# Patient Record
Sex: FEMALE | Race: WHITE | NOT HISPANIC OR LATINO | Employment: FULL TIME | URBAN - METROPOLITAN AREA
[De-identification: names, ages, dates, MRNs, and addresses within clinical notes are randomized per-mention and may not be internally consistent; named-entity substitution may affect disease eponyms.]

---

## 2017-01-02 ENCOUNTER — VIRTUAL VISIT (OUTPATIENT)
Dept: FAMILY MEDICINE | Facility: OTHER | Age: 42
End: 2017-01-02

## 2017-01-02 NOTE — PROGRESS NOTES
"Date:   Clinician: Tracey Guerra  Clinician NPI: [id number]  Patient: Lena Morrow  Patient : 1975  Patient Address: [address] [place] [address]  Patient Phone: [phone]  Visit Protocol: URI  Patient Summary:  Lena is a 41 year old ( : 1975 ) female who initiated a Zip for a presumed sinus infection. When asked the question \"Do you have a [place] primary care physician?\", Lena responded \"Yes\".     Her symptoms started gradually 1 week ago and consist of malaise, sore throat, hoarse voice, nasal congestion, and post-nasal drainage.   She denies dysphagia, nausea, vomiting, itchy eyes, fever, cough, myalgias, chest pain, petechial or purpuric rash, dyspnea, rhinitis, loss of appetite, chills, and ear pain. She denies a history of facial surgery.   Her minimal nasal secretions are green. Her mild facial pain or pressure feels worse when bending over or leaning forward and is located on both sides of her head. The facial pain or pressure started after the onset of other URI symptoms. Lena has a mild headache. The headache did not start before her other symptoms and is located on both sides of her head.   In the past year Lena has been diagnosed with two (2) episodes of sinusitis.   She has a mildly painful sore throat. The patient denies having white spots on the tonsils similar to a sample strep throat image provided. She has not been exposed to Strep. When asked to feel her neck she denied feeling enlarged lymph nodes. She denies axillary lymphadenopathy.    Lena denies having COPD or other chronic lung disease.   Pulse: Not measured beats in 10 seconds.    Weight (in lbs): 199.0   She states she is not pregnant and denies breastfeeding. She no longer menstruates.   Lena does not smoke or use smokeless tobacco.  MEDICATIONS:  Loratadine (Claritin, Tavist ND), acetaminophen (Tylenol), saline nose drops/spray (SeaMist, Ocean Nasal Spray), and guaifenesin (Robitussin, " Mucinex)   Patient free text response:  Lamotrigine   Buproprion   Kameron   Omeprazole     , ALLERGIES:   penicillin/amoxicillin/augmentin and Z-pack/azithromycin/clarithromycin/erythromycin    Clinician Response:  Dear Lena,  Based on the information you have provided, you likely have  acute sinusitis, otherwise known as a sinus infection.   I am prescribing cefdinir (Omnicef). Take one capsule by mouth two times a day for 10 days. There are no refills with this prescription.   Try the following to help with your throat pain and discomfort:     Use throat lozenges    Gargle with warm salt water (1/4 teaspoon of salt per 8 ounce glass of water).    Suck on frozen items such as Popsicles or ice cubes.     Call 911 or go to the emergency room if you feel that your throat is closing off, you suddenly develop a rash, you are unable to swallow fluids, you are drooling, or you are having difficulty breathing.  Follow up with your primary care clinician if your symptoms are not improving in 3-4 days.   Drink plenty of liquids, especially water and take time to rest your body. This may mean taking a nap or going to bed earlier. Your body is fighting an infection and liquids and rest will improve the pace of recovery. Remember to regularly wash your hands and avoid close contact with others to prevent spreading your infection.   Some people develop allergies to antibiotics. If you notice a new rash, significant swelling or difficulty breathing, stop the medication immediately and go into a clinic for physical evaluation.   Some women may also develop a yeast infection as a side effect of taking antibiotics. If you notice symptoms of a yeast infection, please use Zipnosis to get treatment.   If you become pregnant during this course of treatment with medication, stop taking the medication and contact your primary care clinician.   Diagnosis: Acute Sinusitis  Diagnosis ICD: J01.90  Prescription: cefdinir (Omnicef) 300 mg  oral capsule 20 capsules, 10 days supply. Take one capsule by mouth two times a day for 10 days. Refills: 0, Refill as needed: no, Allow substitutions: yes  Prescription Sent At: January 02 14:44:09, 2017  Pharmacy: [facility] [id number] - [phone] - [address] [place] [address]

## 2017-01-18 ENCOUNTER — VIRTUAL VISIT (OUTPATIENT)
Dept: FAMILY MEDICINE | Facility: OTHER | Age: 42
End: 2017-01-18

## 2017-01-18 NOTE — PROGRESS NOTES
"Date:   Clinician: Jaqueline Singh  Clinician NPI: [id number]  Patient: Lena Morrow  Patient : 1975  Patient Address: [address] [place] [id number]  Patient Phone: [phone]  Visit Protocol: URI  Patient Summary:  Lena is a 41 year old ( : 1975 ) female who initiated a Zip for a presumed sinus infection. When asked the question \"Do you have a [place] primary care physician?\", Lena responded \"Yes\".     Her symptoms started gradually 3-6 days ago and consist of post-nasal drainage, cough, malaise, rhinitis, and nasal congestion.   She denies fever, myalgias, chest pain, dysphagia, nausea, vomiting, itchy eyes, chills, sore throat, hoarse voice, ear pain, loss of appetite, and dyspnea. She denies a history of facial surgery.   Her moderate nasal secretions are green. Her mild facial pain or pressure feels worse when bending over or leaning forward and is located on both sides of her head. The facial pain or pressure started after the onset of other URI symptoms.  Lena has a mild headache. The headache did not start before her other symptoms and is located on both sides of her head.   In the past year Lena has been diagnosed with one (1) episodes of sinusitis. When asked to feel her neck she denied feeling enlarged lymph nodes. She cannot tell if axillary lymphadenopathy is present.   Her moderately severe (cough every 5-10 minutes) productive cough is more bothersome at night. She believes the cough is caused by post-nasal drainage. Her cough produces green sputum.    Lena denies having COPD or other chronic lung disease.   Pulse: Not measured beats in 10 seconds.    Weight (in lbs): 188   She states she is not pregnant and denies breastfeeding. She has menstruated in the past month.   Lena smokes or uses smokeless tobacco.  MEDICATIONS:  Antacid, guaifenesin + dextromethorphan (Robitussin DM, Mytussin DM, Mucinex DM), loratadine (Claritin), loratadine (Claritin, Tavist " ND), and naproxen (Aleve, Naprosyn)   Patient free text response:  Lamotrigine   Buproprion   Omeprazole   Joricarda  , ALLERGIES:   Z-pack/azithromycin/clarithromycin/erythromycin and penicillin/amoxicillin/augmentin    Clinician Response:  Dear Lena,  Based on the information you have provided, I am unable to diagnose and treat you without additional information. Please be seen in a clinic or urgent care to determine the treatment that is best for you. You will not be charged for this Zip. Thanks for using Zipnosis.   Diagnosis: Unable to diagnose  Diagnosis ICD: R69  Additional Clinician Notes: Lena, You were just treated on Zipnosis on 1/2/17 (16 days ago) for a sinus infection with the antibiotic: omnicef. Due to this history, you need to be seen in a clinical setting for evaluation.

## 2017-04-19 DIAGNOSIS — B00.9 HERPES SIMPLEX VIRUS (HSV) INFECTION: ICD-10-CM

## 2017-04-19 RX ORDER — VALACYCLOVIR HYDROCHLORIDE 500 MG/1
TABLET, FILM COATED ORAL
Qty: 6 TABLET | Refills: 0 | Status: CANCELLED | OUTPATIENT
Start: 2017-04-19

## 2017-04-20 DIAGNOSIS — B00.9 HSV (HERPES SIMPLEX VIRUS) INFECTION: Primary | ICD-10-CM

## 2017-04-20 NOTE — TELEPHONE ENCOUNTER
Okay, but unfortunately no pharmacy identified.  If ongoing problems, would need appointment or virtual visit.

## 2017-04-20 NOTE — TELEPHONE ENCOUNTER
Routing refill request to provider for review/approval because:  Labs out of range:  Cr  Patient needs to be seen because it has been more than 1 year since last office visit.    Patient reporting has current herpes simplex outbreak on R) arm.  Outbreak started yesterday, c/o discomfort.  Patient wanting Valtrex refilled.  Per records last seen for office visit 12/4/2015, however patient insists she has seen you since then.       Valtrex     Last Written Prescription Date: 9/8/2015  Last Fill Quantity: 6, # refills: 6  [place] [facility] or [facility] prescribing provider: 12/4/2015        Creatinine   Date Value Ref Range Status   11/18/2014 0.83 0.52 - 1.04 mg/dL Final

## 2017-04-21 RX ORDER — VALACYCLOVIR HYDROCHLORIDE 500 MG/1
TABLET, FILM COATED ORAL
Qty: 6 TABLET | Refills: 1 | Status: ON HOLD | OUTPATIENT
Start: 2017-04-21 | End: 2017-05-26

## 2017-05-01 ENCOUNTER — TRANSFERRED RECORDS (OUTPATIENT)
Dept: HEALTH INFORMATION MANAGEMENT | Facility: CLINIC | Age: 42
End: 2017-05-01

## 2017-05-01 LAB — PAP SMEAR - HIM PATIENT REPORTED: NEGATIVE

## 2017-05-08 ENCOUNTER — OFFICE VISIT (OUTPATIENT)
Dept: INTERNAL MEDICINE | Facility: CLINIC | Age: 42
End: 2017-05-08
Payer: COMMERCIAL

## 2017-05-08 VITALS
OXYGEN SATURATION: 96 % | BODY MASS INDEX: 31.4 KG/M2 | DIASTOLIC BLOOD PRESSURE: 82 MMHG | SYSTOLIC BLOOD PRESSURE: 112 MMHG | HEIGHT: 64 IN | TEMPERATURE: 98 F | WEIGHT: 183.9 LBS | HEART RATE: 90 BPM

## 2017-05-08 DIAGNOSIS — F31.9 BIPOLAR AFFECTIVE DISORDER, REMISSION STATUS UNSPECIFIED (H): ICD-10-CM

## 2017-05-08 DIAGNOSIS — Z01.818 PREOP GENERAL PHYSICAL EXAM: Primary | ICD-10-CM

## 2017-05-08 DIAGNOSIS — K21.9 GASTROESOPHAGEAL REFLUX DISEASE WITHOUT ESOPHAGITIS: ICD-10-CM

## 2017-05-08 DIAGNOSIS — N94.6 DYSMENORRHEA: ICD-10-CM

## 2017-05-08 DIAGNOSIS — N92.0 EXCESSIVE OR FREQUENT MENSTRUATION: ICD-10-CM

## 2017-05-08 DIAGNOSIS — F41.1 GENERALIZED ANXIETY DISORDER: ICD-10-CM

## 2017-05-08 DIAGNOSIS — G43.809 OTHER TYPE OF MIGRAINE: ICD-10-CM

## 2017-05-08 DIAGNOSIS — J45.21 MILD INTERMITTENT ASTHMA WITH ACUTE EXACERBATION: ICD-10-CM

## 2017-05-08 LAB
BASOPHILS # BLD AUTO: 0 10E9/L (ref 0–0.2)
BASOPHILS NFR BLD AUTO: 0.2 %
CREAT SERPL-MCNC: 0.99 MG/DL (ref 0.52–1.04)
DIFFERENTIAL METHOD BLD: NORMAL
EOSINOPHIL # BLD AUTO: 0.1 10E9/L (ref 0–0.7)
EOSINOPHIL NFR BLD AUTO: 1.1 %
ERYTHROCYTE [DISTWIDTH] IN BLOOD BY AUTOMATED COUNT: 12.4 % (ref 10–15)
GFR SERPL CREATININE-BSD FRML MDRD: 62 ML/MIN/1.7M2
HCT VFR BLD AUTO: 44.7 % (ref 35–47)
HGB BLD-MCNC: 14.4 G/DL (ref 11.7–15.7)
LYMPHOCYTES # BLD AUTO: 2.1 10E9/L (ref 0.8–5.3)
LYMPHOCYTES NFR BLD AUTO: 24.5 %
MCH RBC QN AUTO: 30.4 PG (ref 26.5–33)
MCHC RBC AUTO-ENTMCNC: 32.2 G/DL (ref 31.5–36.5)
MCV RBC AUTO: 94 FL (ref 78–100)
MONOCYTES # BLD AUTO: 0.6 10E9/L (ref 0–1.3)
MONOCYTES NFR BLD AUTO: 6.3 %
NEUTROPHILS # BLD AUTO: 5.9 10E9/L (ref 1.6–8.3)
NEUTROPHILS NFR BLD AUTO: 67.9 %
PLATELET # BLD AUTO: 243 10E9/L (ref 150–450)
RBC # BLD AUTO: 4.74 10E12/L (ref 3.8–5.2)
WBC # BLD AUTO: 8.7 10E9/L (ref 4–11)

## 2017-05-08 PROCEDURE — 85025 COMPLETE CBC W/AUTO DIFF WBC: CPT | Performed by: PHYSICIAN ASSISTANT

## 2017-05-08 PROCEDURE — 99214 OFFICE O/P EST MOD 30 MIN: CPT | Performed by: PHYSICIAN ASSISTANT

## 2017-05-08 PROCEDURE — 82565 ASSAY OF CREATININE: CPT | Performed by: PHYSICIAN ASSISTANT

## 2017-05-08 PROCEDURE — 36415 COLL VENOUS BLD VENIPUNCTURE: CPT | Performed by: PHYSICIAN ASSISTANT

## 2017-05-08 NOTE — TELEPHONE ENCOUNTER
albuterol (PROAIR HFA, PROVENTIL HFA, VENTOLIN HFA) 108 (90 BASE) MCG/ACT inhaler       Last Written Prescription Date: 12/04/2015  Last Fill Quantity: 1 inhaler, # refills: prn    Last Office Visit with [facility], [facility] or [facility] prescribing provider:  05/08/2017   Future Office Visit:       Date of Last Asthma Action Plan Letter:   Asthma Action Plan Q1 Year    Topic Date Due     Asthma Action Plan - yearly  01/06/2016      Asthma Control Test:   ACT Total Scores 9/1/2015   ACT TOTAL SCORE -   ASTHMA ER VISITS -   ASTHMA HOSPITALIZATIONS -   ACT TOTAL SCORE (Goal Greater than or Equal to 20) 25   In the past 12 months, how many times did you visit the emergency room for your asthma without being admitted to the hospital? 0   In the past 12 months, how many times were you hospitalized overnight because of your asthma? 0       Date of Last Spirometry Test:   No results found for this or any previous visit.

## 2017-05-08 NOTE — PROGRESS NOTES
[address] [address]  [phone]  Dept: [phone]    PRE-OP EVALUATION:  Today's date: 2017    Lena Morrow (: 1975) presents for pre-operative evaluation assessment as requested by Dr. Hastings.  She requires evaluation and anesthesia risk assessment prior to undergoing surgery/procedure for treatment of  Menorrhagia, metomenorrhagia .  Proposed procedure: ROBOTIC ASSISTED LAPAROSCOPIC TOTAL HYSTERECTOMY; BILATERAL SALPINGECTOMY; CYSTOSCOPY    Date of Surgery/ Procedure:   Time of Surgery/ Procedure: 7 am   Hospital/Surgical Facility: [facility]     Primary Physician: Trevon Crisostomo  Type of Anesthesia Anticipated: General    Patient has a Health Care Directive or Living Will:  YES     1. NO - Do you have a history of heart attack, stroke, stent, bypass or surgery on an artery in the head, neck, heart or legs?  2. NO - Do you ever have any pain or discomfort in your chest?  3. NO - Do you have a history of  Heart Failure?  4. NO - Are you troubled by shortness of breath when: walking on the level, up a slight hill or at night?  5. NO - Do you currently have a cold, bronchitis or other respiratory infection?  6. NO - Do you have a cough, shortness of breath or wheezing?  7. NO - Do you sometimes get pains in the calves of your legs when you walk?  8. NO - Do you or anyone in your family have previous history of blood clots?  9. NO - Do you or does anyone in your family have a serious bleeding problem such as prolonged bleeding following surgeries or cuts?  10. NO - Have you ever had problems with anemia or been told to take iron pills?  11. NO - Have you had any abnormal blood loss such as black, tarry or bloody stools, or abnormal vaginal bleeding?  12. NO - Have you ever had a blood transfusion?  13. NO - Have you or any of your relatives ever had problems with anesthesia?  14. NO - Do you have sleep apnea, excessive  snoring or daytime drowsiness?  15. NO - Do you have any prosthetic heart valves?  16. NO - Do you have prosthetic joints?  17. NO - Is there any chance that you may be pregnant?      HPI:                                                      Brief HPI related to upcoming procedure: dysmenorrhea/ menorrhagia/ failed with conservative treatments       See problem list for active medical problems.  Problems all longstanding and stable, except as noted/documented.  See ROS for pertinent symptoms related to these conditions.                                                                                                  .    MEDICAL HISTORY:                                                      Patient Active Problem List    Diagnosis Date Noted     Kidney stones 10/13/2014     Priority: Medium     LSIL (low grade squamous intraepithelial lesion) on Pap smear 10/10/2012     Priority: Medium     10/10/12 LSIL on pap smear done at [facility]  11/2012 Colposcopy done at [facility], pt. Is to have a repeat pap in four months.  4/2013 NL pap, pt. Is to have a repeat pap in one year, 4/2014  Endometrial biopsy benign 12/2015       Bipolar disorder (H) 04/27/2015     History of overspending       Recurrent sinusitis 09/25/2014     3/2014, 9/2014, 9/2015, 10/2015       Vitamin D deficiency 06/10/2014     Abnormal pap      Generalized anxiety disorder 03/20/2013     Atrial flutter, paroxysmal (H)      Occurred during pregnancy, none since.  On meds since.  Rare symptoms lasting up to 2 minutes.       Health Care Home 06/20/2011     X  DX V65.8 REPLACED WITH [facility] (04/08/2013)       CARDIOVASCULAR SCREENING; LDL GOAL LESS THAN 160 10/31/2010     Depression      Onset 2001 after death of multiple family members.  Has been on and off meds for recurrent episodes.  Poor control of symptoms for years       DDD (degenerative disc disease), cervical      Obesity 02/12/2009     Backache 08/30/2007      Problem list name updated by automated process. Provider to review       Other type of migraine      Diagnosis updated by automated process. Provider to review and confirm.       Herpes simplex virus (HSV) infection      Affects R hand area         ASTHMA - MILD INTERMITTENT 2005     Rare need for albuterol       Panic disorder without agoraphobia 2005     DYSPEPSIA 2005     Gastroesophageal reflux disease 2005     Allergic rhinitis 2005     Problem list name updated by automated process. Provider to review        Past Medical History:   Diagnosis Date     Allergic rhinitis, cause unspecified      Arrhythmia     atrial flutter, paroxysmal     Asthma      Atrial flutter, paroxysmal (H)      C. difficile colitis 10/14     Cholelithiasis      DDD (degenerative disc disease), cervical      DYSPEPSIA      Gastro-oesophageal reflux disease      Herpes simplex without mention of complication      LSIL (low grade squamous intraepithelial lesion) on Pap smear 10/10/12    Done at [facility]     Major depression      Mild intermittent asthma      Obesity      Other forms of migraine, without mention of intractable migraine without mention of status migrainosus      Panic disorder without agoraphobia      Past Surgical History:   Procedure Laterality Date     C NONSPECIFIC PROCEDURE      tonsillectomy     C NONSPECIFIC PROCEDURE      Open right carpal tunnel release.  Excision right dorsal carpal ganglion cyst. Excision, terminal branch, right posterior interosseous nerve.        COLONOSCOPY       DILATION AND CURETTAGE N/A 2016    Procedure: DILATION AND CURETTAGE;  Surgeon: Josue Jama MD;  Location: [facility]     GYN SURGERY           HC COLP CERVIX/UPPER VAGINA W BX CERVIX  10/30/12    [facility]     LAPAROSCOPIC CHOLECYSTECTOMY  4/3/2012    Procedure:LAPAROSCOPIC CHOLECYSTECTOMY; LAPAROSCOPIC CHOLECYSTECTOMY ; Surgeon:KARYNA CAMARA;  Location:[facility]     LAPAROSCOPY DIAGNOSTIC (GYN) N/A 1/5/2016    Procedure: LAPAROSCOPY DIAGNOSTIC (GYN);  Surgeon: Josue Jama MD;  Location: [facility]     ORTHOPEDIC SURGERY      carpal tunnel with ganglian cyst removal     Current Outpatient Prescriptions   Medication Sig Dispense Refill     valACYclovir (VALTREX) 500 MG tablet 1 tab twice a day for 3 days for recurrent herpes outbreak 6 tablet 1     methocarbamol (ROBAXIN) 750 MG tablet Take 1 tablet (750 mg) by mouth 4 times daily as needed 30 tablet 1     lamoTRIgine (LAMICTAL) 200 MG tablet Take 1 tablet twice per day (400 mg per day) 60 tablet 5     buPROPion (WELLBUTRIN XL) 150 MG 24 hr tablet Take 3 tablets in the morning 90 tablet 5     propranolol (INDERAL) 20 MG tablet Take 1-2 tablets as needed for anxiety (up to twice a day) 30 tablet 2     fluticasone (FLONASE) 50 MCG/ACT nasal spray Spray 2 sprays into both nostrils daily 1 Bottle prn     omeprazole 20 MG tablet Take 1 tablet (20 mg) by mouth daily Take 30-60 minutes before a meal. 90 tablet prn     saccharomyces boulardii (FLORASTOR) 250 MG capsule Take 250 mg by mouth 2 times daily       oxyCODONE (ROXICODONE) 5 MG immediate release tablet Take 1-2 tablets (5-10 mg) by mouth every 3 hours as needed for pain or other (Moderate to Severe) 30 tablet 0     LORazepam (ATIVAN) 0.5 MG tablet Take 1-2 tablets (0.5-1 mg) by mouth nightly as needed 15 tablet 0     albuterol (PROAIR HFA, PROVENTIL HFA, VENTOLIN HFA) 108 (90 BASE) MCG/ACT inhaler Inhale 2 puffs into the lungs every 6 hours as needed for shortness of breath / dyspnea or wheezing 1 Inhaler prn     ascorbic acid (VITAMIN C) 500 MG tablet Take 1,000 mg by mouth daily  30 tablet      Cholecalciferol (VITAMIN D) 2000 UNITS tablet Take 5,000 Units by mouth daily  100 tablet 3     loratadine (CLARITIN) 10 MG capsule Take 10 mg by mouth 2 times daily       Prenatal vitamin  s (DIS) TABS Take 1 tablet by mouth daily        OTC products: None,  "except as noted above    Allergies   Allergen Reactions     Amoxicillin      yeast vaginal inf     Ativan Fatigue     Augmentin [Amoxicillin-Pot Clavulanate]      itching     Azithromycin      GI cramps     Clonazepam      Sedation, even at 0.5 mg dose     Estrogens      # bcps cause cns sx     Lexapro      diarrhea       Mirtazapine      Sedation      Prochlorperazine      Compazine- agitation     Seasonal Allergies      Venlafaxine      sweats     Xanax [Alprazolam] Fatigue      Latex Allergy: NO    Social History   Substance Use Topics     Smoking status: Never Smoker     Smokeless tobacco: Never Used     Alcohol use Yes      Comment: Very rare     History   Drug Use No       REVIEW OF SYSTEMS:                                                    C: NEGATIVE for fever, chills, change in weight  INTEGUMENTARY/SKIN: NEGATIVE for worrisome rashes, moles or lesions  EYES: NEGATIVE for vision changes or irritation  E/M: NEGATIVE for ear, mouth and throat problems  R: NEGATIVE for significant cough or SOB  CV: NEGATIVE for chest pain, palpitations or peripheral edema  GI: NEGATIVE for nausea, abdominal pain, heartburn, or change in bowel habits  MUSCULOSKELETAL: NEGATIVE for significant arthralgias or myalgia  ENDOCRINE: NEGATIVE for temperature intolerance, skin/hair changes  HEME/ALLERGY/IMMUNE: NEGATIVE for bleeding problems- just heavy menses  PSYCHIATRIC: NEGATIVE for changes in mood or affect  ROS otherwise negative    EXAM:                                                    /82 (BP Location: Left arm, Patient Position: Chair, Cuff Size: Adult Regular)  Pulse 90  Temp 98  F (36.7  C) (Oral)  Ht 5' 4\" (1.626 m)  Wt 183 lb 14.4 oz (83.4 kg)  SpO2 96%  BMI 31.57 kg/m2  GENERAL APPEARANCE: healthy, alert and no distress  HENT: ear canals and TM's normal and nose and mouth without ulcers or lesions  RESP: lungs clear to auscultation - no rales, rhonchi or wheezes  CV: regular rate and rhythm, normal S1 S2, " no S3 or S4 and no murmur, click or rub   ABDOMEN: soft, nontender, no HSM or masses and bowel sounds normal  MS: extremities normal- no gross deformities noted  SKIN: no suspicious lesions or rashes  NEURO: Normal strength and tone, sensory exam grossly normal, mentation intact and speech normal  PSYCH: mentation appears normal and affect normal/bright    DIAGNOSTICS:                                                      EKG: Not indicated due to non-vascular surgery and low risk of event (age <65 and without cardiac risk factors)  Labs Drawn and in Process:   Unresulted Labs Ordered in the Past 30 Days of this Admission     No orders found from 3/9/2017 to 5/9/2017.       Creatinine and CBC ordered at time of visit - pending     Recent Labs   Lab Test  11/18/14   2249  10/09/14   1230   HGB  15.2  14.1   PLT  259  276   NA  144  139   POTASSIUM  3.5  3.9   CR  0.83  0.83        IMPRESSION:                                                    Reason for surgery/procedure: menorrhagia, dysmenorrhea   Diagnosis/reason for consult: bipolar, anxiety disorder migraine, gerd    The proposed surgical procedure is considered INTERMEDIATE risk.    REVISED CARDIAC RISK INDEX  The patient has the following serious cardiovascular risks for perioperative complications such as (MI, PE, VFib and 3  AV Block):  No serious cardiac risks  INTERPRETATION: 0 risks: Class I (very low risk - 0.4% complication rate)    The patient has the following additional risks for perioperative complications:  No identified additional risks      ICD-10-CM    1. Preop general physical exam Z01.818 CBC with platelets differential     Creatinine   2. Excessive or frequent menstruation N92.0 CBC with platelets differential     Creatinine   3. Dysmenorrhea N94.6 CBC with platelets differential     Creatinine   4. Bipolar affective disorder, remission status unspecified (H) F31.9    5. Generalized anxiety disorder F41.1    6. Gastroesophageal reflux disease  K21.9    7. Other type of migraine G43.809        RECOMMENDATIONS:                                                          --Patient is to take all scheduled medications on the day of surgery EXCEPT for modifications listed below.    Anticoagulant or Antiplatelet Medication Use  NSAIDS: stop 7 days prior to surgery - including any aspirin products       Hold vitamins supplements the am of surgery       APPROVAL GIVEN to proceed with proposed procedure, without further diagnostic evaluation       Signed Electronically by: Amelia Strauss PA-C    Copy of this evaluation report is provided to requesting physician.    [facility] Preop Guidelines

## 2017-05-08 NOTE — MR AVS SNAPSHOT
After Visit Summary   5/8/2017    Lena Morrow    MRN: [id number]           Patient Information     Date Of Birth          1975        Visit Information        Provider Department      5/8/2017 3:20 PM Amelia Strauss PA-C [facility]        Today's Diagnoses     Preop general physical exam    -  1    Excessive or frequent menstruation        Dysmenorrhea          Care Instructions    No ibuprofen/ aspirin products after the 19th of may  Ok to take your routine medications with a sip of water morning of surgery  NO vitamins the morning of surgery .        Before Your Surgery      Call your surgeon if there is any change in your health. This includes signs of a cold or flu (such as a sore throat, runny nose, cough, rash or fever).    Do not smoke, drink alcohol or take over the counter medicine (unless your surgeon or primary care doctor tells you to) for the 24 hours before and after surgery.    If you take prescribed drugs: Follow your doctor s orders about which medicines to take and which to stop until after surgery.    Eating and drinking prior to surgery: follow the instructions from your surgeon    Take a shower or bath the night before surgery. Use the soap your surgeon gave you to gently clean your skin. If you do not have soap from your surgeon, use your regular soap. Do not shave or scrub the surgery site.  Wear clean pajamas and have clean sheets on your bed.         Follow-ups after your visit        Your next 10 appointments already scheduled     May 26, 2017   Procedure with Toni Da Silva MD   [facility] (--)    [address]  [facility] [address] [phone]              Who to contact     If you have questions or need follow up information about today's clinic visit or your schedule please contact [facility] directly at [phone].  Normal or non-critical lab and imaging results  "will be communicated to you by MyChart, letter or phone within 4 business days after the clinic has received the results. If you do not hear from us within 7 days, please contact the clinic through [facility] or phone. If you have a critical or abnormal lab result, we will notify you by phone as soon as possible.  Submit refill requests through [facility] or call your pharmacy and they will forward the refill request to us. Please allow 3 business days for your refill to be completed.          Additional Information About Your Visit        [facility] Information     [facility] gives you secure access to your electronic health record. If you see a primary care provider, you can also send messages to your care team and make appointments. If you have questions, please call your primary care clinic.  If you do not have a primary care provider, please call [phone] and they will assist you.        Care EveryWhere ID     This is your Care EveryWhere ID. This could be used by other organizations to access your [place] medical records  [id number]        Your Vitals Were     Pulse Temperature Height Pulse Oximetry BMI (Body Mass Index)       90 98  F (36.7  C) (Oral) 5' 4\" (1.626 m) 96% 31.57 kg/m2        Blood Pressure from Last 3 Encounters:   05/08/17 112/82   08/26/16 114/84   01/05/16 114/77    Weight from Last 3 Encounters:   05/08/17 183 lb 14.4 oz (83.4 kg)   08/26/16 193 lb 6.4 oz (87.7 kg)   01/05/16 190 lb 6.4 oz (86.4 kg)              We Performed the Following     CBC with platelets differential     Creatinine        Primary Care Provider Office Phone # Fax #    Trevon Crisostomo [phone]       [facility] [address] [facility] [address]        Thank you!     Thank you for choosing [facility]  for your care. Our goal is always to provide you with excellent care. Hearing back from our patients is one way we can continue to improve our services. Please " take a few minutes to complete the written survey that you may receive in the mail after your visit with us. Thank you!             Your Updated Medication List - Protect others around you: Learn how to safely use, store and throw away your medicines at www.disposemymeds.org.          This list is accurate as of: 5/8/17  3:55 PM.  Always use your most recent med list.                   Brand Name Dispense Instructions for use    albuterol 108 (90 BASE) MCG/ACT Inhaler    PROAIR HFA/PROVENTIL HFA/VENTOLIN HFA    1 Inhaler    Inhale 2 puffs into the lungs every 6 hours as needed for shortness of breath / dyspnea or wheezing       ascorbic acid 500 MG tablet    VITAMIN C    30 tablet    Take 1,000 mg by mouth daily       buPROPion 150 MG 24 hr tablet    WELLBUTRIN XL    90 tablet    Take 3 tablets in the morning       CLARITIN 10 MG capsule   Generic drug:  loratadine      Take 10 mg by mouth 2 times daily       fluticasone 50 MCG/ACT spray    FLONASE    1 Bottle    Spray 2 sprays into both nostrils daily       lamoTRIgine 200 MG tablet    LaMICtal    60 tablet    Take 1 tablet twice per day (400 mg per day)       LORazepam 0.5 MG tablet    ATIVAN    15 tablet    Take 1-2 tablets (0.5-1 mg) by mouth nightly as needed       methocarbamol 750 MG tablet    ROBAXIN    30 tablet    Take 1 tablet (750 mg) by mouth 4 times daily as needed       omeprazole 20 MG tablet     90 tablet    Take 1 tablet (20 mg) by mouth daily Take 30-60 minutes before a meal.       oxyCODONE 5 MG IR tablet    ROXICODONE    30 tablet    Take 1-2 tablets (5-10 mg) by mouth every 3 hours as needed for pain or other (Moderate to Severe)       Prenatal vitamin  s (DIS) Tabs      Take 1 tablet by mouth daily       propranolol 20 MG tablet    INDERAL    30 tablet    Take 1-2 tablets as needed for anxiety (up to twice a day)       saccharomyces boulardii 250 MG capsule    FLORASTOR     Take 250 mg by mouth 2 times daily       valACYclovir 500 MG tablet     VALTREX    6 tablet    1 tab twice a day for 3 days for recurrent herpes outbreak       vitamin D 2000 UNITS tablet     100 tablet    Take 5,000 Units by mouth daily

## 2017-05-08 NOTE — PATIENT INSTRUCTIONS
No ibuprofen/ aspirin products after the 19th of may  Ok to take your routine medications with a sip of water morning of surgery  NO vitamins the morning of surgery .        Before Your Surgery      Call your surgeon if there is any change in your health. This includes signs of a cold or flu (such as a sore throat, runny nose, cough, rash or fever).    Do not smoke, drink alcohol or take over the counter medicine (unless your surgeon or primary care doctor tells you to) for the 24 hours before and after surgery.    If you take prescribed drugs: Follow your doctor s orders about which medicines to take and which to stop until after surgery.    Eating and drinking prior to surgery: follow the instructions from your surgeon    Take a shower or bath the night before surgery. Use the soap your surgeon gave you to gently clean your skin. If you do not have soap from your surgeon, use your regular soap. Do not shave or scrub the surgery site.  Wear clean pajamas and have clean sheets on your bed.

## 2017-05-08 NOTE — NURSING NOTE
"Chief Complaint   Patient presents with     Pre-Op Exam     hysterectomy 05/26       Initial /82 (BP Location: Left arm, Patient Position: Chair, Cuff Size: Adult Regular)  Pulse 90  Temp 98  F (36.7  C) (Oral)  Ht 5' 4\" (1.626 m)  Wt 183 lb 14.4 oz (83.4 kg)  SpO2 96%  BMI 31.57 kg/m2 Estimated body mass index is 31.57 kg/(m^2) as calculated from the following:    Height as of this encounter: 5' 4\" (1.626 m).    Weight as of this encounter: 183 lb 14.4 oz (83.4 kg).  Medication Reconciliation: complete    "

## 2017-05-09 NOTE — TELEPHONE ENCOUNTER
Routing refill request to provider for review/approval because:  Last ACT test 2015 per [facility] protocol to be done every 6 months

## 2017-05-10 RX ORDER — ALBUTEROL SULFATE 90 UG/1
2 AEROSOL, METERED RESPIRATORY (INHALATION) EVERY 6 HOURS PRN
Qty: 1 INHALER | Status: SHIPPED | OUTPATIENT
Start: 2017-05-10 | End: 2018-04-24

## 2017-05-23 NOTE — H&P (VIEW-ONLY)
[address] [address]  [phone]  Dept: [phone]    PRE-OP EVALUATION:  Today's date: 2017    Lena Morrow (: 1975) presents for pre-operative evaluation assessment as requested by Dr. Hastings.  She requires evaluation and anesthesia risk assessment prior to undergoing surgery/procedure for treatment of  Menorrhagia, metomenorrhagia .  Proposed procedure: ROBOTIC ASSISTED LAPAROSCOPIC TOTAL HYSTERECTOMY; BILATERAL SALPINGECTOMY; CYSTOSCOPY    Date of Surgery/ Procedure:   Time of Surgery/ Procedure: 7 am   Hospital/Surgical Facility: [facility]     Primary Physician: Trevon Crisostomo  Type of Anesthesia Anticipated: General    Patient has a Health Care Directive or Living Will:  YES     1. NO - Do you have a history of heart attack, stroke, stent, bypass or surgery on an artery in the head, neck, heart or legs?  2. NO - Do you ever have any pain or discomfort in your chest?  3. NO - Do you have a history of  Heart Failure?  4. NO - Are you troubled by shortness of breath when: walking on the level, up a slight hill or at night?  5. NO - Do you currently have a cold, bronchitis or other respiratory infection?  6. NO - Do you have a cough, shortness of breath or wheezing?  7. NO - Do you sometimes get pains in the calves of your legs when you walk?  8. NO - Do you or anyone in your family have previous history of blood clots?  9. NO - Do you or does anyone in your family have a serious bleeding problem such as prolonged bleeding following surgeries or cuts?  10. NO - Have you ever had problems with anemia or been told to take iron pills?  11. NO - Have you had any abnormal blood loss such as black, tarry or bloody stools, or abnormal vaginal bleeding?  12. NO - Have you ever had a blood transfusion?  13. NO - Have you or any of your relatives ever had problems with anesthesia?  14. NO - Do you have sleep apnea, excessive  snoring or daytime drowsiness?  15. NO - Do you have any prosthetic heart valves?  16. NO - Do you have prosthetic joints?  17. NO - Is there any chance that you may be pregnant?      HPI:                                                      Brief HPI related to upcoming procedure: dysmenorrhea/ menorrhagia/ failed with conservative treatments       See problem list for active medical problems.  Problems all longstanding and stable, except as noted/documented.  See ROS for pertinent symptoms related to these conditions.                                                                                                  .    MEDICAL HISTORY:                                                      Patient Active Problem List    Diagnosis Date Noted     Kidney stones 10/13/2014     Priority: Medium     LSIL (low grade squamous intraepithelial lesion) on Pap smear 10/10/2012     Priority: Medium     10/10/12 LSIL on pap smear done at [facility]  11/2012 Colposcopy done at [facility], pt. Is to have a repeat pap in four months.  4/2013 NL pap, pt. Is to have a repeat pap in one year, 4/2014  Endometrial biopsy benign 12/2015       Bipolar disorder (H) 04/27/2015     History of overspending       Recurrent sinusitis 09/25/2014     3/2014, 9/2014, 9/2015, 10/2015       Vitamin D deficiency 06/10/2014     Abnormal pap      Generalized anxiety disorder 03/20/2013     Atrial flutter, paroxysmal (H)      Occurred during pregnancy, none since.  On meds since.  Rare symptoms lasting up to 2 minutes.       Health Care Home 06/20/2011     X  DX V65.8 REPLACED WITH 7[facility]30 HEALTH CARE HOME (04/08/2013)       CARDIOVASCULAR SCREENING; LDL GOAL LESS THAN 160 10/31/2010     Depression      Onset 2001 after death of multiple family members.  Has been on and off meds for recurrent episodes.  Poor control of symptoms for years       DDD (degenerative disc disease), cervical      Obesity 02/12/2009     Backache 08/30/2007      Problem list name updated by automated process. Provider to review       Other type of migraine      Diagnosis updated by automated process. Provider to review and confirm.       Herpes simplex virus (HSV) infection      Affects R hand area         ASTHMA - MILD INTERMITTENT 2005     Rare need for albuterol       Panic disorder without agoraphobia 2005     DYSPEPSIA 2005     Gastroesophageal reflux disease 2005     Allergic rhinitis 2005     Problem list name updated by automated process. Provider to review        Past Medical History:   Diagnosis Date     Allergic rhinitis, cause unspecified      Arrhythmia     atrial flutter, paroxysmal     Asthma      Atrial flutter, paroxysmal (H)      C. difficile colitis 10/14     Cholelithiasis      DDD (degenerative disc disease), cervical      DYSPEPSIA      Gastro-oesophageal reflux disease      Herpes simplex without mention of complication      LSIL (low grade squamous intraepithelial lesion) on Pap smear 10/10/12    Done at [facility]     Major depression      Mild intermittent asthma      Obesity      Other forms of migraine, without mention of intractable migraine without mention of status migrainosus      Panic disorder without agoraphobia      Past Surgical History:   Procedure Laterality Date     C NONSPECIFIC PROCEDURE      tonsillectomy     C NONSPECIFIC PROCEDURE      Open right carpal tunnel release.  Excision right dorsal carpal ganglion cyst. Excision, terminal branch, right posterior interosseous nerve.        COLONOSCOPY       DILATION AND CURETTAGE N/A 2016    Procedure: DILATION AND CURETTAGE;  Surgeon: Josue Jama MD;  Location: [facility]     GYN SURGERY           HC COLP CERVIX/UPPER VAGINA W BX CERVIX  10/30/12    [facility]     LAPAROSCOPIC CHOLECYSTECTOMY  4/3/2012    Procedure:LAPAROSCOPIC CHOLECYSTECTOMY; LAPAROSCOPIC CHOLECYSTECTOMY ; Surgeon:KARYNA CAMARA;  Location:[facility]     LAPAROSCOPY DIAGNOSTIC (GYN) N/A 1/5/2016    Procedure: LAPAROSCOPY DIAGNOSTIC (GYN);  Surgeon: Josue Jama MD;  Location: [facility]     ORTHOPEDIC SURGERY      carpal tunnel with ganglian cyst removal     Current Outpatient Prescriptions   Medication Sig Dispense Refill     valACYclovir (VALTREX) 500 MG tablet 1 tab twice a day for 3 days for recurrent herpes outbreak 6 tablet 1     methocarbamol (ROBAXIN) 750 MG tablet Take 1 tablet (750 mg) by mouth 4 times daily as needed 30 tablet 1     lamoTRIgine (LAMICTAL) 200 MG tablet Take 1 tablet twice per day (400 mg per day) 60 tablet 5     buPROPion (WELLBUTRIN XL) 150 MG 24 hr tablet Take 3 tablets in the morning 90 tablet 5     propranolol (INDERAL) 20 MG tablet Take 1-2 tablets as needed for anxiety (up to twice a day) 30 tablet 2     fluticasone (FLONASE) 50 MCG/ACT nasal spray Spray 2 sprays into both nostrils daily 1 Bottle prn     omeprazole 20 MG tablet Take 1 tablet (20 mg) by mouth daily Take 30-60 minutes before a meal. 90 tablet prn     saccharomyces boulardii (FLORASTOR) 250 MG capsule Take 250 mg by mouth 2 times daily       oxyCODONE (ROXICODONE) 5 MG immediate release tablet Take 1-2 tablets (5-10 mg) by mouth every 3 hours as needed for pain or other (Moderate to Severe) 30 tablet 0     LORazepam (ATIVAN) 0.5 MG tablet Take 1-2 tablets (0.5-1 mg) by mouth nightly as needed 15 tablet 0     albuterol (PROAIR HFA, PROVENTIL HFA, VENTOLIN HFA) 108 (90 BASE) MCG/ACT inhaler Inhale 2 puffs into the lungs every 6 hours as needed for shortness of breath / dyspnea or wheezing 1 Inhaler prn     ascorbic acid (VITAMIN C) 500 MG tablet Take 1,000 mg by mouth daily  30 tablet      Cholecalciferol (VITAMIN D) 2000 UNITS tablet Take 5,000 Units by mouth daily  100 tablet 3     loratadine (CLARITIN) 10 MG capsule Take 10 mg by mouth 2 times daily       Prenatal vitamin  s (DIS) TABS Take 1 tablet by mouth daily        OTC products: None,  "except as noted above    Allergies   Allergen Reactions     Amoxicillin      yeast vaginal inf     Ativan Fatigue     Augmentin [Amoxicillin-Pot Clavulanate]      itching     Azithromycin      GI cramps     Clonazepam      Sedation, even at 0.5 mg dose     Estrogens      # bcps cause cns sx     Lexapro      diarrhea       Mirtazapine      Sedation      Prochlorperazine      Compazine- agitation     Seasonal Allergies      Venlafaxine      sweats     Xanax [Alprazolam] Fatigue      Latex Allergy: NO    Social History   Substance Use Topics     Smoking status: Never Smoker     Smokeless tobacco: Never Used     Alcohol use Yes      Comment: Very rare     History   Drug Use No       REVIEW OF SYSTEMS:                                                    C: NEGATIVE for fever, chills, change in weight  INTEGUMENTARY/SKIN: NEGATIVE for worrisome rashes, moles or lesions  EYES: NEGATIVE for vision changes or irritation  E/M: NEGATIVE for ear, mouth and throat problems  R: NEGATIVE for significant cough or SOB  CV: NEGATIVE for chest pain, palpitations or peripheral edema  GI: NEGATIVE for nausea, abdominal pain, heartburn, or change in bowel habits  MUSCULOSKELETAL: NEGATIVE for significant arthralgias or myalgia  ENDOCRINE: NEGATIVE for temperature intolerance, skin/hair changes  HEME/ALLERGY/IMMUNE: NEGATIVE for bleeding problems- just heavy menses  PSYCHIATRIC: NEGATIVE for changes in mood or affect  ROS otherwise negative    EXAM:                                                    /82 (BP Location: Left arm, Patient Position: Chair, Cuff Size: Adult Regular)  Pulse 90  Temp 98  F (36.7  C) (Oral)  Ht 5' 4\" (1.626 m)  Wt 183 lb 14.4 oz (83.4 kg)  SpO2 96%  BMI 31.57 kg/m2  GENERAL APPEARANCE: healthy, alert and no distress  HENT: ear canals and TM's normal and nose and mouth without ulcers or lesions  RESP: lungs clear to auscultation - no rales, rhonchi or wheezes  CV: regular rate and rhythm, normal S1 S2, " no S3 or S4 and no murmur, click or rub   ABDOMEN: soft, nontender, no HSM or masses and bowel sounds normal  MS: extremities normal- no gross deformities noted  SKIN: no suspicious lesions or rashes  NEURO: Normal strength and tone, sensory exam grossly normal, mentation intact and speech normal  PSYCH: mentation appears normal and affect normal/bright    DIAGNOSTICS:                                                      EKG: Not indicated due to non-vascular surgery and low risk of event (age <65 and without cardiac risk factors)  Labs Drawn and in Process:   Unresulted Labs Ordered in the Past 30 Days of this Admission     No orders found from 3/9/2017 to 5/9/2017.       Creatinine and CBC ordered at time of visit - pending     Recent Labs   Lab Test  11/18/14   2249  10/09/14   1230   HGB  15.2  14.1   PLT  259  276   NA  144  139   POTASSIUM  3.5  3.9   CR  0.83  0.83        IMPRESSION:                                                    Reason for surgery/procedure: menorrhagia, dysmenorrhea   Diagnosis/reason for consult: bipolar, anxiety disorder migraine, gerd    The proposed surgical procedure is considered INTERMEDIATE risk.    REVISED CARDIAC RISK INDEX  The patient has the following serious cardiovascular risks for perioperative complications such as (MI, PE, VFib and 3  AV Block):  No serious cardiac risks  INTERPRETATION: 0 risks: Class I (very low risk - 0.4% complication rate)    The patient has the following additional risks for perioperative complications:  No identified additional risks      ICD-10-CM    1. Preop general physical exam Z01.818 CBC with platelets differential     Creatinine   2. Excessive or frequent menstruation N92.0 CBC with platelets differential     Creatinine   3. Dysmenorrhea N94.6 CBC with platelets differential     Creatinine   4. Bipolar affective disorder, remission status unspecified (H) F31.9    5. Generalized anxiety disorder F41.1    6. Gastroesophageal reflux disease  K21.9    7. Other type of migraine G43.809        RECOMMENDATIONS:                                                          --Patient is to take all scheduled medications on the day of surgery EXCEPT for modifications listed below.    Anticoagulant or Antiplatelet Medication Use  NSAIDS: stop 7 days prior to surgery - including any aspirin products       Hold vitamins supplements the am of surgery       APPROVAL GIVEN to proceed with proposed procedure, without further diagnostic evaluation       Signed Electronically by: Amelia Strauss PA-C    Copy of this evaluation report is provided to requesting physician.    [facility] Preop Guidelines

## 2017-05-26 ENCOUNTER — HOSPITAL ENCOUNTER (OUTPATIENT)
Facility: CLINIC | Age: 42
Discharge: HOME | End: 2017-05-27
Attending: OBSTETRICS & GYNECOLOGY | Admitting: OBSTETRICS & GYNECOLOGY
Payer: COMMERCIAL

## 2017-05-26 ENCOUNTER — ANESTHESIA (OUTPATIENT)
Dept: SURGERY | Facility: CLINIC | Age: 42
End: 2017-05-26
Payer: COMMERCIAL

## 2017-05-26 ENCOUNTER — ANESTHESIA EVENT (OUTPATIENT)
Dept: SURGERY | Facility: CLINIC | Age: 42
End: 2017-05-26
Payer: COMMERCIAL

## 2017-05-26 DIAGNOSIS — Z90.710 S/P LAPAROSCOPIC HYSTERECTOMY: Primary | ICD-10-CM

## 2017-05-26 DIAGNOSIS — R10.2 PELVIC PAIN IN FEMALE: ICD-10-CM

## 2017-05-26 DIAGNOSIS — N80.9 ENDOMETRIOSIS: ICD-10-CM

## 2017-05-26 LAB
ABO + RH BLD: NORMAL
ABO + RH BLD: NORMAL
BLD GP AB SCN SERPL QL: NORMAL
BLOOD BANK CMNT PATIENT-IMP: NORMAL
ERYTHROCYTE [DISTWIDTH] IN BLOOD BY AUTOMATED COUNT: 12.4 % (ref 10–15)
HCG SERPL QL: NEGATIVE
HCT VFR BLD AUTO: 46.1 % (ref 35–47)
HGB BLD-MCNC: 15.3 G/DL (ref 11.7–15.7)
MCH RBC QN AUTO: 30.5 PG (ref 26.5–33)
MCHC RBC AUTO-ENTMCNC: 33.2 G/DL (ref 31.5–36.5)
MCV RBC AUTO: 92 FL (ref 78–100)
PLATELET # BLD AUTO: 245 10E9/L (ref 150–450)
RBC # BLD AUTO: 5.01 10E12/L (ref 3.8–5.2)
SPECIMEN EXP DATE BLD: NORMAL
WBC # BLD AUTO: 8.3 10E9/L (ref 4–11)

## 2017-05-26 PROCEDURE — 36000085 ZZH SURGERY LEVEL 8 1ST 30 MIN: Performed by: OBSTETRICS & GYNECOLOGY

## 2017-05-26 PROCEDURE — 25000566 ZZH SEVOFLURANE, EA 15 MIN: Performed by: OBSTETRICS & GYNECOLOGY

## 2017-05-26 PROCEDURE — 88307 TISSUE EXAM BY PATHOLOGIST: CPT | Performed by: OBSTETRICS & GYNECOLOGY

## 2017-05-26 PROCEDURE — 36415 COLL VENOUS BLD VENIPUNCTURE: CPT | Performed by: OBSTETRICS & GYNECOLOGY

## 2017-05-26 PROCEDURE — 84703 CHORIONIC GONADOTROPIN ASSAY: CPT | Performed by: OBSTETRICS & GYNECOLOGY

## 2017-05-26 PROCEDURE — 25000125 ZZHC RX 250: Performed by: ANESTHESIOLOGY

## 2017-05-26 PROCEDURE — 25000128 H RX IP 250 OP 636: Performed by: ANESTHESIOLOGY

## 2017-05-26 PROCEDURE — 25000128 H RX IP 250 OP 636: Performed by: OBSTETRICS & GYNECOLOGY

## 2017-05-26 PROCEDURE — 40000936 ZZH STATISTIC OUTPATIENT (NON-OBS) NIGHT

## 2017-05-26 PROCEDURE — 27210794 ZZH OR GENERAL SUPPLY STERILE: Performed by: OBSTETRICS & GYNECOLOGY

## 2017-05-26 PROCEDURE — 25000128 H RX IP 250 OP 636: Performed by: NURSE ANESTHETIST, CERTIFIED REGISTERED

## 2017-05-26 PROCEDURE — 25000125 ZZHC RX 250: Performed by: OBSTETRICS & GYNECOLOGY

## 2017-05-26 PROCEDURE — 36000087 ZZH SURGERY LEVEL 8 EA 15 ADDTL MIN: Performed by: OBSTETRICS & GYNECOLOGY

## 2017-05-26 PROCEDURE — S0077 INJECTION, CLINDAMYCIN PHOSP: HCPCS | Performed by: OBSTETRICS & GYNECOLOGY

## 2017-05-26 PROCEDURE — 27210995 ZZH RX 272: Performed by: OBSTETRICS & GYNECOLOGY

## 2017-05-26 PROCEDURE — 86901 BLOOD TYPING SEROLOGIC RH(D): CPT | Performed by: OBSTETRICS & GYNECOLOGY

## 2017-05-26 PROCEDURE — 37000009 ZZH ANESTHESIA TECHNICAL FEE, EACH ADDTL 15 MIN: Performed by: OBSTETRICS & GYNECOLOGY

## 2017-05-26 PROCEDURE — 86900 BLOOD TYPING SEROLOGIC ABO: CPT | Performed by: OBSTETRICS & GYNECOLOGY

## 2017-05-26 PROCEDURE — 25000125 ZZHC RX 250: Performed by: NURSE ANESTHETIST, CERTIFIED REGISTERED

## 2017-05-26 PROCEDURE — 40000935 ZZH STATISTIC OUTPATIENT (NON-OBS) EVE

## 2017-05-26 PROCEDURE — 25000132 ZZH RX MED GY IP 250 OP 250 PS 637: Performed by: OBSTETRICS & GYNECOLOGY

## 2017-05-26 PROCEDURE — 71000012 ZZH RECOVERY PHASE 1 LEVEL 1 FIRST HR: Performed by: OBSTETRICS & GYNECOLOGY

## 2017-05-26 PROCEDURE — 88307 TISSUE EXAM BY PATHOLOGIST: CPT | Mod: 26 | Performed by: OBSTETRICS & GYNECOLOGY

## 2017-05-26 PROCEDURE — 40000934 ZZH STATISTIC OUTPATIENT (NON-OBS) DAY

## 2017-05-26 PROCEDURE — 25800025 ZZH RX 258: Performed by: OBSTETRICS & GYNECOLOGY

## 2017-05-26 PROCEDURE — 71000013 ZZH RECOVERY PHASE 1 LEVEL 1 EA ADDTL HR: Performed by: OBSTETRICS & GYNECOLOGY

## 2017-05-26 PROCEDURE — 85027 COMPLETE CBC AUTOMATED: CPT | Performed by: OBSTETRICS & GYNECOLOGY

## 2017-05-26 PROCEDURE — 86850 RBC ANTIBODY SCREEN: CPT | Performed by: OBSTETRICS & GYNECOLOGY

## 2017-05-26 PROCEDURE — 40000170 ZZH STATISTIC PRE-PROCEDURE ASSESSMENT II: Performed by: OBSTETRICS & GYNECOLOGY

## 2017-05-26 PROCEDURE — 37000008 ZZH ANESTHESIA TECHNICAL FEE, 1ST 30 MIN: Performed by: OBSTETRICS & GYNECOLOGY

## 2017-05-26 RX ORDER — NALOXONE HYDROCHLORIDE 0.4 MG/ML
.1-.4 INJECTION, SOLUTION INTRAMUSCULAR; INTRAVENOUS; SUBCUTANEOUS
Status: DISCONTINUED | OUTPATIENT
Start: 2017-05-26 | End: 2017-05-27 | Stop reason: HOSPADM

## 2017-05-26 RX ORDER — CLINDAMYCIN PHOSPHATE 900 MG/50ML
900 INJECTION, SOLUTION INTRAVENOUS SEE ADMIN INSTRUCTIONS
Status: DISCONTINUED | OUTPATIENT
Start: 2017-05-26 | End: 2017-05-26 | Stop reason: HOSPADM

## 2017-05-26 RX ORDER — IBUPROFEN 600 MG/1
600 TABLET, FILM COATED ORAL EVERY 6 HOURS PRN
Qty: 90 TABLET | Refills: 0 | Status: SHIPPED | OUTPATIENT
Start: 2017-05-26 | End: 2020-01-16

## 2017-05-26 RX ORDER — HYDROMORPHONE HYDROCHLORIDE 1 MG/ML
.3-.5 INJECTION, SOLUTION INTRAMUSCULAR; INTRAVENOUS; SUBCUTANEOUS
Status: DISCONTINUED | OUTPATIENT
Start: 2017-05-26 | End: 2017-05-27 | Stop reason: HOSPADM

## 2017-05-26 RX ORDER — ONDANSETRON 4 MG/1
4 TABLET, ORALLY DISINTEGRATING ORAL EVERY 30 MIN PRN
Status: DISCONTINUED | OUTPATIENT
Start: 2017-05-26 | End: 2017-05-26 | Stop reason: HOSPADM

## 2017-05-26 RX ORDER — DEXAMETHASONE SODIUM PHOSPHATE 4 MG/ML
INJECTION, SOLUTION INTRA-ARTICULAR; INTRALESIONAL; INTRAMUSCULAR; INTRAVENOUS; SOFT TISSUE PRN
Status: DISCONTINUED | OUTPATIENT
Start: 2017-05-26 | End: 2017-05-26

## 2017-05-26 RX ORDER — METOCLOPRAMIDE 10 MG/1
10 TABLET ORAL EVERY 6 HOURS PRN
Status: DISCONTINUED | OUTPATIENT
Start: 2017-05-26 | End: 2017-05-27 | Stop reason: HOSPADM

## 2017-05-26 RX ORDER — CLINDAMYCIN PHOSPHATE 900 MG/50ML
900 INJECTION, SOLUTION INTRAVENOUS
Status: COMPLETED | OUTPATIENT
Start: 2017-05-26 | End: 2017-05-26

## 2017-05-26 RX ORDER — MAGNESIUM HYDROXIDE 1200 MG/15ML
LIQUID ORAL PRN
Status: DISCONTINUED | OUTPATIENT
Start: 2017-05-26 | End: 2017-05-26 | Stop reason: HOSPADM

## 2017-05-26 RX ORDER — FLUTICASONE PROPIONATE 50 MCG
1 SPRAY, SUSPENSION (ML) NASAL DAILY PRN
COMMUNITY
End: 2017-07-17

## 2017-05-26 RX ORDER — ONDANSETRON 2 MG/ML
4 INJECTION INTRAMUSCULAR; INTRAVENOUS EVERY 6 HOURS PRN
Status: DISCONTINUED | OUTPATIENT
Start: 2017-05-26 | End: 2017-05-27 | Stop reason: HOSPADM

## 2017-05-26 RX ORDER — ONDANSETRON 2 MG/ML
4 INJECTION INTRAMUSCULAR; INTRAVENOUS EVERY 30 MIN PRN
Status: DISCONTINUED | OUTPATIENT
Start: 2017-05-26 | End: 2017-05-26 | Stop reason: HOSPADM

## 2017-05-26 RX ORDER — SODIUM CHLORIDE, SODIUM LACTATE, POTASSIUM CHLORIDE, CALCIUM CHLORIDE 600; 310; 30; 20 MG/100ML; MG/100ML; MG/100ML; MG/100ML
INJECTION, SOLUTION INTRAVENOUS CONTINUOUS PRN
Status: DISCONTINUED | OUTPATIENT
Start: 2017-05-26 | End: 2017-05-26

## 2017-05-26 RX ORDER — FENTANYL CITRATE 50 UG/ML
INJECTION, SOLUTION INTRAMUSCULAR; INTRAVENOUS PRN
Status: DISCONTINUED | OUTPATIENT
Start: 2017-05-26 | End: 2017-05-26

## 2017-05-26 RX ORDER — PROCHLORPERAZINE MALEATE 5 MG
5-10 TABLET ORAL EVERY 6 HOURS PRN
Status: DISCONTINUED | OUTPATIENT
Start: 2017-05-26 | End: 2017-05-27 | Stop reason: HOSPADM

## 2017-05-26 RX ORDER — PROPOFOL 10 MG/ML
INJECTION, EMULSION INTRAVENOUS PRN
Status: DISCONTINUED | OUTPATIENT
Start: 2017-05-26 | End: 2017-05-26

## 2017-05-26 RX ORDER — KETOROLAC TROMETHAMINE 30 MG/ML
INJECTION, SOLUTION INTRAMUSCULAR; INTRAVENOUS PRN
Status: DISCONTINUED | OUTPATIENT
Start: 2017-05-26 | End: 2017-05-26

## 2017-05-26 RX ORDER — SODIUM CHLORIDE, SODIUM LACTATE, POTASSIUM CHLORIDE, CALCIUM CHLORIDE 600; 310; 30; 20 MG/100ML; MG/100ML; MG/100ML; MG/100ML
INJECTION, SOLUTION INTRAVENOUS CONTINUOUS
Status: DISCONTINUED | OUTPATIENT
Start: 2017-05-26 | End: 2017-05-26 | Stop reason: HOSPADM

## 2017-05-26 RX ORDER — KETOROLAC TROMETHAMINE 30 MG/ML
30 INJECTION, SOLUTION INTRAMUSCULAR; INTRAVENOUS EVERY 6 HOURS PRN
Status: DISCONTINUED | OUTPATIENT
Start: 2017-05-26 | End: 2017-05-27 | Stop reason: HOSPADM

## 2017-05-26 RX ORDER — METOCLOPRAMIDE HYDROCHLORIDE 5 MG/ML
10 INJECTION INTRAMUSCULAR; INTRAVENOUS EVERY 6 HOURS PRN
Status: DISCONTINUED | OUTPATIENT
Start: 2017-05-26 | End: 2017-05-27 | Stop reason: HOSPADM

## 2017-05-26 RX ORDER — IBUPROFEN 600 MG/1
600 TABLET, FILM COATED ORAL EVERY 6 HOURS PRN
Status: DISCONTINUED | OUTPATIENT
Start: 2017-05-26 | End: 2017-05-27 | Stop reason: HOSPADM

## 2017-05-26 RX ORDER — ONDANSETRON 2 MG/ML
INJECTION INTRAMUSCULAR; INTRAVENOUS PRN
Status: DISCONTINUED | OUTPATIENT
Start: 2017-05-26 | End: 2017-05-26

## 2017-05-26 RX ORDER — NEOSTIGMINE METHYLSULFATE 1 MG/ML
VIAL (ML) INJECTION PRN
Status: DISCONTINUED | OUTPATIENT
Start: 2017-05-26 | End: 2017-05-26

## 2017-05-26 RX ORDER — FENTANYL CITRATE 0.05 MG/ML
25-50 INJECTION, SOLUTION INTRAMUSCULAR; INTRAVENOUS
Status: DISCONTINUED | OUTPATIENT
Start: 2017-05-26 | End: 2017-05-26 | Stop reason: HOSPADM

## 2017-05-26 RX ORDER — VECURONIUM BROMIDE 1 MG/ML
INJECTION, POWDER, LYOPHILIZED, FOR SOLUTION INTRAVENOUS PRN
Status: DISCONTINUED | OUTPATIENT
Start: 2017-05-26 | End: 2017-05-26

## 2017-05-26 RX ORDER — ONDANSETRON 4 MG/1
4 TABLET, ORALLY DISINTEGRATING ORAL EVERY 6 HOURS PRN
Status: DISCONTINUED | OUTPATIENT
Start: 2017-05-26 | End: 2017-05-27 | Stop reason: HOSPADM

## 2017-05-26 RX ORDER — LIDOCAINE HYDROCHLORIDE 20 MG/ML
INJECTION, SOLUTION INFILTRATION; PERINEURAL PRN
Status: DISCONTINUED | OUTPATIENT
Start: 2017-05-26 | End: 2017-05-26

## 2017-05-26 RX ORDER — PROPOFOL 10 MG/ML
INJECTION, EMULSION INTRAVENOUS CONTINUOUS PRN
Status: DISCONTINUED | OUTPATIENT
Start: 2017-05-26 | End: 2017-05-26

## 2017-05-26 RX ORDER — OXYCODONE AND ACETAMINOPHEN 5; 325 MG/1; MG/1
1-2 TABLET ORAL EVERY 4 HOURS PRN
Qty: 30 TABLET | Refills: 0 | Status: SHIPPED | OUTPATIENT
Start: 2017-05-26 | End: 2017-07-17 | Stop reason: ALTCHOICE

## 2017-05-26 RX ORDER — OXYCODONE AND ACETAMINOPHEN 5; 325 MG/1; MG/1
1-2 TABLET ORAL EVERY 4 HOURS PRN
Status: DISCONTINUED | OUTPATIENT
Start: 2017-05-26 | End: 2017-05-27 | Stop reason: HOSPADM

## 2017-05-26 RX ORDER — HYDROXYZINE HYDROCHLORIDE 50 MG/ML
25 INJECTION, SOLUTION INTRAMUSCULAR ONCE
Status: COMPLETED | OUTPATIENT
Start: 2017-05-26 | End: 2017-05-26

## 2017-05-26 RX ORDER — PRENATAL VIT/IRON FUM/FOLIC AC 27MG-0.8MG
1 TABLET ORAL DAILY
COMMUNITY
End: 2020-07-03

## 2017-05-26 RX ORDER — GLYCOPYRROLATE 0.2 MG/ML
INJECTION, SOLUTION INTRAMUSCULAR; INTRAVENOUS PRN
Status: DISCONTINUED | OUTPATIENT
Start: 2017-05-26 | End: 2017-05-26

## 2017-05-26 RX ADMIN — CLINDAMYCIN PHOSPHATE 900 MG: 18 INJECTION, SOLUTION INTRAVENOUS at 09:30

## 2017-05-26 RX ADMIN — GENTAMICIN SULFATE 170 MG: 40 INJECTION, SOLUTION INTRAMUSCULAR; INTRAVENOUS at 09:34

## 2017-05-26 RX ADMIN — FENTANYL CITRATE 50 MCG: 50 INJECTION, SOLUTION INTRAMUSCULAR; INTRAVENOUS at 09:19

## 2017-05-26 RX ADMIN — ONDANSETRON 4 MG: 2 INJECTION INTRAMUSCULAR; INTRAVENOUS at 11:23

## 2017-05-26 RX ADMIN — PHENYLEPHRINE HYDROCHLORIDE 150 MCG: 10 INJECTION, SOLUTION INTRAMUSCULAR; INTRAVENOUS; SUBCUTANEOUS at 09:49

## 2017-05-26 RX ADMIN — VECURONIUM BROMIDE 1 MG: 1 INJECTION, POWDER, LYOPHILIZED, FOR SOLUTION INTRAVENOUS at 11:01

## 2017-05-26 RX ADMIN — PHENYLEPHRINE HYDROCHLORIDE 150 MCG: 10 INJECTION, SOLUTION INTRAMUSCULAR; INTRAVENOUS; SUBCUTANEOUS at 10:13

## 2017-05-26 RX ADMIN — PHENYLEPHRINE HYDROCHLORIDE 100 MCG: 10 INJECTION, SOLUTION INTRAMUSCULAR; INTRAVENOUS; SUBCUTANEOUS at 11:19

## 2017-05-26 RX ADMIN — HYDROMORPHONE HYDROCHLORIDE 0.5 MG: 1 INJECTION, SOLUTION INTRAMUSCULAR; INTRAVENOUS; SUBCUTANEOUS at 13:04

## 2017-05-26 RX ADMIN — HYDROXYZINE HYDROCHLORIDE 25 MG: 50 INJECTION, SOLUTION INTRAMUSCULAR at 13:24

## 2017-05-26 RX ADMIN — HYDROMORPHONE HYDROCHLORIDE 0.5 MG: 1 INJECTION, SOLUTION INTRAMUSCULAR; INTRAVENOUS; SUBCUTANEOUS at 15:49

## 2017-05-26 RX ADMIN — FENTANYL CITRATE 25 MCG: 50 INJECTION, SOLUTION INTRAMUSCULAR; INTRAVENOUS at 12:01

## 2017-05-26 RX ADMIN — KETOROLAC TROMETHAMINE 30 MG: 30 INJECTION, SOLUTION INTRAMUSCULAR at 11:55

## 2017-05-26 RX ADMIN — PHENYLEPHRINE HYDROCHLORIDE 100 MCG: 10 INJECTION, SOLUTION INTRAMUSCULAR; INTRAVENOUS; SUBCUTANEOUS at 09:45

## 2017-05-26 RX ADMIN — HYDROMORPHONE HYDROCHLORIDE 0.5 MG: 1 INJECTION, SOLUTION INTRAMUSCULAR; INTRAVENOUS; SUBCUTANEOUS at 12:53

## 2017-05-26 RX ADMIN — GLYCOPYRROLATE 0.6 MG: 0.2 INJECTION, SOLUTION INTRAMUSCULAR; INTRAVENOUS at 11:56

## 2017-05-26 RX ADMIN — PHENYLEPHRINE HYDROCHLORIDE 100 MCG: 10 INJECTION, SOLUTION INTRAMUSCULAR; INTRAVENOUS; SUBCUTANEOUS at 09:35

## 2017-05-26 RX ADMIN — ROCURONIUM BROMIDE 50 MG: 10 INJECTION INTRAVENOUS at 09:23

## 2017-05-26 RX ADMIN — SODIUM CHLORIDE, POTASSIUM CHLORIDE, SODIUM LACTATE AND CALCIUM CHLORIDE: 600; 310; 30; 20 INJECTION, SOLUTION INTRAVENOUS at 13:06

## 2017-05-26 RX ADMIN — FENTANYL CITRATE 50 MCG: 50 INJECTION, SOLUTION INTRAMUSCULAR; INTRAVENOUS at 12:43

## 2017-05-26 RX ADMIN — OXYCODONE HYDROCHLORIDE AND ACETAMINOPHEN 2 TABLET: 5; 325 TABLET ORAL at 17:41

## 2017-05-26 RX ADMIN — SODIUM CHLORIDE, POTASSIUM CHLORIDE, SODIUM LACTATE AND CALCIUM CHLORIDE: 600; 310; 30; 20 INJECTION, SOLUTION INTRAVENOUS at 10:41

## 2017-05-26 RX ADMIN — FENTANYL CITRATE 50 MCG: 50 INJECTION, SOLUTION INTRAMUSCULAR; INTRAVENOUS at 12:32

## 2017-05-26 RX ADMIN — KETOROLAC TROMETHAMINE 30 MG: 30 INJECTION, SOLUTION INTRAMUSCULAR at 20:31

## 2017-05-26 RX ADMIN — PHENYLEPHRINE HYDROCHLORIDE 100 MCG: 10 INJECTION, SOLUTION INTRAMUSCULAR; INTRAVENOUS; SUBCUTANEOUS at 09:30

## 2017-05-26 RX ADMIN — PROPOFOL 200 MG: 10 INJECTION, EMULSION INTRAVENOUS at 09:23

## 2017-05-26 RX ADMIN — DEXAMETHASONE SODIUM PHOSPHATE 4 MG: 4 INJECTION, SOLUTION INTRA-ARTICULAR; INTRALESIONAL; INTRAMUSCULAR; INTRAVENOUS; SOFT TISSUE at 09:38

## 2017-05-26 RX ADMIN — SODIUM CHLORIDE, POTASSIUM CHLORIDE, SODIUM LACTATE AND CALCIUM CHLORIDE: 600; 310; 30; 20 INJECTION, SOLUTION INTRAVENOUS at 09:19

## 2017-05-26 RX ADMIN — FENTANYL CITRATE 200 MCG: 50 INJECTION, SOLUTION INTRAMUSCULAR; INTRAVENOUS at 09:23

## 2017-05-26 RX ADMIN — PHENYLEPHRINE HYDROCHLORIDE 100 MCG: 10 INJECTION, SOLUTION INTRAMUSCULAR; INTRAVENOUS; SUBCUTANEOUS at 11:03

## 2017-05-26 RX ADMIN — NEOSTIGMINE METHYLSULFATE 4 MG: 1 INJECTION INTRAMUSCULAR; INTRAVENOUS; SUBCUTANEOUS at 11:56

## 2017-05-26 RX ADMIN — VECURONIUM BROMIDE 0.5 MG: 1 INJECTION, POWDER, LYOPHILIZED, FOR SOLUTION INTRAVENOUS at 11:19

## 2017-05-26 RX ADMIN — PHENYLEPHRINE HYDROCHLORIDE 100 MCG: 10 INJECTION, SOLUTION INTRAMUSCULAR; INTRAVENOUS; SUBCUTANEOUS at 10:23

## 2017-05-26 RX ADMIN — VECURONIUM BROMIDE 2 MG: 1 INJECTION, POWDER, LYOPHILIZED, FOR SOLUTION INTRAVENOUS at 09:58

## 2017-05-26 RX ADMIN — OXYCODONE HYDROCHLORIDE AND ACETAMINOPHEN 2 TABLET: 5; 325 TABLET ORAL at 21:45

## 2017-05-26 RX ADMIN — PHENYLEPHRINE HYDROCHLORIDE 150 MCG: 10 INJECTION, SOLUTION INTRAMUSCULAR; INTRAVENOUS; SUBCUTANEOUS at 10:04

## 2017-05-26 RX ADMIN — VECURONIUM BROMIDE 1 MG: 1 INJECTION, POWDER, LYOPHILIZED, FOR SOLUTION INTRAVENOUS at 10:45

## 2017-05-26 RX ADMIN — LIDOCAINE HYDROCHLORIDE 100 MG: 20 INJECTION, SOLUTION INFILTRATION; PERINEURAL at 09:23

## 2017-05-26 RX ADMIN — PROPOFOL 30 MCG/KG/MIN: 10 INJECTION, EMULSION INTRAVENOUS at 09:23

## 2017-05-26 RX ADMIN — PHENYLEPHRINE HYDROCHLORIDE 150 MCG: 10 INJECTION, SOLUTION INTRAMUSCULAR; INTRAVENOUS; SUBCUTANEOUS at 10:43

## 2017-05-26 RX ADMIN — PHENYLEPHRINE HYDROCHLORIDE 150 MCG: 10 INJECTION, SOLUTION INTRAMUSCULAR; INTRAVENOUS; SUBCUTANEOUS at 11:56

## 2017-05-26 RX ADMIN — HYDROMORPHONE HYDROCHLORIDE 0.5 MG: 1 INJECTION, SOLUTION INTRAMUSCULAR; INTRAVENOUS; SUBCUTANEOUS at 13:15

## 2017-05-26 ASSESSMENT — LIFESTYLE VARIABLES: TOBACCO_USE: 0

## 2017-05-26 NOTE — OR NURSING
recieves total of 100 mcg fentanyl in PACU- 275 mcg in OR- additionally admin total of 1.5 mg dilaudid IV in PACU- minimally efective- Called Dr Karmen BELL to admin vistaril 25 mg IM to left gluteus- OK to send up to Obs Unit.

## 2017-05-26 NOTE — PLAN OF CARE
Problem: Goal Outcome Summary  Goal: Goal Outcome Summary  Outcome: No Change  A&O but lethargic upon arrival. Not OOB since admission. ADAT- tolerating clears, denies nausea. BP soft (97/53, 94/45), otherwise VSS on 3L NC- wean as able. C/o 10/10 abd pain, ice pack applied as pt drifting in and out of sleep. 4 lap sites C/D/I. Arias patent. No vaginal bleeding noted.

## 2017-05-26 NOTE — IP AVS SNAPSHOT
[facility]    [address] [place] [address]    Phone:  [phone]                                       After Visit Summary   5/26/2017    Lena Morrow    MRN: [id number]           After Visit Summary Signature Page     I have received my discharge instructions, and my questions have been answered. I have discussed any challenges I see with this plan with the nurse or doctor.    ..........................................................................................................................................  Patient/Patient Representative Signature      ..........................................................................................................................................  Patient Representative Print Name and Relationship to Patient    ..................................................               ................................................  Date                                            Time    ..........................................................................................................................................  Reviewed by Signature/Title    ...................................................              ..............................................  Date                                                            Time

## 2017-05-26 NOTE — ANESTHESIA PREPROCEDURE EVALUATION
Procedure: Procedure(s):  DAVINCI HYSTERECTOMY TOTAL, SALPINGECTOMY BILATERAL  CYSTOSCOPY  Preop diagnosis: PELVIC PAIN AND ENDOMETRIOSIS    Allergies   Allergen Reactions     Amoxicillin      yeast vaginal inf     Ativan Fatigue     Augmentin [Amoxicillin-Pot Clavulanate]      itching     Azithromycin      GI cramps     Clonazepam      Sedation, even at 0.5 mg dose     Estrogens      # bcps cause cns sx     Lexapro      diarrhea       Mirtazapine      Sedation      Prochlorperazine      Compazine- agitation     Seasonal Allergies      Venlafaxine      sweats     Xanax [Alprazolam] Fatigue     Past Medical History:   Diagnosis Date     Abnormal pap      Allergic rhinitis, cause unspecified      Arrhythmia     atrial flutter, paroxysmal     Asthma      Atrial flutter, paroxysmal (H)      C. difficile colitis 10/14     Cholelithiasis      DDD (degenerative disc disease), cervical      DYSPEPSIA      Endometriosis      Gastro-oesophageal reflux disease      Herpes simplex without mention of complication      LSIL (low grade squamous intraepithelial lesion) on Pap smear 10/10/12    Done at [facility]     Major depression      Mild intermittent asthma      Obesity      Other forms of migraine, without mention of intractable migraine without mention of status migrainosus      Panic disorder without agoraphobia      Past Surgical History:   Procedure Laterality Date     C NONSPECIFIC PROCEDURE      tonsillectomy     C NONSPECIFIC PROCEDURE      Open right carpal tunnel release.  Excision right dorsal carpal ganglion cyst. Excision, terminal branch, right posterior interosseous nerve.        COLONOSCOPY       DILATION AND CURETTAGE N/A 2016    Procedure: DILATION AND CURETTAGE;  Surgeon: Josue Jama MD;  Location: [facility]     ENT SURGERY       GYN SURGERY           HC COLP CERVIX/UPPER VAGINA W BX CERVIX  10/30/12    [facility]     LAPAROSCOPIC CHOLECYSTECTOMY  4/3/2012     Procedure:LAPAROSCOPIC CHOLECYSTECTOMY; LAPAROSCOPIC CHOLECYSTECTOMY ; Surgeon:KARYNA CAMARA; Location:[facility]     LAPAROSCOPY DIAGNOSTIC (GYN) N/A 1/5/2016    Procedure: LAPAROSCOPY DIAGNOSTIC (GYN);  Surgeon: Josue Jama MD;  Location: [facility]     ORTHOPEDIC SURGERY      carpal tunnel with ganglian cyst removal     Prior to Admission medications    Medication Sig Start Date End Date Taking? Authorizing Provider   BuPROPion HCl (WELLBUTRIN XL PO) Take 450 mg by mouth daily   Yes Reported, Patient   VITAMIN D, CHOLECALCIFEROL, PO Take 5,000 Units by mouth daily   Yes Reported, Patient   fluticasone (FLONASE) 50 MCG/ACT spray Spray 1 spray into both nostrils daily as needed for rhinitis or allergies   Yes Reported, Patient   LamoTRIgine (LAMICTAL PO) Take 200 mg by mouth 2 times daily   Yes Reported, Patient   LORATADINE PO Take 10 mg by mouth daily   Yes Reported, Patient   Methocarbamol (ROBAXIN PO) Take 750 mg by mouth 4 times daily as needed for muscle spasms   Yes Reported, Patient   OMEPRAZOLE PO Take 20 mg by mouth daily   Yes Reported, Patient   OXYCODONE HCL PO Take 5-10 mg by mouth every 3 hours as needed   Yes Reported, Patient   Prenatal Vit-Fe Fumarate-FA (PRENATAL MULTIVITAMIN  PLUS IRON) 27-0.8 MG TABS per tablet Take 1 tablet by mouth daily   Yes Reported, Patient   PROPRANOLOL HCL PO Take 20-40 mg by mouth 2 times daily as needed (anxiety)   Yes Reported, Patient   ValACYclovir HCl (VALTREX PO) Take 500 mg by mouth 2 times daily as needed (herpes outbreak)   Yes Reported, Patient   Phenylephrine-APAP-Guaifenesin (MUCINEX SINUS-MAX PO) Take 1,200 mg by mouth daily   Yes Reported, Patient   aspirin-acetaminophen-caffeine (EXCEDRIN MIGRAINE) 250-250-65 MG per tablet Take 2 tablets by mouth 2 times daily as needed for headaches   Yes Reported, Patient   Ondansetron (ZOFRAN ODT PO) Take 8-16 mg by mouth 3 times daily as needed for nausea   Yes Reported, Patient   albuterol (PROAIR HFA/PROVENTIL  HFA/VENTOLIN HFA) 108 (90 BASE) MCG/ACT Inhaler Inhale 2 puffs into the lungs every 6 hours as needed for shortness of breath / dyspnea or wheezing 5/10/17  Yes Trevon Crisostomo MD     Current Facility-Administered Medications Ordered in Epic   Medication Dose Route Frequency Last Rate Last Dose     clindamycin (CLEOCIN) infusion 900 mg  900 mg Intravenous Pre-Op/Pre-procedure x 1 dose         clindamycin (CLEOCIN) infusion 900 mg  900 mg Intravenous See Admin Instructions         gentamicin (GARAMYCIN) 170 mg in NaCl 0.9 % 50 mL intermittent infusion  2 mg/kg Intravenous Pre-Op/Pre-procedure x 1 dose         gentamicin (GARAMYCIN) 140 mg in NaCl 0.9 % 50 mL intermittent infusion  1.7 mg/kg Intravenous See Admin Instructions         No current [facility]-ordered outpatient prescriptions on file.     Wt Readings from Last 1 Encounters:   05/26/17 81.6 kg (180 lb)     Temp Readings from Last 1 Encounters:   05/26/17 36.1  C (97  F) (Oral)     BP Readings from Last 6 Encounters:   05/26/17 117/54   05/08/17 112/82   08/26/16 114/84   01/05/16 114/77   12/04/15 114/68   11/16/15 104/80     Pulse Readings from Last 4 Encounters:   05/08/17 90   08/26/16 86   11/16/15 86   10/13/15 104     Resp Readings from Last 1 Encounters:   05/26/17 18     SpO2 Readings from Last 1 Encounters:   05/26/17 97%     Recent Labs   Lab Test  05/08/17   1616  11/18/14   2249  10/09/14   1230   NA   --   144  139   POTASSIUM   --   3.5  3.9   CHLORIDE   --   111*  109   CO2   --   24  26   ANIONGAP   --   9  4*   GLC   --   89  83   BUN   --   14  12   CR  0.99  0.83  0.83   JOSE J   --   9.2  9.4     Recent Labs   Lab Test  05/26/17   0740  05/08/17   1616   WBC  8.3  8.7   HGB  15.3  14.4   PLT  245  243     No results for input(s): INR in the last 22019 hours.    Invalid input(s): APTT   RECENT LABS:   ECG:   ECHO:   CXR:    Anesthesia Evaluation     . Pt has had prior anesthetic.     No history of anesthetic complications          ROS/MED  HX    ENT/Pulmonary:     (+)Intermittent asthma Treatment: Inhaler prn,  , . .   (-) tobacco use and sleep apnea   Neurologic:       Cardiovascular:         METS/Exercise Tolerance:     Hematologic:         Musculoskeletal:         GI/Hepatic:     (+) GERD Asymptomatic on medication,       Renal/Genitourinary:     (+) Nephrolithiasis ,       Endo:     (+) Obesity, .      Psychiatric:         Infectious Disease:         Malignancy:         Other:                     Physical Exam  Normal systems: cardiovascular and pulmonary    Airway   Mallampati: I  Neck ROM: full    Dental   (+) caps, chipped and upper retainer    Cardiovascular       Pulmonary                     Anesthesia Plan      History & Physical Review  History and physical reviewed and following examination; no interval change.    ASA Status:  2 .    NPO Status:  > 8 hours    Plan for General and ETT with Intravenous induction. Maintenance will be Balanced.    PONV prophylaxis:  Ondansetron (or other 5HT-3)  Additional equipment: Videolaryngoscope      Postoperative Care  Postoperative pain management:  IV analgesics.      Consents  Anesthetic plan, risks, benefits and alternatives discussed with:  Patient..                          .

## 2017-05-26 NOTE — PROGRESS NOTES
Admission medication history interview status for the 5/26/2017  admission is complete. See EPIC admission navigator for prior to admission medications     Medication history source reliability:Good    Medication history interview source(s):Patient    Medication history resources (including written lists, pill bottles, clinic record):None    [facility]    Additional medication history information not noted on PTA med list :None    Time spent in this activity: 45 minutes    Prior to Admission medications    Medication Sig Last Dose Taking? Auth Provider   BuPROPion HCl (WELLBUTRIN XL PO) Take 450 mg by mouth daily 5/26/2017 at 0650 Yes Reported, Patient   VITAMIN D, CHOLECALCIFEROL, PO Take 5,000 Units by mouth daily 5/25/2017 at am Yes Reported, Patient   fluticasone (FLONASE) 50 MCG/ACT spray Spray 1 spray into both nostrils daily as needed for rhinitis or allergies more than a week at prn Yes Reported, Patient   LamoTRIgine (LAMICTAL PO) Take 200 mg by mouth 2 times daily 5/26/2017 at 0650 Yes Reported, Patient   LORATADINE PO Take 10 mg by mouth daily 5/25/2017 at am Yes Reported, Patient   Methocarbamol (ROBAXIN PO) Take 750 mg by mouth 4 times daily as needed for muscle spasms more than a month at  prn Yes Reported, Patient   OMEPRAZOLE PO Take 20 mg by mouth daily 5/25/2017 at pm Yes Reported, Patient   OXYCODONE HCL PO Take 5-10 mg by mouth every 3 hours as needed 5/25/2017 at pm Yes Reported, Patient   Prenatal Vit-Fe Fumarate-FA (PRENATAL MULTIVITAMIN  PLUS IRON) 27-0.8 MG TABS per tablet Take 1 tablet by mouth daily 5/25/2017 at am Yes Reported, Patient   PROPRANOLOL HCL PO Take 20-40 mg by mouth 2 times daily as needed (anxiety) 5/23/2017 at prn Yes Reported, Patient   ValACYclovir HCl (VALTREX PO) Take 500 mg by mouth 2 times daily as needed (herpes outbreak) more than a month at prn Yes Reported, Patient   Phenylephrine-APAP-Guaifenesin (MUCINEX SINUS-MAX PO) Take 1,200 mg by mouth daily  5/25/2017 at am Yes Reported, Patient   aspirin-acetaminophen-caffeine (EXCEDRIN MIGRAINE) 250-250-65 MG per tablet Take 2 tablets by mouth 2 times daily as needed for headaches 5/19/2017 at prn Yes Reported, Patient   Ondansetron (ZOFRAN ODT PO) Take 8-16 mg by mouth 3 times daily as needed for nausea 5/25/2017 at pm Yes Reported, Patient   albuterol (PROAIR HFA/PROVENTIL HFA/VENTOLIN HFA) 108 (90 BASE) MCG/ACT Inhaler Inhale 2 puffs into the lungs every 6 hours as needed for shortness of breath / dyspnea or wheezing more than a week at prn Yes Trevon Crisostomo MD

## 2017-05-26 NOTE — ANESTHESIA CARE TRANSFER NOTE
Patient: Lena Morrow    Procedure(s):  ROBOTIC ASSISTED LAPAROSCOPIC TOTAL HYSTERECTOMY; BILATERAL SALPINGECTOMY; CYSTOSCOPY - Wound Class: II-Clean Contaminated   - Wound Class: II-Clean Contaminated    Diagnosis: PELVIC PAIN AND ENDOMETRIOSIS  Diagnosis Additional Information: No value filed.    Anesthesia Type:   General, ETT     Note:  Airway :Face Mask  Patient transferred to:PACU  Comments: Pt to PACU with O2 via mask, airway patent, VSS.  Report to RN.      Vitals: (Last set prior to Anesthesia Care Transfer)    CRNA VITALS  5/26/2017 1145 - 5/26/2017 1220      5/26/2017             Pulse: 84    SpO2: 100 %    Resp Rate (set): 10                Electronically Signed By: JUDIE Duval CRNA  May 26, 2017  12:20 PM

## 2017-05-26 NOTE — DISCHARGE INSTRUCTIONS
Same Day Surgery Discharge Instructions for  Sedation and General Anesthesia       It's not unusual to feel dizzy, light-headed or faint for up to 24 hours after surgery or while taking pain medication.  If you have these symptoms: sit for a few minutes before standing and have someone assist you when you get up to walk or use the bathroom.      You should rest and relax for the next 24 hours. We recommend you make arrangements to have an adult stay with you for at least 24 hours after your discharge.  Avoid hazardous and strenuous activity.      DO NOT DRIVE any vehicle or operate mechanical equipment for 24 hours following the end of your surgery.  Even though you may feel normal, your reactions may be affected by the medication you have received.      Do not drink alcoholic beverages for 24 hours following surgery.       Slowly progress to your regular diet as you feel able. It's not unusual to feel nauseated and/or vomit after receiving anesthesia.  If you develop these symptoms, drink clear liquids (apple juice, ginger ale, broth, 7-up, etc. ) until you feel better.  If your nausea and vomiting persists for 24 hours, please notify your surgeon.        All narcotic pain medications, along with inactivity and anesthesia, can cause constipation. Drinking plenty of liquids and increasing fiber intake will help.      For any questions of a medical nature, call your surgeon.      Do not make important decisions for 24 hours.      If you had general anesthesia, you may have a sore throat for a couple of days related to the breathing tube used during surgery.  You may use Cepacol lozenges to help with this discomfort.  If it worsens or if you develop a fever, contact your surgeon.       If you feel your pain is not well managed with the pain medications prescribed by your surgeon, please contact your surgeon's office to let them know so they can address your concerns.     While you were at the hospital today you  received Toradol, an antiinflammatory medication similar to Ibuprofen.  You should not take other antiinflammatory medication, such as Ibuprofen, Motrin, Advil, Aleve, Naprosyn, etc, until 6 PM    [facility] after a Gynecological Laparoscopy Procedure    Discharge Instructions    YARIEL Jama M.D., JOHN Iraheta M.D., HELADIO Ramires M.D., CHINO Sánchez M.D.,  MARCIE Stanford M.D., HUNG Da Silva M.D., SEAN Myers M.D., HELADIO Baker M.D.          PAIN RELIEF    You may be given a prescription for pain medicine.    If you do not need prescription medicine but you feel some discomfort, take Acetaminophen (Tylenol or Extra-strength Tylenol) or Ibuprofen (Advil or Motrin).  Follow instructions on the package label.    Avoid Aspirin products if possible    Any discomfort in the abdomen or shoulders should be gone in 24-36 hours.  This discomfort is caused by the gas from the procedure.         ACTIVITY    After 24 hours, you may drive a car.    You may resume normal activities, including climbing stairs, lifting as soon as your abdominal discomfort disappears.      You may return to work in 1 to 2 days, unless your doctor tells you otherwise.    You may shower    Avoid sexual intercourse for one week         STITCHES      If there is a stitch (suture) under the skin it will spontaneously dissolve.      The bandages may be removed at any time.         VAGINAL DISCHARGE    You may have some vaginal bleeding for about 1 week after surgery.      Do not use tampons or douches for one week.         DIET    You may return to your normal diet.         WHEN TO CALL YOUR DOCTOR       Call your doctor if you:     Develop a temperature higher then 100.4      Notice a large amount of vaginal bleeding     Have increasingly severe abdominal pain     See that incisions are becoming more swollen, warm, reddened, or painful.         FOLLOW-UP APPOINTMENT       Call your doctor s office the day after your surgery to make a follow-up  appointment if not already done.  [facility]  [address]    [address]  [place] [address]  Telephone:  [phone]             Discharge Instructions for Laparoscopic or Davinci Hysterectomy    Incisional Care  A small amount of drainage from your incisions is normal. You may wear Band Aids until the drainage stops. The day after surgery, if your incisions are dry, remove the Band Aids. Leave the Steri-Strips (small pieces of tape) in place. Let them fall off on their own, or gently remove them after 7 days.  Once your have removed your Band Aids, you may shower as usual. Wash your incisions with mild soap and water. Pat dry.  Don't use oils, powders, or lotions on your incisions.  General Care  Take your medications exactly as directed by your doctor.    You may have vaginal bleeding that can last for several weeks. Use pads only. Don't put anything in your vagina (tampons, douches or have sexual intercourse) until your doctor says it's safe to do so.  Avoid constipation.  Eat fruits, vegetables, and whole grains.  Drink 6-8 glasses of water a day, unless told to do otherwise.  Use a laxative or a mild stool softener if your doctor says it's okay.  Activity  Don't drive while you are still taking narcotic pain medications.  Don t do strenuous activities until the doctor says it's okay.  Walk as often as you feel able.    Pain  Your incisions may be tender or sore. You may also have pain in your upper back or shoulders. This is from the gas used to enlarge your abdomen to allow your doctor to see inside your pelvis and perform the procedure. This pain usually goes away in a day or two.   When to Call Your Doctor  Call your doctor right away if you have any of the following:  Fever above 100.4 F (38 C) or chills  Vaginal bleeding that soaks more than one sanitary pad per hour  A foul smelling discharge from the vagina  Difficulty urinating  Severe pain   Redness, swelling, or drainage at  your incision sites  Follow-Up  Make a follow-up appointment as directed by your surgeon.        **If you have questions or concerns about your procedure, call   Dr. Da Silva at [phone]**

## 2017-05-26 NOTE — IP AVS SNAPSHOT
MRN:[id number]                      After Visit Summary   5/26/2017    Lena Morrow    MRN: [id number]           Thank you!     Thank you for choosing [facility] for your care. Our goal is always to provide you with excellent care. Hearing back from our patients is one way we can continue to improve our services. Please take a few minutes to complete the written survey that you may receive in the mail after you visit with us. Thank you!        Patient Information     Date Of Birth          1975        About your hospital stay     You were admitted on:  May 26, 2017 [facility]    You were discharged on:  May 27, 2017        Reason for your hospital stay       Robot hysterectomy and removal of fallopian tubes                  Who to Call     For medical emergencies, please call 911.  For non-urgent questions about your medical care, please call your primary care provider or clinic, [phone]  For questions related to your surgery, please call your surgery clinic        Attending Provider     Provider Specialty    Toni Da Silva MD OB/Gyn       Primary Care Provider Office Phone # Fax #    Trevon Crisostomo [phone]       [facility] [address] [place] [id number]         When to contact your care team       Call [facility] if you have any of the following: temperature greater than 100.5,  Bleeding like a period.  Nausea or vomiting.                  After Care Instructions     Activity       Your activity upon discharge:No driving for 1 week.  No lifting over 20# until seen in office.  Pelvic rest            Diet       Follow this diet upon discharge: Normal            Diet Instructions       Resume pre-procedure diet            Discharge Instructions       Patient to follow up with appointment in 4 weeks            Dressing       Keep dressing clean and dry.  Dressing / incisional care as  instructed by RN and or Surgeon            No Alcohol       For 24 hours post procedure            No driving or operating machinery        until the day after procedure            No lifting        No lifting over 20 lbs and no strenuous physical activity for 3 weeks            Shower       No shower for 24 hours post procedure. May shower Postoperative Day (POD)  2            Weight bearing status - As tolerated           Wound care and dressings       Instructions to care for your wound at home:Remove steri strips in 1 week.  Ok to get incisions wet.                  Follow-up Appointments     Follow-up and recommended labs and tests        Be seen at [facility] in 4 weeks                  Further instructions from your care team       Same Day Surgery Discharge Instructions for  Sedation and General Anesthesia       It's not unusual to feel dizzy, light-headed or faint for up to 24 hours after surgery or while taking pain medication.  If you have these symptoms: sit for a few minutes before standing and have someone assist you when you get up to walk or use the bathroom.      You should rest and relax for the next 24 hours. We recommend you make arrangements to have an adult stay with you for at least 24 hours after your discharge.  Avoid hazardous and strenuous activity.      DO NOT DRIVE any vehicle or operate mechanical equipment for 24 hours following the end of your surgery.  Even though you may feel normal, your reactions may be affected by the medication you have received.      Do not drink alcoholic beverages for 24 hours following surgery.       Slowly progress to your regular diet as you feel able. It's not unusual to feel nauseated and/or vomit after receiving anesthesia.  If you develop these symptoms, drink clear liquids (apple juice, ginger ale, broth, 7-up, etc. ) until you feel better.  If your nausea and vomiting persists for 24 hours, please notify your surgeon.        All narcotic  pain medications, along with inactivity and anesthesia, can cause constipation. Drinking plenty of liquids and increasing fiber intake will help.      For any questions of a medical nature, call your surgeon.      Do not make important decisions for 24 hours.      If you had general anesthesia, you may have a sore throat for a couple of days related to the breathing tube used during surgery.  You may use Cepacol lozenges to help with this discomfort.  If it worsens or if you develop a fever, contact your surgeon.       If you feel your pain is not well managed with the pain medications prescribed by your surgeon, please contact your surgeon's office to let them know so they can address your concerns.     While you were at the hospital today you received Toradol, an antiinflammatory medication similar to Ibuprofen.  You should not take other antiinflammatory medication, such as Ibuprofen, Motrin, Advil, Aleve, Naprosyn, etc, until 6 PM    [facility]    Care after a Gynecological Laparoscopy Procedure    Discharge Instructions    YARIEL Jama M.D., JOHN Iraheta M.D., HELADIO Ramires M.D., CHINO Sánchez M.D.,  MARCIE Stanford M.D., HUNG Da Silva M.D., SEAN Myers M.D., HELADIO Baker M.D.          PAIN RELIEF    You may be given a prescription for pain medicine.    If you do not need prescription medicine but you feel some discomfort, take Acetaminophen (Tylenol or Extra-strength Tylenol) or Ibuprofen (Advil or Motrin).  Follow instructions on the package label.    Avoid Aspirin products if possible    Any discomfort in the abdomen or shoulders should be gone in 24-36 hours.  This discomfort is caused by the gas from the procedure.         ACTIVITY    After 24 hours, you may drive a car.    You may resume normal activities, including climbing stairs, lifting as soon as your abdominal discomfort disappears.      You may return to work in 1 to 2 days, unless your doctor tells you otherwise.    You may shower    Avoid sexual  intercourse for one week         STITCHES      If there is a stitch (suture) under the skin it will spontaneously dissolve.      The bandages may be removed at any time.         VAGINAL DISCHARGE    You may have some vaginal bleeding for about 1 week after surgery.      Do not use tampons or douches for one week.         DIET    You may return to your normal diet.         WHEN TO CALL YOUR DOCTOR       Call your doctor if you:     Develop a temperature higher then 100.4      Notice a large amount of vaginal bleeding     Have increasingly severe abdominal pain     See that incisions are becoming more swollen, warm, reddened, or painful.         FOLLOW-UP APPOINTMENT       Call your doctor s office the day after your surgery to make a follow-up appointment if not already done.  [facility]  [address]    [address]  [place] [address]  Telephone:  [phone]             Discharge Instructions for Laparoscopic or Davinci Hysterectomy    Incisional Care  A small amount of drainage from your incisions is normal. You may wear Band Aids until the drainage stops. The day after surgery, if your incisions are dry, remove the Band Aids. Leave the Steri-Strips (small pieces of tape) in place. Let them fall off on their own, or gently remove them after 7 days.  Once your have removed your Band Aids, you may shower as usual. Wash your incisions with mild soap and water. Pat dry.  Don't use oils, powders, or lotions on your incisions.  General Care  Take your medications exactly as directed by your doctor.    You may have vaginal bleeding that can last for several weeks. Use pads only. Don't put anything in your vagina (tampons, douches or have sexual intercourse) until your doctor says it's safe to do so.  Avoid constipation.  Eat fruits, vegetables, and whole grains.  Drink 6-8 glasses of water a day, unless told to do otherwise.  Use a laxative or a mild stool softener if your doctor says it's  "okay.  Activity  Don't drive while you are still taking narcotic pain medications.  Don t do strenuous activities until the doctor says it's okay.  Walk as often as you feel able.    Pain  Your incisions may be tender or sore. You may also have pain in your upper back or shoulders. This is from the gas used to enlarge your abdomen to allow your doctor to see inside your pelvis and perform the procedure. This pain usually goes away in a day or two.   When to Call Your Doctor  Call your doctor right away if you have any of the following:  Fever above 100.4 F (38 C) or chills  Vaginal bleeding that soaks more than one sanitary pad per hour  A foul smelling discharge from the vagina  Difficulty urinating  Severe pain   Redness, swelling, or drainage at your incision sites  Follow-Up  Make a follow-up appointment as directed by your surgeon.        **If you have questions or concerns about your procedure, call   Dr. Da Silva at [phone]**           Pending Results     Date and Time Order Name Status Description    5/26/2017 1123 Surgical pathology exam In process             Statement of Approval     Ordered          05/27/17 0753  I have reviewed and agree with all the recommendations and orders detailed in this document.  EFFECTIVE NOW     Approved and electronically signed by:  Josue Jama MD             Admission Information     Date & Time Provider Department Dept. Phone    5/26/2017 Toni Da Silva MD [facility] [phone]      Your Vitals Were     Blood Pressure Temperature Respirations Height Weight Pulse Oximetry    108/51 (BP Location: Right arm) 98.2  F (36.8  C) (Oral) 18 1.626 m (5' 4.02\") 85 kg (187 lb 4.8 oz) 97%    BMI (Body Mass Index)                   32.13 kg/m2           MyChart Information     [email or web address] gives you secure access to your electronic health record. If you see a primary care provider, you can also send messages to your care team and make appointments. If " you have questions, please call your primary care clinic.  If you do not have a primary care provider, please call [phone] and they will assist you.        Care EveryWhere ID     This is your Care EveryWhere ID. This could be used by other organizations to access your [place] medical records  [id number]           Review of your medicines      UNREVIEWED medicines. Ask your doctor about these medicines        Dose / Directions    ZOFRAN ODT PO        Dose:  8-16 mg   Take 8-16 mg by mouth 3 times daily as needed for nausea   Refills:  0         START taking        Dose / Directions    ibuprofen 600 MG tablet   Commonly known as:  ADVIL/MOTRIN        Dose:  600 mg   Take 1 tablet (600 mg) by mouth every 6 hours as needed for pain (mild)   Quantity:  90 tablet   Refills:  0       oxyCODONE-acetaminophen 5-325 MG per tablet   Commonly known as:  PERCOCET        Dose:  1-2 tablet   Take 1-2 tablets by mouth every 4 hours as needed for pain (moderate to severe)   Quantity:  30 tablet   Refills:  0         CONTINUE these medicines which have NOT CHANGED        Dose / Directions    albuterol 108 (90 BASE) MCG/ACT Inhaler   Commonly known as:  PROAIR HFA/PROVENTIL HFA/VENTOLIN HFA   Used for:  Mild intermittent asthma with acute exacerbation        Dose:  2 puff   Inhale 2 puffs into the lungs every 6 hours as needed for shortness of breath / dyspnea or wheezing   Quantity:  1 Inhaler   Refills:  prn       aspirin-acetaminophen-caffeine 250-250-65 MG per tablet   Commonly known as:  EXCEDRIN MIGRAINE        Dose:  2 tablet   Take 2 tablets by mouth 2 times daily as needed for headaches   Refills:  0       fluticasone 50 MCG/ACT spray   Commonly known as:  FLONASE        Dose:  1 spray   Spray 1 spray into both nostrils daily as needed for rhinitis or allergies   Refills:  0       LAMICTAL PO        Dose:  200 mg   Take 200 mg by mouth 2 times daily   Refills:  0       LORATADINE PO        Dose:  10 mg   Take 10 mg by  mouth daily   Refills:  0       MUCINEX SINUS-MAX PO        Dose:  1200 mg   Take 1,200 mg by mouth daily   Refills:  0       OMEPRAZOLE PO        Dose:  20 mg   Take 20 mg by mouth daily   Refills:  0       OXYCODONE HCL PO        Dose:  5-10 mg   Take 5-10 mg by mouth every 3 hours as needed   Refills:  0       prenatal multivitamin  plus iron 27-0.8 MG Tabs per tablet        Dose:  1 tablet   Take 1 tablet by mouth daily   Refills:  0       PROPRANOLOL HCL PO        Dose:  20-40 mg   Take 20-40 mg by mouth 2 times daily as needed (anxiety)   Refills:  0       ROBAXIN PO        Dose:  750 mg   Take 750 mg by mouth 4 times daily as needed for muscle spasms   Refills:  0       VALTREX PO        Dose:  500 mg   Take 500 mg by mouth 2 times daily as needed (herpes outbreak)   Refills:  0       VITAMIN D (CHOLECALCIFEROL) PO        Dose:  5000 Units   Take 5,000 Units by mouth daily   Refills:  0       WELLBUTRIN XL PO        Dose:  450 mg   Take 450 mg by mouth daily   Refills:  0            Where to get your medicines      Some of these will need a paper prescription and others can be bought over the counter. Ask your nurse if you have questions.     Bring a paper prescription for each of these medications     ibuprofen 600 MG tablet    oxyCODONE-acetaminophen 5-325 MG per tablet                Protect others around you: Learn how to safely use, store and throw away your medicines at www.disposemymeds.org.             Medication List: This is a list of all your medications and when to take them. Check marks below indicate your daily home schedule. Keep this list as a reference.      Medications           Morning Afternoon Evening Bedtime As Needed    albuterol 108 (90 BASE) MCG/ACT Inhaler   Commonly known as:  PROAIR HFA/PROVENTIL HFA/VENTOLIN HFA   Inhale 2 puffs into the lungs every 6 hours as needed for shortness of breath / dyspnea or wheezing                                aspirin-acetaminophen-caffeine  250-250-65 MG per tablet   Commonly known as:  EXCEDRIN MIGRAINE   Take 2 tablets by mouth 2 times daily as needed for headaches                                fluticasone 50 MCG/ACT spray   Commonly known as:  FLONASE   Spray 1 spray into both nostrils daily as needed for rhinitis or allergies                                ibuprofen 600 MG tablet   Commonly known as:  ADVIL/MOTRIN   Take 1 tablet (600 mg) by mouth every 6 hours as needed for pain (mild)                                LAMICTAL PO   Take 200 mg by mouth 2 times daily                                LORATADINE PO   Take 10 mg by mouth daily                                MUCINEX SINUS-MAX PO   Take 1,200 mg by mouth daily                                OMEPRAZOLE PO   Take 20 mg by mouth daily                                OXYCODONE HCL PO   Take 5-10 mg by mouth every 3 hours as needed                                oxyCODONE-acetaminophen 5-325 MG per tablet   Commonly known as:  PERCOCET   Take 1-2 tablets by mouth every 4 hours as needed for pain (moderate to severe)   Last time this was given:  2 tablets on 5/27/2017 10:19 AM                                prenatal multivitamin  plus iron 27-0.8 MG Tabs per tablet   Take 1 tablet by mouth daily                                PROPRANOLOL HCL PO   Take 20-40 mg by mouth 2 times daily as needed (anxiety)                                ROBAXIN PO   Take 750 mg by mouth 4 times daily as needed for muscle spasms                                VALTREX PO   Take 500 mg by mouth 2 times daily as needed (herpes outbreak)                                VITAMIN D (CHOLECALCIFEROL) PO   Take 5,000 Units by mouth daily                                WELLBUTRIN XL PO   Take 450 mg by mouth daily                                ZOFRAN ODT PO   Take 8-16 mg by mouth 3 times daily as needed for nausea

## 2017-05-26 NOTE — ANESTHESIA POSTPROCEDURE EVALUATION
Patient: Lena Morrow    Procedure(s):  ROBOTIC ASSISTED LAPAROSCOPIC TOTAL HYSTERECTOMY; BILATERAL SALPINGECTOMY; CYSTOSCOPY - Wound Class: II-Clean Contaminated   - Wound Class: II-Clean Contaminated    Diagnosis:PELVIC PAIN AND ENDOMETRIOSIS  Diagnosis Additional Information: No value filed.    Anesthesia Type:  General, ETT    Note:  Anesthesia Post Evaluation    Patient location during evaluation: PACU  Patient participation: Able to fully participate in evaluation  Level of consciousness: awake  Pain management: adequate  Airway patency: patent  Cardiovascular status: acceptable  Respiratory status: acceptable  Hydration status: acceptable  PONV: none     Anesthetic complications: None          Last vitals:  Vitals:    05/26/17 1341 05/26/17 1357 05/26/17 1438   BP:  97/53 94/45   Resp:  16 16   Temp:      SpO2: 97% 94% 96%         Electronically Signed By: Medhat Negron MD  May 26, 2017  3:24 PM

## 2017-05-27 VITALS
BODY MASS INDEX: 31.98 KG/M2 | TEMPERATURE: 98.2 F | HEIGHT: 64 IN | DIASTOLIC BLOOD PRESSURE: 51 MMHG | RESPIRATION RATE: 18 BRPM | OXYGEN SATURATION: 97 % | WEIGHT: 187.3 LBS | SYSTOLIC BLOOD PRESSURE: 108 MMHG

## 2017-05-27 LAB — GLUCOSE BLDC GLUCOMTR-MCNC: 90 MG/DL (ref 70–99)

## 2017-05-27 PROCEDURE — 82962 GLUCOSE BLOOD TEST: CPT

## 2017-05-27 PROCEDURE — 25000132 ZZH RX MED GY IP 250 OP 250 PS 637: Performed by: OBSTETRICS & GYNECOLOGY

## 2017-05-27 PROCEDURE — 40000934 ZZH STATISTIC OUTPATIENT (NON-OBS) DAY

## 2017-05-27 PROCEDURE — 25000128 H RX IP 250 OP 636: Performed by: OBSTETRICS & GYNECOLOGY

## 2017-05-27 RX ADMIN — KETOROLAC TROMETHAMINE 30 MG: 30 INJECTION, SOLUTION INTRAMUSCULAR at 05:48

## 2017-05-27 RX ADMIN — OXYCODONE HYDROCHLORIDE AND ACETAMINOPHEN 2 TABLET: 5; 325 TABLET ORAL at 05:48

## 2017-05-27 RX ADMIN — OXYCODONE HYDROCHLORIDE AND ACETAMINOPHEN 2 TABLET: 5; 325 TABLET ORAL at 01:45

## 2017-05-27 RX ADMIN — OXYCODONE HYDROCHLORIDE AND ACETAMINOPHEN 2 TABLET: 5; 325 TABLET ORAL at 10:19

## 2017-05-27 ASSESSMENT — PAIN DESCRIPTION - DESCRIPTORS
DESCRIPTORS: CRAMPING;ACHING
DESCRIPTORS: CRAMPING;CONSTANT;ACHING
DESCRIPTORS: CRAMPING

## 2017-05-27 NOTE — PROGRESS NOTES
POD #1    No unusual complaints.  Plans home later today.  Discussed activities    98.8     106/48    Abdomen soft.  Dressings dry.      Will plan home later today  Pain meds already up on station    See in 4 weeks

## 2017-05-27 NOTE — PLAN OF CARE
Problem: Goal Outcome Summary  Goal: Goal Outcome Summary  Outcome: Adequate for Discharge Date Met:  05/27/17  Pt doing well. A/o. VSS. Pain controlled. Up SBA. Ambulating. Voiding. Tolerating reg diet. Lap site CDI. Discharge instructions reviewed with pt. Verbalizes understanding. PIV removed. Take home med given. Verified 5 medication rights. Copy signed. Dc home with family.

## 2017-05-27 NOTE — PLAN OF CARE
Problem: Goal Outcome Summary  Goal: Goal Outcome Summary  Outcome: Improving  Pt is A&O X4. CMS in tact Pt has lower ab pain does not feel associated with surgery. Pt has received percocet for pain control works well  But has had episodes of breakthrough pain  . Pt BP has been soft. Pt is on 1l o2 and stats are 95-97. Arias is present and positional. All incisions are CDI.

## 2017-05-27 NOTE — PLAN OF CARE
Problem: Goal Outcome Summary  Goal: Goal Outcome Summary  Outcome: Improving  A&O, anxious at times. BP's soft at times. VS otherwise stable on RA. CMS intact. Lap sites CDI. Scant vaginal bleeding. Pain reduced with PRN Percocet, Toradol, Ice. Dangled, stood at bedside. Denies dizziness, n, v. UOP good. Arias d/c'd. Plan to discharge today.

## 2017-05-27 NOTE — OP NOTE
DATE OF PROCEDURE:  5/26/2017      PREOPERATIVE DIAGNOSIS:  A 42-year-old, para 1-0-0-1, with pelvic pain and history of endometriosis.      POSTOPERATIVE DIAGNOSES:  A 42-year-old, para 1-0-0-1, with pelvic pain and history of endometriosis.      PROCEDURE:     1.  Robotic-assisted hysterectomy with bilateral salpingectomy.   2.  Cystoscopy.      SURGEON:  Toni Da Silva MD   COSURGEON:  Mesha Stanford MD      ANESTHESIA:  General.      ESTIMATED BLOOD LOSS:  50 mL      OBSERVATIONS:  Normal-appearing uterus, tubes and ovaries.  Per cystoscopy, normal-appearing bladder without evidence of trauma.  Urine seen entering into the bladder through each ureteral orifice.      COMPLICATIONS:  None.      SPECIMENS:  Uterus with cervix and bilateral fallopian tubes.      DISPOSITION:  Patient stable to the recovery room.      INDICATIONS:  Lena Morrow is a 42-year-old para 1-0-0-1, who has had issues with chronic pelvic pain and dysmenorrhea.  Sometimes she uses oxycodone to help manage her pain.  Tramadol does not help.  She has a history of a laparoscopy in 01/2016 for endometriosis.  She had CO2 laser of endometriosis lesions.  She has tried an IUD in the past, but had heavy bleeding.  She tried danazol, but had hot flashes and stopped this treatment.  Currently, she is on progesterone only oral contraceptive pills daily but still her menses are once a month which cause her pain.  This pain has been getting worse.  Discussed further options Depo-Lupron.  She declines this.  She is ready for definitive surgical management.  I discussed with her specifically proceeding to the operating room for hysterectomy, i.e., robotic-assisted total hysterectomy with bilateral salpingectomy, and cystoscopy.  Possible laparotomy.  Risks, benefits, pros and cons discussed.  Risk of surgery were discussed with her including bleeding, infection, injury to other organs, need to convert to an open procedure and need for further surgery  postoperatively.  One-day hospital stay, 4-6 weeks for full recovery.  Her questions were answered.  She desires to proceed.      DESCRIPTION OF PROCEDURE:  The patient was taken to the operating room where general anesthesia was found to be adequate.  The patient was then prepped and draped in normal sterile fashion in the dorsal lithotomy position.  At the start of the case, attention was turned to the perineum.  A Arias catheter was placed in the bladder without difficulty.  Clear yellow urine was seen filling the Arias bag.  Speculum was then introduced into the vagina and the cervix was isolated.  Normal-appearing vagina and cervix.  The anterior lip of the cervix was grasped with single-toothed tenaculum.  A single stitch was placed just lateral to the cervix on the left hand side.  The VCare uterine manipulator was then placed without difficulty and the stitches were then placed tied the VCare into place.  Single-tooth tenaculum was removed as was the speculum from the vagina.      Attention was now turned to the abdomen.  Using a scalpel, an approximately 1 cm transverse supraumbilical skin incision was made in the midline.  Through this opening, the Veress needle was placed.  Saline was seen to passively flow through the Veress needle.  CO2 gas was hooked up to the Veress needle.  Opening pressure was 4 mmHg.  The abdomen and pelvis were insufflated to 15 mmHg.  The Veress needle was then removed and using the 8 mm plastic tip trocar was placed through this opening.  The camera was placed through this port site, confirming proper location.  The abdomen and pelvis were then surveyed.  Please see observations noted above.  Another 8 mm port was placed on the left hand side lateral to the umbilical port under direct visualization with the camera.  Two more ports were also placed under direct visualization of the camera. One in the right upper quadrant lateral to the umbilicus and finally one in the right lower  quadrant.  The robot was then docked and instruments placed through their proper port sites.      I now went to the console.  Using the vessel sealer, the patient's right fallopian tube was  from its attachments.  Thereafter, the utero-ovarian pedicle was grasped, sealed and transected.  Following this, the remaining attachments on the right hand side were also serially clamped, sealed and transected with the vessel sealer.  These included the broad ligament including the uterine artery, the cardinal ligaments and finally the uterosacral ligaments.  Using the laparoscopic scissors and cautery, the bladder flap was created to the midline.  The same sequence of events was performed on the patient's left hand side.  At this point, I could feel the VCare cup circumferentially around the cervix.  Using the laparoscopic scissors and cautery, I did transect the cervix from the vagina around the VCare cup.  The specimen was thus freed up and removed through the vagina.  A sterile glove with lap sponges was placed into the vagina to maintain our pneumoperitoneum.  The vaginal cuff was then closed with a 0 V-Loc suture from the left apex of the vaginal cuff to the right and then from the right to left.  The vaginal cuff and pedicles were irrigated.  Excellent hemostasis was noted.      Attention now turned to the perineum.  I removed the Arias catheter.  There was about 300 mL of clear yellow urine in the Arias bag.  The cystoscope was then placed in the bladder.  The bladder walls were then surveyed.  Normal-appearing bladder walls without evidence of trauma.  I then was able to visualize urine coming into the bladder from each ureteral orifice.  I then removed the cystoscope and I placed a new Arias catheter into the bladder without difficulty.      Attention was now returned to the abdomen.  One final look at the pedicles and vaginal cuff revealed excellent hemostasis.  The robot was undocked.  The CO2 gas was  allowed to escape.  The port sites were removed.  All four port sites were injected with a combined 30 mL of 1% lidocaine and reapproximated with 4-0 Vicryl in subcuticular fashion.      The patient tolerated the procedure well.  Sponge, lap and needle counts were reported to me as correct x2.  The patient received a dose of clindamycin and gentamicin through her IV prior to start of the case for prophylaxis.  The patient was taken to the recovery room in stable condition.         SCOTT CLIFTON MD             D: 2017 12:19   T: 2017 15:14   MT: [id number]      Name:     NATHAN HUGGINS   MRN:      [id number]        Account:        [id number]   :      1975           Procedure Date: 2017      Document: [id number]

## 2017-05-28 NOTE — DISCHARGE SUMMARY
HISTORY OF PRESENT ILLNESS:  Lena Huggins is a 42-year-old multiparous patient who has a history of endometriosis and chronic pelvic pain.  She has had multiple modalities, has continued to have pelvic pain.  She was therefore scheduled for surgery.        HOSPITAL COURSE:  She underwent a robotic-assisted hysterectomy, bilateral salpingectomy on 2017 without difficulties.      The patient's postop course has been essentially uncomplicated.  She remained afebrile.  At the time of discharge, her incisions are healing well.  She is stable.      The patient is being discharged today in satisfactory condition.  Given routine postop instructions.  She already has pain medications per Dr. Da Silva, given to her yesterday.  She was given routine instructions:  No lifting over 20 pounds, no driving for at least 1 week.  She was instructed in wound care.  She will be seen back in the office in 4 weeks.         MARIA LUISA SYED MD             D: 2017 08:04   T: 2017 09:10   MT: #[id number]      Name:     LENA HUGGINS   MRN:      [id number]        Account:        [id number]   :      1975           Admit Date:     [id number]                                  Discharge Date: 2017      Document: [id number]

## 2017-05-30 LAB — COPATH REPORT: NORMAL

## 2017-06-03 ENCOUNTER — HEALTH MAINTENANCE LETTER (OUTPATIENT)
Age: 42
End: 2017-06-03

## 2017-06-05 DIAGNOSIS — F31.81 BIPOLAR 2 DISORDER (H): Primary | ICD-10-CM

## 2017-06-05 RX ORDER — LAMOTRIGINE 200 MG/1
200 TABLET ORAL 2 TIMES DAILY
Qty: 60 TABLET | Refills: 0 | Status: SHIPPED | OUTPATIENT
Start: 2017-06-05 | End: 2017-06-14

## 2017-06-05 NOTE — TELEPHONE ENCOUNTER
Patient is overdue for a med check. There was some question about her health plan covering Dr. Doe's appointment. She needs to schedule a med check if she still intends on getting refills from Dr. Doe. We have not heard that she is transferring care yet.   Last Office Visit with [facility], [facility] or [facility] prescribing provider:11/2016       BP Readings from Last 3 Encounters:   05/27/17 108/51   05/08/17 112/82   08/26/16 114/84

## 2017-06-14 ENCOUNTER — OFFICE VISIT (OUTPATIENT)
Dept: PSYCHIATRY | Facility: CLINIC | Age: 42
End: 2017-06-14
Payer: COMMERCIAL

## 2017-06-14 DIAGNOSIS — F31.81 BIPOLAR 2 DISORDER (H): ICD-10-CM

## 2017-06-14 DIAGNOSIS — F41.1 GENERALIZED ANXIETY DISORDER: ICD-10-CM

## 2017-06-14 PROCEDURE — 99214 OFFICE O/P EST MOD 30 MIN: CPT | Performed by: PSYCHIATRY & NEUROLOGY

## 2017-06-14 RX ORDER — LAMOTRIGINE 200 MG/1
200 TABLET ORAL 2 TIMES DAILY
Qty: 180 TABLET | Refills: 2 | Status: SHIPPED | OUTPATIENT
Start: 2017-06-14 | End: 2018-04-20

## 2017-06-14 RX ORDER — PROPRANOLOL HYDROCHLORIDE 20 MG/1
TABLET ORAL
Qty: 90 TABLET | Refills: 2 | Status: SHIPPED | OUTPATIENT
Start: 2017-06-14 | End: 2018-04-24

## 2017-06-14 ASSESSMENT — PATIENT HEALTH QUESTIONNAIRE - PHQ9: 5. POOR APPETITE OR OVEREATING: SEVERAL DAYS

## 2017-06-14 ASSESSMENT — ANXIETY QUESTIONNAIRES
GAD7 TOTAL SCORE: 11
6. BECOMING EASILY ANNOYED OR IRRITABLE: NEARLY EVERY DAY
5. BEING SO RESTLESS THAT IT IS HARD TO SIT STILL: NOT AT ALL
2. NOT BEING ABLE TO STOP OR CONTROL WORRYING: MORE THAN HALF THE DAYS
7. FEELING AFRAID AS IF SOMETHING AWFUL MIGHT HAPPEN: SEVERAL DAYS
1. FEELING NERVOUS, ANXIOUS, OR ON EDGE: MORE THAN HALF THE DAYS
3. WORRYING TOO MUCH ABOUT DIFFERENT THINGS: MORE THAN HALF THE DAYS

## 2017-06-14 NOTE — PROGRESS NOTES
Psychiatric Progress Note    Name: Lena Morrow  Date: June 14, 2017   Length of Visit: 30 minutes  MRN: [id number]      Current Outpatient Prescriptions   Medication Sig     lamoTRIgine (LAMICTAL) 200 MG tablet Take 1 tablet (200 mg) by mouth 2 times daily     BuPROPion HCl (WELLBUTRIN XL PO) Take 450 mg by mouth daily     VITAMIN D, CHOLECALCIFEROL, PO Take 5,000 Units by mouth daily     fluticasone (FLONASE) 50 MCG/ACT spray Spray 1 spray into both nostrils daily as needed for rhinitis or allergies     LORATADINE PO Take 10 mg by mouth daily     Methocarbamol (ROBAXIN PO) Take 750 mg by mouth 4 times daily as needed for muscle spasms     OMEPRAZOLE PO Take 20 mg by mouth daily     OXYCODONE HCL PO Take 5-10 mg by mouth every 3 hours as needed     Prenatal Vit-Fe Fumarate-FA (PRENATAL MULTIVITAMIN  PLUS IRON) 27-0.8 MG TABS per tablet Take 1 tablet by mouth daily     PROPRANOLOL HCL PO Take 20-40 mg by mouth 2 times daily as needed (anxiety)     ValACYclovir HCl (VALTREX PO) Take 500 mg by mouth 2 times daily as needed (herpes outbreak)     Phenylephrine-APAP-Guaifenesin (MUCINEX SINUS-MAX PO) Take 1,200 mg by mouth daily     aspirin-acetaminophen-caffeine (EXCEDRIN MIGRAINE) 250-250-65 MG per tablet Take 2 tablets by mouth 2 times daily as needed for headaches     Ondansetron (ZOFRAN ODT PO) Take 8-16 mg by mouth 3 times daily as needed for nausea     ibuprofen (ADVIL/MOTRIN) 600 MG tablet Take 1 tablet (600 mg) by mouth every 6 hours as needed for pain (mild)     oxyCODONE-acetaminophen (PERCOCET) 5-325 MG per tablet Take 1-2 tablets by mouth every 4 hours as needed for pain (moderate to severe)     albuterol (PROAIR HFA/PROVENTIL HFA/VENTOLIN HFA) 108 (90 BASE) MCG/ACT Inhaler Inhale 2 puffs into the lungs every 6 hours as needed for shortness of breath / dyspnea or wheezing     No current facility-administered medications for this visit.      Past medication trials: (patient was presented with a list  to review all currently available antidepressants, mood stabilizers, tranquilizers, hypnotics and antipsychotics)  Older Antidepressants: Elavil (amitriptyline) and Serzone (nefazodone)  New Antidepressants: Zoloft (sertraline), Wellbutrin, Zyban, Aplenzin (bupropion) and Cymbalta (duloxetine), Viibryd, Remeron  Mood Stabilizers: Lamictal (lamotrigine) and Neurontin (gabapentin)  Sedatives/Hypnotics: Ambien (zolpidem)  Tranquilizers: Xanax (alprazolam) and Ativan (lorazepam),Valium  Weight gain on Elavil and Serzone, WB and Cymbalta stopped, unsure why.    Also failed Lexapro (by me)       Therapist:  Tried it twice     PHQ-9:  PHQ-9 SCORE 10/29/2015 12/4/2015 6/14/2017   Total Score - - -   Total Score MyChart - - -   Total Score 21 12 12      PHIL-7:  PHIL-7 SCORE 10/29/2015 12/4/2015 6/14/2017   Total Score - - -   Total Score 21 8 11        Interim History:  Had a hysterectomy 5/26; the fact that she will not have another child has really hit her.   Did to much post op, so had some bleeding and now limited in activity. Not being able to keep up with house work is driving her crazy (OCD). Was working as a , so missing the tip money without her working.   8 y/o son now on meds for ADHD, but won't eat, loosing wt.   Using Inderal about 3x per week for anxiety; worried about becoming addicted; dicussed.   Didn't have a sleep eval, and 3s on poor sleep, tired on PHQ-9. Has tried a bunch of stuff for sleep.      Assessment: from last visit 11/22   She still has some residual symptoms I can't come up with any new medications that might help with these. Due to her being covered by [employer] she may have to change provider net work; discussed.     Treatment Plan:  Continue Wellbutrin (bupropion) - 450 mg. Take all in morning   Continue Lamictal (lamotrigine) to 200 mg twice a day and Inderal (propranolol) 20 mg; 1-2 as needed for anxiety (up to twice a day)   Continue vitamin D   Arrange for a  "sleep study   Safety plan was reviewed; to the ER as needed or call after hours crisis line; [phone]  Education and counseling was done regarding use of medications, psychotherapy options  Call for a follow up appointment with Dr. Doe after the sleep evaluation; [phone].      Past Medical History:   Diagnosis Date     Abnormal pap      Allergic rhinitis, cause unspecified      Arrhythmia     atrial flutter, paroxysmal     Asthma      Atrial flutter, paroxysmal (H)      C. difficile colitis 10/14     Cholelithiasis      DDD (degenerative disc disease), cervical      DYSPEPSIA      Endometriosis      Gastro-oesophageal reflux disease      Herpes simplex without mention of complication      LSIL (low grade squamous intraepithelial lesion) on Pap smear 10/10/12    Done at [facility]     Major depression      Mild intermittent asthma      Obesity      Other forms of migraine, without mention of intractable migraine without mention of status migrainosus      Panic disorder without agoraphobia        10 point ROS is negative except for nausea, lower abdominal pain, back and neck pain.      Mental Status Assessment:  Appearance:  Well groomed     Behavior/relationship to examiner/demeanor:  Cooperative, but crying on occasion   Motor activity:  Normal  Gait:  Normal   Speech:  Normal in volume, articulation, coherence   Mood (subjective report):  \"good\"   Affect (objective appearance):  Mood congruent  Thought Process (Associations):  Logical, linear and goal directed  Thought content:  No evidence of suicidal or homicidal ideation,          no overt psychosis and                    patient does not appear to be responding to internal stimuli  Oriented to person, place, date/time   Attention Span and concentration: Intact   Memory:  Short-term memory intact and Long-term memory; fair   Language:  Fluent   Fund of Knowledge/Intelligence:  Average  Use of language: Intact   Abstraction:  " "Normal  Insight:  Adequate  Judgment:  Adequate for safety    DSM-5 DIAGNOSIS:  296.89 - Bipolar II Disorder (F31.81)   300.00 - Anxiety Disorder, NOS   Cluster B Traits: Ralph BPD Scale Positive for 8 / 10 items     Assessment:  I think that she is doing as well as can be expected on the current medications. However she has a lot going on and I really think she should be involved in some psychotherapy but there may be financial constraints in regards to this. Inderal is working well for her and she was reassured that it is not addicting.    Treatment Plan:  Continue Wellbutrin (bupropion)  mg. Take all in morning   Continue Lamictal (lamotrigine) to 200 mg twice a day, but may drop one of the doses to 100 mg (300 mg total); cognitive problems?   Ask Dr Crisostomo about a sleep consultation   Information on sleep hygiene was provided   I suggested the [employer] book \"Why we get fat\"   Inderal (propranolol) 20 mg; 1-2 as needed for anxiety (up to twice a day). Don't worry about getting \"hooked\" on this (not addicting)   Continue vitamin D   Arrange for a sleep study   Call [facility] at [phone] to set up psychotherapy.   Safety plan was reviewed; to the ER as needed or call after hours crisis line; [phone]  Education and counseling was done regarding use of medications, psychotherapy options  Follow up with Trevon Crisostomo for medication management and refills.         Signed: Soy Doe M.D.     "

## 2017-06-14 NOTE — MR AVS SNAPSHOT
"                  MRN:[id number]                      After Visit Summary   6/14/2017    Lena Morrow    MRN: [id number]           Visit Information        Provider Department      6/14/2017 12:30 PM Soy Doe MD [facility]        Care Instructions        Treatment Plan:  Continue Wellbutrin (bupropion)  mg. Take all in morning   Continue Lamictal (lamotrigine) to 200 mg twice a day, but may drop one of the doses to 100 mg (300 mg total).   Ask Dr Crisostomo about a sleep consultation   Information on sleep hygiene was provided   I suggested the [employer] book \"Why we get fat\"   Inderal (propranolol) 20 mg; 1-2 as needed for anxiety (up to twice a day). Don't worry about getting \"hooked\" on this (not addicting)   Continue vitamin D   Arrange for a sleep study   Call [facility] at [phone] to set up psychotherapy.   Safety plan was reviewed; to the ER as needed or call after hours crisis line; [phone]  Education and counseling was done regarding use of medications, psychotherapy options  Follow up with Trevon Crisostomo for medication management and refills.              [employer] Information     [employer] gives you secure access to your electronic health record. If you see a primary care provider, you can also send messages to your care team and make appointments. If you have questions, please call your primary care clinic.  If you do not have a primary care provider, please call [phone] and they will assist you.        Care EveryWhere ID     This is your Care EveryWhere ID. This could be used by other organizations to access your [facility] medical records  [id number]        "

## 2017-06-14 NOTE — PATIENT INSTRUCTIONS
"    Treatment Plan:  Continue Wellbutrin (bupropion)  mg. Take all in morning   Continue Lamictal (lamotrigine) to 200 mg twice a day, but may drop one of the doses to 100 mg (300 mg total).   Ask Dr Crisostomo about a sleep consultation   Information on sleep hygiene was provided   I suggested the [employer]nory book \"Why we get fat\"   Inderal (propranolol) 20 mg; 1-2 as needed for anxiety (up to twice a day). Don't worry about getting \"hooked\" on this (not addicting)   Continue vitamin D   Arrange for a sleep study   Call [facility] at [phone] to set up psychotherapy.   Safety plan was reviewed; to the ER as needed or call after hours crisis line; [phone]  Education and counseling was done regarding use of medications, psychotherapy options  Follow up with Trevon Crisostomo for medication management and refills.       "

## 2017-06-15 ASSESSMENT — ANXIETY QUESTIONNAIRES: GAD7 TOTAL SCORE: 11

## 2017-06-15 ASSESSMENT — PATIENT HEALTH QUESTIONNAIRE - PHQ9: SUM OF ALL RESPONSES TO PHQ QUESTIONS 1-9: 12

## 2017-06-23 ENCOUNTER — TELEPHONE (OUTPATIENT)
Dept: PSYCHIATRY | Facility: CLINIC | Age: 42
End: 2017-06-23

## 2017-06-23 DIAGNOSIS — F33.1 MAJOR DEPRESSIVE DISORDER, RECURRENT EPISODE, MODERATE (H): Primary | ICD-10-CM

## 2017-06-23 NOTE — TELEPHONE ENCOUNTER
Reason for call:  Other   Patient called regarding (reason for call): prescription  Additional comments: Pharmacy called her and said that insurance will not pay for the Wellbutrin as of prescribed right now. Paperwork needed to get this covered?    Phone number to reach patient:  Home number on file [phone] (home)    Best Time:  anytime    Can we leave a detailed message on this number?  YES

## 2017-06-27 RX ORDER — BUPROPION HYDROCHLORIDE 300 MG/1
TABLET ORAL
Qty: 30 TABLET | Refills: 1 | Status: SHIPPED | OUTPATIENT
Start: 2017-06-27 | End: 2017-08-29

## 2017-06-27 RX ORDER — BUPROPION HYDROCHLORIDE 150 MG/1
TABLET ORAL
Qty: 30 TABLET | Refills: 1 | Status: SHIPPED | OUTPATIENT
Start: 2017-06-27 | End: 2017-08-29

## 2017-06-27 NOTE — TELEPHONE ENCOUNTER
"Spoke to pharmacist about the Wellbutrin. They stated they refilled this last month w/o problems. Now plan only pays for one tab daily. Dose dived between to different sizes per pharmacy request.     From 6/14/2017:  DSM-5 DIAGNOSIS:  296.89 - Bipolar II Disorder (F31.81)   300.00 - Anxiety Disorder, NOS   Cluster B Traits: Zanarini BPD Scale Positive for 8 / 10 items      Assessment:  I think that she is doing as well as can be expected on the current medications. However she has a lot going on and I really think she should be involved in some psychotherapy but there may be financial constraints in regards to this. Inderal is working well for her and she was reassured that it is not addicting.     Treatment Plan:  Continue Wellbutrin (bupropion)  mg. Take all in morning   Continue Lamictal (lamotrigine) to 200 mg twice a day, but may drop one of the doses to 100 mg (300 mg total); cognitive problems?   Ask Dr Crisostomo about a sleep consultation   Information on sleep hygiene was provided   I suggested the [employer] book \"Why we get fat\"   Inderal (propranolol) 20 mg; 1-2 as needed for anxiety (up to twice a day). Don't worry about getting \"hooked\" on this (not addicting)   Continue vitamin D   Arrange for a sleep study   Call [facility] at [phone] to set up psychotherapy.   Safety plan was reviewed; to the ER as needed or call after hours crisis line; [phone]  Education and counseling was done regarding use of medications, psychotherapy options  Follow up with Trevon Crisostomo for medication management and refills.            Signed:                     Soy Doe M.D.   "

## 2017-07-17 ENCOUNTER — OFFICE VISIT (OUTPATIENT)
Dept: URGENT CARE | Facility: URGENT CARE | Age: 42
End: 2017-07-17
Payer: COMMERCIAL

## 2017-07-17 ENCOUNTER — HOSPITAL ENCOUNTER (OUTPATIENT)
Dept: CT IMAGING | Facility: CLINIC | Age: 42
Discharge: HOME | End: 2017-07-17
Attending: FAMILY MEDICINE | Admitting: FAMILY MEDICINE
Payer: COMMERCIAL

## 2017-07-17 VITALS
DIASTOLIC BLOOD PRESSURE: 70 MMHG | BODY MASS INDEX: 31.45 KG/M2 | WEIGHT: 183.3 LBS | SYSTOLIC BLOOD PRESSURE: 120 MMHG | OXYGEN SATURATION: 98 % | TEMPERATURE: 97.8 F | HEART RATE: 84 BPM

## 2017-07-17 DIAGNOSIS — Z87.442 HISTORY OF KIDNEY STONES: ICD-10-CM

## 2017-07-17 DIAGNOSIS — R31.29 MICROSCOPIC HEMATURIA: ICD-10-CM

## 2017-07-17 DIAGNOSIS — R10.9 FLANK PAIN: ICD-10-CM

## 2017-07-17 DIAGNOSIS — N20.0 CALCULUS OF KIDNEY: ICD-10-CM

## 2017-07-17 DIAGNOSIS — R10.9 FLANK PAIN: Primary | ICD-10-CM

## 2017-07-17 LAB
ALBUMIN SERPL-MCNC: 3.8 G/DL (ref 3.4–5)
ALBUMIN UR-MCNC: ABNORMAL MG/DL
ALP SERPL-CCNC: 96 U/L (ref 40–150)
ALT SERPL W P-5'-P-CCNC: 28 U/L (ref 0–50)
ANION GAP SERPL CALCULATED.3IONS-SCNC: 5 MMOL/L (ref 3–14)
APPEARANCE UR: CLEAR
AST SERPL W P-5'-P-CCNC: 13 U/L (ref 0–45)
BASOPHILS # BLD AUTO: 0 10E9/L (ref 0–0.2)
BASOPHILS NFR BLD AUTO: 0.4 %
BILIRUB SERPL-MCNC: 0.5 MG/DL (ref 0.2–1.3)
BILIRUB UR QL STRIP: NEGATIVE
BUN SERPL-MCNC: 16 MG/DL (ref 7–30)
CALCIUM SERPL-MCNC: 8.8 MG/DL (ref 8.5–10.1)
CHLORIDE SERPL-SCNC: 110 MMOL/L (ref 94–109)
CO2 SERPL-SCNC: 28 MMOL/L (ref 20–32)
COLOR UR AUTO: YELLOW
CREAT SERPL-MCNC: 0.98 MG/DL (ref 0.52–1.04)
DIFFERENTIAL METHOD BLD: NORMAL
EOSINOPHIL # BLD AUTO: 0.2 10E9/L (ref 0–0.7)
EOSINOPHIL NFR BLD AUTO: 2.3 %
ERYTHROCYTE [DISTWIDTH] IN BLOOD BY AUTOMATED COUNT: 12.9 % (ref 10–15)
GFR SERPL CREATININE-BSD FRML MDRD: 62 ML/MIN/1.7M2
GLUCOSE SERPL-MCNC: 85 MG/DL (ref 70–99)
GLUCOSE UR STRIP-MCNC: NEGATIVE MG/DL
HCT VFR BLD AUTO: 43.5 % (ref 35–47)
HGB BLD-MCNC: 14.1 G/DL (ref 11.7–15.7)
HGB UR QL STRIP: ABNORMAL
KETONES UR STRIP-MCNC: NEGATIVE MG/DL
LEUKOCYTE ESTERASE UR QL STRIP: NEGATIVE
LYMPHOCYTES # BLD AUTO: 2.3 10E9/L (ref 0.8–5.3)
LYMPHOCYTES NFR BLD AUTO: 32.1 %
MCH RBC QN AUTO: 30.2 PG (ref 26.5–33)
MCHC RBC AUTO-ENTMCNC: 32.4 G/DL (ref 31.5–36.5)
MCV RBC AUTO: 93 FL (ref 78–100)
MONOCYTES # BLD AUTO: 0.6 10E9/L (ref 0–1.3)
MONOCYTES NFR BLD AUTO: 8.1 %
MUCOUS THREADS #/AREA URNS LPF: PRESENT /LPF
NEUTROPHILS # BLD AUTO: 4 10E9/L (ref 1.6–8.3)
NEUTROPHILS NFR BLD AUTO: 57.1 %
NITRATE UR QL: NEGATIVE
PH UR STRIP: 5.5 PH (ref 5–7)
PLATELET # BLD AUTO: 247 10E9/L (ref 150–450)
POTASSIUM SERPL-SCNC: 3.6 MMOL/L (ref 3.4–5.3)
PROT SERPL-MCNC: 7.4 G/DL (ref 6.8–8.8)
RBC # BLD AUTO: 4.67 10E12/L (ref 3.8–5.2)
RBC #/AREA URNS AUTO: ABNORMAL /HPF (ref 0–2)
SODIUM SERPL-SCNC: 143 MMOL/L (ref 133–144)
SP GR UR STRIP: >1.03 (ref 1–1.03)
URN SPEC COLLECT METH UR: ABNORMAL
UROBILINOGEN UR STRIP-ACNC: 0.2 EU/DL (ref 0.2–1)
WBC # BLD AUTO: 7 10E9/L (ref 4–11)
WBC #/AREA URNS AUTO: ABNORMAL /HPF (ref 0–2)

## 2017-07-17 PROCEDURE — 81001 URINALYSIS AUTO W/SCOPE: CPT | Performed by: FAMILY MEDICINE

## 2017-07-17 PROCEDURE — 80053 COMPREHEN METABOLIC PANEL: CPT | Performed by: FAMILY MEDICINE

## 2017-07-17 PROCEDURE — 74176 CT ABD & PELVIS W/O CONTRAST: CPT

## 2017-07-17 PROCEDURE — 96372 THER/PROPH/DIAG INJ SC/IM: CPT | Performed by: FAMILY MEDICINE

## 2017-07-17 PROCEDURE — 99214 OFFICE O/P EST MOD 30 MIN: CPT | Mod: 25 | Performed by: FAMILY MEDICINE

## 2017-07-17 PROCEDURE — 36415 COLL VENOUS BLD VENIPUNCTURE: CPT | Performed by: FAMILY MEDICINE

## 2017-07-17 PROCEDURE — 85025 COMPLETE CBC W/AUTO DIFF WBC: CPT | Performed by: FAMILY MEDICINE

## 2017-07-17 RX ORDER — OXYCODONE AND ACETAMINOPHEN 5; 325 MG/1; MG/1
1-2 TABLET ORAL EVERY 6 HOURS PRN
Qty: 20 TABLET | Refills: 0 | Status: SHIPPED | OUTPATIENT
Start: 2017-07-17 | End: 2017-07-20

## 2017-07-17 RX ORDER — KETOROLAC TROMETHAMINE 30 MG/ML
60 INJECTION, SOLUTION INTRAMUSCULAR; INTRAVENOUS ONCE
Qty: 2 ML | Refills: 0 | OUTPATIENT
Start: 2017-07-17 | End: 2017-07-17

## 2017-07-17 NOTE — MR AVS SNAPSHOT
After Visit Summary   7/17/2017    Lena Morrow    MRN: [id number]           Patient Information     Date Of Birth          1975        Visit Information        Provider Department      7/17/2017 10:10 AM Freddy Andino DO [facility]        Today's Diagnoses     Flank pain    -  1    Microscopic hematuria        History of kidney stones        Calculus of kidney           Follow-ups after your visit        Additional Services     UROLOGY ADULT REFERRAL       Your provider has referred you to: [facility]: [facility] - [place] [phone]   [email or web address]  FH[facility]: [facility]  [place] [phone]   [email or web address]  [place] [phone]   [email or web address]    Please be aware that coverage of these services is subject to the terms and limitations of your health insurance plan.  Call member services at your health plan with any benefit or coverage questions.      Please bring the following with you to your appointment:    (1) Any X-Rays, CTs or MRIs which have been performed.  Contact the facility where they were done to arrange for  prior to your scheduled appointment.    (2) List of current medications  (3) This referral request   (4) Any documents/labs given to you for this referral                  Future tests that were ordered for you today     Open Future Orders        Priority Expected Expires Ordered    CT Abdomen Pelvis w/o Contrast STAT 7/17/2017 7/17/2018 7/17/2017            Who to contact     If you have questions or need follow up information about today's clinic visit or your schedule please contact [facility] directly at [phone].  Normal or non-critical lab and imaging results will be communicated to you by MyChart, letter or phone within 4 business days after the clinic has received the results. If you do not hear from us within 7 days, please contact the  clinic through [employer] or phone. If you have a critical or abnormal lab result, we will notify you by phone as soon as possible.  Submit refill requests through [employer] or call your pharmacy and they will forward the refill request to us. Please allow 3 business days for your refill to be completed.          Additional Information About Your Visit        [employer] Information     [employer] gives you secure access to your electronic health record. If you see a primary care provider, you can also send messages to your care team and make appointments. If you have questions, please call your primary care clinic.  If you do not have a primary care provider, please call [phone] and they will assist you.        Care EveryWhere ID     This is your Care EveryWhere ID. This could be used by other organizations to access your [place] medical records  [id number]        Your Vitals Were     Pulse Temperature Pulse Oximetry BMI (Body Mass Index)          84 97.8  F (36.6  C) 98% 31.45 kg/m2         Blood Pressure from Last 3 Encounters:   07/17/17 120/70   05/27/17 108/51   05/08/17 112/82    Weight from Last 3 Encounters:   07/17/17 183 lb 4.8 oz (83.1 kg)   05/26/17 187 lb 4.8 oz (85 kg)   05/08/17 183 lb 14.4 oz (83.4 kg)              We Performed the Following     CBC with platelets differential     Comprehensive metabolic panel     INJECTION INTRAMUSCULAR OR SUB-Q     KETOROLAC TROMETHAMINE 15MG     UA with Microscopic reflex to Culture     UROLOGY ADULT REFERRAL          Today's Medication Changes          These changes are accurate as of: 7/17/17  3:51 PM.  If you have any questions, ask your nurse or doctor.               Start taking these medicines.        Dose/Directions    ketorolac 60 MG/2ML Soln injection   Commonly known as:  TORADOL   Used for:  Flank pain, Microscopic hematuria, History of kidney stones        Dose:  60 mg   Inject 2 mLs (60 mg) into the muscle once for 1 dose   Quantity:  2 mL   Refills:  0          These medicines have changed or have updated prescriptions.        Dose/Directions    oxyCODONE-acetaminophen 5-325 MG per tablet   Commonly known as:  PERCOCET   This may have changed:    - when to take this  - reasons to take this  - additional instructions   Used for:  Flank pain, Microscopic hematuria, History of kidney stones, Calculus of kidney        Dose:  1-2 tablet   Take 1-2 tablets by mouth every 6 hours as needed for pain maximum 8 tablet(s) per day   Quantity:  20 tablet   Refills:  0         Stop taking these medicines if you haven't already. Please contact your care team if you have questions.     OMEPRAZOLE PO                Where to get your medicines      Some of these will need a paper prescription and others can be bought over the counter.  Ask your nurse if you have questions.     Bring a paper prescription for each of these medications     oxyCODONE-acetaminophen 5-325 MG per tablet       You don't need a prescription for these medications     ketorolac 60 MG/2ML Soln injection                Primary Care Provider Office Phone # Fax #    Trevon Jeison Crisostomo [phone]       [address] [address]        Equal Access to Services     HARJINDER MAURICIO : Hadii russ portillo hadsaud Soterell, waaxda luqadaha, qaybta kaalmada jermaine, linden champion. So [facility] [phone].    ATENCIÓN: Si habla español, tiene a manriquez disposición servicios gratuitos de asistencia lingüística. [facility] [phone].    We comply with applicable federal civil rights laws and [place] laws. We do not discriminate on the basis of race, color, national origin, age, disability sex, sexual orientation or gender identity.            Thank you!     Thank you for choosing [facility]  for your care. Our goal is always to provide you with excellent care. Hearing back from our patients is one way we can continue to improve  our services. Please take a few minutes to complete the written survey that you may receive in the mail after your visit with us. Thank you!             Your Updated Medication List - Protect others around you: Learn how to safely use, store and throw away your medicines at www.disposemymeds.org.          This list is accurate as of: 7/17/17  3:51 PM.  Always use your most recent med list.                   Brand Name Dispense Instructions for use Diagnosis    albuterol 108 (90 BASE) MCG/ACT Inhaler    PROAIR HFA/PROVENTIL HFA/VENTOLIN HFA    1 Inhaler    Inhale 2 puffs into the lungs every 6 hours as needed for shortness of breath / dyspnea or wheezing    Mild intermittent asthma with acute exacerbation       * buPROPion 300 MG 24 hr tablet    WELLBUTRIN XL    30 tablet    Take one 300 mg tablet along with 150 mg tablet to make 450 mg per day.    Major depressive disorder, recurrent episode, moderate (H)       * buPROPion 150 MG 24 hr tablet    WELLBUTRIN XL    30 tablet    Take one 150 mg tablet along with one 300 mg tablet to make 450 mg per day.    Major depressive disorder, recurrent episode, moderate (H)       ibuprofen 600 MG tablet    ADVIL/MOTRIN    90 tablet    Take 1 tablet (600 mg) by mouth every 6 hours as needed for pain (mild)    S/P laparoscopic hysterectomy, Endometriosis, Pelvic pain in female       ketorolac 60 MG/2ML Soln injection    TORADOL    2 mL    Inject 2 mLs (60 mg) into the muscle once for 1 dose    Flank pain, Microscopic hematuria, History of kidney stones       lamoTRIgine 200 MG tablet    LAMICTAL    180 tablet    Take 1 tablet (200 mg) by mouth 2 times daily    Bipolar 2 disorder (H)       LORATADINE PO      Take 10 mg by mouth daily        MUCINEX SINUS-MAX PO      Take 1,200 mg by mouth daily        NEXIUM PO      Take by mouth daily        OXYCODONE HCL PO      Take 5-10 mg by mouth every 3 hours as needed        oxyCODONE-acetaminophen 5-325 MG per tablet    PERCOCET    20  tablet    Take 1-2 tablets by mouth every 6 hours as needed for pain maximum 8 tablet(s) per day    Flank pain, Microscopic hematuria, History of kidney stones, Calculus of kidney       prenatal multivitamin  plus iron 27-0.8 MG Tabs per tablet      Take 1 tablet by mouth daily        propranolol 20 MG tablet    INDERAL    90 tablet    Take 1-2 tablets as needed for anxiety (up to twice a day)    Generalized anxiety disorder       ROBAXIN PO      Take 750 mg by mouth 4 times daily as needed for muscle spasms        VALTREX PO      Take 500 mg by mouth 2 times daily as needed (herpes outbreak)        VITAMIN D (CHOLECALCIFEROL) PO      Take 5,000 Units by mouth daily        * Notice:  This list has 2 medication(s) that are the same as other medications prescribed for you. Read the directions carefully, and ask your doctor or other care provider to review them with you.

## 2017-07-17 NOTE — LETTER
[place] [facility]  [address] [facility] [id number] [phone]      July 17, 2017    RE:  Lena Morrow                                                                                                                                                       [address]    [facility] [address]            To whom it may concern:    Lena Morrow was seen today in the [facility] for a medical issue. Please excuse patient from work today 7/17/17. Further testing is needed. Patient may need more time off depending on results. Patient will update you as needed.      Sincerely,        Yusra Lewis    [facility]

## 2017-07-17 NOTE — NURSING NOTE
"Chief Complaint   Patient presents with     Pain     Pt reports that she had a hysterrectmy in May 2017. Pt was receovering well. Then on 7/9/17 pt moved flower boxes and developed abdominal cramping. Pt went to her OB/GYN and was told to go on bedrest for 1 week which helped. Last night, pt devloped the abdominal cramping along with back and side pain. Pt has taken oxycodone with very little relief.     Urgent Care       Initial /70  Pulse 84  Temp 97.8  F (36.6  C)  Wt 183 lb 4.8 oz (83.1 kg)  SpO2 98%  BMI 31.45 kg/m2 Estimated body mass index is 31.45 kg/(m^2) as calculated from the following:    Height as of 5/26/17: 5' 4.02\" (1.626 m).    Weight as of this encounter: 183 lb 4.8 oz (83.1 kg).  Medication Reconciliation: complete    "

## 2017-07-17 NOTE — PROGRESS NOTES
SUBJECTIVE: Lena Morrow is a 42 year old female presenting with a chief complaint of acute onset of Rt flank pain at 2am that awoke pt and was unable to fall back asleep.  Course of illness is same and constant.    Severity severe  Current and Associated symptoms: none  Treatment measures tried include Oxycodone.  Predisposing factors include see problem list.    Past Medical History:   Diagnosis Date     Abnormal pap      Allergic rhinitis, cause unspecified      Arrhythmia     atrial flutter, paroxysmal     Asthma      Atrial flutter, paroxysmal (H)      C. difficile colitis 10/14     Cholelithiasis      DDD (degenerative disc disease), cervical      DYSPEPSIA      Endometriosis      Gastro-oesophageal reflux disease      Herpes simplex without mention of complication      LSIL (low grade squamous intraepithelial lesion) on Pap smear 10/10/12    Done at [facility]     Major depression      Mild intermittent asthma      Obesity      Other forms of migraine, without mention of intractable migraine without mention of status migrainosus      Panic disorder without agoraphobia      Allergies   Allergen Reactions     Amoxicillin      yeast vaginal inf     Ativan Fatigue     Augmentin [Amoxicillin-Pot Clavulanate]      Itching,hives     Azithromycin      GI cramps     Clonazepam      Sedation, even at 0.5 mg dose     Estrogens      # bcps cause cns sx     Lexapro      diarrhea       Mirtazapine      Sedation      Prochlorperazine      Compazine- agitation     Seasonal Allergies      Venlafaxine      sweats     Xanax [Alprazolam] Fatigue     Social History   Substance Use Topics     Smoking status: Never Smoker     Smokeless tobacco: Never Used     Alcohol use Yes      Comment: Very rare       Review Of Systems  Skin: negative  Eyes: negative  Ears/Nose/Throat: negative  Respiratory: No shortness of breath, dyspnea on exertion, cough, or hemoptysis, not Pleuritic   Cardiovascular: negative  Gastrointestinal:  negative  Genitourinary: negative  Musculoskeletal: negative  Neurologic: negative  Psychiatric: negative  Hematologic/Lymphatic/Immunologic: negative  Endocrine: negative      OBJECTIVE:  /70  Pulse 84  Temp 97.8  F (36.6  C)  Wt 183 lb 4.8 oz (83.1 kg)  SpO2 98%  BMI 31.45 kg/h6VLYLCAH APPEARANCE: healthy, alert and no distress  RESP: lungs clear to auscultation - no rales, rhonchi or wheezes  ABDOMEN:  soft, nontender, no HSM or masses and bowel sounds normal  SKIN: no suspicious lesions or rashes  Rt flank pain    CT ABDOMEN AND PELVIS WITHOUT CONTRAST   7/17/2017 2:55 PM      HISTORY: Severe right flank pain and hematuria.     COMPARISON: 10/9/2014.     TECHNIQUE: Without intravenous or oral contrast, helical sections were  acquired from the top of the diaphragm through the pubic symphysis.  Coronal reconstructions were generated. Radiation dose for this scan  was reduced using automated exposure control, adjustment of the mA  and/or kV according to the patient's size, or iterative reconstruction  technique. (Renal stone protocol)     FINDINGS:      Right urinary tract: Two adjacent calculi are present in the mid  ureter, the largest measuring 0.4 cm. Mild dilatation of the more  proximal ureter and intrarenal collecting system. No additional renal  or ureteral calculi.     Left urinary tract: Three nonobstructing calculi in the inferior pole  of the kidney, the largest measuring 0.4 cm in diameter. No ureteral  calculi. No dilatation of the intrarenal collecting system or ureter.     Urinary bladder: No visualized calculi.     Remainder of the abdomen and pelvis: The liver, spleen, pancreas and  adrenal glands are unremarkable to the limits of a noncontrast CT  scan. Prior cholecystectomy. The small and large bowel are normal in  caliber. The appendix is unremarkable. No bowel wall thickening,  pneumatosis or free intraperitoneal gas. No enlarged lymph nodes or  free fluid in the abdomen or pelvis.  Bilateral L5 pars  interarticularis defects.     Scan through the lower chest is unremarkable.         IMPRESSION:   1. Two adjacent calculi in the mid right ureter, resulting in mild  obstruction. The largest calculus measures 0.4 cm in diameter.  2. A few tiny nonobstructing left renal calculi.      ICD-10-CM    1. Flank pain R10.9 UA with Microscopic reflex to Culture     CBC with platelets differential     Comprehensive metabolic panel     ketorolac (TORADOL) 60 MG/2ML SOLN injection     CT Abdomen Pelvis w/o Contrast     KETOROLAC TROMETHAMINE 15MG     INJECTION INTRAMUSCULAR OR SUB-Q     UROLOGY ADULT REFERRAL     oxyCODONE-acetaminophen (PERCOCET) 5-325 MG per tablet   2. Microscopic hematuria R31.29 ketorolac (TORADOL) 60 MG/2ML SOLN injection     CT Abdomen Pelvis w/o Contrast     KETOROLAC TROMETHAMINE 15MG     INJECTION INTRAMUSCULAR OR SUB-Q     UROLOGY ADULT REFERRAL     oxyCODONE-acetaminophen (PERCOCET) 5-325 MG per tablet   3. History of kidney stones Z87.442 ketorolac (TORADOL) 60 MG/2ML SOLN injection     CT Abdomen Pelvis w/o Contrast     KETOROLAC TROMETHAMINE 15MG     INJECTION INTRAMUSCULAR OR SUB-Q     UROLOGY ADULT REFERRAL     oxyCODONE-acetaminophen (PERCOCET) 5-325 MG per tablet   4. Calculus of kidney N20.0 UROLOGY ADULT REFERRAL     oxyCODONE-acetaminophen (PERCOCET) 5-325 MG per tablet     Fluids/Rest, f/u if worse/not any better

## 2017-07-17 NOTE — NURSING NOTE
Pt scheduled for a CT scan of the abdomen and pelvis today as a read and call at 3 pm at [facility].

## 2017-07-24 DIAGNOSIS — N20.0 KIDNEY STONES: Primary | ICD-10-CM

## 2017-07-25 ENCOUNTER — TELEPHONE (OUTPATIENT)
Dept: URGENT CARE | Facility: URGENT CARE | Age: 42
End: 2017-07-25

## 2017-07-25 NOTE — TELEPHONE ENCOUNTER
Pt calling for UA results from 7/17. Informed of results. Pt states has been feeling urgency. Has appointment with urology tomorrow due to dx of kidney stone. Advised per Dr Andino would need to have new UA done as UA from 7/17 not indicative of UTI  Monica CORTEZ

## 2017-07-26 ENCOUNTER — OFFICE VISIT (OUTPATIENT)
Dept: UROLOGY | Facility: CLINIC | Age: 42
End: 2017-07-26
Payer: COMMERCIAL

## 2017-07-26 VITALS
SYSTOLIC BLOOD PRESSURE: 130 MMHG | BODY MASS INDEX: 30.49 KG/M2 | HEART RATE: 100 BPM | DIASTOLIC BLOOD PRESSURE: 100 MMHG | HEIGHT: 65 IN | WEIGHT: 183 LBS

## 2017-07-26 DIAGNOSIS — N20.0 KIDNEY STONES: ICD-10-CM

## 2017-07-26 LAB
ALBUMIN UR-MCNC: NEGATIVE MG/DL
APPEARANCE UR: CLEAR
BILIRUB UR QL STRIP: NEGATIVE
COLOR UR AUTO: YELLOW
GLUCOSE UR STRIP-MCNC: NEGATIVE MG/DL
HGB UR QL STRIP: ABNORMAL
KETONES UR STRIP-MCNC: NEGATIVE MG/DL
LEUKOCYTE ESTERASE UR QL STRIP: ABNORMAL
NITRATE UR QL: NEGATIVE
PH UR STRIP: 7 PH (ref 5–7)
SP GR UR STRIP: 1.02 (ref 1–1.03)
URN SPEC COLLECT METH UR: ABNORMAL
UROBILINOGEN UR STRIP-ACNC: 0.2 EU/DL (ref 0.2–1)

## 2017-07-26 PROCEDURE — 99203 OFFICE O/P NEW LOW 30 MIN: CPT | Performed by: PHYSICIAN ASSISTANT

## 2017-07-26 PROCEDURE — 81003 URINALYSIS AUTO W/O SCOPE: CPT | Performed by: PHYSICIAN ASSISTANT

## 2017-07-26 RX ORDER — OXYCODONE AND ACETAMINOPHEN 5; 325 MG/1; MG/1
1-2 TABLET ORAL EVERY 4 HOURS PRN
Qty: 20 TABLET | Refills: 0 | Status: SHIPPED | OUTPATIENT
Start: 2017-07-26 | End: 2017-08-08

## 2017-07-26 RX ORDER — TAMSULOSIN HYDROCHLORIDE 0.4 MG/1
0.4 CAPSULE ORAL DAILY
Qty: 21 CAPSULE | Refills: 1 | Status: SHIPPED | OUTPATIENT
Start: 2017-07-26 | End: 2017-08-11

## 2017-07-26 ASSESSMENT — PAIN SCALES - GENERAL: PAINLEVEL: WORST PAIN (10)

## 2017-07-26 NOTE — PROGRESS NOTES
CC: Mid right ureteral stones.    HPI: It is a pleasure to see Ms. Lena Morrow, a 42 year old female seen today in the urology clinic in consultation from Dr. Andino for evaluation of the above. This was first detected on CT in the ED on 7/17/17 after the patient presented complaining of acute renal colic symptoms. The stones are adjacent to one another and the largest measures 4 mm and is located in the mid right ureter with associated mild hydronephrosis. UA in the ED was negative for infection. BMP revealed Cr to be normal. With pain under good control, the patient was discharged with a prescription for Percocet and instructions to follow up with urology.    Currently, the patient reports significant pain. The patient has not been straining their urine with no captured stones thus far. Denies gross hematuria, dysuria, fevers, chills, N/V. No prior history of kidney stones. Percocet works when she takes it for pain control.    RECENT IMAGING:  CT ABDOMEN AND PELVIS WITHOUT CONTRAST   7/17/2017 2:55 PM       HISTORY: Severe right flank pain and hematuria.    FINDINGS:   Right urinary tract: Two adjacent calculi are present in the mid  ureter, the largest measuring 0.4 cm. Mild dilatation of the more  proximal ureter and intrarenal collecting system. No additional renal  or ureteral calculi.      Left urinary tract: Three nonobstructing calculi in the inferior pole  of the kidney, the largest measuring 0.4 cm in diameter. No ureteral  calculi. No dilatation of the intrarenal collecting system or ureter.      Urinary bladder: No visualized calculi.      Remainder of the abdomen and pelvis: The liver, spleen, pancreas and  adrenal glands are unremarkable to the limits of a noncontrast CT  scan. Prior cholecystectomy. The small and large bowel are normal in  caliber. The appendix is unremarkable. No bowel wall thickening,  pneumatosis or free intraperitoneal gas. No enlarged lymph nodes or  free fluid in the abdomen  or pelvis. Bilateral L5 pars  interarticularis defects. Scan through the lower chest is unremarkable.        IMPRESSION:   1. Two adjacent calculi in the mid right ureter, resulting in mild  obstruction. The largest calculus measures 0.4 cm in diameter.  2. A few tiny nonobstructing left renal calculi.    Past Medical History:   Diagnosis Date     Abnormal pap      Allergic rhinitis, cause unspecified      Arrhythmia     atrial flutter, paroxysmal     Asthma      Atrial flutter, paroxysmal (H)      C. difficile colitis 10/14     Cholelithiasis      DDD (degenerative disc disease), cervical      DYSPEPSIA      Endometriosis      Gastro-oesophageal reflux disease      Herpes simplex without mention of complication      LSIL (low grade squamous intraepithelial lesion) on Pap smear 10/10/12    Done at [facility]     Major depression      Mild intermittent asthma      Obesity      Other forms of migraine, without mention of intractable migraine without mention of status migrainosus      Panic disorder without agoraphobia        Past Surgical History:   Procedure Laterality Date     C NONSPECIFIC PROCEDURE      tonsillectomy     C NONSPECIFIC PROCEDURE      Open right carpal tunnel release.  Excision right dorsal carpal ganglion cyst. Excision, terminal branch, right posterior interosseous nerve.        COLONOSCOPY       CYSTOSCOPY N/A 2017    Procedure: CYSTOSCOPY;;  Surgeon: Toni Da Silva MD;  Location:  OR     DAVINCI HYSTERECTOMY TOTAL, SALPINGECTOMY BILATERAL N/A 2017    Procedure: DAVINCI HYSTERECTOMY TOTAL, SALPINGECTOMY BILATERAL;  ROBOTIC ASSISTED LAPAROSCOPIC TOTAL HYSTERECTOMY; BILATERAL SALPINGECTOMY; CYSTOSCOPY;  Surgeon: Toni Da Silva MD;  Location:  OR     DILATION AND CURETTAGE N/A 2016    Procedure: DILATION AND CURETTAGE;  Surgeon: Josue Jama MD;  Location: [facility]     ENT SURGERY       GYN SURGERY           HC COLP CERVIX/UPPER VAGINA W BX  CERVIX  10/30/12    [facility]     LAPAROSCOPIC CHOLECYSTECTOMY  4/3/2012    Procedure:LAPAROSCOPIC CHOLECYSTECTOMY; LAPAROSCOPIC CHOLECYSTECTOMY ; Surgeon:KARYNA CAMARA; Location:[facility]     LAPAROSCOPY DIAGNOSTIC (GYN) N/A 1/5/2016    Procedure: LAPAROSCOPY DIAGNOSTIC (GYN);  Surgeon: Josue Jama MD;  Location: [facility]     ORTHOPEDIC SURGERY      carpal tunnel with ganglian cyst removal       Current Outpatient Prescriptions   Medication Sig Dispense Refill     Esomeprazole Magnesium (NEXIUM PO) Take by mouth daily       buPROPion (WELLBUTRIN XL) 300 MG 24 hr tablet Take one 300 mg tablet along with 150 mg tablet to make 450 mg per day. 30 tablet 1     buPROPion (WELLBUTRIN XL) 150 MG 24 hr tablet Take one 150 mg tablet along with one 300 mg tablet to make 450 mg per day. 30 tablet 1     propranolol (INDERAL) 20 MG tablet Take 1-2 tablets as needed for anxiety (up to twice a day) 90 tablet 2     lamoTRIgine (LAMICTAL) 200 MG tablet Take 1 tablet (200 mg) by mouth 2 times daily 180 tablet 2     VITAMIN D, CHOLECALCIFEROL, PO Take 5,000 Units by mouth daily       LORATADINE PO Take 10 mg by mouth daily       Methocarbamol (ROBAXIN PO) Take 750 mg by mouth 4 times daily as needed for muscle spasms       OXYCODONE HCL PO Take 5-10 mg by mouth every 3 hours as needed       Prenatal Vit-Fe Fumarate-FA (PRENATAL MULTIVITAMIN  PLUS IRON) 27-0.8 MG TABS per tablet Take 1 tablet by mouth daily       ValACYclovir HCl (VALTREX PO) Take 500 mg by mouth 2 times daily as needed (herpes outbreak)       Phenylephrine-APAP-Guaifenesin (MUCINEX SINUS-MAX PO) Take 1,200 mg by mouth daily       ibuprofen (ADVIL/MOTRIN) 600 MG tablet Take 1 tablet (600 mg) by mouth every 6 hours as needed for pain (mild) 90 tablet 0     albuterol (PROAIR HFA/PROVENTIL HFA/VENTOLIN HFA) 108 (90 BASE) MCG/ACT Inhaler Inhale 2 puffs into the lungs every 6 hours as needed for shortness of breath / dyspnea or wheezing 1 Inhaler prn        Allergies   Allergen Reactions     Amoxicillin      yeast vaginal inf     Ativan Fatigue     Augmentin [Amoxicillin-Pot Clavulanate]      Itching,hives     Azithromycin      GI cramps     Clonazepam      Sedation, even at 0.5 mg dose     Estrogens      # bcps cause cns sx     Lexapro      diarrhea       Mirtazapine      Sedation      Prochlorperazine      Compazine- agitation     Seasonal Allergies      Venlafaxine      sweats     Xanax [Alprazolam] Fatigue       FAMILY HISTORY: There is no family h/o  malignancy.  There is no family h/o urolithiasis.     SOCIAL HISTORY: The patient does not smoke cigarettes. Denies EtOH and illicit drug abuse.     ROS: A comprehensive 14 point ROS was obtained and otherwise negative except for that outlined above in the HPI.    GENERAL PHYSICAL EXAM:   Vitals: Stable, afebrile, reviewed in EMR  GENERAL: Well groomed, well developed, well nourished female in NAD.  HEAD: Normocephalic. Atraumatic.  RESPIRATORY: No increased respiratory effort.   GI: Soft, NT  MS: Full ROM in extremities, gait normal, normal muscle tone  SKIN: Warm to touch, dry.  NEURO: Alert and oriented x 3.  PSYCH: Normal mood and affect, pleasant and agreeable during interview and exam.    UA today demonstrates trace blood, trace leuk est, o/w wnl.      ASSESSMENT AND PLAN: 42 year old female with a ureteral stone.   Symptoms currently controlled. Given size and location of stone, and fact that symptoms are well controlled, it is reasonable to continue with trial of medical expulsive therapy at this time.   - Start tamsulosin 0.4 mg daily. Refills provided.  - Continue to push fluids. Strain all urine and submit any captured stones for analysis.  - Percocet for pain.  - Follow up in 2-3 weeks with repeat KUB to monitor for stone progression. If the stone passes prior to f/u appointment, no need for KUB.  - Warning signs discussed including fevers, chills, N/V, gross hematuria, severe pain that is  refractory to medications which should prompt more urgent evaluation. Patient understands to proceed to the ER should these warning signs occur outside of clinic hours.    During counseling for this visit, we covered the natural history of kidney stones, the risk of progression to symptomatic pain/infection, and the possibility of renal failure/kidney damage.  We covered treatment options including observation, medical expulsive therapy, ureteroscopy/laser/stent, extracorporeal shock wave lithotripsy (ESWL), and percutaneous nephrolithotomy (PCNL).      Standard recommendations on kidney stone prevention were provided.    I have enjoyed participating in the medical care of this patient and will continue to keep you updated on her progress.  Please do not hesitate to contact me with any questions or concerns.      Yesika Templeton PA-C  [facility]    30 minutes were spent with the patient today, > 50% in counseling and coordination of care.

## 2017-07-26 NOTE — PATIENT INSTRUCTIONS
Standard recommendations on kidney stone prevention:  -These include maintaining fluid intake of 3 liters per day or more with a goal of making 2 or more liters of urine per day.  If alcoholic or caffeinated beverages are consumed then you need to drink water along with these beverages to maintain hydration.    -A few ounces of lemon juice concentrate a day diluted in water can help prevent stones.  -Limit intake of red meat, salt, and salty processed foods.    -Maintain calcium intake in diet through continued consumption of dairy products etc.  -Limit foods that are high in oxalate such as spinach, sweet potatoes, dark chocolate, soy products, and some nuts such as peanuts.   -Additional/different recommendations pending any stone analysis.

## 2017-07-26 NOTE — NURSING NOTE
Chief Complaint   Patient presents with     Kidney Stone(s) Followup     Patient is here for stone. Pain started last Monday.      UTI     Patient thinks she may have an infection.      Sandra Rosado LPN 3:10 PM July 26, 2017

## 2017-07-26 NOTE — LETTER
7/26/2017       RE: Lena Morrow  [address]    [place] [address]     Dear Colleague,    Thank you for referring your patient, Lena Morrow, to the [facility] [place] [facility]. Please see a copy of my visit note below.    CC: Mid right ureteral stones.    HPI: It is a pleasure to see Ms. Lena Morrow, a 42 year old female seen today in the urology clinic in consultation from Dr. Andino for evaluation of the above. This was first detected on CT in the ED on 7/17/17 after the patient presented complaining of acute renal colic symptoms. The stones are adjacent to one another and the largest measures 4 mm and is located in the mid right ureter with associated mild hydronephrosis. UA in the ED was negative for infection. BMP revealed Cr to be normal. With pain under good control, the patient was discharged with a prescription for Percocet and instructions to follow up with urology.    Currently, the patient reports significant pain. The patient has not been straining their urine with no captured stones thus far. Denies gross hematuria, dysuria, fevers, chills, N/V. No prior history of kidney stones. Percocet works when she takes it for pain control.    RECENT IMAGING:  CT ABDOMEN AND PELVIS WITHOUT CONTRAST   7/17/2017 2:55 PM       HISTORY: Severe right flank pain and hematuria.    FINDINGS:   Right urinary tract: Two adjacent calculi are present in the mid  ureter, the largest measuring 0.4 cm. Mild dilatation of the more  proximal ureter and intrarenal collecting system. No additional renal  or ureteral calculi.      Left urinary tract: Three nonobstructing calculi in the inferior pole  of the kidney, the largest measuring 0.4 cm in diameter. No ureteral  calculi. No dilatation of the intrarenal collecting system or ureter.      Urinary bladder: No visualized calculi.      Remainder of the abdomen and pelvis: The  liver, spleen, pancreas and  adrenal glands are unremarkable to the limits of a noncontrast CT  scan. Prior cholecystectomy. The small and large bowel are normal in  caliber. The appendix is unremarkable. No bowel wall thickening,  pneumatosis or free intraperitoneal gas. No enlarged lymph nodes or  free fluid in the abdomen or pelvis. Bilateral L5 pars  interarticularis defects. Scan through the lower chest is unremarkable.        IMPRESSION:   1. Two adjacent calculi in the mid right ureter, resulting in mild  obstruction. The largest calculus measures 0.4 cm in diameter.  2. A few tiny nonobstructing left renal calculi.    Past Medical History:   Diagnosis Date     Abnormal pap      Allergic rhinitis, cause unspecified      Arrhythmia     atrial flutter, paroxysmal     Asthma      Atrial flutter, paroxysmal (H)      C. difficile colitis 10/14     Cholelithiasis      DDD (degenerative disc disease), cervical      DYSPEPSIA      Endometriosis      Gastro-oesophageal reflux disease      Herpes simplex without mention of complication      LSIL (low grade squamous intraepithelial lesion) on Pap smear 10/10/12    Done at [facility]     Major depression      Mild intermittent asthma      Obesity      Other forms of migraine, without mention of intractable migraine without mention of status migrainosus      Panic disorder without agoraphobia        Past Surgical History:   Procedure Laterality Date     C NONSPECIFIC PROCEDURE      tonsillectomy     C NONSPECIFIC PROCEDURE  2010    Open right carpal tunnel release.  Excision right dorsal carpal ganglion cyst. Excision, terminal branch, right posterior interosseous nerve.        COLONOSCOPY       CYSTOSCOPY N/A 5/26/2017    Procedure: CYSTOSCOPY;;  Surgeon: Toni Da Silva MD;  Location:  OR     DAVINCI HYSTERECTOMY TOTAL, SALPINGECTOMY BILATERAL N/A 5/26/2017    Procedure: DAVINCI HYSTERECTOMY TOTAL, SALPINGECTOMY BILATERAL;  ROBOTIC ASSISTED LAPAROSCOPIC  TOTAL HYSTERECTOMY; BILATERAL SALPINGECTOMY; CYSTOSCOPY;  Surgeon: Toni Da Silva MD;  Location:  OR     DILATION AND CURETTAGE N/A 2016    Procedure: DILATION AND CURETTAGE;  Surgeon: Josue Jama MD;  Location: [facility]     ENT SURGERY       GYN SURGERY           HC COLP CERVIX/UPPER VAGINA W BX CERVIX  10/30/12    [facility]     LAPAROSCOPIC CHOLECYSTECTOMY  4/3/2012    Procedure:LAPAROSCOPIC CHOLECYSTECTOMY; LAPAROSCOPIC CHOLECYSTECTOMY ; Surgeon:KARYNA CAMARA; Location:[facility]     LAPAROSCOPY DIAGNOSTIC (GYN) N/A 2016    Procedure: LAPAROSCOPY DIAGNOSTIC (GYN);  Surgeon: Josue Jama MD;  Location: [facility]     ORTHOPEDIC SURGERY      carpal tunnel with ganglian cyst removal       Current Outpatient Prescriptions   Medication Sig Dispense Refill     Esomeprazole Magnesium (NEXIUM PO) Take by mouth daily       buPROPion (WELLBUTRIN XL) 300 MG 24 hr tablet Take one 300 mg tablet along with 150 mg tablet to make 450 mg per day. 30 tablet 1     buPROPion (WELLBUTRIN XL) 150 MG 24 hr tablet Take one 150 mg tablet along with one 300 mg tablet to make 450 mg per day. 30 tablet 1     propranolol (INDERAL) 20 MG tablet Take 1-2 tablets as needed for anxiety (up to twice a day) 90 tablet 2     lamoTRIgine (LAMICTAL) 200 MG tablet Take 1 tablet (200 mg) by mouth 2 times daily 180 tablet 2     VITAMIN D, CHOLECALCIFEROL, PO Take 5,000 Units by mouth daily       LORATADINE PO Take 10 mg by mouth daily       Methocarbamol (ROBAXIN PO) Take 750 mg by mouth 4 times daily as needed for muscle spasms       OXYCODONE HCL PO Take 5-10 mg by mouth every 3 hours as needed       Prenatal Vit-Fe Fumarate-FA (PRENATAL MULTIVITAMIN  PLUS IRON) 27-0.8 MG TABS per tablet Take 1 tablet by mouth daily       ValACYclovir HCl (VALTREX PO) Take 500 mg by mouth 2 times daily as needed (herpes outbreak)       Phenylephrine-APAP-Guaifenesin (MUCINEX SINUS-MAX PO) Take 1,200 mg by mouth daily        ibuprofen (ADVIL/MOTRIN) 600 MG tablet Take 1 tablet (600 mg) by mouth every 6 hours as needed for pain (mild) 90 tablet 0     albuterol (PROAIR HFA/PROVENTIL HFA/VENTOLIN HFA) 108 (90 BASE) MCG/ACT Inhaler Inhale 2 puffs into the lungs every 6 hours as needed for shortness of breath / dyspnea or wheezing 1 Inhaler prn       Allergies   Allergen Reactions     Amoxicillin      yeast vaginal inf     Ativan Fatigue     Augmentin [Amoxicillin-Pot Clavulanate]      Itching,hives     Azithromycin      GI cramps     Clonazepam      Sedation, even at 0.5 mg dose     Estrogens      # bcps cause cns sx     Lexapro      diarrhea       Mirtazapine      Sedation      Prochlorperazine      Compazine- agitation     Seasonal Allergies      Venlafaxine      sweats     Xanax [Alprazolam] Fatigue     FAMILY HISTORY: There is no family h/o  malignancy.  There is no family h/o urolithiasis.     SOCIAL HISTORY: The patient does not smoke cigarettes. Denies EtOH and illicit drug abuse.     ROS: A comprehensive 14 point ROS was obtained and otherwise negative except for that outlined above in the HPI.    GENERAL PHYSICAL EXAM:   Vitals: Stable, afebrile, reviewed in EMR  GENERAL: Well groomed, well developed, well nourished female in NAD.  HEAD: Normocephalic. Atraumatic.  RESPIRATORY: No increased respiratory effort.   GI: Soft, NT  MS: Full ROM in extremities, gait normal, normal muscle tone  SKIN: Warm to touch, dry.  NEURO: Alert and oriented x 3.  PSYCH: Normal mood and affect, pleasant and agreeable during interview and exam.    UA today demonstrates trace blood, trace leuk est, o/w wnl.      ASSESSMENT AND PLAN: 42 year old female with a ureteral stone.   Symptoms currently controlled. Given size and location of stone, and fact that symptoms are well controlled, it is reasonable to continue with trial of medical expulsive therapy at this time.   - Start tamsulosin 0.4 mg daily. Refills provided.  - Continue to push fluids.  Strain all urine and submit any captured stones for analysis.  - Percocet for pain.  - Follow up in 2-3 weeks with repeat KUB to monitor for stone progression. If the stone passes prior to f/u appointment, no need for KUB.  - Warning signs discussed including fevers, chills, N/V, gross hematuria, severe pain that is refractory to medications which should prompt more urgent evaluation. Patient understands to proceed to the ER should these warning signs occur outside of clinic hours.    During counseling for this visit, we covered the natural history of kidney stones, the risk of progression to symptomatic pain/infection, and the possibility of renal failure/kidney damage.  We covered treatment options including observation, medical expulsive therapy, ureteroscopy/laser/stent, extracorporeal shock wave lithotripsy (ESWL), and percutaneous nephrolithotomy (PCNL).      Standard recommendations on kidney stone prevention were provided.    I have enjoyed participating in the medical care of this patient and will continue to keep you updated on her progress.  Please do not hesitate to contact me with any questions or concerns.      30 minutes were spent with the patient today, > 50% in counseling and coordination of care.    Again, thank you for allowing me to participate in the care of your patient.      Sincerely,  Yesika Templeton PA-C, [facility]

## 2017-08-08 ENCOUNTER — TELEPHONE (OUTPATIENT)
Dept: UROLOGY | Facility: CLINIC | Age: 42
End: 2017-08-08

## 2017-08-08 DIAGNOSIS — N20.0 KIDNEY STONES: ICD-10-CM

## 2017-08-08 RX ORDER — OXYCODONE AND ACETAMINOPHEN 5; 325 MG/1; MG/1
1-2 TABLET ORAL EVERY 4 HOURS PRN
Qty: 15 TABLET | Refills: 0 | Status: SHIPPED | OUTPATIENT
Start: 2017-08-08 | End: 2017-10-30

## 2017-08-08 NOTE — TELEPHONE ENCOUNTER
Patient called and is requesting a RX for more percocet. Patient ran out of RX, per PA okay to refill. RX printed and PA signed. RX is at , patient will pick-up tomorrow after 2pm. Nesha Bal LPN

## 2017-08-10 DIAGNOSIS — N20.0 KIDNEY STONES: Primary | ICD-10-CM

## 2017-08-11 ENCOUNTER — OFFICE VISIT (OUTPATIENT)
Dept: UROLOGY | Facility: CLINIC | Age: 42
End: 2017-08-11
Payer: COMMERCIAL

## 2017-08-11 ENCOUNTER — HOSPITAL ENCOUNTER (OUTPATIENT)
Dept: GENERAL RADIOLOGY | Facility: CLINIC | Age: 42
Discharge: HOME | End: 2017-08-11
Attending: PHYSICIAN ASSISTANT | Admitting: PHYSICIAN ASSISTANT
Payer: COMMERCIAL

## 2017-08-11 VITALS
SYSTOLIC BLOOD PRESSURE: 118 MMHG | HEART RATE: 76 BPM | HEIGHT: 65 IN | WEIGHT: 183 LBS | BODY MASS INDEX: 30.49 KG/M2 | DIASTOLIC BLOOD PRESSURE: 78 MMHG

## 2017-08-11 DIAGNOSIS — N20.0 KIDNEY STONES: ICD-10-CM

## 2017-08-11 LAB
ALBUMIN UR-MCNC: NEGATIVE MG/DL
APPEARANCE UR: CLEAR
BILIRUB UR QL STRIP: NEGATIVE
COLOR UR AUTO: YELLOW
GLUCOSE UR STRIP-MCNC: NEGATIVE MG/DL
HGB UR QL STRIP: ABNORMAL
KETONES UR STRIP-MCNC: NEGATIVE MG/DL
LEUKOCYTE ESTERASE UR QL STRIP: ABNORMAL
NITRATE UR QL: NEGATIVE
PH UR STRIP: 6 PH (ref 5–7)
SP GR UR STRIP: 1.02 (ref 1–1.03)
URN SPEC COLLECT METH UR: ABNORMAL
UROBILINOGEN UR STRIP-ACNC: 0.2 EU/DL (ref 0.2–1)

## 2017-08-11 PROCEDURE — 81003 URINALYSIS AUTO W/O SCOPE: CPT | Performed by: PHYSICIAN ASSISTANT

## 2017-08-11 PROCEDURE — 99213 OFFICE O/P EST LOW 20 MIN: CPT | Performed by: PHYSICIAN ASSISTANT

## 2017-08-11 PROCEDURE — 74010 XR KUB: CPT | Mod: 52

## 2017-08-11 RX ORDER — TAMSULOSIN HYDROCHLORIDE 0.4 MG/1
0.4 CAPSULE ORAL DAILY
Qty: 21 CAPSULE | Refills: 1 | Status: SHIPPED | OUTPATIENT
Start: 2017-08-11 | End: 2018-01-15

## 2017-08-11 RX ORDER — OXYCODONE AND ACETAMINOPHEN 5; 325 MG/1; MG/1
1-2 TABLET ORAL EVERY 4 HOURS PRN
Qty: 20 TABLET | Refills: 0 | Status: SHIPPED | OUTPATIENT
Start: 2017-08-11 | End: 2017-08-16

## 2017-08-11 ASSESSMENT — PAIN SCALES - GENERAL: PAINLEVEL: MILD PAIN (2)

## 2017-08-11 NOTE — LETTER
8/11/2017      RE: Lena Morrow  [address]    [facility] [id number]       CC: Mid right ureteral stones.    HPI: It is a pleasure to see Ms. Lena Morrow, a 42 year old female seen in f/u today for the above. This was first detected on CT in the ED on 7/17/17 after the patient presented complaining of acute renal colic symptoms. The stones are adjacent to one another and the largest measures 4 mm and is located in the mid right ureter with associated mild hydronephrosis. UA in the ED was negative for infection. BMP revealed Cr to be normal. With pain under good control, the patient was discharged with a prescription for Percocet and instructions to follow up with urology.    Currently, the patient reports significant pain, but can be controlled with Percocet. The patient has not been straining their urine consistently with no captured stones thus far. Denies gross hematuria, dysuria, fevers, chills, N/V. No prior history of kidney stones. Percocet works when she takes it for pain control.    RECENT IMAGING:  CT ABDOMEN AND PELVIS WITHOUT CONTRAST   7/17/2017 2:55 PM       HISTORY: Severe right flank pain and hematuria.    FINDINGS:   Right urinary tract: Two adjacent calculi are present in the mid  ureter, the largest measuring 0.4 cm. Mild dilatation of the more  proximal ureter and intrarenal collecting system. No additional renal  or ureteral calculi.      Left urinary tract: Three nonobstructing calculi in the inferior pole  of the kidney, the largest measuring 0.4 cm in diameter. No ureteral  calculi. No dilatation of the intrarenal collecting system or ureter.      Urinary bladder: No visualized calculi.      Remainder of the abdomen and pelvis: The liver, spleen, pancreas and  adrenal glands are unremarkable to the limits of a noncontrast CT  scan. Prior cholecystectomy. The small and large bowel are normal in  caliber. The appendix is unremarkable. No bowel wall thickening,  pneumatosis  or free intraperitoneal gas. No enlarged lymph nodes or  free fluid in the abdomen or pelvis. Bilateral L5 pars  interarticularis defects. Scan through the lower chest is unremarkable.        IMPRESSION:   1. Two adjacent calculi in the mid right ureter, resulting in mild  obstruction. The largest calculus measures 0.4 cm in diameter.  2. A few tiny nonobstructing left renal calculi.    Past Medical History:   Diagnosis Date     Abnormal pap      Allergic rhinitis, cause unspecified      Arrhythmia     atrial flutter, paroxysmal     Asthma      Atrial flutter, paroxysmal (H)      C. difficile colitis 10/14     Cholelithiasis      DDD (degenerative disc disease), cervical      DYSPEPSIA      Endometriosis      Gastro-oesophageal reflux disease      Herpes simplex without mention of complication      LSIL (low grade squamous intraepithelial lesion) on Pap smear 10/10/12    Done at [facility]     Major depression      Mild intermittent asthma      Obesity      Other forms of migraine, without mention of intractable migraine without mention of status migrainosus      Palpitations      Panic disorder without agoraphobia        Past Surgical History:   Procedure Laterality Date     C NONSPECIFIC PROCEDURE      tonsillectomy     C NONSPECIFIC PROCEDURE  2010    Open right carpal tunnel release.  Excision right dorsal carpal ganglion cyst. Excision, terminal branch, right posterior interosseous nerve.        COLONOSCOPY       CYSTOSCOPY N/A 5/26/2017    Procedure: CYSTOSCOPY;;  Surgeon: Toni Da Silva MD;  Location:  OR     DAVINCI HYSTERECTOMY TOTAL, SALPINGECTOMY BILATERAL N/A 5/26/2017    Procedure: DAVINCI HYSTERECTOMY TOTAL, SALPINGECTOMY BILATERAL;  ROBOTIC ASSISTED LAPAROSCOPIC TOTAL HYSTERECTOMY; BILATERAL SALPINGECTOMY; CYSTOSCOPY;  Surgeon: Toni D aSilva MD;  Location:  OR     DILATION AND CURETTAGE N/A 1/5/2016    Procedure: DILATION AND CURETTAGE;  Surgeon: Josue Jama MD;   Location: [facility]     ENT SURGERY       GYN SURGERY           HC COLP CERVIX/UPPER VAGINA W BX CERVIX  10/30/12    [facility]     HYSTERECTOMY       LAPAROSCOPIC CHOLECYSTECTOMY  4/3/2012    Procedure:LAPAROSCOPIC CHOLECYSTECTOMY; LAPAROSCOPIC CHOLECYSTECTOMY ; Surgeon:KARYNA CAMARA; Location:[facility]     LAPAROSCOPY DIAGNOSTIC (GYN) N/A 2016    Procedure: LAPAROSCOPY DIAGNOSTIC (GYN);  Surgeon: Josue Jama MD;  Location: [facility]     ORTHOPEDIC SURGERY      carpal tunnel with ganglian cyst removal       Current Outpatient Prescriptions   Medication Sig Dispense Refill     oxyCODONE-acetaminophen (PERCOCET) 5-325 MG per tablet Take 1-2 tablets by mouth every 4 hours as needed for pain 15 tablet 0     Omega-3 Fatty Acids (FISH OIL PO)        magnesium citrate solution Take 296 mLs by mouth once       B Complex-C (SUPER B COMPLEX PO)        tamsulosin (FLOMAX) 0.4 MG capsule Take 1 capsule (0.4 mg) by mouth daily 21 capsule 1     Esomeprazole Magnesium (NEXIUM PO) Take by mouth daily       buPROPion (WELLBUTRIN XL) 300 MG 24 hr tablet Take one 300 mg tablet along with 150 mg tablet to make 450 mg per day. 30 tablet 1     buPROPion (WELLBUTRIN XL) 150 MG 24 hr tablet Take one 150 mg tablet along with one 300 mg tablet to make 450 mg per day. 30 tablet 1     propranolol (INDERAL) 20 MG tablet Take 1-2 tablets as needed for anxiety (up to twice a day) 90 tablet 2     lamoTRIgine (LAMICTAL) 200 MG tablet Take 1 tablet (200 mg) by mouth 2 times daily 180 tablet 2     VITAMIN D, CHOLECALCIFEROL, PO Take 5,000 Units by mouth daily       LORATADINE PO Take 10 mg by mouth daily       Methocarbamol (ROBAXIN PO) Take 750 mg by mouth 4 times daily as needed for muscle spasms       OXYCODONE HCL PO Take 5-10 mg by mouth every 3 hours as needed       Prenatal Vit-Fe Fumarate-FA (PRENATAL MULTIVITAMIN  PLUS IRON) 27-0.8 MG TABS per tablet Take 1 tablet by mouth daily       ValACYclovir HCl (VALTREX PO)  Take 500 mg by mouth 2 times daily as needed (herpes outbreak)       Phenylephrine-APAP-Guaifenesin (MUCINEX SINUS-MAX PO) Take 1,200 mg by mouth daily       ibuprofen (ADVIL/MOTRIN) 600 MG tablet Take 1 tablet (600 mg) by mouth every 6 hours as needed for pain (mild) 90 tablet 0     albuterol (PROAIR HFA/PROVENTIL HFA/VENTOLIN HFA) 108 (90 BASE) MCG/ACT Inhaler Inhale 2 puffs into the lungs every 6 hours as needed for shortness of breath / dyspnea or wheezing 1 Inhaler prn       Allergies   Allergen Reactions     Amoxicillin      yeast vaginal inf     Ativan Fatigue     Augmentin [Amoxicillin-Pot Clavulanate]      Itching,hives     Azithromycin      GI cramps     Clonazepam      Sedation, even at 0.5 mg dose     Estrogens      # bcps cause cns sx     Lexapro      diarrhea       Mirtazapine      Sedation      Prochlorperazine      Compazine- agitation     Seasonal Allergies      Venlafaxine      sweats     Xanax [Alprazolam] Fatigue       FAMILY HISTORY: There is no family h/o  malignancy.  There is no family h/o urolithiasis.     SOCIAL HISTORY: The patient does not smoke cigarettes. Denies EtOH and illicit drug abuse.     ROS: A comprehensive 14 point ROS was obtained and otherwise negative except for that outlined above in the HPI.    GENERAL PHYSICAL EXAM:   Vitals: Stable, afebrile, reviewed in EMR  GENERAL: Well groomed, well developed, well nourished female in NAD, tearful.  HEAD: Normocephalic. Atraumatic.  RESPIRATORY: No increased respiratory effort.   GI: Soft, NT  MS: Full ROM in extremities, gait normal, normal muscle tone  SKIN: Warm to touch, dry.  NEURO: Alert and oriented x 3.  PSYCH: Normal mood and affect, pleasant and agreeable during interview and exam.    UA today demonstrates trace blood, trace leuk est, o/w wnl.      ASSESSMENT AND PLAN: 42 year old female with a ureteral stone.   Symptoms currently controlled. Given size and location of stone, and fact that symptoms are well controlled, it  is reasonable to continue with trial of medical expulsive therapy at this time.   - Continue tamsulosin 0.4 mg daily. Refills provided.  - Continue to push fluids. Strain all urine and submit any captured stones for analysis.  - Percocet for pain. Miralax daily  - Will ask one of the Urologists to review films to plan for intervention (missing work, pain, etc).  - Warning signs discussed including fevers, chills, N/V, gross hematuria, severe pain that is refractory to medications which should prompt more urgent evaluation. Patient understands to proceed to the ER should these warning signs occur outside of clinic hours.      Yesika Templeton PA-C  [facility]    20 minutes were spent with the patient today, > 50% in counseling and coordination of care.      Yesika Templeton PA-C, PA-C

## 2017-08-11 NOTE — PATIENT INSTRUCTIONS
I will review images with Dr. Kimbrough and call you on Monday with recommendations.     Continue Flomax and start Miralax daily while on pain meds.

## 2017-08-11 NOTE — LETTER
8/11/2017       RE: Lena Morrow  [address]    [facility] [id number]     Dear Colleague,    Thank you for referring your patient, Lena Morrow, to the [facility] [place] [facility]. Please see a copy of my visit note below.    CC: Mid right ureteral stones.    HPI: It is a pleasure to see Ms. Lena Morrow, a 42 year old female seen in f/u today for the above. This was first detected on CT in the ED on 7/17/17 after the patient presented complaining of acute renal colic symptoms. The stones are adjacent to one another and the largest measures 4 mm and is located in the mid right ureter with associated mild hydronephrosis. UA in the ED was negative for infection. BMP revealed Cr to be normal. With pain under good control, the patient was discharged with a prescription for Percocet and instructions to follow up with urology.    Currently, the patient reports significant pain, but can be controlled with Percocet. The patient has not been straining their urine consistently with no captured stones thus far. Denies gross hematuria, dysuria, fevers, chills, N/V. No prior history of kidney stones. Percocet works when she takes it for pain control.    RECENT IMAGING:  CT ABDOMEN AND PELVIS WITHOUT CONTRAST   7/17/2017 2:55 PM       HISTORY: Severe right flank pain and hematuria.    FINDINGS:   Right urinary tract: Two adjacent calculi are present in the mid  ureter, the largest measuring 0.4 cm. Mild dilatation of the more  proximal ureter and intrarenal collecting system. No additional renal  or ureteral calculi.      Left urinary tract: Three nonobstructing calculi in the inferior pole  of the kidney, the largest measuring 0.4 cm in diameter. No ureteral  calculi. No dilatation of the intrarenal collecting system or ureter.      Urinary bladder: No visualized calculi.      Remainder of the abdomen and pelvis: The liver, spleen,  pancreas and  adrenal glands are unremarkable to the limits of a noncontrast CT  scan. Prior cholecystectomy. The small and large bowel are normal in  caliber. The appendix is unremarkable. No bowel wall thickening,  pneumatosis or free intraperitoneal gas. No enlarged lymph nodes or  free fluid in the abdomen or pelvis. Bilateral L5 pars  interarticularis defects. Scan through the lower chest is unremarkable.        IMPRESSION:   1. Two adjacent calculi in the mid right ureter, resulting in mild  obstruction. The largest calculus measures 0.4 cm in diameter.  2. A few tiny nonobstructing left renal calculi.    Past Medical History:   Diagnosis Date     Abnormal pap      Allergic rhinitis, cause unspecified      Arrhythmia     atrial flutter, paroxysmal     Asthma      Atrial flutter, paroxysmal (H)      C. difficile colitis 10/14     Cholelithiasis      DDD (degenerative disc disease), cervical      DYSPEPSIA      Endometriosis      Gastro-oesophageal reflux disease      Herpes simplex without mention of complication      LSIL (low grade squamous intraepithelial lesion) on Pap smear 10/10/12    Done at [facility]     Major depression      Mild intermittent asthma      Obesity      Other forms of migraine, without mention of intractable migraine without mention of status migrainosus      Palpitations      Panic disorder without agoraphobia        Past Surgical History:   Procedure Laterality Date     C NONSPECIFIC PROCEDURE      tonsillectomy     C NONSPECIFIC PROCEDURE  2010    Open right carpal tunnel release.  Excision right dorsal carpal ganglion cyst. Excision, terminal branch, right posterior interosseous nerve.        COLONOSCOPY       CYSTOSCOPY N/A 5/26/2017    Procedure: CYSTOSCOPY;;  Surgeon: Toni Da Silva MD;  Location:  OR     DAVINCI HYSTERECTOMY TOTAL, SALPINGECTOMY BILATERAL N/A 5/26/2017    Procedure: DAVINCI HYSTERECTOMY TOTAL, SALPINGECTOMY BILATERAL;  ROBOTIC ASSISTED  LAPAROSCOPIC TOTAL HYSTERECTOMY; BILATERAL SALPINGECTOMY; CYSTOSCOPY;  Surgeon: Toni Da Silva MD;  Location:  OR     DILATION AND CURETTAGE N/A 2016    Procedure: DILATION AND CURETTAGE;  Surgeon: Josue Jama MD;  Location: [facility]     ENT SURGERY       GYN SURGERY           HC COLP CERVIX/UPPER VAGINA W BX CERVIX  10/30/12    [facility]     HYSTERECTOMY       LAPAROSCOPIC CHOLECYSTECTOMY  4/3/2012    Procedure:LAPAROSCOPIC CHOLECYSTECTOMY; LAPAROSCOPIC CHOLECYSTECTOMY ; Surgeon:KARYNA CAMARA; Location:[facility]     LAPAROSCOPY DIAGNOSTIC (GYN) N/A 2016    Procedure: LAPAROSCOPY DIAGNOSTIC (GYN);  Surgeon: Josue Jama MD;  Location: [facility]     ORTHOPEDIC SURGERY      carpal tunnel with ganglian cyst removal       Current Outpatient Prescriptions   Medication Sig Dispense Refill     oxyCODONE-acetaminophen (PERCOCET) 5-325 MG per tablet Take 1-2 tablets by mouth every 4 hours as needed for pain 15 tablet 0     Omega-3 Fatty Acids (FISH OIL PO)        magnesium citrate solution Take 296 mLs by mouth once       B Complex-C (SUPER B COMPLEX PO)        tamsulosin (FLOMAX) 0.4 MG capsule Take 1 capsule (0.4 mg) by mouth daily 21 capsule 1     Esomeprazole Magnesium (NEXIUM PO) Take by mouth daily       buPROPion (WELLBUTRIN XL) 300 MG 24 hr tablet Take one 300 mg tablet along with 150 mg tablet to make 450 mg per day. 30 tablet 1     buPROPion (WELLBUTRIN XL) 150 MG 24 hr tablet Take one 150 mg tablet along with one 300 mg tablet to make 450 mg per day. 30 tablet 1     propranolol (INDERAL) 20 MG tablet Take 1-2 tablets as needed for anxiety (up to twice a day) 90 tablet 2     lamoTRIgine (LAMICTAL) 200 MG tablet Take 1 tablet (200 mg) by mouth 2 times daily 180 tablet 2     VITAMIN D, CHOLECALCIFEROL, PO Take 5,000 Units by mouth daily       LORATADINE PO Take 10 mg by mouth daily       Methocarbamol (ROBAXIN PO) Take 750 mg by mouth 4 times daily as needed for  muscle spasms       OXYCODONE HCL PO Take 5-10 mg by mouth every 3 hours as needed       Prenatal Vit-Fe Fumarate-FA (PRENATAL MULTIVITAMIN  PLUS IRON) 27-0.8 MG TABS per tablet Take 1 tablet by mouth daily       ValACYclovir HCl (VALTREX PO) Take 500 mg by mouth 2 times daily as needed (herpes outbreak)       Phenylephrine-APAP-Guaifenesin (MUCINEX SINUS-MAX PO) Take 1,200 mg by mouth daily       ibuprofen (ADVIL/MOTRIN) 600 MG tablet Take 1 tablet (600 mg) by mouth every 6 hours as needed for pain (mild) 90 tablet 0     albuterol (PROAIR HFA/PROVENTIL HFA/VENTOLIN HFA) 108 (90 BASE) MCG/ACT Inhaler Inhale 2 puffs into the lungs every 6 hours as needed for shortness of breath / dyspnea or wheezing 1 Inhaler prn       Allergies   Allergen Reactions     Amoxicillin      yeast vaginal inf     Ativan Fatigue     Augmentin [Amoxicillin-Pot Clavulanate]      Itching,hives     Azithromycin      GI cramps     Clonazepam      Sedation, even at 0.5 mg dose     Estrogens      # bcps cause cns sx     Lexapro      diarrhea       Mirtazapine      Sedation      Prochlorperazine      Compazine- agitation     Seasonal Allergies      Venlafaxine      sweats     Xanax [Alprazolam] Fatigue       FAMILY HISTORY: There is no family h/o  malignancy.  There is no family h/o urolithiasis.     SOCIAL HISTORY: The patient does not smoke cigarettes. Denies EtOH and illicit drug abuse.     ROS: A comprehensive 14 point ROS was obtained and otherwise negative except for that outlined above in the HPI.    GENERAL PHYSICAL EXAM:   Vitals: Stable, afebrile, reviewed in EMR  GENERAL: Well groomed, well developed, well nourished female in NAD, tearful.  HEAD: Normocephalic. Atraumatic.  RESPIRATORY: No increased respiratory effort.   GI: Soft, NT  MS: Full ROM in extremities, gait normal, normal muscle tone  SKIN: Warm to touch, dry.  NEURO: Alert and oriented x 3.  PSYCH: Normal mood and affect, pleasant and agreeable during interview and  exam.    UA today demonstrates trace blood, trace leuk est, o/w wnl.      ASSESSMENT AND PLAN: 42 year old female with a ureteral stone.   Symptoms currently controlled. Given size and location of stone, and fact that symptoms are well controlled, it is reasonable to continue with trial of medical expulsive therapy at this time.   - Continue tamsulosin 0.4 mg daily. Refills provided.  - Continue to push fluids. Strain all urine and submit any captured stones for analysis.  - Percocet for pain. Miralax daily  - Will ask one of the Urologists to review films to plan for intervention (missing work, pain, etc).  - Warning signs discussed including fevers, chills, N/V, gross hematuria, severe pain that is refractory to medications which should prompt more urgent evaluation. Patient understands to proceed to the ER should these warning signs occur outside of clinic hours.      Yesika Templeton PA-C  [facility]    20 minutes were spent with the patient today, > 50% in counseling and coordination of care.      Again, thank you for allowing me to participate in the care of your patient.      Sincerely,    Yesika Templeton PA-C, PA-C

## 2017-08-11 NOTE — PROGRESS NOTES
CC: Mid right ureteral stones.    HPI: It is a pleasure to see Ms. Lena Morrow, a 42 year old female seen in f/u today for the above. This was first detected on CT in the ED on 7/17/17 after the patient presented complaining of acute renal colic symptoms. The stones are adjacent to one another and the largest measures 4 mm and is located in the mid right ureter with associated mild hydronephrosis. UA in the ED was negative for infection. BMP revealed Cr to be normal. With pain under good control, the patient was discharged with a prescription for Percocet and instructions to follow up with urology.    Currently, the patient reports significant pain, but can be controlled with Percocet. The patient has not been straining their urine consistently with no captured stones thus far. Denies gross hematuria, dysuria, fevers, chills, N/V. No prior history of kidney stones. Percocet works when she takes it for pain control.    RECENT IMAGING:  CT ABDOMEN AND PELVIS WITHOUT CONTRAST   7/17/2017 2:55 PM       HISTORY: Severe right flank pain and hematuria.    FINDINGS:   Right urinary tract: Two adjacent calculi are present in the mid  ureter, the largest measuring 0.4 cm. Mild dilatation of the more  proximal ureter and intrarenal collecting system. No additional renal  or ureteral calculi.      Left urinary tract: Three nonobstructing calculi in the inferior pole  of the kidney, the largest measuring 0.4 cm in diameter. No ureteral  calculi. No dilatation of the intrarenal collecting system or ureter.      Urinary bladder: No visualized calculi.      Remainder of the abdomen and pelvis: The liver, spleen, pancreas and  adrenal glands are unremarkable to the limits of a noncontrast CT  scan. Prior cholecystectomy. The small and large bowel are normal in  caliber. The appendix is unremarkable. No bowel wall thickening,  pneumatosis or free intraperitoneal gas. No enlarged lymph nodes or  free fluid in the abdomen or pelvis.  Bilateral L5 pars  interarticularis defects. Scan through the lower chest is unremarkable.        IMPRESSION:   1. Two adjacent calculi in the mid right ureter, resulting in mild  obstruction. The largest calculus measures 0.4 cm in diameter.  2. A few tiny nonobstructing left renal calculi.    Past Medical History:   Diagnosis Date     Abnormal pap      Allergic rhinitis, cause unspecified      Arrhythmia     atrial flutter, paroxysmal     Asthma      Atrial flutter, paroxysmal (H)      C. difficile colitis 10/14     Cholelithiasis      DDD (degenerative disc disease), cervical      DYSPEPSIA      Endometriosis      Gastro-oesophageal reflux disease      Herpes simplex without mention of complication      LSIL (low grade squamous intraepithelial lesion) on Pap smear 10/10/12    Done at [facility]     Major depression      Mild intermittent asthma      Obesity      Other forms of migraine, without mention of intractable migraine without mention of status migrainosus      Palpitations      Panic disorder without agoraphobia        Past Surgical History:   Procedure Laterality Date     C NONSPECIFIC PROCEDURE      tonsillectomy     C NONSPECIFIC PROCEDURE      Open right carpal tunnel release.  Excision right dorsal carpal ganglion cyst. Excision, terminal branch, right posterior interosseous nerve.        COLONOSCOPY       CYSTOSCOPY N/A 2017    Procedure: CYSTOSCOPY;;  Surgeon: Toni Da Silva MD;  Location:  OR     DAVINCI HYSTERECTOMY TOTAL, SALPINGECTOMY BILATERAL N/A 2017    Procedure: DAVINCI HYSTERECTOMY TOTAL, SALPINGECTOMY BILATERAL;  ROBOTIC ASSISTED LAPAROSCOPIC TOTAL HYSTERECTOMY; BILATERAL SALPINGECTOMY; CYSTOSCOPY;  Surgeon: Toni Da Silva MD;  Location:  OR     DILATION AND CURETTAGE N/A 2016    Procedure: DILATION AND CURETTAGE;  Surgeon: Josue Jama MD;  Location: [facility]     ENT SURGERY       GYN SURGERY           HC COLP CERVIX/UPPER VAGINA  W BX CERVIX  10/30/12    [facility]     HYSTERECTOMY       LAPAROSCOPIC CHOLECYSTECTOMY  4/3/2012    Procedure:LAPAROSCOPIC CHOLECYSTECTOMY; LAPAROSCOPIC CHOLECYSTECTOMY ; Surgeon:KARYNA CAMARA; Location:[facility]     LAPAROSCOPY DIAGNOSTIC (GYN) N/A 1/5/2016    Procedure: LAPAROSCOPY DIAGNOSTIC (GYN);  Surgeon: Josue Jama MD;  Location: [facility]     ORTHOPEDIC SURGERY      carpal tunnel with ganglian cyst removal       Current Outpatient Prescriptions   Medication Sig Dispense Refill     oxyCODONE-acetaminophen (PERCOCET) 5-325 MG per tablet Take 1-2 tablets by mouth every 4 hours as needed for pain 15 tablet 0     Omega-3 Fatty Acids (FISH OIL PO)        magnesium citrate solution Take 296 mLs by mouth once       B Complex-C (SUPER B COMPLEX PO)        tamsulosin (FLOMAX) 0.4 MG capsule Take 1 capsule (0.4 mg) by mouth daily 21 capsule 1     Esomeprazole Magnesium (NEXIUM PO) Take by mouth daily       buPROPion (WELLBUTRIN XL) 300 MG 24 hr tablet Take one 300 mg tablet along with 150 mg tablet to make 450 mg per day. 30 tablet 1     buPROPion (WELLBUTRIN XL) 150 MG 24 hr tablet Take one 150 mg tablet along with one 300 mg tablet to make 450 mg per day. 30 tablet 1     propranolol (INDERAL) 20 MG tablet Take 1-2 tablets as needed for anxiety (up to twice a day) 90 tablet 2     lamoTRIgine (LAMICTAL) 200 MG tablet Take 1 tablet (200 mg) by mouth 2 times daily 180 tablet 2     VITAMIN D, CHOLECALCIFEROL, PO Take 5,000 Units by mouth daily       LORATADINE PO Take 10 mg by mouth daily       Methocarbamol (ROBAXIN PO) Take 750 mg by mouth 4 times daily as needed for muscle spasms       OXYCODONE HCL PO Take 5-10 mg by mouth every 3 hours as needed       Prenatal Vit-Fe Fumarate-FA (PRENATAL MULTIVITAMIN  PLUS IRON) 27-0.8 MG TABS per tablet Take 1 tablet by mouth daily       ValACYclovir HCl (VALTREX PO) Take 500 mg by mouth 2 times daily as needed (herpes outbreak)        Phenylephrine-APAP-Guaifenesin (MUCINEX SINUS-MAX PO) Take 1,200 mg by mouth daily       ibuprofen (ADVIL/MOTRIN) 600 MG tablet Take 1 tablet (600 mg) by mouth every 6 hours as needed for pain (mild) 90 tablet 0     albuterol (PROAIR HFA/PROVENTIL HFA/VENTOLIN HFA) 108 (90 BASE) MCG/ACT Inhaler Inhale 2 puffs into the lungs every 6 hours as needed for shortness of breath / dyspnea or wheezing 1 Inhaler prn       Allergies   Allergen Reactions     Amoxicillin      yeast vaginal inf     Ativan Fatigue     Augmentin [Amoxicillin-Pot Clavulanate]      Itching,hives     Azithromycin      GI cramps     Clonazepam      Sedation, even at 0.5 mg dose     Estrogens      # bcps cause cns sx     Lexapro      diarrhea       Mirtazapine      Sedation      Prochlorperazine      Compazine- agitation     Seasonal Allergies      Venlafaxine      sweats     Xanax [Alprazolam] Fatigue       FAMILY HISTORY: There is no family h/o  malignancy.  There is no family h/o urolithiasis.     SOCIAL HISTORY: The patient does not smoke cigarettes. Denies EtOH and illicit drug abuse.     ROS: A comprehensive 14 point ROS was obtained and otherwise negative except for that outlined above in the HPI.    GENERAL PHYSICAL EXAM:   Vitals: Stable, afebrile, reviewed in EMR  GENERAL: Well groomed, well developed, well nourished female in NAD, tearful.  HEAD: Normocephalic. Atraumatic.  RESPIRATORY: No increased respiratory effort.   GI: Soft, NT  MS: Full ROM in extremities, gait normal, normal muscle tone  SKIN: Warm to touch, dry.  NEURO: Alert and oriented x 3.  PSYCH: Normal mood and affect, pleasant and agreeable during interview and exam.    UA today demonstrates trace blood, trace leuk est, o/w wnl.      ASSESSMENT AND PLAN: 42 year old female with a ureteral stone.   Symptoms currently controlled. Given size and location of stone, and fact that symptoms are well controlled, it is reasonable to continue with trial of medical expulsive therapy at  this time.   - Continue tamsulosin 0.4 mg daily. Refills provided.  - Continue to push fluids. Strain all urine and submit any captured stones for analysis.  - Percocet for pain. Miralax daily  - Will ask one of the Urologists to review films to plan for intervention (missing work, pain, etc).  - Warning signs discussed including fevers, chills, N/V, gross hematuria, severe pain that is refractory to medications which should prompt more urgent evaluation. Patient understands to proceed to the ER should these warning signs occur outside of clinic hours.      Yesika Templeton PA-C  [facility]    20 minutes were spent with the patient today, > 50% in counseling and coordination of care.

## 2017-08-14 ENCOUNTER — TELEPHONE (OUTPATIENT)
Dept: UROLOGY | Facility: CLINIC | Age: 42
End: 2017-08-14

## 2017-08-14 DIAGNOSIS — N20.0 NEPHROLITHIASIS: Primary | ICD-10-CM

## 2017-08-14 NOTE — TELEPHONE ENCOUNTER
Discussed cysto, right ureteroscopy, laser, stone extraction and stent with pt, and she wishes to proceed. Dr. Kimbrough aware of pt and will place pre-op orders. Scheduling to contact her once orders are in place.  JOSEPH LUJAN

## 2017-08-16 ENCOUNTER — OFFICE VISIT (OUTPATIENT)
Dept: INTERNAL MEDICINE | Facility: CLINIC | Age: 42
End: 2017-08-16
Payer: COMMERCIAL

## 2017-08-16 ENCOUNTER — HOSPITAL ENCOUNTER (OUTPATIENT)
Dept: CT IMAGING | Facility: CLINIC | Age: 42
Discharge: HOME | End: 2017-08-16
Attending: UROLOGY | Admitting: UROLOGY
Payer: COMMERCIAL

## 2017-08-16 ENCOUNTER — TELEPHONE (OUTPATIENT)
Dept: UROLOGY | Facility: CLINIC | Age: 42
End: 2017-08-16

## 2017-08-16 VITALS
DIASTOLIC BLOOD PRESSURE: 76 MMHG | BODY MASS INDEX: 31.94 KG/M2 | OXYGEN SATURATION: 97 % | TEMPERATURE: 98.1 F | HEART RATE: 102 BPM | SYSTOLIC BLOOD PRESSURE: 104 MMHG | WEIGHT: 186.1 LBS

## 2017-08-16 DIAGNOSIS — N20.0 KIDNEY STONE: ICD-10-CM

## 2017-08-16 DIAGNOSIS — N20.1 URETERAL STONE: Primary | ICD-10-CM

## 2017-08-16 DIAGNOSIS — J45.20 MILD INTERMITTENT ASTHMA WITHOUT COMPLICATION: ICD-10-CM

## 2017-08-16 DIAGNOSIS — N20.9 CALCULUS OF UPPER URINARY TRACT: ICD-10-CM

## 2017-08-16 DIAGNOSIS — N20.1 URETERAL STONE: ICD-10-CM

## 2017-08-16 DIAGNOSIS — Z01.818 PREOP GENERAL PHYSICAL EXAM: Primary | ICD-10-CM

## 2017-08-16 PROCEDURE — 99000 SPECIMEN HANDLING OFFICE-LAB: CPT | Performed by: INTERNAL MEDICINE

## 2017-08-16 PROCEDURE — 74176 CT ABD & PELVIS W/O CONTRAST: CPT

## 2017-08-16 PROCEDURE — 82365 CALCULUS SPECTROSCOPY: CPT | Mod: 90 | Performed by: INTERNAL MEDICINE

## 2017-08-16 PROCEDURE — 99214 OFFICE O/P EST MOD 30 MIN: CPT | Performed by: INTERNAL MEDICINE
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
## 2017-08-16 NOTE — NURSING NOTE
"Chief Complaint   Patient presents with     Pre-Op Exam       Initial /76  Pulse 102  Temp 98.1  F (36.7  C) (Oral)  Wt 186 lb 1.6 oz (84.4 kg)  LMP 12/10/2015  SpO2 97%  BMI 31.94 kg/m2 Estimated body mass index is 31.94 kg/(m^2) as calculated from the following:    Height as of an earlier encounter on 8/16/17: 5' 4\" (1.626 m).    Weight as of this encounter: 186 lb 1.6 oz (84.4 kg).  Medication Reconciliation: complete   Malini Sutton, JAYDA      "

## 2017-08-16 NOTE — TELEPHONE ENCOUNTER
Patient called to state she passed a stone earlier today. Brought this to PCP and was sent for analysis. Given suspicion for multiple ureteral stones on initial scan, repeat imaging completed that confirms no residual right-sided stones. Thus, will cancel ureteroscopy for 8/17. Will follow-up with stone composition results and stone prevention counseling in near future.    Mino Kimbrough MD  Urology  [facility]  Pager [phone]

## 2017-08-16 NOTE — MR AVS SNAPSHOT
After Visit Summary   8/16/2017    Lena Morrow    MRN: [id number]           Patient Information     Date Of Birth          1975        Visit Information        Provider Department      8/16/2017 3:00 PM Jitendra Ayon MD [facility]        Today's Diagnoses     Preop general physical exam    -  1    Calculus of upper urinary tract        Mild intermittent asthma without complication          Care Instructions      Before Your Surgery      Call your surgeon if there is any change in your health. This includes signs of a cold or flu (such as a sore throat, runny nose, cough, rash or fever).    Do not smoke, drink alcohol or take over the counter medicine (unless your surgeon or primary care doctor tells you to) for the 24 hours before and after surgery.    If you take prescribed drugs: Follow your doctor s orders about which medicines to take and which to stop until after surgery.    Eating and drinking prior to surgery: follow the instructions from your surgeon    Take a shower or bath the night before surgery. Use the soap your surgeon gave you to gently clean your skin. If you do not have soap from your surgeon, use your regular soap. Do not shave or scrub the surgery site.  Wear clean pajamas and have clean sheets on your bed.           Follow-ups after your visit        Your next 10 appointments already scheduled     Aug 17, 2017   Procedure with Mino Kimbrough MD   [facility] (--)    [address]  [facility] [address]   [phone]            Aug 25, 2017 10:00 AM CDT   Cystoscopy with Mino Kimbrough MD, UA CYF   [facility] ([facility])    [address]  [address]  [facility] [address]   [phone]              Future tests that were ordered for you today     Open Future Orders        Priority Expected Expires Ordered    Stone analysis  Routine  8/16/2018 8/16/2017            Who to contact     If you have questions or need follow up information about today's clinic visit or your schedule please contact [facility] directly at [phone].  Normal or non-critical lab and imaging results will be communicated to you by MyChart, letter or phone within 4 business days after the clinic has received the results. If you do not hear from us within 7 days, please contact the clinic through File[employer] or phone. If you have a critical or abnormal lab result, we will notify you by phone as soon as possible.  Submit refill requests through [facility] or call your pharmacy and they will forward the refill request to us. Please allow 3 business days for your refill to be completed.          Additional Information About Your Visit        Filementhart Information     [facility] gives you secure access to your electronic health record. If you see a primary care provider, you can also send messages to your care team and make appointments. If you have questions, please call your primary care clinic.  If you do not have a primary care provider, please call [phone] and they will assist you.        Care EveryWhere ID     This is your Care EveryWhere ID. This could be used by other organizations to access your [facility] medical records  [id number]        Your Vitals Were     Pulse Temperature Last Period Pulse Oximetry BMI (Body Mass Index)       102 98.1  F (36.7  C) (Oral) 12/10/2015 97% 31.94 kg/m2        Blood Pressure from Last 3 Encounters:   08/16/17 104/76   08/11/17 118/78   07/26/17 (!) 130/100    Weight from Last 3 Encounters:   08/16/17 186 lb 1.6 oz (84.4 kg)   08/16/17 183 lb (83 kg)   08/11/17 183 lb (83 kg)               Primary Care Provider Office Phone # Fax #    Trevon Crisostomo [phone] [address] [facility] [address]        Equal Access to Services     PATRICIA MAURICIO AH: Humza Vazquez  wasmithada fernandopepper, qaybta kailiana dean, linden ruizaaguzman ah. So [facility] [phone].    ATENCIÓN: Si sanya fowler, tiene a manriquez disposición servicios gratuitos de asistencia lingüística. Abril al [phone].    We comply with applicable federal civil rights laws and [place] laws. We do not discriminate on the basis of race, color, national origin, age, disability sex, sexual orientation or gender identity.            Thank you!     Thank you for choosing [facility]  for your care. Our goal is always to provide you with excellent care. Hearing back from our patients is one way we can continue to improve our services. Please take a few minutes to complete the written survey that you may receive in the mail after your visit with us. Thank you!             Your Updated Medication List - Protect others around you: Learn how to safely use, store and throw away your medicines at www.disposemymeds.org.          This list is accurate as of: 8/16/17  3:20 PM.  Always use your most recent med list.                   Brand Name Dispense Instructions for use Diagnosis    albuterol 108 (90 BASE) MCG/ACT Inhaler    PROAIR HFA/PROVENTIL HFA/VENTOLIN HFA    1 Inhaler    Inhale 2 puffs into the lungs every 6 hours as needed for shortness of breath / dyspnea or wheezing    Mild intermittent asthma with acute exacerbation       * buPROPion 300 MG 24 hr tablet    WELLBUTRIN XL    30 tablet    Take one 300 mg tablet along with 150 mg tablet to make 450 mg per day.    Major depressive disorder, recurrent episode, moderate (H)       * buPROPion 150 MG 24 hr tablet    WELLBUTRIN XL    30 tablet    Take one 150 mg tablet along with one 300 mg tablet to make 450 mg per day.    Major depressive disorder, recurrent episode, moderate (H)       FISH OIL PO           ibuprofen 600 MG tablet    ADVIL/MOTRIN    90 tablet    Take 1 tablet (600 mg) by mouth every 6 hours as needed for pain (mild)     S/P laparoscopic hysterectomy, Endometriosis, Pelvic pain in female       lamoTRIgine 200 MG tablet    LAMICTAL    180 tablet    Take 1 tablet (200 mg) by mouth 2 times daily    Bipolar 2 disorder (H)       LORATADINE PO      Take 10 mg by mouth daily        magnesium citrate solution      Take 296 mLs by mouth once        MUCINEX SINUS-MAX PO      Take 1,200 mg by mouth daily        NEXIUM PO      Take 20 mg by mouth daily        oxyCODONE-acetaminophen 5-325 MG per tablet    PERCOCET    15 tablet    Take 1-2 tablets by mouth every 4 hours as needed for pain    Kidney stones       prenatal multivitamin plus iron 27-0.8 MG Tabs per tablet      Take 1 tablet by mouth daily        propranolol 20 MG tablet    INDERAL    90 tablet    Take 1-2 tablets as needed for anxiety (up to twice a day)    Generalized anxiety disorder       SUPER B COMPLEX PO           tamsulosin 0.4 MG capsule    FLOMAX    21 capsule    Take 1 capsule (0.4 mg) by mouth daily    Kidney stones       VALTREX PO      Take 500 mg by mouth 2 times daily as needed (herpes outbreak)        VITAMIN D (CHOLECALCIFEROL) PO      Take 5,000 Units by mouth daily        * Notice:  This list has 2 medication(s) that are the same as other medications prescribed for you. Read the directions carefully, and ask your doctor or other care provider to review them with you.

## 2017-08-19 LAB
APPEARANCE STONE: NORMAL
COMPN STONE: NORMAL
NUMBER STONE: 1
SIZE STONE: NORMAL MM
WT STONE: 87 MG

## 2017-08-29 DIAGNOSIS — F33.1 MAJOR DEPRESSIVE DISORDER, RECURRENT EPISODE, MODERATE (H): ICD-10-CM

## 2017-08-29 NOTE — TELEPHONE ENCOUNTER
"Patient referred back to her PCP after the June 2017 visit. See tx plan below.       Last Written Prescription Date: 6/27/17   Last Fill Quantity: 30; # refills: 1  Last Office Visit with [facility], P or [facility] prescribing provider:  Patient may need an appointment. Unclear who her PCP is.         Last PHQ-9 score on record=   PHQ-9 SCORE 6/14/2017   Total Score -   Total Score MyChart -   Total Score 12       Lab Results   Component Value Date    AST 13 07/17/2017     Lab Results   Component Value Date    ALT 28 07/17/2017          From 06/14/2017:  DSM-5 DIAGNOSIS:  296.89 - Bipolar II Disorder (F31.81)   300.00 - Anxiety Disorder, NOS   Cluster B Traits: Zanarini BPD Scale Positive for 8 / 10 items      Assessment:  I think that she is doing as well as can be expected on the current medications. However she has a lot going on and I really think she should be involved in some psychotherapy but there may be financial constraints in regards to this. Inderal is working well for her and she was reassured that it is not addicting.     Treatment Plan:  Continue Wellbutrin (bupropion)  mg. Take all in morning   Continue Lamictal (lamotrigine) to 200 mg twice a day, but may drop one of the doses to 100 mg (300 mg total); cognitive problems?   Ask Dr Crisostomo about a sleep consultation   Information on sleep hygiene was provided   I suggested the Kan Rose book \"Why we get fat\"   Inderal (propranolol) 20 mg; 1-2 as needed for anxiety (up to twice a day). Don't worry about getting \"hooked\" on this (not addicting)   Continue vitamin D   Arrange for a sleep study   Call [facility] at [phone] to set up psychotherapy.   Safety plan was reviewed; to the ER as needed or call after hours crisis line; [phone]  Education and counseling was done regarding use of medications, psychotherapy options  Follow up with Trevon Crisostomo for medication management and refills.            Signed:                     " Soy Doe M.D.

## 2017-08-30 NOTE — TELEPHONE ENCOUNTER
Last office visit with me was almost 2 years ago.  She has seen partners since.    While I am happy to refill medication at this time, patient will need an appointment with myself or another provider here in the near future to resume responsibility for these medications.    Also, Dr. Doe has a number of things in his note which need to be followed up on.  Please contact patient and advise.      Also, no pharmacy is chosen here.

## 2017-08-31 RX ORDER — BUPROPION HYDROCHLORIDE 300 MG/1
TABLET ORAL
Qty: 30 TABLET | Refills: 1 | Status: SHIPPED | OUTPATIENT
Start: 2017-08-31 | End: 2017-10-23

## 2017-08-31 RX ORDER — BUPROPION HYDROCHLORIDE 150 MG/1
TABLET ORAL
Qty: 30 TABLET | Refills: 1 | Status: SHIPPED | OUTPATIENT
Start: 2017-08-31 | End: 2017-10-23

## 2017-08-31 NOTE — TELEPHONE ENCOUNTER
[facility], she also stated that she will call back to make an appointment with MD in the near future

## 2017-10-23 DIAGNOSIS — F33.1 MAJOR DEPRESSIVE DISORDER, RECURRENT EPISODE, MODERATE (H): ICD-10-CM

## 2017-10-24 ENCOUNTER — MYC MEDICAL ADVICE (OUTPATIENT)
Dept: INTERNAL MEDICINE | Facility: CLINIC | Age: 42
End: 2017-10-24

## 2017-10-24 NOTE — TELEPHONE ENCOUNTER
See TE from 8/29/17:    Pt has upcoming appt on 10/30 with Dr. Crisostomo at 3pm.  Left voicemail for pt to call back, does she need refill? Or does she have enough meds to last until Monday?

## 2017-10-25 NOTE — TELEPHONE ENCOUNTER
10/24/17 11:31 PM   I only have enough medication for 4 days. After Wednesday I'll have only 3 until my appointment next week.   Thank you,   Lena Morrow     Please refill. Has appt Monday.

## 2017-10-26 RX ORDER — BUPROPION HYDROCHLORIDE 150 MG/1
TABLET ORAL
Qty: 90 TABLET | Refills: 1 | Status: SHIPPED | OUTPATIENT
Start: 2017-10-26 | End: 2017-11-20 | Stop reason: DRUGHIGH

## 2017-10-26 RX ORDER — BUPROPION HYDROCHLORIDE 300 MG/1
TABLET ORAL
Qty: 90 TABLET | Refills: 1 | Status: SHIPPED | OUTPATIENT
Start: 2017-10-26 | End: 2018-04-24

## 2017-10-30 ENCOUNTER — OFFICE VISIT (OUTPATIENT)
Dept: INTERNAL MEDICINE | Facility: CLINIC | Age: 42
End: 2017-10-30

## 2017-10-30 ENCOUNTER — TELEPHONE (OUTPATIENT)
Dept: INTERNAL MEDICINE | Facility: CLINIC | Age: 42
End: 2017-10-30

## 2017-10-30 VITALS
SYSTOLIC BLOOD PRESSURE: 122 MMHG | DIASTOLIC BLOOD PRESSURE: 80 MMHG | BODY MASS INDEX: 31.64 KG/M2 | OXYGEN SATURATION: 98 % | TEMPERATURE: 99.5 F | HEART RATE: 85 BPM | WEIGHT: 184.3 LBS

## 2017-10-30 DIAGNOSIS — Z23 NEED FOR PROPHYLACTIC VACCINATION AND INOCULATION AGAINST INFLUENZA: Primary | ICD-10-CM

## 2017-10-30 NOTE — TELEPHONE ENCOUNTER
Reason for Call:  Other appointment - fyi for dr hernandez    Detailed comments: the patient had a scheduled dr robinsont on 10.30.17 @ 3pm, she arrived before her scheduled appt, she was called into the room by the roomer when a room opened up, and waited for the doctor.  The patient had to leave at 4:15pm to  her child from school.  The patient left after 4:15pm.  The patient states she was going to [facility] since she was 14 yrs old, and was exteremly upset on how she was treated today.  And it was not the check in's/registar person. But, we had to hear the stress from the patient.  She said she needs her medications filled.  She does have an appointment this coming Thursday.  This is just an fyi, for patient was extremely upset.....    Phone Number Patient can be reached at: Home number on file [phone] (home)    Best Time: anytime    Can we leave a detailed message on this number? Not Applicable    Call taken on 10/30/2017 at 4:28 PM by Ana Smith

## 2017-10-30 NOTE — PROGRESS NOTES
"  SUBJECTIVE:   Lena Morrow is a 42 year old female who presents to clinic today for the following health issues:    Depression Followup    Status since last visit: Worsened  New stress - would no go into detail    See PHQ-9 for current symptoms.  Other associated symptoms: laziness, anxiety, insomnia, not being able to \"shut mind off' at night, feels guilty    Complicating factors:   Significant life event:  Yes-  Increased stress- would no go into detail   Current substance abuse:  None  Anxiety or Panic symptoms:  Yes-  Uses propranolol PRN. Has anxiety for example over 9y.o. [employer] team, if they are going to win or loose. Believes it is stupid reasons\"    PHQ-9 Score and MyChart F/U Questions 12/4/2015 6/14/2017 10/30/2017   Total Score 12 12 -   Q9: Suicide Ideation Not at all Not at all Not at all       PHQ-9  English  PHQ-9   Any Language  Suicide Assessment Five-step Evaluation and Treatment (SAFE-T)      Amount of exercise or physical activity: None    Problems taking medications regularly: No    Medication side effects: possible weight gain - would like to discuss    Diet: regular (no restrictions)      Reviewed and updated as needed this visit by clinical staff:  Medications  Allergies  Soc Hx   Additional history: as documented        NOTE:  Patient was unable to wait, left without being seen    Trevon Crisostomo MD  [facility]       Injectable Influenza Immunization Documentation    1.  Is the person to be vaccinated sick today?  Slight congestion, 99.5 F temp    2. Does the person to be vaccinated have an allergy to a component   of the vaccine?   No  Egg Allergy Algorithm Link    3. Has the person to be vaccinated ever had a serious reaction   to influenza vaccine in the past?   No    4. Has the person to be vaccinated ever had Guillain-Barré syndrome?   No    Form completed by Malini Sutton CMA           "

## 2017-10-30 NOTE — MR AVS SNAPSHOT
After Visit Summary   10/30/2017    Lena Morrow    MRN: [id number]           Patient Information     Date Of Birth          1975        Visit Information        Provider Department      10/30/2017 3:00 PM Trevon Crisostomo MD [facility]        Today's Diagnoses     Need for prophylactic vaccination and inoculation against influenza    -  1       Follow-ups after your visit        Your next 10 appointments already scheduled     Jeffry 15, 2018  2:30 PM CST   Office Visit with Trevon Crisostomo MD   [facility] ([facility])    [address] [id number] [phone]           Bring a current list of meds and any records pertaining to this visit. For Physicals, please bring immunization records and any forms needing to be filled out. Please arrive 10 minutes early to complete paperwork.              Who to contact     If you have questions or need follow up information about today's clinic visit or your schedule please contact [facility] directly at [phone].  Normal or non-critical lab and imaging results will be communicated to you by finalsitehart, letter or phone within 4 business days after the clinic has received the results. If you do not hear from us within 7 days, please contact the clinic through finalsitehart or phone. If you have a critical or abnormal lab result, we will notify you by phone as soon as possible.  Submit refill requests through [employer] or call your pharmacy and they will forward the refill request to us. Please allow 3 business days for your refill to be completed.          Additional Information About Your Visit        finalsitehart Information     [employer] gives you secure access to your electronic health record. If you see a primary care provider, you can also send messages to your care team and make appointments. If you have questions, please call your primary  care clinic.  If you do not have a primary care provider, please call [phone] and they will assist you.        Care EveryWhere ID     This is your Care EveryWhere ID. This could be used by other organizations to access your [facility] medical records  [id number]        Your Vitals Were     Pulse Temperature Last Period Pulse Oximetry BMI (Body Mass Index)       85 99.5  F (37.5  C) (Oral) 12/10/2015 98% 31.64 kg/m2        Blood Pressure from Last 3 Encounters:   11/20/17 114/70   10/30/17 122/80   08/16/17 104/76    Weight from Last 3 Encounters:   11/20/17 184 lb 9.6 oz (83.7 kg)   10/30/17 184 lb 4.8 oz (83.6 kg)   08/16/17 186 lb 1.6 oz (84.4 kg)              We Performed the Following     Vaccine Administration, Initial [74098]        Primary Care Provider Office Phone # Fax #    Trevon Jeison Crisostomo [phone]       [address] [place] [id number]        Equal Access to Services     [facility] Campus[facility] : Hadii russ espinal Soterell, waaxda luroxannadaha, qaybta kaalchelita dean, linden john . So [facility] [phone].    ATENCIÓN: Si habla español, tiene a manriquez disposición servicios gratuitos de asistencia lingüística. [facility] [phone].    We comply with applicable federal civil rights laws and [place] laws. We do not discriminate on the basis of race, color, national origin, age, disability, sex, sexual orientation, or gender identity.            Thank you!     Thank you for choosing [facility]  for your care. Our goal is always to provide you with excellent care. Hearing back from our patients is one way we can continue to improve our services. Please take a few minutes to complete the written survey that you may receive in the mail after your visit with us. Thank you!             Your Updated Medication List - Protect others around you: Learn how to safely use, store and throw away your medicines at www.disposemymeds.org.           This list is accurate as of: 10/30/17 11:59 PM.  Always use your most recent med list.                   Brand Name Dispense Instructions for use Diagnosis    albuterol 108 (90 BASE) MCG/ACT Inhaler    PROAIR HFA/PROVENTIL HFA/VENTOLIN HFA    1 Inhaler    Inhale 2 puffs into the lungs every 6 hours as needed for shortness of breath / dyspnea or wheezing    Mild intermittent asthma with acute exacerbation       * buPROPion 300 MG 24 hr tablet    WELLBUTRIN XL    90 tablet    TAKE 1 TABLET BY MOUTH DAILY ALONG WITH 1 TABLET  MGS. TOTALING 450 MGS DAILY.    Major depressive disorder, recurrent episode, moderate (H)       * buPROPion 150 MG 24 hr tablet    WELLBUTRIN XL    90 tablet    TAKE 1 TABLET BY MOUTH DAILY ALONG WITH  MG TABLET. TOTALING 450 MGS DAILY.    Major depressive disorder, recurrent episode, moderate (H)       FISH OIL PO           ibuprofen 600 MG tablet    ADVIL/MOTRIN    90 tablet    Take 1 tablet (600 mg) by mouth every 6 hours as needed for pain (mild)    S/P laparoscopic hysterectomy, Endometriosis, Pelvic pain in female       lamoTRIgine 200 MG tablet    LAMICTAL    180 tablet    Take 1 tablet (200 mg) by mouth 2 times daily    Bipolar 2 disorder (H)       LORATADINE PO      Take 10 mg by mouth daily        magnesium citrate solution      Take 296 mLs by mouth once        MUCINEX SINUS-MAX PO      Take 1,200 mg by mouth daily        NEXIUM PO      Take 20 mg by mouth daily        prenatal multivitamin plus iron 27-0.8 MG Tabs per tablet      Take 1 tablet by mouth daily        propranolol 20 MG tablet    INDERAL    90 tablet    Take 1-2 tablets as needed for anxiety (up to twice a day)    Generalized anxiety disorder       SUPER B COMPLEX PO           tamsulosin 0.4 MG capsule    FLOMAX    21 capsule    Take 1 capsule (0.4 mg) by mouth daily    Kidney stones       VALTREX PO      Take 500 mg by mouth 2 times daily as needed (herpes outbreak)        * Notice:  This list has 2  medication(s) that are the same as other medications prescribed for you. Read the directions carefully, and ask your doctor or other care provider to review them with you.

## 2017-10-30 NOTE — NURSING NOTE
"Chief Complaint   Patient presents with     Depression     increasing depression       Initial /80  Pulse 85  Temp 99.5  F (37.5  C) (Oral)  Wt 184 lb 4.8 oz (83.6 kg)  LMP 12/10/2015  SpO2 98%  BMI 31.64 kg/m2 Estimated body mass index is 31.64 kg/(m^2) as calculated from the following:    Height as of 8/16/17: 5' 4\" (1.626 m).    Weight as of this encounter: 184 lb 4.8 oz (83.6 kg).  Medication Reconciliation: complete   Malini Sutton CMA      "

## 2017-10-31 ASSESSMENT — ASTHMA QUESTIONNAIRES: ACT_TOTALSCORE: 25

## 2017-10-31 NOTE — TELEPHONE ENCOUNTER
Noted.   Apology email sent earlier.  Please apologize again, and then check with her to see if she prefers the 10:00 am appointment Thursday, versus doing a virtual visit.   If wants the virtual visit, could do tonight or Thursday evening.

## 2017-11-02 ENCOUNTER — MYC MEDICAL ADVICE (OUTPATIENT)
Dept: INTERNAL MEDICINE | Facility: CLINIC | Age: 42
End: 2017-11-02

## 2017-11-09 ENCOUNTER — TELEPHONE (OUTPATIENT)
Dept: INTERNAL MEDICINE | Facility: CLINIC | Age: 42
End: 2017-11-09

## 2017-11-09 NOTE — TELEPHONE ENCOUNTER
Reason for Call:  Same Day Appointment, Requested Provider:  Trevon Hernandez MD    PCP: Trevon Hernandez    Reason for visit: pt needs to get her medications renew and checked     Duration of symptoms: today    Have you been treated for this in the past? No    Additional comments: pt had an appointment with dr hernandez on 11/2/17 and waited for 2 hours but left without being seen. Pt wants to be worked into dr hernandez schedule sooner than his next opening 1/2/17. pts needs to get her meds and be seen by dr hernandez. Pt request dr hernandez nurse call her back on when she can be seen. Pt said M,W,F she can come in after 2 pm, Tue and Thur she can come anytime.    Can we leave a detailed message on this number? YES    Phone number patient can be reached at: Home number on file [phone] (home)    Best Time: anytime.    Call taken on 11/9/2017 at 4:11 PM by DVE MOORE

## 2017-11-10 NOTE — TELEPHONE ENCOUNTER
May use   1.  A Monday afternoon private spot as long as there is one remaining  2.  Any 5 pm spot on a Thursday (I'm not here again on a Thursday until December)  3.  A 7:30 am spot on a Thursday, if I start at 8 am.

## 2017-11-20 ENCOUNTER — OFFICE VISIT (OUTPATIENT)
Dept: INTERNAL MEDICINE | Facility: CLINIC | Age: 42
End: 2017-11-20
Payer: COMMERCIAL

## 2017-11-20 VITALS
SYSTOLIC BLOOD PRESSURE: 114 MMHG | HEIGHT: 64 IN | TEMPERATURE: 98.2 F | OXYGEN SATURATION: 100 % | DIASTOLIC BLOOD PRESSURE: 70 MMHG | WEIGHT: 184.6 LBS | HEART RATE: 91 BPM | BODY MASS INDEX: 31.51 KG/M2

## 2017-11-20 DIAGNOSIS — F33.1 MODERATE EPISODE OF RECURRENT MAJOR DEPRESSIVE DISORDER (H): ICD-10-CM

## 2017-11-20 DIAGNOSIS — F31.30 BIPOLAR AFFECTIVE DISORDER, CURRENT EPISODE DEPRESSED, CURRENT EPISODE SEVERITY UNSPECIFIED (H): ICD-10-CM

## 2017-11-20 DIAGNOSIS — F41.1 GENERALIZED ANXIETY DISORDER: ICD-10-CM

## 2017-11-20 DIAGNOSIS — Z23 NEED FOR PROPHYLACTIC VACCINATION AND INOCULATION AGAINST INFLUENZA: Primary | ICD-10-CM

## 2017-11-20 DIAGNOSIS — E55.9 VITAMIN D DEFICIENCY: ICD-10-CM

## 2017-11-20 PROCEDURE — 90686 IIV4 VACC NO PRSV 0.5 ML IM: CPT | Performed by: INTERNAL MEDICINE

## 2017-11-20 PROCEDURE — 99214 OFFICE O/P EST MOD 30 MIN: CPT | Mod: 25 | Performed by: INTERNAL MEDICINE

## 2017-11-20 PROCEDURE — 90471 IMMUNIZATION ADMIN: CPT | Performed by: INTERNAL MEDICINE

## 2017-11-20 RX ORDER — FAMOTIDINE 20 MG
1000 TABLET ORAL DAILY
COMMUNITY
Start: 2017-11-20 | End: 2021-08-04

## 2017-11-20 RX ORDER — QUETIAPINE FUMARATE 25 MG/1
25-50 TABLET, FILM COATED ORAL AT BEDTIME
Qty: 60 TABLET | Refills: 2 | Status: SHIPPED | OUTPATIENT
Start: 2017-11-20 | End: 2018-01-15 | Stop reason: SINTOL

## 2017-11-20 ASSESSMENT — ANXIETY QUESTIONNAIRES
2. NOT BEING ABLE TO STOP OR CONTROL WORRYING: NEARLY EVERY DAY
7. FEELING AFRAID AS IF SOMETHING AWFUL MIGHT HAPPEN: NEARLY EVERY DAY
IF YOU CHECKED OFF ANY PROBLEMS ON THIS QUESTIONNAIRE, HOW DIFFICULT HAVE THESE PROBLEMS MADE IT FOR YOU TO DO YOUR WORK, TAKE CARE OF THINGS AT HOME, OR GET ALONG WITH OTHER PEOPLE: SOMEWHAT DIFFICULT
5. BEING SO RESTLESS THAT IT IS HARD TO SIT STILL: NOT AT ALL
1. FEELING NERVOUS, ANXIOUS, OR ON EDGE: MORE THAN HALF THE DAYS
GAD7 TOTAL SCORE: 15
6. BECOMING EASILY ANNOYED OR IRRITABLE: NEARLY EVERY DAY
3. WORRYING TOO MUCH ABOUT DIFFERENT THINGS: NEARLY EVERY DAY

## 2017-11-20 ASSESSMENT — PATIENT HEALTH QUESTIONNAIRE - PHQ9
SUM OF ALL RESPONSES TO PHQ QUESTIONS 1-9: 19
5. POOR APPETITE OR OVEREATING: SEVERAL DAYS

## 2017-11-20 NOTE — NURSING NOTE
"Chief Complaint   Patient presents with     Depression       Initial /70  Pulse 91  Temp 98.2  F (36.8  C) (Oral)  Ht 5' 4\" (1.626 m)  Wt 184 lb 9.6 oz (83.7 kg)  LMP 12/10/2015  SpO2 100%  BMI 31.69 kg/m2 Estimated body mass index is 31.69 kg/(m^2) as calculated from the following:    Height as of this encounter: 5' 4\" (1.626 m).    Weight as of this encounter: 184 lb 9.6 oz (83.7 kg).  Medication Reconciliation: complete   Madison Melton MA   "

## 2017-11-20 NOTE — PATIENT INSTRUCTIONS
Blepharitis    Blepharitis is an inflammation of the eyelid. It results in swelling of the eyelids, and it is usually caused by a bacterial infection or a skin condition. Blepharitis is a common eye condition. There are two types. Anterior blepharitis occurs where the eyelashes are attached (outside front edge of the eye). Posterior blepharitis affects the inner edge of the eyelid that touches the eyeball.  In addition to swollen eyelids, symptoms of blepharitis can include thick, yellow, dandruff-like scales that stick to the eyelid. There may be oily patches on the eyelid. The eyelashes may be crusted (with dandruff-like scales) when you wake up from sleeping. The irritated area may itch. The eyelids may be red. The eyes can be red and burn or sting. The eyes may tear a lot, or be dry. You can become sensitive to light or have blurred vision. Symptoms of blepharitis can cause irritability.  Blepharitis is a chronic condition and difficult to cure. Even with successful treatment, recurrences are common. Good hygiene and home treatments (in the Home care section below) can improve your condition.  Causes  Causes of blepharitis may include:    Problems with the oil glands in the eyelid (meibomian glands)    Dandruff of the scalp and eyebrows (seborrheic dermatitis)    Acne rosacea (a skin condition that causes redness of the face, and other symptoms)    Eyelash mites (tiny organisms in the eyelash follicles)    Allergic reactions to cosmetics or medicines  Home care  Medicine: The healthcare provider may prescribe an antibiotic eye drops or ointment, artificial tears, and/or steroid eye drops. Follow all instructions for using these medicines. Use all medicines as directed. If you have pain, take medicine as advised by the healthcare provider.    Wash your hands carefully with soap and warm water before and after caring for your eyes.    Apply a warm compress or a warm, moist washcloth to the eyelids for 1 minute,  2 to 3 times a day, to loosen the crust. Then, wipe away scales or crust from the eyelids.    After applying the warm compress, gently scrub the base of the eyelashes for almost 15 seconds per eyelid. To do this, close your eyes and use a moist eyelid cleansing wipe, clean washcloth, or cotton swab. Ask your healthcare provider about products (such as nonirritating baby shampoo) to use to help clean the eyelids.    You may be instructed to gently massage your eyelids to help unblock the eyelid glands. Follow all instructions given by the healthcare provider.    Unless told otherwise, on a regular basis, with eyes closed, clean your eyelids as directed by the healthcare provider. Blepharitis can be an ongoing problem.    Do not wear eye makeup until the inflammation goes away, or as directed by your healthcare provider.    Unless told otherwise, stop using contact lenses until you complete treatment for the condition.    Wash your hands regularly to help prevent dirt and bacteria from coming in contact with your eyelid.  Follow-up care  Follow up with your healthcare provider, or as advised. Your healthcare provider may refer you to an eye specialist (an optometrist or ophthalmologist) for further evaluation and treatment.  When to seek medical advice  Call your healthcare provider right away if any of these occur:    Increase in redness of the white part of the eye    Increase in swelling, redness, irritation, or pain of the eyelids    Eye pain    Change in vision (trouble seeing or blurring)    Drainage (pus, blood) from the eyelid    Fever of 100.4 F (38 C) or higher, or as directed by your healthcare provider  Date Last Reviewed: 10/9/2015    6607-3986 [employer]. [address] [place] [address], [place] [address]. All rights reserved. This information is not intended as a substitute for professional medical care. Always follow your healthcare professional's instructions.      PLAN:                                                       1.  MEDICATIONS:        - Decrease bupropion to 300 mg daily, as higher dose has not been helpful.   We may decrease further in the future.         - Trial of seroquel at low dose, with gradual increase over time   Risks and benefits discussed, including increased chance of tardive dyskinesia, weight gain, DM, grogginess, etc.        - Continue other medications without change  2.  Use heat, gentle expression of lid marins each day.   Consider baby shampoo wash, as well.   If not better, see eye specialist and consider daily disposable contacts.     3.  Follow-up E visit in 3-4 weeks.   Notify MD for problems sooner.    4.  Schedule office visit follow-up in January.

## 2017-11-20 NOTE — PROGRESS NOTES
SUBJECTIVE:   Lena Morrow is a 42 year old female who presents to clinic today for the following health issues:      Depression/Bipolar disorder Followup    Status since last visit: Slightly worsened     See PHQ-9 for current symptoms.  Other associated symptoms: None    Complicating factors:   Significant life event:  Yes-  Finances   Current substance abuse:  None  Anxiety or Panic symptoms:  Yes, anxiety    Patient on bupropion 450 mg daily, lamictal 200 mg bid.   Feels that she hasn't been happy for a long time.  No benefit from increased bupropion dose.  Last saw Dr. Doe 6/2017, and at that time he didn't feel that have anything else to try for her symptoms.   Patient states cannot recall when she last felt happy.    Worries about husbands job.   He is stuck in a job that he cannot afford to leave.   Others treat him poorly, and it bothers her.  Son doesn't start work until 9, and patient cannot ride bus as open enrolls.  They are having problems affording son's hockey.      PHQ-9 Score and MyChart F/U Questions 12/4/2015 6/14/2017 10/30/2017   Total Score 12 12 -   Q9: Suicide Ideation Not at all Not at all Not at all       PHQ-9  English  PHQ-9   Any Language  Suicide Assessment Five-step Evaluation and Treatment (SAFE-T)      Amount of exercise or physical activity: only wallking at work     Problems taking medications regularly: No    Medication side effects: none    Diet: regular (no restrictions)      Reviewed and updated as needed this visit by clinical staff:  Medications  Allergies  Soc Hx   Additional history: as documented    Additional issues to address:  1.  Chronic eye irritation, discharge in the am, etc.   Itch, and scratches eyes to the point that gets more irritated.  2.  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        ROS:    Constitutional, HEENT, cardiovascular, pulmonary, gi  "and gu systems are negative, except as otherwise noted.      OBJECTIVE:                                                    /70  Pulse 91  Temp 98.2  F (36.8  C) (Oral)  Ht 5' 4\" (1.626 m)  Wt 184 lb 9.6 oz (83.7 kg)  LMP 12/10/2015  SpO2 100%  BMI 31.69 kg/m2  Body mass index is 31.69 kg/(m^2).   No apparent distress  EYES: erythema along lid margins, no acute discharge seen  PSYCH: mentation appears normal and patient appearance initially normal.  Patient did have crying episodes.       ASSESSMENT:                                                      Bipolar disorder with depressive symptoms.   Not fully controlled.   Patient has been tried on multiple medications in the past  without full effectiveness.   Psychiatry has turned her care back to us, as there has not been a fully effective treatment in past.     Blepharitis, rule out allergy to contacts, etc.      PLAN:                                                      1.  MEDICATIONS:        - Decrease bupropion to 300 mg daily, as higher dose has not been helpful.   We may decrease further in the future.         - Trial of seroquel at low dose, with gradual increase over time   Risks and benefits discussed, and chooses to proceed.   Possible side effects include increased chance of tardive dyskinesia, weight gain, DM, grogginess, etc.        - Continue other medications without change  2.  Use heat, gentle expression of lid marins each day.   Consider baby shampoo wash, as well.   If not better, see eye specialist and consider daily disposable contacts.     3.  Follow-up E visit in 3-4 weeks.   Notify MD for problems sooner.    4.  Schedule office visit follow-up in January.    Trevon Crisostomo MD  [facility]       Injectable Influenza Immunization Documentation    1.  Is the person to be vaccinated sick today?   No    2. Does the person to be vaccinated have an allergy to a component   of the vaccine?   No  Egg Allergy " Algorithm Link    3. Has the person to be vaccinated ever had a serious reaction   to influenza vaccine in the past?   No    4. Has the person to be vaccinated ever had Guillain-Barré syndrome?   No    Form completed by Madison Melton MA

## 2017-11-20 NOTE — MR AVS SNAPSHOT
After Visit Summary   11/20/2017    Lena Morrow    MRN: [id number]           Patient Information     Date Of Birth          1975        Visit Information        Provider Department      11/20/2017 2:00 PM Trevon Crisostomo MD [facility]        Today's Diagnoses     Need for prophylactic vaccination and inoculation against influenza    -  1    Moderate episode of recurrent major depressive disorder (H)        Generalized anxiety disorder        Bipolar affective disorder, current episode depressed, current episode severity unspecified (H)        Vitamin D deficiency          Care Instructions      Blepharitis    Blepharitis is an inflammation of the eyelid. It results in swelling of the eyelids, and it is usually caused by a bacterial infection or a skin condition. Blepharitis is a common eye condition. There are two types. Anterior blepharitis occurs where the eyelashes are attached (outside front edge of the eye). Posterior blepharitis affects the inner edge of the eyelid that touches the eyeball.  In addition to swollen eyelids, symptoms of blepharitis can include thick, yellow, dandruff-like scales that stick to the eyelid. There may be oily patches on the eyelid. The eyelashes may be crusted (with dandruff-like scales) when you wake up from sleeping. The irritated area may itch. The eyelids may be red. The eyes can be red and burn or sting. The eyes may tear a lot, or be dry. You can become sensitive to light or have blurred vision. Symptoms of blepharitis can cause irritability.  Blepharitis is a chronic condition and difficult to cure. Even with successful treatment, recurrences are common. Good hygiene and home treatments (in the Home care section below) can improve your condition.  Causes  Causes of blepharitis may include:    Problems with the oil glands in the eyelid (meibomian glands)    Dandruff of the scalp and eyebrows (seborrheic dermatitis)    Acne  rosacea (a skin condition that causes redness of the face, and other symptoms)    Eyelash mites (tiny organisms in the eyelash follicles)    Allergic reactions to cosmetics or medicines  Home care  Medicine: The healthcare provider may prescribe an antibiotic eye drops or ointment, artificial tears, and/or steroid eye drops. Follow all instructions for using these medicines. Use all medicines as directed. If you have pain, take medicine as advised by the healthcare provider.    Wash your hands carefully with soap and warm water before and after caring for your eyes.    Apply a warm compress or a warm, moist washcloth to the eyelids for 1 minute, 2 to 3 times a day, to loosen the crust. Then, wipe away scales or crust from the eyelids.    After applying the warm compress, gently scrub the base of the eyelashes for almost 15 seconds per eyelid. To do this, close your eyes and use a moist eyelid cleansing wipe, clean washcloth, or cotton swab. Ask your healthcare provider about products (such as nonirritating baby shampoo) to use to help clean the eyelids.    You may be instructed to gently massage your eyelids to help unblock the eyelid glands. Follow all instructions given by the healthcare provider.    Unless told otherwise, on a regular basis, with eyes closed, clean your eyelids as directed by the healthcare provider. Blepharitis can be an ongoing problem.    Do not wear eye makeup until the inflammation goes away, or as directed by your healthcare provider.    Unless told otherwise, stop using contact lenses until you complete treatment for the condition.    Wash your hands regularly to help prevent dirt and bacteria from coming in contact with your eyelid.  Follow-up care  Follow up with your healthcare provider, or as advised. Your healthcare provider may refer you to an eye specialist (an optometrist or ophthalmologist) for further evaluation and treatment.  When to seek medical advice  Call your healthcare  provider right away if any of these occur:    Increase in redness of the white part of the eye    Increase in swelling, redness, irritation, or pain of the eyelids    Eye pain    Change in vision (trouble seeing or blurring)    Drainage (pus, blood) from the eyelid    Fever of 100.4 F (38 C) or higher, or as directed by your healthcare provider  Date Last Reviewed: 10/9/2015    1248-6323 [employer]. [address] [place] [address]. All rights reserved. This information is not intended as a substitute for professional medical care. Always follow your healthcare professional's instructions.      PLAN:                                                      1.  MEDICATIONS:        - Decrease bupropion to 300 mg daily, as higher dose has not been helpful.   We may decrease further in the future.         - Trial of seroquel at low dose, with gradual increase over time   Risks and benefits discussed, including increased chance of tardive dyskinesia, weight gain, DM, grogginess, etc.        - Continue other medications without change  2.  Use heat, gentle expression of lid marins each day.   Consider baby shampoo wash, as well.   If not better, see eye specialist and consider daily disposable contacts.     3.  Follow-up E visit in 3-4 weeks.   Notify MD for problems sooner.    4.  Schedule office visit follow-up in January.          Follow-ups after your visit        Who to contact     If you have questions or need follow up information about today's clinic visit or your schedule please contact [facility] directly at [phone].  Normal or non-critical lab and imaging results will be communicated to you by MyChart, letter or phone within 4 business days after the clinic has received the results. If you do not hear from us within 7 days, please contact the clinic through MyChart or phone. If you have a critical or abnormal lab result, we will notify you by phone as soon as  "possible.  Submit refill requests through [employer] or call your pharmacy and they will forward the refill request to us. Please allow 3 business days for your refill to be completed.          Additional Information About Your Visit        [employer] Information     [employer] gives you secure access to your electronic health record. If you see a primary care provider, you can also send messages to your care team and make appointments. If you have questions, please call your primary care clinic.  If you do not have a primary care provider, please call [phone] and they will assist you.        Care EveryWhere ID     This is your Care EveryWhere ID. This could be used by other organizations to access your [facility] medical records  [id number]        Your Vitals Were     Pulse Temperature Height Last Period Pulse Oximetry BMI (Body Mass Index)    91 98.2  F (36.8  C) (Oral) 5' 4\" (1.626 m) 12/10/2015 100% 31.69 kg/m2       Blood Pressure from Last 3 Encounters:   11/20/17 114/70   10/30/17 122/80   08/16/17 104/76    Weight from Last 3 Encounters:   11/20/17 184 lb 9.6 oz (83.7 kg)   10/30/17 184 lb 4.8 oz (83.6 kg)   08/16/17 186 lb 1.6 oz (84.4 kg)              We Performed the Following     FLU VAC, SPLIT VIRUS IM > 3 YO (QUADRIVALENT) [65298]     Vaccine Administration, Initial [85717]          Today's Medication Changes          These changes are accurate as of: 11/20/17  3:27 PM.  If you have any questions, ask your nurse or doctor.               Start taking these medicines.        Dose/Directions    QUEtiapine 25 MG tablet   Commonly known as:  SEROQUEL   Used for:  Moderate episode of recurrent major depressive disorder (H), Generalized anxiety disorder, Bipolar affective disorder, current episode depressed, current episode severity unspecified (H)   Started by:  Trevon Crisostomo MD        Dose:  25-50 mg   Take 1-2 tablets (25-50 mg) by mouth At Bedtime   Quantity:  60 tablet   Refills:  2         These " medicines have changed or have updated prescriptions.        Dose/Directions    buPROPion 300 MG 24 hr tablet   Commonly known as:  WELLBUTRIN XL   This may have changed:  Another medication with the same name was removed. Continue taking this medication, and follow the directions you see here.   Used for:  Major depressive disorder, recurrent episode, moderate (H)   Changed by:  Trevon Crisostomo MD        TAKE 1 TABLET BY MOUTH DAILY ALONG WITH 1 TABLET  MGS. TOTALING 450 MGS DAILY.   Quantity:  90 tablet   Refills:  1       Vitamin D (Cholecalciferol) 1000 UNITS Caps   This may have changed:    - medication strength  - how much to take   Used for:  Vitamin D deficiency   Changed by:  Trevon Crisostomo MD        Dose:  1000 Units   Take 1,000 Units by mouth daily   Refills:  0         Stop taking these medicines if you haven't already. Please contact your care team if you have questions.     FISH OIL PO   Stopped by:  Trevon Crisostomo MD           magnesium citrate solution   Stopped by:  Trevon Crisostomo MD           SUPER B COMPLEX PO   Stopped by:  Trevon Crisostomo MD                Where to get your medicines      These medications were sent to [facility] [id number] [place], [place] [id number] AT [facility] [address] [facility] [address]     Phone:  [phone]     QUEtiapine 25 MG tablet         Some of these will need a paper prescription and others can be bought over the counter.  Ask your nurse if you have questions.     You don't need a prescription for these medications     Vitamin D (Cholecalciferol) 1000 UNITS Caps                Primary Care Provider Office Phone # Fax #    Trevon Crisostomo [phone]       [address] [facility] [address]        Equal Access to Services     [facility] LULY AH: Humza Vazquez, dre sanchez, vasu dean, linden champion. So [facility]  [phone].    ATENCIÓN: Si sanya fowler, tiene a manriquez disposición servicios gratuitos de asistencia lingüística. Abril lopez [phone].    We comply with applicable federal civil rights laws and [place] laws. We do not discriminate on the basis of race, color, national origin, age, disability, sex, sexual orientation, or gender identity.            Thank you!     Thank you for choosing [facility]  for your care. Our goal is always to provide you with excellent care. Hearing back from our patients is one way we can continue to improve our services. Please take a few minutes to complete the written survey that you may receive in the mail after your visit with us. Thank you!             Your Updated Medication List - Protect others around you: Learn how to safely use, store and throw away your medicines at www.disposemymeds.org.          This list is accurate as of: 11/20/17  3:27 PM.  Always use your most recent med list.                   Brand Name Dispense Instructions for use Diagnosis    albuterol 108 (90 BASE) MCG/ACT Inhaler    PROAIR HFA/PROVENTIL HFA/VENTOLIN HFA    1 Inhaler    Inhale 2 puffs into the lungs every 6 hours as needed for shortness of breath / dyspnea or wheezing    Mild intermittent asthma with acute exacerbation       buPROPion 300 MG 24 hr tablet    WELLBUTRIN XL    90 tablet    TAKE 1 TABLET BY MOUTH DAILY ALONG WITH 1 TABLET  MGS. TOTALING 450 MGS DAILY.    Major depressive disorder, recurrent episode, moderate (H)       ibuprofen 600 MG tablet    ADVIL/MOTRIN    90 tablet    Take 1 tablet (600 mg) by mouth every 6 hours as needed for pain (mild)    S/P laparoscopic hysterectomy, Endometriosis, Pelvic pain in female       lamoTRIgine 200 MG tablet    LAMICTAL    180 tablet    Take 1 tablet (200 mg) by mouth 2 times daily    Bipolar 2 disorder (H)       LORATADINE PO      Take 10 mg by mouth daily        MUCINEX SINUS-MAX PO      Take 1,200 mg by mouth  daily        NEXIUM PO      Take 20 mg by mouth daily        prenatal multivitamin plus iron 27-0.8 MG Tabs per tablet      Take 1 tablet by mouth daily        propranolol 20 MG tablet    INDERAL    90 tablet    Take 1-2 tablets as needed for anxiety (up to twice a day)    Generalized anxiety disorder       QUEtiapine 25 MG tablet    SEROQUEL    60 tablet    Take 1-2 tablets (25-50 mg) by mouth At Bedtime    Moderate episode of recurrent major depressive disorder (H), Generalized anxiety disorder, Bipolar affective disorder, current episode depressed, current episode severity unspecified (H)       tamsulosin 0.4 MG capsule    FLOMAX    21 capsule    Take 1 capsule (0.4 mg) by mouth daily    Kidney stones       VALTREX PO      Take 500 mg by mouth 2 times daily as needed (herpes outbreak)        Vitamin D (Cholecalciferol) 1000 UNITS Caps      Take 1,000 Units by mouth daily    Vitamin D deficiency

## 2017-11-21 ASSESSMENT — ANXIETY QUESTIONNAIRES: GAD7 TOTAL SCORE: 15

## 2017-11-30 DIAGNOSIS — M54.42 BILATERAL LOW BACK PAIN WITH LEFT-SIDED SCIATICA, UNSPECIFIED CHRONICITY: ICD-10-CM

## 2017-12-01 ENCOUNTER — MYC MEDICAL ADVICE (OUTPATIENT)
Dept: INTERNAL MEDICINE | Facility: CLINIC | Age: 42
End: 2017-12-01

## 2017-12-04 RX ORDER — METHOCARBAMOL 750 MG/1
TABLET, FILM COATED ORAL
Qty: 30 TABLET | Refills: 0 | Status: SHIPPED | OUTPATIENT
Start: 2017-12-04 | End: 2018-06-14

## 2017-12-04 NOTE — TELEPHONE ENCOUNTER
.Routing refill request to provider for review/approval because:  Drug not active on patient's medication list  Drug not on the [facility] refill protocol

## 2018-01-15 ENCOUNTER — OFFICE VISIT (OUTPATIENT)
Dept: INTERNAL MEDICINE | Facility: CLINIC | Age: 43
End: 2018-01-15
Payer: COMMERCIAL

## 2018-01-15 VITALS
HEIGHT: 64 IN | TEMPERATURE: 97.7 F | WEIGHT: 184.2 LBS | BODY MASS INDEX: 31.45 KG/M2 | OXYGEN SATURATION: 97 % | DIASTOLIC BLOOD PRESSURE: 70 MMHG | HEART RATE: 86 BPM | SYSTOLIC BLOOD PRESSURE: 104 MMHG

## 2018-01-15 DIAGNOSIS — F33.1 MODERATE EPISODE OF RECURRENT MAJOR DEPRESSIVE DISORDER (H): ICD-10-CM

## 2018-01-15 DIAGNOSIS — F31.30 BIPOLAR AFFECTIVE DISORDER, CURRENT EPISODE DEPRESSED, CURRENT EPISODE SEVERITY UNSPECIFIED (H): ICD-10-CM

## 2018-01-15 DIAGNOSIS — F41.1 GENERALIZED ANXIETY DISORDER: ICD-10-CM

## 2018-01-15 PROCEDURE — 99214 OFFICE O/P EST MOD 30 MIN: CPT | Performed by: INTERNAL MEDICINE

## 2018-01-15 RX ORDER — RISPERIDONE 0.5 MG/1
0.5 TABLET ORAL 2 TIMES DAILY
Qty: 60 TABLET | Refills: 3 | Status: SHIPPED | OUTPATIENT
Start: 2018-01-15 | End: 2018-01-30

## 2018-01-15 RX ORDER — QUETIAPINE FUMARATE 25 MG/1
25-50 TABLET, FILM COATED ORAL AT BEDTIME
Qty: 60 TABLET | Refills: 2 | Status: CANCELLED | OUTPATIENT
Start: 2018-01-15

## 2018-01-15 ASSESSMENT — PATIENT HEALTH QUESTIONNAIRE - PHQ9
5. POOR APPETITE OR OVEREATING: MORE THAN HALF THE DAYS
SUM OF ALL RESPONSES TO PHQ QUESTIONS 1-9: 12

## 2018-01-15 ASSESSMENT — ANXIETY QUESTIONNAIRES
1. FEELING NERVOUS, ANXIOUS, OR ON EDGE: SEVERAL DAYS
IF YOU CHECKED OFF ANY PROBLEMS ON THIS QUESTIONNAIRE, HOW DIFFICULT HAVE THESE PROBLEMS MADE IT FOR YOU TO DO YOUR WORK, TAKE CARE OF THINGS AT HOME, OR GET ALONG WITH OTHER PEOPLE: SOMEWHAT DIFFICULT
6. BECOMING EASILY ANNOYED OR IRRITABLE: MORE THAN HALF THE DAYS
2. NOT BEING ABLE TO STOP OR CONTROL WORRYING: MORE THAN HALF THE DAYS
3. WORRYING TOO MUCH ABOUT DIFFERENT THINGS: NEARLY EVERY DAY
GAD7 TOTAL SCORE: 12
5. BEING SO RESTLESS THAT IT IS HARD TO SIT STILL: NOT AT ALL
7. FEELING AFRAID AS IF SOMETHING AWFUL MIGHT HAPPEN: MORE THAN HALF THE DAYS

## 2018-01-15 NOTE — PROGRESS NOTES
"  SUBJECTIVE:   Lena Morrow is a 42 year old female who presents to clinic today for the following health issues:      Medication Followup of Seroquel    Taking Medication as prescribed: yes    Side Effects:  Yes, irritability, but that has subsided. Increased appetite at HS    Medication Helping Symptoms:  yes   Since last office visit, bupropion down to 300 mg daily.  No changes.  Tried seroquel 25 mg, but was too tired in the morning.   Now taking 12.5 mg at bedtime.  Initially, was \"not very nice\", irritable.   Then these symptoms resolved.  Hasn't taken a propranolol for some time, since on medication - which she appreciates.    - appetite has increased since on medication.  Last week got up to eat 3 times at noc.      Reviewed and updated as needed this visit by clinical staff:  Medications  Allergies  Soc Hx   Additional history: as documented    Additional issues to address:  1.  Woke with pain L scapular area today.   Hurts to take a deep breath, etc.      ROS:  Cough x 5 weeks, minimal phlegm at this point  Constitutional, HEENT, cardiovascular, pulmonary, gi and gu systems are negative, except as otherwise noted.      OBJECTIVE:                                                    /70  Pulse 86  Temp 97.7  F (36.5  C) (Oral)  Ht 5' 4\" (1.626 m)  Wt 184 lb 3.2 oz (83.6 kg)  LMP 12/10/2015  SpO2 97%  BMI 31.62 kg/m2  Body mass index is 31.62 kg/(m^2).   No apparent distress  CV: regular rates and rhythm and no murmur, click or rub  LUNGS: clear  LYMPHATICS: normal ant/post cervical and supraclavicular nodes  MS: Focal tenderness just medial to L scapula posteriorly, otherwise noncontributory     PHQ-9 SCORE 6/14/2017 11/20/2017 1/15/2018   Total Score - - -   Total Score MyChart - - -   Total Score 12 19 12      PHIL-7 SCORE 6/14/2017 11/20/2017 1/15/2018   Total Score - - -   Total Score 11 15 12             ASSESSMENT:                                                      Depression, " improved slightly  Anxiety, mild improvement  Increased appetite from seroquel; this med is not sustainable  Acute pain L scapular area, appears musculoskeletal   Suspect bronchitis, supportive care at this time    PLAN:                                                      1.  MEDICATIONS:        - Trial of ibuprofen 600 mg three times daily with food as needed for scapular area pain       - Use methocarbamol as needed        - Stop seroquel.   Trial of Risperdal daily       - Continue other medications without change  2.  Use heat and stretching for scapular pain.   Physical Therapy if not better.    3.  If risperdal causing the same side effects as seroquel, would try to get Abilify covered.      Trevon Crisostomo MD  [facility]

## 2018-01-15 NOTE — PATIENT INSTRUCTIONS
PLAN:                                                      1.  MEDICATIONS:        - Trial of ibuprofen 600 mg three times daily with food as needed for scapular area pain       - Use methocarbamol as needed        - Stop seroquel.   Trial of Risperdal dailyris       - Continue other medications without change  2.  Use heat and stretching for scapular pain.   Physical Therapy if not better.    3.  If risperdal causing the same side effects as seroquel, would try to get Abilify covered.

## 2018-01-15 NOTE — MR AVS SNAPSHOT
After Visit Summary   1/15/2018    Lena Morrow    MRN: [id number]           Patient Information     Date Of Birth          1975        Visit Information        Provider Department      1/15/2018 2:30 PM Trevon Crisostomo MD [facility]        Today's Diagnoses     Moderate episode of recurrent major depressive disorder (H)        Generalized anxiety disorder        Bipolar affective disorder, current episode depressed, current episode severity unspecified (H)          Care Instructions    PLAN:                                                      1.  MEDICATIONS:        - Trial of ibuprofen 600 mg three times daily with food as needed for scapular area pain       - Use methocarbamol as needed        - Stop seroquel.   Trial of Risperdal dailyris       - Continue other medications without change  2.  Use heat and stretching for scapular pain.   Physical Therapy if not better.    3.  If risperdal causing the same side effects as seroquel, would try to get Abilify covered.            Follow-ups after your visit        Who to contact     If you have questions or need follow up information about today's clinic visit or your schedule please contact [facility] directly at [phone].  Normal or non-critical lab and imaging results will be communicated to you by locrhart, letter or phone within 4 business days after the clinic has received the results. If you do not hear from us within 7 days, please contact the clinic through [employer] or phone. If you have a critical or abnormal lab result, we will notify you by phone as soon as possible.  Submit refill requests through [employer] or call your pharmacy and they will forward the refill request to us. Please allow 3 business days for your refill to be completed.          Additional Information About Your Visit        locrharexsulin Information     [employer] gives you secure access to your electronic health record.  "If you see a primary care provider, you can also send messages to your care team and make appointments. If you have questions, please call your primary care clinic.  If you do not have a primary care provider, please call [phone] and they will assist you.        Care EveryWhere ID     This is your Care EveryWhere ID. This could be used by other organizations to access your [place] medical records  [id number]        Your Vitals Were     Pulse Temperature Height Last Period Pulse Oximetry BMI (Body Mass Index)    86 97.7  F (36.5  C) (Oral) 5' 4\" (1.626 m) 12/10/2015 97% 31.62 kg/m2       Blood Pressure from Last 3 Encounters:   01/15/18 104/70   11/20/17 114/70   10/30/17 122/80    Weight from Last 3 Encounters:   01/15/18 184 lb 3.2 oz (83.6 kg)   11/20/17 184 lb 9.6 oz (83.7 kg)   10/30/17 184 lb 4.8 oz (83.6 kg)              Today, you had the following     No orders found for display         Today's Medication Changes          These changes are accurate as of: 1/15/18  3:52 PM.  If you have any questions, ask your nurse or doctor.               Start taking these medicines.        Dose/Directions    risperiDONE 0.5 MG tablet   Commonly known as:  risperDAL   Used for:  Moderate episode of recurrent major depressive disorder (H), Generalized anxiety disorder, Bipolar affective disorder, current episode depressed, current episode severity unspecified (H)   Started by:  Trevon Crisostomo MD        Dose:  0.5 mg   Take 1 tablet (0.5 mg) by mouth 2 times daily   Quantity:  60 tablet   Refills:  3         Stop taking these medicines if you haven't already. Please contact your care team if you have questions.     MUCINEX SINUS-MAX PO   Stopped by:  Trevon Crisostomo MD           QUEtiapine 25 MG tablet   Commonly known as:  SEROQUEL   Stopped by:  Trevon Crisostomo MD           tamsulosin 0.4 MG capsule   Commonly known as:  FLOMAX   Stopped by:  Trevon Crisostomo MD                Where to get your " medicines      These medications were sent to [facility] - [place], [place] - [address] AT [facility] [address] [facility] [address], [facility] [address]     Phone:  [phone]     risperiDONE 0.5 MG tablet                Primary Care Provider Office Phone # Fax #    Trevon Jeison Crisostomo [phone]       [address] [facility] [address]        Equal Access to Services     PATRICIA MAURICIO : Hadii aad ku hadasho Soomaali, waaxda luqadaha, qaybta kaalmada adeegyada, waxay idiin hayaan adeeg kharash larefugio . So [facility] [phone].    ATENCIÓN: Si habla español, tiene a manriquez disposición servicios gratuitos de asistencia lingüística. Llame al [phone].    We comply with applicable federal civil rights laws and [place] laws. We do not discriminate on the basis of race, color, national origin, age, disability, sex, sexual orientation, or gender identity.            Thank you!     Thank you for choosing [facility]  for your care. Our goal is always to provide you with excellent care. Hearing back from our patients is one way we can continue to improve our services. Please take a few minutes to complete the written survey that you may receive in the mail after your visit with us. Thank you!             Your Updated Medication List - Protect others around you: Learn how to safely use, store and throw away your medicines at www.disposemymeds.org.          This list is accurate as of: 1/15/18  3:52 PM.  Always use your most recent med list.                   Brand Name Dispense Instructions for use Diagnosis    albuterol 108 (90 BASE) MCG/ACT Inhaler    PROAIR HFA/PROVENTIL HFA/VENTOLIN HFA    1 Inhaler    Inhale 2 puffs into the lungs every 6 hours as needed for shortness of breath / dyspnea or wheezing    Mild intermittent asthma with acute exacerbation       buPROPion 300 MG 24 hr tablet    WELLBUTRIN XL    90 tablet    TAKE 1 TABLET BY MOUTH  DAILY ALONG WITH 1 TABLET  MGS. TOTALING 450 MGS DAILY.    Major depressive disorder, recurrent episode, moderate (H)       ibuprofen 600 MG tablet    ADVIL/MOTRIN    90 tablet    Take 1 tablet (600 mg) by mouth every 6 hours as needed for pain (mild)    S/P laparoscopic hysterectomy, Endometriosis, Pelvic pain in female       lamoTRIgine 200 MG tablet    LAMICTAL    180 tablet    Take 1 tablet (200 mg) by mouth 2 times daily    Bipolar 2 disorder (H)       LORATADINE PO      Take 10 mg by mouth daily        methocarbamol 750 MG tablet    ROBAXIN    30 tablet    TAKE 1 TABLET(750 MG) BY MOUTH FOUR TIMES DAILY AS NEEDED    Bilateral low back pain with left-sided sciatica, unspecified chronicity       NEXIUM PO      Take 20 mg by mouth daily        prenatal multivitamin plus iron 27-0.8 MG Tabs per tablet      Take 1 tablet by mouth daily        propranolol 20 MG tablet    INDERAL    90 tablet    Take 1-2 tablets as needed for anxiety (up to twice a day)    Generalized anxiety disorder       risperiDONE 0.5 MG tablet    risperDAL    60 tablet    Take 1 tablet (0.5 mg) by mouth 2 times daily    Moderate episode of recurrent major depressive disorder (H), Generalized anxiety disorder, Bipolar affective disorder, current episode depressed, current episode severity unspecified (H)       VALTREX PO      Take 500 mg by mouth 2 times daily as needed (herpes outbreak)        Vitamin D (Cholecalciferol) 1000 UNITS Caps      Take 1,000 Units by mouth daily    Vitamin D deficiency

## 2018-01-16 ASSESSMENT — ANXIETY QUESTIONNAIRES: GAD7 TOTAL SCORE: 12

## 2018-01-24 ENCOUNTER — NURSE TRIAGE (OUTPATIENT)
Dept: NURSING | Facility: CLINIC | Age: 43
End: 2018-01-24

## 2018-01-24 NOTE — TELEPHONE ENCOUNTER
"  Additional Information    Negative: Mild rectal pain (all triage questions negative)    Rectal bleeding is minimal (e.g., blood just on toilet paper, a few drops in toilet bowl) (all triage questions negative)     \"I have an external hemorrhoid and I have been using Preparation H on it. But tonight I pooped and when I wiped there was blood.\" Denies rectal pain or other sx. Triaged and gave home care advice. Follow up with PCP if needed.    Protocols used: RECTAL SYMPTOMS-ADULT-AH, RECTAL BLEEDING-ADULT-AH    "

## 2018-01-29 ENCOUNTER — MYC MEDICAL ADVICE (OUTPATIENT)
Dept: INTERNAL MEDICINE | Facility: CLINIC | Age: 43
End: 2018-01-29

## 2018-02-26 ENCOUNTER — MYC MEDICAL ADVICE (OUTPATIENT)
Dept: INTERNAL MEDICINE | Facility: CLINIC | Age: 43
End: 2018-02-26

## 2018-02-26 NOTE — TELEPHONE ENCOUNTER
Routing to PCP and/or partner.  Please see previous message from 1/29/18 regarding dosing and mychart message from 2/21.  Please advise.

## 2018-04-20 ENCOUNTER — MYC MEDICAL ADVICE (OUTPATIENT)
Dept: INTERNAL MEDICINE | Facility: CLINIC | Age: 43
End: 2018-04-20

## 2018-04-20 DIAGNOSIS — F31.81 BIPOLAR 2 DISORDER (H): ICD-10-CM

## 2018-04-20 NOTE — TELEPHONE ENCOUNTER
Patient will be out of medication before appointment on 4/24.      Lamictal     Last Written Prescription Date:  6/14/17  Last Fill Quantity: 180,   # refills: 2  Last Office Visit: 1/15/18  Future Office visit:    Next 5 appointments (look out 90 days)     Apr 24, 2018 10:30 AM CDT   Office Visit with Trevon Crisostomo MD   [facility]    [address] [id number] [phone]                   Routing refill request to provider for review/approval because:  previously prescribed by different provider

## 2018-04-21 RX ORDER — LAMOTRIGINE 200 MG/1
200 TABLET ORAL 2 TIMES DAILY
Qty: 180 TABLET | Refills: 3 | Status: SHIPPED | OUTPATIENT
Start: 2018-04-21 | End: 2019-04-14

## 2018-04-24 ENCOUNTER — OFFICE VISIT (OUTPATIENT)
Dept: INTERNAL MEDICINE | Facility: CLINIC | Age: 43
End: 2018-04-24
Payer: COMMERCIAL

## 2018-04-24 VITALS
HEIGHT: 64 IN | WEIGHT: 189.3 LBS | TEMPERATURE: 98.3 F | SYSTOLIC BLOOD PRESSURE: 104 MMHG | BODY MASS INDEX: 32.32 KG/M2 | HEART RATE: 82 BPM | DIASTOLIC BLOOD PRESSURE: 70 MMHG | RESPIRATION RATE: 18 BRPM | OXYGEN SATURATION: 97 %

## 2018-04-24 DIAGNOSIS — R11.0 NAUSEA: ICD-10-CM

## 2018-04-24 DIAGNOSIS — F41.1 GENERALIZED ANXIETY DISORDER: ICD-10-CM

## 2018-04-24 DIAGNOSIS — F31.81 BIPOLAR 2 DISORDER (H): ICD-10-CM

## 2018-04-24 DIAGNOSIS — F42.9 OBSESSIVE-COMPULSIVE DISORDER, UNSPECIFIED TYPE: Primary | ICD-10-CM

## 2018-04-24 DIAGNOSIS — F33.1 MODERATE EPISODE OF RECURRENT MAJOR DEPRESSIVE DISORDER (H): ICD-10-CM

## 2018-04-24 DIAGNOSIS — J45.21 MILD INTERMITTENT ASTHMA WITH ACUTE EXACERBATION: ICD-10-CM

## 2018-04-24 DIAGNOSIS — F31.30 BIPOLAR AFFECTIVE DISORDER, CURRENT EPISODE DEPRESSED, CURRENT EPISODE SEVERITY UNSPECIFIED (H): ICD-10-CM

## 2018-04-24 DIAGNOSIS — F33.1 MAJOR DEPRESSIVE DISORDER, RECURRENT EPISODE, MODERATE (H): ICD-10-CM

## 2018-04-24 DIAGNOSIS — R10.13 EPIGASTRIC PAIN: ICD-10-CM

## 2018-04-24 PROCEDURE — 99214 OFFICE O/P EST MOD 30 MIN: CPT | Performed by: INTERNAL MEDICINE

## 2018-04-24 RX ORDER — ARIPIPRAZOLE 5 MG/1
5 TABLET ORAL AT BEDTIME
Qty: 30 TABLET | Refills: 2 | Status: CANCELLED | OUTPATIENT
Start: 2018-04-24

## 2018-04-24 RX ORDER — ESOMEPRAZOLE MAGNESIUM 40 MG/1
40 CAPSULE, DELAYED RELEASE ORAL DAILY
Qty: 30 CAPSULE | Status: SHIPPED | OUTPATIENT
Start: 2018-04-24 | End: 2019-05-15

## 2018-04-24 RX ORDER — ALBUTEROL SULFATE 90 UG/1
2 AEROSOL, METERED RESPIRATORY (INHALATION) EVERY 6 HOURS PRN
Qty: 1 INHALER | Status: SHIPPED | OUTPATIENT
Start: 2018-04-24 | End: 2020-01-16

## 2018-04-24 RX ORDER — RISPERIDONE 0.5 MG/1
TABLET ORAL
COMMUNITY
Start: 2018-04-24 | End: 2018-05-31 | Stop reason: SINTOL

## 2018-04-24 RX ORDER — PROPRANOLOL HYDROCHLORIDE 80 MG/1
80 CAPSULE, EXTENDED RELEASE ORAL DAILY
Qty: 90 CAPSULE | Refills: 3 | Status: SHIPPED | OUTPATIENT
Start: 2018-04-24 | End: 2019-04-01

## 2018-04-24 RX ORDER — BUPROPION HYDROCHLORIDE 300 MG/1
300 TABLET ORAL DAILY
COMMUNITY
Start: 2018-04-24 | End: 2019-05-16

## 2018-04-24 RX ORDER — RISPERIDONE 0.5 MG/1
TABLET ORAL
Refills: 3 | COMMUNITY
Start: 2018-02-26 | End: 2018-04-24

## 2018-04-24 RX ORDER — LAMOTRIGINE 200 MG/1
200 TABLET ORAL 2 TIMES DAILY
Qty: 180 TABLET | Refills: 3 | Status: CANCELLED | OUTPATIENT
Start: 2018-04-24

## 2018-04-24 RX ORDER — ONDANSETRON 8 MG/1
16 TABLET, ORALLY DISINTEGRATING ORAL EVERY 8 HOURS PRN
Start: 2018-04-24 | End: 2018-04-26

## 2018-04-24 ASSESSMENT — ANXIETY QUESTIONNAIRES
2. NOT BEING ABLE TO STOP OR CONTROL WORRYING: NEARLY EVERY DAY
IF YOU CHECKED OFF ANY PROBLEMS ON THIS QUESTIONNAIRE, HOW DIFFICULT HAVE THESE PROBLEMS MADE IT FOR YOU TO DO YOUR WORK, TAKE CARE OF THINGS AT HOME, OR GET ALONG WITH OTHER PEOPLE: SOMEWHAT DIFFICULT
7. FEELING AFRAID AS IF SOMETHING AWFUL MIGHT HAPPEN: NEARLY EVERY DAY
5. BEING SO RESTLESS THAT IT IS HARD TO SIT STILL: SEVERAL DAYS
3. WORRYING TOO MUCH ABOUT DIFFERENT THINGS: NEARLY EVERY DAY
1. FEELING NERVOUS, ANXIOUS, OR ON EDGE: NEARLY EVERY DAY
GAD7 TOTAL SCORE: 17
6. BECOMING EASILY ANNOYED OR IRRITABLE: MORE THAN HALF THE DAYS

## 2018-04-24 ASSESSMENT — PATIENT HEALTH QUESTIONNAIRE - PHQ9: 5. POOR APPETITE OR OVEREATING: MORE THAN HALF THE DAYS

## 2018-04-24 NOTE — PROGRESS NOTES
"  SUBJECTIVE:   Lena Morrow is a 42 year old female who presents to clinic today for the following health issues:      Depression and Anxiety Follow-Up    Status since last visit: No change    Other associated symptoms:None    Complicating factors:     Significant life event: No     Current substance abuse: None    Marked increase in anxiety, associated with insomnia.  I think of some \"messed up things\" which she is unwilling to share.  Worried about her son's health, welfare (though there has been nothing which happened).   Unable to control this worry.   When most anxious, wants to cry, and feeling of dread.   Checks on her son often.       Taking propranolol prn, helps these symptoms     Is obsessive about many things.   Sister on meds for OCD, and patient feels she has.  Has not had specific treatment for this.       MEDS CHANGES:  - seroquel stopped in January  - tried risperdal in January, caused her to want to eat more, and drowsy.  Gained some weight.   Cut med in half, splitting dose, and this helped some.    - abilify (early March) made things worse -->  Sad and stevenson.  Stopped after 2 weeks and went back to risperdal.    Decreased buspropion from 450 to 300 mg daily a    PHQ-9 10/30/2017 11/20/2017 1/15/2018   Total Score - 19 12   Q9: Suicide Ideation Not at all Not at all Not at all     PHIL-7 SCORE 6/14/2017 11/20/2017 1/15/2018   Total Score - - -   Total Score 11 15 12             Amount of exercise or physical activity: 2-3 days/week for an average of 30-45 minutes    Problems taking medications regularly: No    Medication side effects: increased appetite from Risperdone     Diet: regular (no restrictions)        Reviewed and updated as needed this visit by clinical staff:  Medications  Allergies  Soc Hx   Additional history: as documented    Additional issues to address:  1.  Patient has had a recurrence of epigastric pain associated with quesiness.   Returned a month ago.   TUMS help a little. " " Food doesn't help symptoms.   PPI not controlling symptoms.   Still has   2.  Vaginal odor after hysterectomy.   Saw GYN, nothing found.   Wonder if from medication, possibly lamictal.    Has had for 2 years.        ROS:    Constitutional, HEENT, cardiovascular, pulmonary, gi and gu systems are negative, except as otherwise noted.    OBJECTIVE:                                                      /70  Pulse 82  Temp 98.3  F (36.8  C) (Oral)  Resp 18  Ht 5' 4\" (1.626 m)  Wt 189 lb 4.8 oz (85.9 kg)  LMP 12/10/2015  SpO2 97%  BMI 32.49 kg/m2  Body mass index is 32.49 kg/(m^2).  No apparent distress  Affect bright  Reg pulse/heart tones  Abdomen soft, nontender        ASSESSMENT:                                                      Anxiety disorder, symptoms increased  Depression, doing fairly well  OCD  Chest/epigastric pains, which do not sound cardiac.  PPI not controlling symptoms.     Asthma, doing well    PLAN:                                                      1.  MEDICATIONS:        - Trial of increasing propranalol to 80 mg daily.  Notify MD for side effects.         - Try increasing the nexium to 40 mg daily        - Trial of sertraline 25 mg daily for two weeks, then increase to 50 mg daily       - Continue other medications without change  2.  Notify MD if asthma worsens on the propranolol, otherwise update MD in 2 weeks.  3.  Mental health specialist consultation.    Check with [facility] to see who is in their network.     4.  Consider seeing GYN again regarding vaginal odor symptoms   5.  Follow-up in a month        Trevon Crisostomo MD  [facility]     "

## 2018-04-24 NOTE — Clinical Note
Please abstract the following data from this visit with this patient into the appropriate field in Epic:  Pap smear done on this date: 5/17 (approximately), by this group: OBGYN, results were normal.

## 2018-04-24 NOTE — MR AVS SNAPSHOT
After Visit Summary   4/24/2018    Lena Morrow    MRN: [id number]           Patient Information     Date Of Birth          1975        Visit Information        Provider Department      4/24/2018 10:30 AM Trevon Crisostomo MD [facility]        Today's Diagnoses     Obsessive-compulsive disorder, unspecified type    -  1    Bipolar affective disorder, current episode depressed, current episode severity unspecified (H)        Major depressive disorder, recurrent episode, moderate (H)        Bipolar 2 disorder (H)        Generalized anxiety disorder        Moderate episode of recurrent major depressive disorder (H)        Nausea        Mild intermittent asthma with acute exacerbation        Epigastric pain          Care Instructions    PLAN:                                                      1.  MEDICATIONS:        - Trial of increasing propranalol to 80 mg daily.  Notify MD for side effects.         - Try increasing the nexium to 40 mg daily        - Trial of sertraline 25 mg daily for two weeks, then increase to 50 mg daily       - Continue other medications without change  2.  Notify MD if asthma worsens on the propranolol, otherwise update MD in 2 weeks.  3.  Mental health specialist consultation.    Check with [facility] to see who is in their network.     4.  Consider seeing GYN again regarding vaginal odor symptoms   5.  Follow-up in a month          Follow-ups after your visit        Who to contact     If you have questions or need follow up information about today's clinic visit or your schedule please contact [facility] directly at [phone].  Normal or non-critical lab and imaging results will be communicated to you by MyChart, letter or phone within 4 business days after the clinic has received the results. If you do not hear from us within 7 days, please contact the clinic through MyChart or phone. If you have a  "critical or abnormal lab result, we will notify you by phone as soon as possible.  Submit refill requests through Earnest or call your pharmacy and they will forward the refill request to us. Please allow 3 business days for your refill to be completed.          Additional Information About Your Visit        TalkApolishart Information     Earnest gives you secure access to your electronic health record. If you see a primary care provider, you can also send messages to your care team and make appointments. If you have questions, please call your primary care clinic.  If you do not have a primary care provider, please call [phone] and they will assist you.        Care EveryWhere ID     This is your Care EveryWhere ID. This could be used by other organizations to access your [facility] medical records  [id number]        Your Vitals Were     Pulse Temperature Respirations Height Last Period Pulse Oximetry    82 98.3  F (36.8  C) (Oral) 18 5' 4\" (1.626 m) 12/10/2015 97%    BMI (Body Mass Index)                   32.49 kg/m2            Blood Pressure from Last 3 Encounters:   04/24/18 104/70   01/15/18 104/70   11/20/17 114/70    Weight from Last 3 Encounters:   04/24/18 189 lb 4.8 oz (85.9 kg)   01/15/18 184 lb 3.2 oz (83.6 kg)   11/20/17 184 lb 9.6 oz (83.7 kg)              Today, you had the following     No orders found for display         Today's Medication Changes          These changes are accurate as of 4/24/18 11:34 AM.  If you have any questions, ask your nurse or doctor.               Start taking these medicines.        Dose/Directions    ondansetron 8 MG ODT tab   Commonly known as:  ZOFRAN-ODT   Used for:  Nausea   Started by:  Trevon Crisostomo MD        Dose:  16 mg   Take 2 tablets (16 mg) by mouth every 8 hours as needed for nausea   Refills:  0       propranolol 80 MG 24 hr capsule   Commonly known as:  INDERAL LA   Used for:  Generalized anxiety disorder   Replaces:  propranolol 20 MG tablet   Started " by:  Trevon Crisostomo MD        Dose:  80 mg   Take 1 capsule (80 mg) by mouth daily   Quantity:  90 capsule   Refills:  3       sertraline 50 MG tablet   Commonly known as:  ZOLOFT   Used for:  Major depressive disorder, recurrent episode, moderate (H), Generalized anxiety disorder   Started by:  Trevon Crisostomo MD        Dose:  50 mg   Take 1 tablet (50 mg) by mouth daily   Quantity:  60 tablet   Refills:  5         These medicines have changed or have updated prescriptions.        Dose/Directions    buPROPion 300 MG 24 hr tablet   Commonly known as:  WELLBUTRIN XL   This may have changed:  See the new instructions.   Used for:  Major depressive disorder, recurrent episode, moderate (H)   Changed by:  Trevon Crisostomo MD        Dose:  300 mg   Take 1 tablet (300 mg) by mouth daily   Refills:  0       esomeprazole 40 MG CR capsule   Commonly known as:  nexIUM   This may have changed:    - medication strength  - how much to take   Used for:  Epigastric pain   Changed by:  Trevon Crisostomo MD        Dose:  40 mg   Take 1 capsule (40 mg) by mouth daily   Quantity:  30 capsule   Refills:  prn       risperiDONE 0.5 MG tablet   Commonly known as:  risperDAL   This may have changed:  additional instructions   Changed by:  Trevon Crisostomo MD        Taking 1/2 in morning, 1/2 midday, and 1 at bedtime   Refills:  0         Stop taking these medicines if you haven't already. Please contact your care team if you have questions.     ARIPiprazole 5 MG tablet   Commonly known as:  ABILIFY   Stopped by:  Trevon Crisostomo MD           propranolol 20 MG tablet   Commonly known as:  INDERAL   Replaced by:  propranolol 80 MG 24 hr capsule   Stopped by:  Trevon Crisostomo MD                Where to get your medicines      These medications were sent to [facility] [facility], [place] - [address] [facility] [address] [address]     Phone:  [phone]      albuterol 108 (90 Base) MCG/ACT Inhaler    esomeprazole 40 MG CR capsule    propranolol 80 MG 24 hr capsule    sertraline 50 MG tablet         Some of these will need a paper prescription and others can be bought over the counter.  Ask your nurse if you have questions.     You don't need a prescription for these medications     ondansetron 8 MG ODT tab                Primary Care Provider Office Phone # Fax #    Trevon Jeison Crisostomo [phone]       [address] [facility] [address]        Equal Access to Services     [facility]: Hadii aad ku hadasho Soomaali, waaxda luqadaha, qaybta kaalmada adeegyada, waxay zeein haypower john . So [facility] [phone].    ATENCIÓN: Si habla español, tiene a manriquez disposición servicios gratuitos de asistencia lingüística. [facility] [phone].    We comply with applicable federal civil rights laws and [place] laws. We do not discriminate on the basis of race, color, national origin, age, disability, sex, sexual orientation, or gender identity.            Thank you!     Thank you for choosing [facility]  for your care. Our goal is always to provide you with excellent care. Hearing back from our patients is one way we can continue to improve our services. Please take a few minutes to complete the written survey that you may receive in the mail after your visit with us. Thank you!             Your Updated Medication List - Protect others around you: Learn how to safely use, store and throw away your medicines at www.disposemymeds.org.          This list is accurate as of 4/24/18 11:34 AM.  Always use your most recent med list.                   Brand Name Dispense Instructions for use Diagnosis    albuterol 108 (90 Base) MCG/ACT Inhaler    PROAIR HFA/PROVENTIL HFA/VENTOLIN HFA    1 Inhaler    Inhale 2 puffs into the lungs every 6 hours as needed for shortness of breath / dyspnea or wheezing    Mild intermittent asthma with  acute exacerbation       buPROPion 300 MG 24 hr tablet    WELLBUTRIN XL     Take 1 tablet (300 mg) by mouth daily    Major depressive disorder, recurrent episode, moderate (H)       esomeprazole 40 MG CR capsule    nexIUM    30 capsule    Take 1 capsule (40 mg) by mouth daily    Epigastric pain       ibuprofen 600 MG tablet    ADVIL/MOTRIN    90 tablet    Take 1 tablet (600 mg) by mouth every 6 hours as needed for pain (mild)    S/P laparoscopic hysterectomy, Endometriosis, Pelvic pain in female       lamoTRIgine 200 MG tablet    LAMICTAL    180 tablet    Take 1 tablet (200 mg) by mouth 2 times daily    Bipolar 2 disorder (H)       LORATADINE PO      Take 10 mg by mouth daily        methocarbamol 750 MG tablet    ROBAXIN    30 tablet    TAKE 1 TABLET(750 MG) BY MOUTH FOUR TIMES DAILY AS NEEDED    Bilateral low back pain with left-sided sciatica, unspecified chronicity       ondansetron 8 MG ODT tab    ZOFRAN-ODT     Take 2 tablets (16 mg) by mouth every 8 hours as needed for nausea    Nausea       prenatal multivitamin plus iron 27-0.8 MG Tabs per tablet      Take 1 tablet by mouth daily        propranolol 80 MG 24 hr capsule    INDERAL LA    90 capsule    Take 1 capsule (80 mg) by mouth daily    Generalized anxiety disorder       risperiDONE 0.5 MG tablet    risperDAL     Taking 1/2 in morning, 1/2 midday, and 1 at bedtime        sertraline 50 MG tablet    ZOLOFT    60 tablet    Take 1 tablet (50 mg) by mouth daily    Major depressive disorder, recurrent episode, moderate (H), Generalized anxiety disorder       VALTREX PO      Take 500 mg by mouth 2 times daily as needed (herpes outbreak)        Vitamin D (Cholecalciferol) 1000 units Caps      Take 1,000 Units by mouth daily    Vitamin D deficiency

## 2018-04-24 NOTE — PATIENT INSTRUCTIONS
PLAN:                                                      1.  MEDICATIONS:        - Trial of increasing propranalol to 80 mg daily.  Notify MD for side effects.         - Try increasing the nexium to 40 mg daily        - Trial of sertraline 25 mg daily for two weeks, then increase to 50 mg daily       - Continue other medications without change  2.  Notify MD if asthma worsens on the propranolol, otherwise update MD in 2 weeks.  3.  Mental health specialist consultation.    Check with [facility] to see who is in their network.     4.  Consider seeing GYN again regarding vaginal odor symptoms   5.  Follow-up in a month

## 2018-04-25 ASSESSMENT — ANXIETY QUESTIONNAIRES: GAD7 TOTAL SCORE: 17

## 2018-04-25 ASSESSMENT — ASTHMA QUESTIONNAIRES: ACT_TOTALSCORE: 24

## 2018-04-26 ENCOUNTER — MYC MEDICAL ADVICE (OUTPATIENT)
Dept: INTERNAL MEDICINE | Facility: CLINIC | Age: 43
End: 2018-04-26

## 2018-04-26 DIAGNOSIS — R11.0 NAUSEA: ICD-10-CM

## 2018-04-26 RX ORDER — ONDANSETRON 8 MG/1
16 TABLET, ORALLY DISINTEGRATING ORAL EVERY 8 HOURS PRN
Qty: 60 TABLET | Refills: 5 | Status: SHIPPED | OUTPATIENT
Start: 2018-04-26 | End: 2020-01-16

## 2018-04-27 ENCOUNTER — TELEPHONE (OUTPATIENT)
Dept: INTERNAL MEDICINE | Facility: CLINIC | Age: 43
End: 2018-04-27

## 2018-04-27 NOTE — TELEPHONE ENCOUNTER
Prior Authorization Retail Medication Request    Medication/Dose: ondansetron ODT 8mg tablets  ICD code (if different than what is on RX):  R11.0  Previously Tried and Failed:    Rationale:      Insurance Name:  [employer] [phone]  Insurance ID:  [id number]      Pharmacy Information (if different than what is on RX)  Name:  [facility] [address] [place] [address] Lucero Guerin  Phone:  [phone]

## 2018-04-30 NOTE — TELEPHONE ENCOUNTER
PA Initiation    Medication: ondansetron ODT 8mg tablets  Insurance Company: [employer] - Phone [phone] Fax [phone]  Pharmacy Filling the Rx: [facility] [address] [place] [address] [address] AT [facility] & [facility]  Filling Pharmacy Phone: [phone]  Filling Pharmacy Fax:    Start Date: 4/30/2018    THIS HAS BEEN SUBMITTED BY THE PRIOR-AUTHORIZATION TEAM. ANY QUESTIONS PLEASE CALL [phone]. THANK YOU

## 2018-05-01 NOTE — TELEPHONE ENCOUNTER
Prior Authorization Approval    Authorization Effective Date: 4/27/2018  Authorization Expiration Date: 4/27/2019  Medication: ondansetron ODT 8mg tablets APPROVED   Approved Dose/Quantity:   Reference #: [id number]   Insurance Company: [employer] - Phone [phone] Fax [phone]  Expected CoPay:       CoPay Card Available:      Foundation Assistance Needed:    Which Pharmacy is filling the prescription (Not needed for infusion/clinic administered): [facility] [address] [place] [address] [address]  Pharmacy Notified: Yes  Patient Notified: Yes

## 2018-05-18 DIAGNOSIS — F33.1 MAJOR DEPRESSIVE DISORDER, RECURRENT EPISODE, MODERATE (H): ICD-10-CM

## 2018-05-19 NOTE — TELEPHONE ENCOUNTER
"Requested Prescriptions   Pending Prescriptions Disp Refills     buPROPion (WELLBUTRIN XL) 300 MG 24 hr tablet [Pharmacy Med Name: BUPROPION XL 300MG TABLETS]  Last Written Prescription Date:  4/24/2018  Last Fill Quantity: 0,  # refills: 0   Last Office Visit: 6/14/2017   Future Office Visit:    Next 5 appointments (look out 90 days)     May 31, 2018 10:00 AM CDT   Office Visit with Trevon Crisostomo MD   [facility]    [address] [address] [phone]                90 tablet 0     Sig: TAKE 1 TABLET BY MOUTH EVERY DAY ALONG WITH 1 TABLET OF 150MG TOTALING 450MG DAILY    SSRIs Protocol Failed    5/18/2018 10:40 PM       Failed - PHQ-9 score less than 5 in past 6 months    Please review last PHQ-9 score.   PHQ-9 SCORE 6/14/2017 11/20/2017 1/15/2018   Total Score - - -   Total Score MyChart - - -   Total Score 12 19 12     PHIL-7 SCORE 11/20/2017 1/15/2018 4/24/2018   Total Score - - -   Total Score 15 12 17                Passed - Medication is Bupropion    If the medication is Bupropion (Wellbutrin), and the patient is taking for smoking cessation; OK to refill.         Passed - Patient is age 18 or older       Passed - No active pregnancy on record       Passed - No positive pregnancy test in last 12 months       Passed - Recent (6 mo) or future (30 days) visit within the authorizing provider's specialty    Patient had office visit in the last 6 months or has a visit in the next 30 days with authorizing provider or within the authorizing provider's specialty.  See \"Patient Info\" tab in inbasket, or \"Choose Columns\" in Meds & Orders section of the refill encounter.              "

## 2018-05-22 NOTE — TELEPHONE ENCOUNTER
Routing refill request to provider for review/approval because:  Requested dose is different from current med list

## 2018-05-23 RX ORDER — BUPROPION HYDROCHLORIDE 300 MG/1
300 TABLET ORAL EVERY MORNING
Qty: 90 TABLET | Refills: 3 | Status: SHIPPED | OUTPATIENT
Start: 2018-05-23 | End: 2018-05-31

## 2018-05-23 NOTE — TELEPHONE ENCOUNTER
Spoke with patient and she is now taking Wellbutrin 300 mg 1 tablet daily.  RX is listed as historical.  Routed to PCP.

## 2018-05-23 NOTE — TELEPHONE ENCOUNTER
Thanks for picking this up, please check with patient to see if she is taking 300 mg or 450 mg of bupropion daily.

## 2018-05-31 ENCOUNTER — OFFICE VISIT (OUTPATIENT)
Dept: INTERNAL MEDICINE | Facility: CLINIC | Age: 43
End: 2018-05-31
Payer: COMMERCIAL

## 2018-05-31 VITALS
DIASTOLIC BLOOD PRESSURE: 60 MMHG | BODY MASS INDEX: 33.01 KG/M2 | HEART RATE: 69 BPM | WEIGHT: 192.3 LBS | OXYGEN SATURATION: 98 % | TEMPERATURE: 98.2 F | RESPIRATION RATE: 16 BRPM | SYSTOLIC BLOOD PRESSURE: 90 MMHG

## 2018-05-31 DIAGNOSIS — F31.30 BIPOLAR AFFECTIVE DISORDER, CURRENT EPISODE DEPRESSED, CURRENT EPISODE SEVERITY UNSPECIFIED (H): Primary | ICD-10-CM

## 2018-05-31 DIAGNOSIS — K21.9 GASTROESOPHAGEAL REFLUX DISEASE WITHOUT ESOPHAGITIS: ICD-10-CM

## 2018-05-31 DIAGNOSIS — F41.0 PANIC DISORDER WITHOUT AGORAPHOBIA: ICD-10-CM

## 2018-05-31 DIAGNOSIS — F33.1 MODERATE EPISODE OF RECURRENT MAJOR DEPRESSIVE DISORDER (H): ICD-10-CM

## 2018-05-31 DIAGNOSIS — F41.1 GENERALIZED ANXIETY DISORDER: ICD-10-CM

## 2018-05-31 PROCEDURE — 99214 OFFICE O/P EST MOD 30 MIN: CPT | Performed by: INTERNAL MEDICINE

## 2018-05-31 ASSESSMENT — PATIENT HEALTH QUESTIONNAIRE - PHQ9: 5. POOR APPETITE OR OVEREATING: SEVERAL DAYS

## 2018-05-31 ASSESSMENT — ANXIETY QUESTIONNAIRES
2. NOT BEING ABLE TO STOP OR CONTROL WORRYING: NEARLY EVERY DAY
3. WORRYING TOO MUCH ABOUT DIFFERENT THINGS: NEARLY EVERY DAY
7. FEELING AFRAID AS IF SOMETHING AWFUL MIGHT HAPPEN: MORE THAN HALF THE DAYS
IF YOU CHECKED OFF ANY PROBLEMS ON THIS QUESTIONNAIRE, HOW DIFFICULT HAVE THESE PROBLEMS MADE IT FOR YOU TO DO YOUR WORK, TAKE CARE OF THINGS AT HOME, OR GET ALONG WITH OTHER PEOPLE: SOMEWHAT DIFFICULT
GAD7 TOTAL SCORE: 13
6. BECOMING EASILY ANNOYED OR IRRITABLE: MORE THAN HALF THE DAYS
1. FEELING NERVOUS, ANXIOUS, OR ON EDGE: SEVERAL DAYS
5. BEING SO RESTLESS THAT IT IS HARD TO SIT STILL: SEVERAL DAYS

## 2018-05-31 NOTE — PROGRESS NOTES
SUBJECTIVE:   Lena Morrow is a 43 year old female who presents to clinic today for the following health issues:      Depression Followup    Status since last visit: Worsened a little   Pt does not like the sertraline, causing constipation.   Taking miralax, not having bowel movements as often.   Also, sweating a lot.      See PHQ-9 for current symptoms.  Other associated symptoms: None    Complicating factors:   Significant life event: Yes, mother in law has dementia and will not except help and with the dementia the mother in law yelled and will not talk with them so causing some family stress.   Current substance abuse:  None  Anxiety or Panic symptoms:  No  Insomnia about the same, overthinks things, some guilt    PHQ-9 10/30/2017 11/20/2017 1/15/2018   Total Score - 19 12   Q9: Suicide Ideation Not at all Not at all Not at all       PHQ-9  English  PHQ-9   Any Language  Suicide Assessment Five-step Evaluation and Treatment (SAFE-T)    Amount of exercise or physical activity: 2-3 days/week for an average of 30-45 minutes    Problems taking medications regularly: No    Medication side effects: constipated, gained weight, and sweat from the sertraline    Diet: regular (no restrictions)        Medication Followup of Nexium    Taking Medication as prescribed: yes    Side Effects:  None    Medication Helping Symptoms:  yes       Reviewed and updated as needed this visit by clinical staff:  Medications  Allergies  Soc Hx   Additional history: as documented    Additional issues to address:  1.  Eating more on the risperdal, has gained more weight.   Worry and dread are much improved on the higher dose beta-blocker.  Wants to stop.     ROS:  Vaginal odor resolved without intervention      OBJECTIVE:                                                      BP 90/60  Pulse 69  Temp 98.2  F (36.8  C) (Oral)  Resp 16  Wt 192 lb 4.8 oz (87.2 kg)  LMP 12/10/2015  SpO2 98%  BMI 33.01 kg/m2  Body mass index is 33.01  kg/(m^2).  No apparent distress  Affect fairly normal       ASSESSMENT:                                                      Depression/bipolar/anxiety with panic.  Not well controlled.  Increased appetite and weight gain, likely from Risperdal  GERD, now controlled    PLAN:                                                      1.  MEDICATIONS:        - wean off both risperdal and sertraline over the next week       - Continue other medications without change  2.  Again, I am advising a mental health specialist consultation.    Please check ASAP with [facility] to see who is in their network, and get scheduled    3.  Update MD if symptoms not better, or if mood is worsening prior to seeing mental health specialist.      Trevon Crisostomo MD  [facility]

## 2018-05-31 NOTE — LETTER
Lena Morrow  [address]    [facility] [address]    May 31, 2018      Reason for referral:  Depression, bipolar disorder    Patient Active Problem List   Diagnosis     Gastroesophageal reflux disease     Allergic rhinitis     Panic disorder without agoraphobia     DYSPEPSIA     ASTHMA - MILD INTERMITTENT     Herpes simplex virus (HSV) infection     Other type of migraine     Backache     Obesity     DDD (degenerative disc disease), cervical     Depression     CARDIOVASCULAR SCREENING; LDL GOAL LESS THAN 160     Health Care Home     Generalized anxiety disorder     LSIL (low grade squamous intraepithelial lesion) on Pap smear     Vitamin D deficiency     Recurrent sinusitis     Kidney stones     Bipolar disorder (H)     Pelvic pain in female     Endometriosis     Status post laparoscopic hysterectomy     Obsessive-compulsive disorder       Current Outpatient Prescriptions   Medication Sig Dispense Refill     albuterol (PROAIR HFA/PROVENTIL HFA/VENTOLIN HFA) 108 (90 Base) MCG/ACT Inhaler Inhale 2 puffs into the lungs every 6 hours as needed for shortness of breath / dyspnea or wheezing 1 Inhaler prn     buPROPion (WELLBUTRIN XL) 300 MG 24 hr tablet Take 1 tablet (300 mg) by mouth daily       esomeprazole (NEXIUM) 40 MG CR capsule Take 1 capsule (40 mg) by mouth daily 30 capsule prn     ibuprofen (ADVIL/MOTRIN) 600 MG tablet Take 1 tablet (600 mg) by mouth every 6 hours as needed for pain (mild) 90 tablet 0     lamoTRIgine (LAMICTAL) 200 MG tablet Take 1 tablet (200 mg) by mouth 2 times daily 180 tablet 3     LORATADINE PO Take 10 mg by mouth daily       methocarbamol (ROBAXIN) 750 MG tablet TAKE 1 TABLET(750 MG) BY MOUTH FOUR TIMES DAILY AS NEEDED 30 tablet 0     ondansetron (ZOFRAN-ODT) 8 MG ODT tab Take 2 tablets (16 mg) by mouth every 8 hours as needed for nausea 60 tablet 5     Prenatal Vit-Fe Fumarate-FA (PRENATAL  MULTIVITAMIN  PLUS IRON) 27-0.8 MG TABS per tablet Take 1 tablet by mouth daily       propranolol (INDERAL LA) 80 MG 24 hr capsule Take 1 capsule (80 mg) by mouth daily 90 capsule 3     ValACYclovir HCl (VALTREX PO) Take 500 mg by mouth 2 times daily as needed (herpes outbreak)       Vitamin D, Cholecalciferol, 1000 UNITS CAPS Take 1,000 Units by mouth daily       [DISCONTINUED] buPROPion (WELLBUTRIN XL) 300 MG 24 hr tablet Take 1 tablet (300 mg) by mouth every morning 90 tablet 3       Allergies   Allergen Reactions     Amoxicillin      yeast vaginal inf     Aripiprazole      Increased sadness and moodiness     Ativan Fatigue     Augmentin [Amoxicillin-Pot Clavulanate]      Itching,hives     Azithromycin      GI cramps     Clonazepam      Sedation, even at 0.5 mg dose     Estrogens      # bcps cause cns sx     Lexapro      diarrhea       Mirtazapine      Sedation      Prochlorperazine      Compazine- agitation     Risperdal [Risperidone]      Weight gain     Seasonal Allergies      Seroquel [Quetiapine]      Increased appetite     Sertraline      sweats     Venlafaxine      sweats     Xanax [Alprazolam] Fatigue       Past Surgical History:   Procedure Laterality Date     C NONSPECIFIC PROCEDURE      tonsillectomy     C NONSPECIFIC PROCEDURE  2010    Open right carpal tunnel release.  Excision right dorsal carpal ganglion cyst. Excision, terminal branch, right posterior interosseous nerve.        COLONOSCOPY       CYSTOSCOPY N/A 5/26/2017    Procedure: CYSTOSCOPY;;  Surgeon: Toni Da Silva MD;  Location:  OR     DAVINCI HYSTERECTOMY TOTAL, SALPINGECTOMY BILATERAL N/A 5/26/2017    Procedure: DAVINCI HYSTERECTOMY TOTAL, SALPINGECTOMY BILATERAL;  ROBOTIC ASSISTED LAPAROSCOPIC TOTAL HYSTERECTOMY; BILATERAL SALPINGECTOMY; CYSTOSCOPY;  Surgeon: Toni Da Silva MD;  Location:  OR     DILATION AND CURETTAGE N/A 1/5/2016    Procedure: DILATION AND CURETTAGE;  Surgeon: Josue Jama MD;  Location:  SD      ENT SURGERY       GYN SURGERY           HC COLP CERVIX/UPPER VAGINA W BX CERVIX  10/30/12    [facility]     HYSTERECTOMY       LAPAROSCOPIC CHOLECYSTECTOMY  4/3/2012    Procedure:LAPAROSCOPIC CHOLECYSTECTOMY; LAPAROSCOPIC CHOLECYSTECTOMY ; Surgeon:KARYNA CAMARA; Location:[facility]     LAPAROSCOPY DIAGNOSTIC (GYN) N/A 2016    Procedure: LAPAROSCOPY DIAGNOSTIC (GYN);  Surgeon: Josue Jama MD;  Location: [facility]     ORTHOPEDIC SURGERY      carpal tunnel with ganglian cyst removal         Sincerely,         Trevon Crisostomo MD

## 2018-05-31 NOTE — MR AVS SNAPSHOT
After Visit Summary   5/31/2018    Lena Morrow    MRN: [id number]           Patient Information     Date Of Birth          1975        Visit Information        Provider Department      5/31/2018 10:00 AM Trevon Crisostomo MD [facility]        Care Instructions    PLAN:                                                      1.  MEDICATIONS:        - wean off both risperdal and sertraline over the next week       - Continue other medications without change  2.  Again, I am advising a mental health specialist consultation.    Please check ASAP with [facility] to see who is in their network, and get scheduled    3.  Update MD if symptoms not better, or if mood is worsening prior to seeing mental health specialist.              Follow-ups after your visit        Who to contact     If you have questions or need follow up information about today's clinic visit or your schedule please contact [facility] directly at [phone].  Normal or non-critical lab and imaging results will be communicated to you by [employer], letter or phone within 4 business days after the clinic has received the results. If you do not hear from us within 7 days, please contact the clinic through [employer] or phone. If you have a critical or abnormal lab result, we will notify you by phone as soon as possible.  Submit refill requests through [employer] or call your pharmacy and they will forward the refill request to us. Please allow 3 business days for your refill to be completed.          Additional Information About Your Visit        MyChart Information     [employer] gives you secure access to your electronic health record. If you see a primary care provider, you can also send messages to your care team and make appointments. If you have questions, please call your primary care clinic.  If you do not have a primary care provider, please call [phone] and they will  assist you.        Care EveryWhere ID     This is your Care EveryWhere ID. This could be used by other organizations to access your [facility] medical records  [id number]        Your Vitals Were     Pulse Temperature Respirations Last Period Pulse Oximetry BMI (Body Mass Index)    69 98.2  F (36.8  C) (Oral) 16 12/10/2015 98% 33.01 kg/m2       Blood Pressure from Last 3 Encounters:   05/31/18 90/60   04/24/18 104/70   01/15/18 104/70    Weight from Last 3 Encounters:   05/31/18 192 lb 4.8 oz (87.2 kg)   04/24/18 189 lb 4.8 oz (85.9 kg)   01/15/18 184 lb 3.2 oz (83.6 kg)              Today, you had the following     No orders found for display         Today's Medication Changes          These changes are accurate as of 5/31/18 11:29 AM.  If you have any questions, ask your nurse or doctor.               Stop taking these medicines if you haven't already. Please contact your care team if you have questions.     risperiDONE 0.5 MG tablet   Commonly known as:  risperDAL   Stopped by:  Trevon Crisostomo MD           sertraline 50 MG tablet   Commonly known as:  ZOLOFT   Stopped by:  Trevon Crisostomo MD                    Primary Care Provider Office Phone # Fax #    Trevon Crisostomo [phone]       [address] [facility] [address]        Equal Access to Services     [facility]: Hadii aad ku hadasho Soomaali, waaxda luqadaha, qaybta kaalmada adeegyada, waxay sue champion. So [facility] [phone].    ATENCIÓN: Si habla español, tiene a manriquez disposición servicios gratuitos de asistencia lingüística. Llame al [phone].    We comply with applicable federal civil rights laws and [place] laws. We do not discriminate on the basis of race, color, national origin, age, disability, sex, sexual orientation, or gender identity.            Thank you!     Thank you for choosing [facility]  for your care. Our goal is always to provide you with excellent  care. Hearing back from our patients is one way we can continue to improve our services. Please take a few minutes to complete the written survey that you may receive in the mail after your visit with us. Thank you!             Your Updated Medication List - Protect others around you: Learn how to safely use, store and throw away your medicines at www.disposemymeds.org.          This list is accurate as of 5/31/18 11:29 AM.  Always use your most recent med list.                   Brand Name Dispense Instructions for use Diagnosis    albuterol 108 (90 Base) MCG/ACT Inhaler    PROAIR HFA/PROVENTIL HFA/VENTOLIN HFA    1 Inhaler    Inhale 2 puffs into the lungs every 6 hours as needed for shortness of breath / dyspnea or wheezing    Mild intermittent asthma with acute exacerbation       buPROPion 300 MG 24 hr tablet    WELLBUTRIN XL     Take 1 tablet (300 mg) by mouth daily    Major depressive disorder, recurrent episode, moderate (H)       esomeprazole 40 MG CR capsule    nexIUM    30 capsule    Take 1 capsule (40 mg) by mouth daily    Epigastric pain       ibuprofen 600 MG tablet    ADVIL/MOTRIN    90 tablet    Take 1 tablet (600 mg) by mouth every 6 hours as needed for pain (mild)    S/P laparoscopic hysterectomy, Endometriosis, Pelvic pain in female       lamoTRIgine 200 MG tablet    LAMICTAL    180 tablet    Take 1 tablet (200 mg) by mouth 2 times daily    Bipolar 2 disorder (H)       LORATADINE PO      Take 10 mg by mouth daily        methocarbamol 750 MG tablet    ROBAXIN    30 tablet    TAKE 1 TABLET(750 MG) BY MOUTH FOUR TIMES DAILY AS NEEDED    Bilateral low back pain with left-sided sciatica, unspecified chronicity       ondansetron 8 MG ODT tab    ZOFRAN-ODT    60 tablet    Take 2 tablets (16 mg) by mouth every 8 hours as needed for nausea    Nausea       prenatal multivitamin plus iron 27-0.8 MG Tabs per tablet      Take 1 tablet by mouth daily        propranolol 80 MG 24 hr capsule    INDERAL LA    90  capsule    Take 1 capsule (80 mg) by mouth daily    Generalized anxiety disorder       VALTREX PO      Take 500 mg by mouth 2 times daily as needed (herpes outbreak)        Vitamin D (Cholecalciferol) 1000 units Caps      Take 1,000 Units by mouth daily    Vitamin D deficiency

## 2018-05-31 NOTE — PATIENT INSTRUCTIONS
PLAN:                                                      1.  MEDICATIONS:        - wean off both risperdal and sertraline over the next week       - Continue other medications without change  2.  Again, I am advising a mental health specialist consultation.    Please check AS[facility] with [facility] to see who is in their network, and get scheduled    3.  Update MD if symptoms not better, or if mood is worsening prior to seeing mental health specialist.

## 2018-05-31 NOTE — LETTER
My Depression Action Plan  Name: Lena oMrrow   Date of Birth 1975  Date: 5/31/2018    My doctor: Trevon Crisostomo   My clinic: [address] [address] [phone]          GREEN    ZONE   Good Control    What it looks like:     Things are going generally well. You have normal up s and down s. You may even feel depressed from time to time, but bad moods usually last less than a day.   What you need to do:  1. Continue to care for yourself (see self care plan)  2. Check your depression survival kit and update it as needed  3. Follow your physician s recommendations including any medication.  4. Do not stop taking medication unless you consult with your physician first.           YELLOW         ZONE Getting Worse    What it looks like:     Depression is starting to interfere with your life.     It may be hard to get out of bed; you may be starting to isolate yourself from others.    Symptoms of depression are starting to last most all day and this has happened for several days.     You may have suicidal thoughts but they are not constant.   What you need to do:     1. Call your care team, your response to treatment will improve if you keep your care team informed of your progress. Yellow periods are signs an adjustment may need to be made.     2. Continue your self-care, even if you have to fake it!    3. Talk to someone in your support network    4. Open up your depression survival kit           RED    ZONE Medical Alert - Get Help    What it looks like:     Depression is seriously interfering with your life.     You may experience these or other symptoms: You can t get out of bed most days, can t work or engage in other necessary activities, you have trouble taking care of basic hygiene, or basic responsibilities, thoughts of suicide or death that will not go away, self-injurious behavior.     What you need to do:  1. Call your care  team and request a same-day appointment. If they are not available (weekends or after hours) call your local crisis line, emergency room or 911.            Depression Self Care Plan / Survival Kit    Self-Care for Depression  Here s the deal. Your body and mind are really not as separate as most people think.  What you do and think affects how you feel and how you feel influences what you do and think. This means if you do things that people who feel good do, it will help you feel better.  Sometimes this is all it takes.  There is also a place for medication and therapy depending on how severe your depression is, so be sure to consult with your medical provider and/ or Behavioral Health Consultant if your symptoms are worsening or not improving.     In order to better manage my stress, I will:    Exercise  Get some form of exercise, every day. This will help reduce pain and release endorphins, the  feel good  chemicals in your brain. This is almost as good as taking antidepressants!  This is not the same as joining a gym and then never going! (they count on that by the way ) It can be as simple as just going for a walk or doing some gardening, anything that will get you moving.      Hygiene   Maintain good hygiene (Get out of bed in the morning, Make your bed, Brush your teeth, Take a shower, and Get dressed like you were going to work, even if you are unemployed).  If your clothes don't fit try to get ones that do.    Diet  I will strive to eat foods that are good for me, drink plenty of water, and avoid excessive sugar, caffeine, alcohol, and other mood-altering substances.  Some foods that are helpful in depression are: complex carbohydrates, B vitamins, flaxseed, fish or fish oil, fresh fruits and vegetables.    Psychotherapy  I agree to participate in Individual Therapy (if recommended).    Medication  If prescribed medications, I agree to take them.  Missing doses can result in serious side effects.  I  understand that drinking alcohol, or other illicit drug use, may cause potential side effects.  I will not stop my medication abruptly without first discussing it with my provider.    Staying Connected With Others  I will stay in touch with my friends, family members, and my primary care provider/team.    Use your imagination  Be creative.  We all have a creative side; it doesn t matter if it s oil painting, sand castles, or mud pies! This will also kick up the endorphins.    Witness Beauty  (AKA stop and smell the roses) Take a look outside, even in mid-winter. Notice colors, textures. Watch the squirrels and birds.     Service to others  Be of service to others.  There is always someone else in need.  By helping others we can  get out of ourselves  and remember the really important things.  This also provides opportunities for practicing all the other parts of the program.    Humor  Laugh and be silly!  Adjust your TV habits for less news and crime-drama and more comedy.    Control your stress  Try breathing deep, massage therapy, biofeedback, and meditation. Find time to relax each day.     My support system    Clinic Contact:  Phone number:    Contact 1:  Phone number:    Contact 2:  Phone number:    Holiness/:  Phone number:    Therapist:  Phone number:    Local crisis center:    Phone number:    Other community support:  Phone number:

## 2018-06-01 ASSESSMENT — ANXIETY QUESTIONNAIRES: GAD7 TOTAL SCORE: 13

## 2018-06-14 DIAGNOSIS — M54.42 BILATERAL LOW BACK PAIN WITH LEFT-SIDED SCIATICA, UNSPECIFIED CHRONICITY: ICD-10-CM

## 2018-06-15 RX ORDER — METHOCARBAMOL 750 MG/1
TABLET, FILM COATED ORAL
Qty: 30 TABLET | Refills: 0 | Status: SHIPPED | OUTPATIENT
Start: 2018-06-15 | End: 2018-07-29

## 2018-06-15 NOTE — TELEPHONE ENCOUNTER
Requested Prescriptions   Pending Prescriptions Disp Refills     methocarbamol (ROBAXIN) 750 MG tablet [Pharmacy Med Name:   METHOCARBAMOL 750MG TABLETS]  Last Written Prescription Date:  12/4/17  Last Fill Quantity: 30,  # refills: 0   Last office visit: No previous visit found with prescribing provider:     Future Office Visit:     30 tablet 0     Sig: TAKE 1 TABLET(750 MG) BY MOUTH FOUR TIMES DAILY AS NEEDED    There is no refill protocol information for this order

## 2018-07-29 DIAGNOSIS — M54.42 BILATERAL LOW BACK PAIN WITH LEFT-SIDED SCIATICA, UNSPECIFIED CHRONICITY: ICD-10-CM

## 2018-07-31 RX ORDER — METHOCARBAMOL 750 MG/1
TABLET, FILM COATED ORAL
Qty: 30 TABLET | Refills: 2 | Status: SHIPPED | OUTPATIENT
Start: 2018-07-31 | End: 2019-05-17

## 2018-07-31 NOTE — TELEPHONE ENCOUNTER
Requested Prescriptions   Pending Prescriptions Disp Refills     methocarbamol (ROBAXIN) 750 MG tablet [Pharmacy Med Name: METHOCARBAMOL 750MG TABLETS] 30 tablet 0     Sig: TAKE 1 TABLET(750 MG) BY MOUTH FOUR TIMES DAILY AS NEEDED    There is no refill protocol information for this order        Last Written Prescription Date:  06/15/18  Last Fill Quantity: 30,  # refills: 0   Last office visit: No previous visit found with prescribing provider:  05/31/18 - Dr Crisostomo  Future Office Visit:

## 2018-10-03 ENCOUNTER — TELEPHONE (OUTPATIENT)
Dept: NURSING | Facility: CLINIC | Age: 43
End: 2018-10-03

## 2018-10-03 ENCOUNTER — MYC MEDICAL ADVICE (OUTPATIENT)
Dept: INTERNAL MEDICINE | Facility: CLINIC | Age: 43
End: 2018-10-03

## 2018-10-03 ENCOUNTER — OFFICE VISIT (OUTPATIENT)
Dept: INTERNAL MEDICINE | Facility: CLINIC | Age: 43
End: 2018-10-03
Payer: COMMERCIAL

## 2018-10-03 VITALS
WEIGHT: 194.5 LBS | DIASTOLIC BLOOD PRESSURE: 84 MMHG | OXYGEN SATURATION: 96 % | RESPIRATION RATE: 20 BRPM | HEART RATE: 76 BPM | BODY MASS INDEX: 33.39 KG/M2 | SYSTOLIC BLOOD PRESSURE: 122 MMHG | TEMPERATURE: 98.5 F

## 2018-10-03 DIAGNOSIS — F41.1 GENERALIZED ANXIETY DISORDER: ICD-10-CM

## 2018-10-03 DIAGNOSIS — F42.9 OBSESSIVE-COMPULSIVE DISORDER, UNSPECIFIED TYPE: ICD-10-CM

## 2018-10-03 DIAGNOSIS — F33.1 MODERATE EPISODE OF RECURRENT MAJOR DEPRESSIVE DISORDER (H): ICD-10-CM

## 2018-10-03 DIAGNOSIS — F31.30 BIPOLAR AFFECTIVE DISORDER, CURRENT EPISODE DEPRESSED, CURRENT EPISODE SEVERITY UNSPECIFIED (H): Primary | ICD-10-CM

## 2018-10-03 PROCEDURE — 99214 OFFICE O/P EST MOD 30 MIN: CPT | Performed by: PHYSICIAN ASSISTANT

## 2018-10-03 ASSESSMENT — ANXIETY QUESTIONNAIRES
3. WORRYING TOO MUCH ABOUT DIFFERENT THINGS: NEARLY EVERY DAY
1. FEELING NERVOUS, ANXIOUS, OR ON EDGE: NEARLY EVERY DAY
GAD7 TOTAL SCORE: 21
6. BECOMING EASILY ANNOYED OR IRRITABLE: NEARLY EVERY DAY
7. FEELING AFRAID AS IF SOMETHING AWFUL MIGHT HAPPEN: NEARLY EVERY DAY
2. NOT BEING ABLE TO STOP OR CONTROL WORRYING: NEARLY EVERY DAY
5. BEING SO RESTLESS THAT IT IS HARD TO SIT STILL: NEARLY EVERY DAY
IF YOU CHECKED OFF ANY PROBLEMS ON THIS QUESTIONNAIRE, HOW DIFFICULT HAVE THESE PROBLEMS MADE IT FOR YOU TO DO YOUR WORK, TAKE CARE OF THINGS AT HOME, OR GET ALONG WITH OTHER PEOPLE: EXTREMELY DIFFICULT

## 2018-10-03 ASSESSMENT — PATIENT HEALTH QUESTIONNAIRE - PHQ9: 5. POOR APPETITE OR OVEREATING: NEARLY EVERY DAY

## 2018-10-03 NOTE — PROGRESS NOTES
SUBJECTIVE:   Lena Morrow is a 43 year old female who presents to clinic today for the following health issues:      Abnormal Mood Symptoms      Duration: years    Description:  Depression: YES  Anxiety: YES  Panic attacks: no      Accompanying signs and symptoms: see PHQ-9 and PHIL scores    History (similar episodes/previous evaluation): patient with know bipolar disorder    Precipitating or alleviating factors: new medication started by psychiatry    Therapies tried and outcome: Wellbutrin (Bupropion) and Lamictal     Patient was started on new medication Vraylar by psychiatry.    She was to have an appointment this am but due to traffic for 10 min late and the provider would not seen her    She is out of medication.    She had been getting samples from the pyschiatry office. She has been on Vraylar for 2 months now and was doing well    This am very tearful due to missing appointment and worry about not getting medication.           -------------------------------------    Problem list and histories reviewed & adjusted, as indicated.  Additional history: as documented    Labs reviewed in EPIC    Reviewed and updated as needed this visit by clinical staff  Tobacco  Allergies  Meds       Reviewed and updated as needed this visit by Provider  Allergies  Meds         ROS:  Constitutional, HEENT, cardiovascular, pulmonary, gi and gu systems are negative, except as otherwise noted.    OBJECTIVE:     /84  Pulse 76  Temp 98.5  F (36.9  C) (Oral)  Resp 20  Wt 194 lb 8 oz (88.2 kg)  LMP 12/10/2015  SpO2 96%  BMI 33.39 kg/m2  Body mass index is 33.39 kg/(m^2).  GENERAL: healthy, alert and no distress  RESP: lungs clear to auscultation - no rales, rhonchi or wheezes  CV: regular rates and rhythm and normal S1 S2, no S3 or S4  PSYCH: tearful and anxious    Diagnostic Test Results:  none     ASSESSMENT/PLAN:             1. Bipolar affective disorder, current episode depressed, current episode severity  unspecified (H)    - cariprazine (VRAYLAR) 1.5 MG CAPS capsule; Take 1 capsule (1.5 mg) by mouth daily  Dispense: 30 capsule; Refill: 0    2. Obsessive-compulsive disorder, unspecified type    - cariprazine (VRAYLAR) 1.5 MG CAPS capsule; Take 1 capsule (1.5 mg) by mouth daily  Dispense: 30 capsule; Refill: 0    3. Generalized anxiety disorder    - cariprazine (VRAYLAR) 1.5 MG CAPS capsule; Take 1 capsule (1.5 mg) by mouth daily  Dispense: 30 capsule; Refill: 0    4. Moderate episode of recurrent major depressive disorder (H)    - cariprazine (VRAYLAR) 1.5 MG CAPS capsule; Take 1 capsule (1.5 mg) by mouth daily  Dispense: 30 capsule; Refill: 0    Reviewed concerns.  Prescription done, but medication is very expensive for out of pocket cost.  She may need to shop around   Also encourage to call psych office and talk with clinic manager about issues this am and that she was out of medication.   Consider seeing different provider in that office if possible.     25 minutes spent with patient > 50% of time on counseling and plan of care.      Amelia Strauss PA-C  [facility]

## 2018-10-03 NOTE — MR AVS SNAPSHOT
After Visit Summary   10/3/2018    Lena Morrow    MRN: [id number]           Patient Information     Date Of Birth          1975        Visit Information        Provider Department      10/3/2018 8:00 AM Amelia Strauss PA-C [facility]        Today's Diagnoses     Bipolar affective disorder, current episode depressed, current episode severity unspecified (H)    -  1    Obsessive-compulsive disorder, unspecified type        Generalized anxiety disorder        Moderate episode of recurrent major depressive disorder (H)           Follow-ups after your visit        Who to contact     If you have questions or need follow up information about today's clinic visit or your schedule please contact [facility] directly at [phone].  Normal or non-critical lab and imaging results will be communicated to you by [employer], letter or phone within 4 business days after the clinic has received the results. If you do not hear from us within 7 days, please contact the clinic through [employer] or phone. If you have a critical or abnormal lab result, we will notify you by phone as soon as possible.  Submit refill requests through [employer] or call your pharmacy and they will forward the refill request to us. Please allow 3 business days for your refill to be completed.          Additional Information About Your Visit        MyChart Information     [employer] gives you secure access to your electronic health record. If you see a primary care provider, you can also send messages to your care team and make appointments. If you have questions, please call your primary care clinic.  If you do not have a primary care provider, please call [phone] and they will assist you.        Care EveryWhere ID     This is your Care EveryWhere ID. This could be used by other organizations to access your [facility] medical records  [id number]        Your Vitals Were     Pulse  Temperature Respirations Last Period Pulse Oximetry BMI (Body Mass Index)    76 98.5  F (36.9  C) (Oral) 20 12/10/2015 96% 33.39 kg/m2       Blood Pressure from Last 3 Encounters:   10/03/18 122/84   05/31/18 90/60   04/24/18 104/70    Weight from Last 3 Encounters:   10/03/18 194 lb 8 oz (88.2 kg)   05/31/18 192 lb 4.8 oz (87.2 kg)   04/24/18 189 lb 4.8 oz (85.9 kg)              Today, you had the following     No orders found for display         Where to get your medicines      Some of these will need a paper prescription and others can be bought over the counter.  Ask your nurse if you have questions.     Bring a paper prescription for each of these medications     cariprazine 1.5 MG Caps capsule          Primary Care Provider Office Phone # Fax #    Trevon Crisostomo [phone]       [address] [place] [address]        Equal Access to Services     HARJINDER MAURICIO : Hadii russ ku hadasho Soomaali, waaxda luqadaha, qaybta kaalmada adeegyada, linden john . So [facility] [phone].    ATENCIÓN: Si habla español, tiene a manriquez disposición servicios gratuitos de asistencia lingüística. Llame al [phone].    We comply with applicable federal civil rights laws and [place] laws. We do not discriminate on the basis of race, color, national origin, age, disability, sex, sexual orientation, or gender identity.            Thank you!     Thank you for choosing [facility]  for your care. Our goal is always to provide you with excellent care. Hearing back from our patients is one way we can continue to improve our services. Please take a few minutes to complete the written survey that you may receive in the mail after your visit with us. Thank you!             Your Updated Medication List - Protect others around you: Learn how to safely use, store and throw away your medicines at www.disposemymeds.org.          This list is accurate as of 10/3/18   8:34 AM.  Always use your most recent med list.                   Brand Name Dispense Instructions for use Diagnosis    albuterol 108 (90 Base) MCG/ACT inhaler    PROAIR HFA/PROVENTIL HFA/VENTOLIN HFA    1 Inhaler    Inhale 2 puffs into the lungs every 6 hours as needed for shortness of breath / dyspnea or wheezing    Mild intermittent asthma with acute exacerbation       buPROPion 300 MG 24 hr tablet    WELLBUTRIN XL     Take 1 tablet (300 mg) by mouth daily    Major depressive disorder, recurrent episode, moderate (H)       cariprazine 1.5 MG Caps capsule    VRAYLAR    30 capsule    Take 1 capsule (1.5 mg) by mouth daily    Bipolar affective disorder, current episode depressed, current episode severity unspecified (H), Obsessive-compulsive disorder, unspecified type, Generalized anxiety disorder, Moderate episode of recurrent major depressive disorder (H)       esomeprazole 40 MG CR capsule    nexIUM    30 capsule    Take 1 capsule (40 mg) by mouth daily    Epigastric pain       ibuprofen 600 MG tablet    ADVIL/MOTRIN    90 tablet    Take 1 tablet (600 mg) by mouth every 6 hours as needed for pain (mild)    S/P laparoscopic hysterectomy, Endometriosis, Pelvic pain in female       lamoTRIgine 200 MG tablet    LAMICTAL    180 tablet    Take 1 tablet (200 mg) by mouth 2 times daily    Bipolar 2 disorder (H)       LORATADINE PO      Take 10 mg by mouth daily        methocarbamol 750 MG tablet    ROBAXIN    30 tablet    TAKE 1 TABLET(750 MG) BY MOUTH FOUR TIMES DAILY AS NEEDED    Bilateral low back pain with left-sided sciatica, unspecified chronicity       ondansetron 8 MG ODT tab    ZOFRAN-ODT    60 tablet    Take 2 tablets (16 mg) by mouth every 8 hours as needed for nausea    Nausea       prenatal multivitamin plus iron 27-0.8 MG Tabs per tablet      Take 1 tablet by mouth daily        propranolol 80 MG 24 hr capsule    INDERAL LA    90 capsule    Take 1 capsule (80 mg) by mouth daily    Generalized anxiety  disorder       VALTREX PO      Take 500 mg by mouth 2 times daily as needed (herpes outbreak)        Vitamin D (Cholecalciferol) 1000 units Caps      Take 1,000 Units by mouth daily    Vitamin D deficiency

## 2018-10-04 ASSESSMENT — PATIENT HEALTH QUESTIONNAIRE - PHQ9: SUM OF ALL RESPONSES TO PHQ QUESTIONS 1-9: 21

## 2018-10-04 ASSESSMENT — ANXIETY QUESTIONNAIRES: GAD7 TOTAL SCORE: 21

## 2018-10-05 ENCOUNTER — DOCUMENTATION ONLY (OUTPATIENT)
Dept: INTERNAL MEDICINE | Facility: CLINIC | Age: 43
End: 2018-10-05

## 2018-10-05 DIAGNOSIS — Z13.1 SCREENING FOR DIABETES MELLITUS: ICD-10-CM

## 2018-10-05 DIAGNOSIS — Z13.6 CARDIOVASCULAR SCREENING; LDL GOAL LESS THAN 160: Primary | ICD-10-CM

## 2018-10-06 DIAGNOSIS — Z79.899 HIGH RISK MEDICATION USE: Primary | ICD-10-CM

## 2018-10-06 DIAGNOSIS — Z13.6 CARDIOVASCULAR SCREENING; LDL GOAL LESS THAN 160: ICD-10-CM

## 2018-10-06 DIAGNOSIS — Z13.1 SCREENING FOR DIABETES MELLITUS: ICD-10-CM

## 2018-10-06 LAB
ANION GAP SERPL CALCULATED.3IONS-SCNC: 5 MMOL/L (ref 3–14)
BUN SERPL-MCNC: 13 MG/DL (ref 7–30)
CALCIUM SERPL-MCNC: 9 MG/DL (ref 8.5–10.1)
CHLORIDE SERPL-SCNC: 110 MMOL/L (ref 94–109)
CHOLEST SERPL-MCNC: 147 MG/DL
CO2 SERPL-SCNC: 27 MMOL/L (ref 20–32)
CREAT SERPL-MCNC: 0.81 MG/DL (ref 0.52–1.04)
GFR SERPL CREATININE-BSD FRML MDRD: 77 ML/MIN/1.7M2
GLUCOSE SERPL-MCNC: 92 MG/DL (ref 70–99)
HBA1C MFR BLD: 5.2 % (ref 0–5.6)
HDLC SERPL-MCNC: 43 MG/DL
LDLC SERPL CALC-MCNC: 79 MG/DL
NONHDLC SERPL-MCNC: 104 MG/DL
POTASSIUM SERPL-SCNC: 3.6 MMOL/L (ref 3.4–5.3)
SODIUM SERPL-SCNC: 142 MMOL/L (ref 133–144)
TRIGL SERPL-MCNC: 127 MG/DL

## 2018-10-06 PROCEDURE — 80048 BASIC METABOLIC PNL TOTAL CA: CPT | Performed by: INTERNAL MEDICINE

## 2018-10-06 PROCEDURE — 36415 COLL VENOUS BLD VENIPUNCTURE: CPT | Performed by: INTERNAL MEDICINE

## 2018-10-06 PROCEDURE — 83036 HEMOGLOBIN GLYCOSYLATED A1C: CPT

## 2018-10-06 PROCEDURE — 80061 LIPID PANEL: CPT | Performed by: INTERNAL MEDICINE

## 2019-01-10 LAB — PHQ9 SCORE: 18

## 2019-01-17 LAB — PHQ9 SCORE: 23

## 2019-01-21 ENCOUNTER — VIRTUAL VISIT (OUTPATIENT)
Dept: FAMILY MEDICINE | Facility: OTHER | Age: 44
End: 2019-01-21

## 2019-01-21 NOTE — PROGRESS NOTES
"Date:   Clinician: Freddy Andino  Clinician NPI: [id number]  Patient: Lena Morrow  Patient : 1975  Patient Address: [address] [place] [address]  Patient Phone: [phone]  Visit Protocol: URI  Patient Summary:  Lena is a 43 year old ( : 1975 ) female who initiated a Visit for cold, sinus infection, or influenza. When asked the question \"Please sign me up to receive news, health information and promotions. \", Lena responded \"No\".    Lena states her symptoms started gradually 3-6 days ago.   Her symptoms consist of a headache, facial pain or pressure, rhinitis, and nasal congestion.   Symptom details     Nasal secretions: The color of her mucus is green.    Facial pain or pressure: The facial pain or pressure feels worse when bending over or leaning forward.     Headache: She states the headache is moderate (4-6 on a 10 point pain scale).      Lena denies having myalgias, sore throat, ear pain, fever, malaise, cough, chills, wheezing, and teeth pain. She also denies taking antibiotic medication for the symptoms, having recent facial or sinus surgery in the past 60 days, and double sickening (worsening symptoms after initial improvement). She is not experiencing dyspnea.    Lena had 1 sinus infection within the past year.   Weight: 191 lbs   Lena does not smoke or use smokeless tobacco.   She denies pregnancy and denies breastfeeding. She does not menstruate.   MEDICATIONS: esomeprazole magnesium oral, Wellbutrin XL oral, Lamictal oral, lithium carbonate oral, ALLERGIES: Augmentin  Clinician Response:  Dear Lena,  Based on the information provided, you have a viral upper respiratory infection, otherwise known as a cold. Symptoms vary from person to person, but can include sneezing, coughing, a runny nose, sore throat, and headache and range from mild to severe.  Unfortunately, there are no medications that can cure a cold, so treatment is focused on controlling " symptoms as much as possible. Most people gradually feel better until symptoms are gone in 1-2 weeks.  Medication information  Because you have a viral infection, antibiotics will not help you get better. Treating a viral infection with antibiotics could actually make you feel worse.  For more information on why I am not prescribing antibiotics, please watch this video: Antibiotics Aren't Always the Answer.  Self care  The following tips will keep you as comfortable as possible while you recover:     Rest    Drink plenty of water and other liquids    Take a hot shower to loosen congestion     When to seek care  Please be seen in a clinic or urgent care if new symptoms develop, or symptoms become worse.   Diagnosis: Viral URI  Diagnosis ICD: J06.9  Additional Clinician Notes: Otc symptomatic cares

## 2019-01-25 DIAGNOSIS — Z79.899 ENCOUNTER FOR LONG-TERM (CURRENT) USE OF HIGH-RISK MEDICATION: Primary | ICD-10-CM

## 2019-01-25 DIAGNOSIS — F41.1 GENERALIZED ANXIETY DISORDER: ICD-10-CM

## 2019-01-25 LAB
ANION GAP SERPL CALCULATED.3IONS-SCNC: 3 MMOL/L (ref 3–14)
BUN SERPL-MCNC: 14 MG/DL (ref 7–30)
CALCIUM SERPL-MCNC: 9.5 MG/DL (ref 8.5–10.1)
CHLORIDE SERPL-SCNC: 109 MMOL/L (ref 94–109)
CO2 SERPL-SCNC: 29 MMOL/L (ref 20–32)
CREAT SERPL-MCNC: 0.84 MG/DL (ref 0.52–1.04)
GFR SERPL CREATININE-BSD FRML MDRD: 85 ML/MIN/{1.73_M2}
GLUCOSE SERPL-MCNC: 99 MG/DL (ref 70–99)
LITHIUM SERPL-SCNC: 0.39 MMOL/L (ref 0.6–1.2)
POTASSIUM SERPL-SCNC: 4 MMOL/L (ref 3.4–5.3)
SODIUM SERPL-SCNC: 141 MMOL/L (ref 133–144)
T4 FREE SERPL-MCNC: 0.99 NG/DL (ref 0.76–1.46)
TSH SERPL DL<=0.005 MIU/L-ACNC: 1.94 MU/L (ref 0.4–4)

## 2019-01-25 PROCEDURE — 80178 ASSAY OF LITHIUM: CPT | Performed by: PHYSICIAN ASSISTANT

## 2019-01-25 PROCEDURE — 84443 ASSAY THYROID STIM HORMONE: CPT | Performed by: PHYSICIAN ASSISTANT

## 2019-01-25 PROCEDURE — 80048 BASIC METABOLIC PNL TOTAL CA: CPT | Performed by: PHYSICIAN ASSISTANT

## 2019-01-25 PROCEDURE — 36415 COLL VENOUS BLD VENIPUNCTURE: CPT | Performed by: PHYSICIAN ASSISTANT

## 2019-01-25 PROCEDURE — 84439 ASSAY OF FREE THYROXINE: CPT | Performed by: PHYSICIAN ASSISTANT

## 2019-01-29 ENCOUNTER — ALLIED HEALTH/NURSE VISIT (OUTPATIENT)
Dept: NURSING | Facility: CLINIC | Age: 44
End: 2019-01-29
Payer: COMMERCIAL

## 2019-01-29 DIAGNOSIS — Z79.899 HIGH RISK MEDICATION USE: ICD-10-CM

## 2019-01-29 DIAGNOSIS — F31.30 BIPOLAR AFFECTIVE DISORDER, CURRENT EPISODE DEPRESSED, CURRENT EPISODE SEVERITY UNSPECIFIED (H): Primary | ICD-10-CM

## 2019-01-29 PROCEDURE — 93000 ELECTROCARDIOGRAM COMPLETE: CPT

## 2019-01-29 PROCEDURE — 99207 ZZC NO CHARGE NURSE ONLY: CPT

## 2019-01-29 NOTE — PROGRESS NOTES
EKG orders reviewed  And EKG reviewed today no change from previous other than an ectopic beat seen.

## 2019-02-01 ENCOUNTER — TELEPHONE (OUTPATIENT)
Dept: INTERNAL MEDICINE | Facility: CLINIC | Age: 44
End: 2019-02-01

## 2019-02-01 NOTE — TELEPHONE ENCOUNTER
Patient's psychiatrist was requesting it. Since patient last saw Amelia for some Depression and Bipolar concerns I went to her to sign the order. Patient had a previous EKG in Chart.

## 2019-02-01 NOTE — TELEPHONE ENCOUNTER
----- Message from Trevon Crisostomo MD sent at 1/29/2019  9:36 AM CST -----  I received an EKG in my results today, with Amelia HERNANDEZ) as the ordering provider.   I don't see any reason for this being done.    What can you tell me?

## 2019-02-28 ENCOUNTER — OFFICE VISIT (OUTPATIENT)
Dept: URGENT CARE | Facility: URGENT CARE | Age: 44
End: 2019-02-28
Payer: COMMERCIAL

## 2019-02-28 VITALS
OXYGEN SATURATION: 98 % | TEMPERATURE: 98.7 F | DIASTOLIC BLOOD PRESSURE: 68 MMHG | SYSTOLIC BLOOD PRESSURE: 118 MMHG | HEART RATE: 73 BPM

## 2019-02-28 DIAGNOSIS — J30.89 ALLERGIC RHINITIS DUE TO OTHER ALLERGIC TRIGGER, UNSPECIFIED SEASONALITY: Primary | ICD-10-CM

## 2019-02-28 DIAGNOSIS — J32.9 SINUSITIS, UNSPECIFIED CHRONICITY, UNSPECIFIED LOCATION: ICD-10-CM

## 2019-02-28 PROCEDURE — 99214 OFFICE O/P EST MOD 30 MIN: CPT | Performed by: PHYSICIAN ASSISTANT

## 2019-02-28 RX ORDER — CETIRIZINE HYDROCHLORIDE 10 MG/1
10 TABLET ORAL EVERY EVENING
Qty: 30 TABLET | Refills: 0 | Status: SHIPPED | OUTPATIENT
Start: 2019-02-28 | End: 2020-01-16

## 2019-02-28 RX ORDER — DOXYCYCLINE 100 MG/1
100 CAPSULE ORAL 2 TIMES DAILY
Qty: 20 CAPSULE | Refills: 0 | Status: SHIPPED | OUTPATIENT
Start: 2019-02-28 | End: 2019-03-10

## 2019-02-28 RX ORDER — FLUTICASONE PROPIONATE 50 MCG
2 SPRAY, SUSPENSION (ML) NASAL DAILY
Qty: 16 G | Refills: 0 | Status: SHIPPED | OUTPATIENT
Start: 2019-02-28 | End: 2020-01-16

## 2019-02-28 RX ORDER — PREDNISONE 10 MG/1
10 TABLET ORAL DAILY
Qty: 5 TABLET | Refills: 0 | Status: SHIPPED | OUTPATIENT
Start: 2019-02-28 | End: 2019-03-05

## 2019-02-28 RX ORDER — LITHIUM CARBONATE 300 MG/1
TABLET, FILM COATED, EXTENDED RELEASE ORAL
Refills: 0 | COMMUNITY
Start: 2019-02-13 | End: 2019-04-01
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
## 2019-03-01 NOTE — PATIENT INSTRUCTIONS
(J30.89) Allergic rhinitis due to other allergic trigger, unspecified seasonality  (primary encounter diagnosis)  Comment:   Plan:  Saline nasal spray every morning.   fluticasone (FLONASE) 50 MCG/ACT nasal spray,         cetirizine (ZYRTEC) 10 MG tablet, predniSONE         (DELTASONE) 10 MG tablet          SHOULD SYMPTOMS PERSIST OR WORSEN OVER THE NEXT 10 days,   Start the Doxycycline as prescribed.      (J32.9) Sinusitis, unspecified chronicity, unspecified location  Comment:   Plan: doxycycline hyclate (VIBRAMYCIN) 100 MG capsule         Follow up with primary clinic should symptoms persist or worsen.

## 2019-03-19 LAB — PHQ9 SCORE: 13

## 2019-04-01 ENCOUNTER — MYC MEDICAL ADVICE (OUTPATIENT)
Dept: INTERNAL MEDICINE | Facility: CLINIC | Age: 44
End: 2019-04-01

## 2019-04-01 DIAGNOSIS — F41.1 GENERALIZED ANXIETY DISORDER: ICD-10-CM

## 2019-04-01 RX ORDER — PROPRANOLOL HYDROCHLORIDE 80 MG/1
80 CAPSULE, EXTENDED RELEASE ORAL DAILY
Qty: 90 CAPSULE | Refills: 3 | Status: SHIPPED | OUTPATIENT
Start: 2019-04-01 | End: 2020-04-07

## 2019-04-01 RX ORDER — LITHIUM CARBONATE 300 MG/1
1200 TABLET, FILM COATED, EXTENDED RELEASE ORAL AT BEDTIME
Refills: 0 | COMMUNITY
Start: 2019-04-01

## 2019-04-01 RX ORDER — PROPRANOLOL HYDROCHLORIDE 80 MG/1
80 CAPSULE, EXTENDED RELEASE ORAL DAILY
Qty: 90 CAPSULE | Refills: 0 | Status: SHIPPED | OUTPATIENT
Start: 2019-04-01 | End: 2019-04-01

## 2019-04-01 NOTE — TELEPHONE ENCOUNTER
"Requested Prescriptions   Pending Prescriptions Disp Refills     propranolol ER (INDERAL LA) 80 MG 24 hr capsule 90 capsule 3     Sig: Take 1 capsule (80 mg) by mouth daily    Beta-Blockers Protocol Passed - 4/1/2019 12:14 PM       Passed - Blood pressure under 140/90 in past 12 months    BP Readings from Last 3 Encounters:   02/28/19 118/68   10/03/18 122/84   05/31/18 90/60                Passed - Patient is age 6 or older       Passed - Recent (12 mo) or future (30 days) visit within the authorizing provider's specialty    Patient had office visit in the last 12 months or has a visit in the next 30 days with authorizing provider or within the authorizing provider's specialty.  See \"Patient Info\" tab in inbasket, or \"Choose Columns\" in Meds & Orders section of the refill encounter.             Passed - Medication is active on med list        Last Written Prescription Date:  4/24/2018  Last Fill Quantity: 90,  # refills: 3   Last office visit: 10/3/2018 with prescribing provider:  Trevon Crisostomo     Future Office Visit:      Prescription approved per [facility] Refill Protocol.    Tiffany MAGANA, RN, BSN, PHN      "

## 2019-04-14 DIAGNOSIS — F31.81 BIPOLAR 2 DISORDER (H): ICD-10-CM

## 2019-04-15 NOTE — TELEPHONE ENCOUNTER
"Requested Prescriptions   Pending Prescriptions Disp Refills     lamoTRIgine (LAMICTAL) 200 MG tablet [Pharmacy Med Name: LAMOTRIGINE 200MG TABLETS] 180 tablet 0     Sig: TAKE 1 TABLET(200 MG) BY MOUTH TWICE DAILY   Last Written Prescription Date:  4/21/2018  Last Fill Quantity: 180,  # refills: 3   Last office visit: 10/3/2018 with prescribing provider:  10/3/2018   Future Office Visit:        Anti-Seizure Meds Protocol  Failed - 4/14/2019  3:14 PM        Failed - Review Authorizing provider's last note.      Refer to last progress notes: confirm request is for original authorizing provider (cannot be through other providers).          Passed - Recent (12 mo) or future (30 days) visit within the authorizing provider's specialty     Patient had office visit in the last 12 months or has a visit in the next 30 days with authorizing provider or within the authorizing provider's specialty.  See \"Patient Info\" tab in inbasket, or \"Choose Columns\" in Meds & Orders section of the refill encounter.              Passed - Normal CBC on file in past 26 months     Recent Labs   Lab Test 07/17/17  1054   WBC 7.0   RBC 4.67   HGB 14.1   HCT 43.5                    Passed - Normal ALT or AST on file in past 26 months     Recent Labs   Lab Test 07/17/17  1054   ALT 28     Recent Labs   Lab Test 07/17/17  1054   AST 13             Passed - Normal platelet count on file in past 26 months     Recent Labs   Lab Test 07/17/17  1054                  Passed - Medication is active on med list        Passed - No active pregnancy on record        Passed - No positive pregnancy test in last 12 months          "

## 2019-04-16 RX ORDER — LAMOTRIGINE 200 MG/1
TABLET ORAL
Qty: 180 TABLET | Refills: 0 | Status: SHIPPED | OUTPATIENT
Start: 2019-04-16 | End: 2019-05-14

## 2019-04-19 ENCOUNTER — NURSE TRIAGE (OUTPATIENT)
Dept: NURSING | Facility: CLINIC | Age: 44
End: 2019-04-19

## 2019-04-20 ENCOUNTER — NURSE TRIAGE (OUTPATIENT)
Dept: NURSING | Facility: CLINIC | Age: 44
End: 2019-04-20

## 2019-04-20 DIAGNOSIS — B00.9 HERPES SIMPLEX VIRUS INFECTION: ICD-10-CM

## 2019-04-20 DIAGNOSIS — B00.9 HERPES SIMPLEX VIRUS INFECTION: Primary | ICD-10-CM

## 2019-04-20 RX ORDER — VALACYCLOVIR HYDROCHLORIDE 500 MG/1
500 TABLET, FILM COATED ORAL 2 TIMES DAILY PRN
Qty: 14 TABLET | Refills: 0 | Status: SHIPPED | OUTPATIENT
Start: 2019-04-20 | End: 2019-04-20

## 2019-04-20 RX ORDER — VALACYCLOVIR HYDROCHLORIDE 500 MG/1
500 TABLET, FILM COATED ORAL 2 TIMES DAILY PRN
Qty: 14 TABLET | Refills: 0 | Status: SHIPPED | OUTPATIENT
Start: 2019-04-20 | End: 2020-07-03

## 2019-04-20 NOTE — TELEPHONE ENCOUNTER
Patient requesting refill of Valcyclovir. On-call paged and approved 14 tablets.     E-precscription transmitted to pharmacy of choice.    Mesha Posada RN  [facility]

## 2019-04-20 NOTE — TELEPHONE ENCOUNTER
Reason for Disposition    Caller requesting a NON-URGENT new prescription or refill and triager unable to refill per unit policy     valACYclovir (VALTREX) 500 MG tablet    Protocols used: MEDICATION QUESTION CALL-ADULT-

## 2019-04-20 NOTE — TELEPHONE ENCOUNTER
"Reason for Call/Nurse Assessment:  Lena requesting refill of her Valtrex for an outbreak of herpes on her wrist. Reports her previous prescription is  and her pharmacist told her to have the on call doctor paged tonight for a refill. Last fill date per patient is 17 by Dr. Crisostomo (see encounter dated 17). Per [facility], medication is listed as \"historical\" in the active med list. Last fill dates listed in Epic were 17 & 09/08/15.     Outpatient Medication Detail      Disp Refills Start End ANA   valACYclovir (VALTREX) 500 MG tablet (Discontinued) 6 tablet 1 2017 No   Si tab twice a day for 3 days for recurrent herpes outbreak   Class: E-Prescribe   Reason for Discontinue: Medication Reconciliation Clean Up   Order: [id number]   E-Prescribing Status: Receipt confirmed by pharmacy (2017  7:42 AM CDT)     Per [facility], medication not listed in active medication list due to \"discontinued on 17 for medication reconciliation clean up\".    [facility] [address] [place] [address] AT [facility]  [phone]    RN Action/Disposition:  This nurse paged the on call provider (Dr. Jose Arias) for the [facility] at 10:39pm via smart web to call the nurse back directly at [phone] for 2nd level triage. No call back from provider to FNA due to after 9pm page, advised caller to call FNA back in the morning and have the day on call provider paged.    Caller verbalized understanding of care advice given and plans to call FNA back in the morning. Caller had no further questions. Encouraged call back to FNA  for new/worsening symptoms or further questions.    Paloma Mares RN  [facility]    Smart Web message:     patient Lena Morrow   1975  Call Back # [phone], FNA  Synopsis: 43 yr old F, herpes outbreak on wrist, requesting Valtrex.  MRN: [id number]  PCP Andressa Dow RN/FNA      "

## 2019-04-20 NOTE — TELEPHONE ENCOUNTER
"Patient calling. States she needs a refill of her valcyclovir. She has not needed it for two years but she is having an outbreak on her wrist and pain is going up her arm.    Patient does not have current refill available. ON-call provider paged.    Refill orders given and transmitted to pharmacy. Patient notified.    Protocol and care advice reviewed  Caller states understanding of the recommended disposition      Advised to call back if further questions or concerns      Reason for Disposition    [1] Request for URGENT new prescription or refill of \"essential\" medication (i.e., likelihood of harm to patient if not taken) AND [2] triager unable to fill per unit policy    Additional Information    Negative: MORE THAN A DOUBLE DOSE of a prescription or over-the-counter (OTC) drug    Negative: [1] DOUBLE DOSE (an extra dose or lesser amount) of over-the-counter (OTC) drug AND [2] any symptoms (e.g., dizziness, nausea, pain, sleepiness)    Negative: [1] DOUBLE DOSE (an extra dose or lesser amount) of prescription drug AND [2] any symptoms (e.g., dizziness, nausea, pain, sleepiness)    Negative: Took another person's prescription drug    Negative: Diabetes drug error or overdose (e.g., insulin or extra dose)    Negative: [1] DOUBLE DOSE (an extra dose or lesser amount) of prescription drug AND [2] NO symptoms (Exception: a double dose of antibiotics)    Protocols used: MEDICATION QUESTION CALL-ADULT-      "

## 2019-04-22 DIAGNOSIS — F33.1 MAJOR DEPRESSIVE DISORDER, RECURRENT EPISODE, MODERATE (H): ICD-10-CM

## 2019-04-23 NOTE — TELEPHONE ENCOUNTER
Requested Prescriptions   Pending Prescriptions Disp Refills     buPROPion (WELLBUTRIN XL) 300 MG 24 hr tablet  Last Written Prescription Date:  04/24/2018  Last Fill Quantity: n/a,  # refills: n/a   Last Office Visit: 10/3/2018   Future Office Visit:            Sig: Take 1 tablet (300 mg) by mouth daily       There is no refill protocol information for this order

## 2019-04-24 RX ORDER — BUPROPION HYDROCHLORIDE 300 MG/1
300 TABLET ORAL DAILY
OUTPATIENT
Start: 2019-04-24

## 2019-04-24 NOTE — TELEPHONE ENCOUNTER
According to patient message on April 1, she should now be getting all her mental health medications from her provider at [facility].  Please redirect pharmacy to that provider for bupropion refills.

## 2019-04-24 NOTE — TELEPHONE ENCOUNTER
"Requested Prescriptions   Pending Prescriptions Disp Refills     buPROPion (WELLBUTRIN XL) 300 MG 24 hr tablet       Sig: Take 1 tablet (300 mg) by mouth daily       SSRIs Protocol Failed - 4/22/2019  8:49 PM        Failed - PHQ-9 score less than 5 in past 6 months     Please review last PHQ-9 score.           Failed - Recent (6 mo) or future (30 days) visit within the authorizing provider's specialty     Patient had office visit in the last 6 months or has a visit in the next 30 days with authorizing provider or within the authorizing provider's specialty.  See \"Patient Info\" tab in inbasket, or \"Choose Columns\" in Meds & Orders section of the refill encounter.            Passed - Medication is Bupropion     If the medication is Bupropion (Wellbutrin), and the patient is taking for smoking cessation; OK to refill.          Passed - Medication is active on med list        Passed - Patient is age 18 or older        Passed - No active pregnancy on record        Passed - No positive pregnancy test in last 12 months        PHQ-9 SCORE 11/20/2017 1/15/2018 10/3/2018   PHQ-9 Total Score - - -   PHQ-9 Total Score MyChart - - -   PHQ-9 Total Score 19 12 21     Routing refill request to provider for review/approval because:  Medication is reported/historical        "

## 2019-05-02 ENCOUNTER — MYC MEDICAL ADVICE (OUTPATIENT)
Dept: INTERNAL MEDICINE | Facility: CLINIC | Age: 44
End: 2019-05-02

## 2019-05-02 DIAGNOSIS — B00.9 HERPES SIMPLEX VIRUS (HSV) INFECTION: Primary | ICD-10-CM

## 2019-05-06 RX ORDER — VALACYCLOVIR HYDROCHLORIDE 1 G/1
2000 TABLET, FILM COATED ORAL 2 TIMES DAILY
Qty: 4 TABLET | Status: SHIPPED | OUTPATIENT
Start: 2019-05-06 | End: 2020-01-16

## 2019-05-06 NOTE — TELEPHONE ENCOUNTER
Patient responded to Mychart regarding  Valtrex dose.     Please review and send if appropriate. Pharmacy Td'up.    Tiffany MAGANA, RN, BSN, PHN

## 2019-05-07 DIAGNOSIS — F33.1 MAJOR DEPRESSIVE DISORDER, RECURRENT EPISODE, MODERATE (H): ICD-10-CM

## 2019-05-07 DIAGNOSIS — F31.81 BIPOLAR 2 DISORDER (H): ICD-10-CM

## 2019-05-07 NOTE — TELEPHONE ENCOUNTER
"Requested Prescriptions   Pending Prescriptions Disp Refills     buPROPion (WELLBUTRIN XL) 300 MG 24 hr tablet       Sig: Take 1 tablet (300 mg) by mouth daily       SSRIs Protocol Failed - 5/7/2019 10:00 AM        Failed - PHQ-9 score less than 5 in past 6 months     Please review last PHQ-9 score.           Failed - Recent (6 mo) or future (30 days) visit within the authorizing provider's specialty     Patient had office visit in the last 6 months or has a visit in the next 30 days with authorizing provider or within the authorizing provider's specialty.  See \"Patient Info\" tab in inbasket, or \"Choose Columns\" in Meds & Orders section of the refill encounter.            Passed - Medication is Bupropion     If the medication is Bupropion (Wellbutrin), and the patient is taking for smoking cessation; OK to refill.          Passed - Medication is active on med list        Passed - Patient is age 18 or older        Passed - No active pregnancy on record        Passed - No positive pregnancy test in last 12 months        PHQ-9 SCORE 11/20/2017 1/15/2018 10/3/2018   PHQ-9 Total Score - - -   PHQ-9 Total Score MyChart - - -   PHQ-9 Total Score 19 12 21     Routing refill request to provider for review/approval because:   out of range:  phq9 >4        "

## 2019-05-08 NOTE — TELEPHONE ENCOUNTER
If patient seeing mental health specialist?   If so, should probably be getting refills from that person.  If not, needs PHQ-9 updated, and schedule follow-up with me or partner.  Will then refill med.

## 2019-05-13 NOTE — TELEPHONE ENCOUNTER
"Requested Prescriptions   Pending Prescriptions Disp Refills     buPROPion (WELLBUTRIN XL) 300 MG 24 hr tablet       Sig: Take 1 tablet (300 mg) by mouth daily       SSRIs Protocol Failed - 5/13/2019 11:04 AM        Failed - PHQ-9 score less than 5 in past 6 months     Please review last PHQ-9 score.           Failed - Recent (6 mo) or future (30 days) visit within the authorizing provider's specialty     Patient had office visit in the last 6 months or has a visit in the next 30 days with authorizing provider or within the authorizing provider's specialty.  See \"Patient Info\" tab in inbasket, or \"Choose Columns\" in Meds & Orders section of the refill encounter.            Passed - Medication is Bupropion     If the medication is Bupropion (Wellbutrin), and the patient is taking for smoking cessation; OK to refill.          Passed - Medication is active on med list        Passed - Patient is age 18 or older        Passed - No active pregnancy on record        Passed - No positive pregnancy test in last 12 months        "

## 2019-05-13 NOTE — TELEPHONE ENCOUNTER
Spoke with patient. She is seeing Dr. Abdoulaye Moy at [facility] in [place]. Per Patient that provider wanted Dr. Crisostomo to continue Rxing meds. Patient will call me back to finish PHQ9 and schedule appointment.  Unable at the moment. MICHAELA Gibbs, [facility]

## 2019-05-14 ASSESSMENT — PATIENT HEALTH QUESTIONNAIRE - PHQ9: SUM OF ALL RESPONSES TO PHQ QUESTIONS 1-9: 12

## 2019-05-14 NOTE — TELEPHONE ENCOUNTER
Pt returned call to finish the PHQ-9. (in chart) Pt also wanted PCP to know  She is also seeing a therapist at [facility] as well. Could only remember  but not his last name.    Can you please RX the pended meds for her? She did not want to make appointment at this time but wanted to keep communicating with PCP through E-mails.  MICHAELA Gibbs, CMA

## 2019-05-15 DIAGNOSIS — R10.13 EPIGASTRIC PAIN: ICD-10-CM

## 2019-05-15 RX ORDER — ESOMEPRAZOLE MAGNESIUM 40 MG/1
CAPSULE, DELAYED RELEASE ORAL
Qty: 30 CAPSULE | Refills: 4 | Status: SHIPPED | OUTPATIENT
Start: 2019-05-15 | End: 2019-10-06

## 2019-05-15 NOTE — TELEPHONE ENCOUNTER
"Requested Prescriptions   Pending Prescriptions Disp Refills     esomeprazole (NEXIUM) 40 MG DR capsule [Pharmacy Med Name: ESOMEPRAZOLE MAGNESIUM 40MG DR CAPS] 30 capsule 0     Sig: TAKE 1 CAPSULE(40 MG) BY MOUTH DAILY       PPI Protocol Passed - 5/15/2019  3:35 PM        Passed - Not on Clopidogrel (unless Pantoprazole ordered)        Passed - No diagnosis of osteoporosis on record        Passed - Recent (12 mo) or future (30 days) visit within the authorizing provider's specialty     Patient had office visit in the last 12 months or has a visit in the next 30 days with authorizing provider or within the authorizing provider's specialty.  See \"Patient Info\" tab in inbasket, or \"Choose Columns\" in Meds & Orders section of the refill encounter.              Passed - Medication is active on med list        Passed - Patient is age 18 or older        Passed - No active pregnacy on record        Passed - No positive pregnancy test in past 12 months          "

## 2019-05-16 RX ORDER — LAMOTRIGINE 200 MG/1
200 TABLET ORAL 2 TIMES DAILY
Qty: 180 TABLET | Refills: 1 | Status: SHIPPED | OUTPATIENT
Start: 2019-05-16 | End: 2020-01-17

## 2019-05-16 RX ORDER — BUPROPION HYDROCHLORIDE 300 MG/1
300 TABLET ORAL DAILY
Qty: 90 TABLET | Refills: 1 | Status: SHIPPED | OUTPATIENT
Start: 2019-05-16 | End: 2019-10-06

## 2019-05-17 ENCOUNTER — MYC MEDICAL ADVICE (OUTPATIENT)
Dept: INTERNAL MEDICINE | Facility: CLINIC | Age: 44
End: 2019-05-17

## 2019-05-17 DIAGNOSIS — R10.13 EPIGASTRIC PAIN: ICD-10-CM

## 2019-05-17 DIAGNOSIS — M54.42 BILATERAL LOW BACK PAIN WITH LEFT-SIDED SCIATICA, UNSPECIFIED CHRONICITY: ICD-10-CM

## 2019-05-17 RX ORDER — METHOCARBAMOL 750 MG/1
TABLET, FILM COATED ORAL
Qty: 30 TABLET | Refills: 2 | Status: SHIPPED | OUTPATIENT
Start: 2019-05-17 | End: 2019-12-31

## 2019-05-17 RX ORDER — ESOMEPRAZOLE MAGNESIUM 40 MG/1
CAPSULE, DELAYED RELEASE ORAL
Qty: 30 CAPSULE | Refills: 4 | Status: CANCELLED | OUTPATIENT
Start: 2019-05-17

## 2019-05-17 NOTE — TELEPHONE ENCOUNTER
Panel Management Review      Patient has the following on her problem list:     Depression / Dysthymia review    Measure:  Needs PHQ-9 score of 4 or less during index window.  Administer PHQ-9 and if score is 5 or more, send encounter to provider for next steps.    5 - 7 month window range: 2/2/19-6/2/19    PHQ-9 SCORE 1/15/2018 10/3/2018 5/14/2019   PHQ-9 Total Score - - -   PHQ-9 Total Score MyChart - - -   PHQ-9 Total Score 12 21 12       If PHQ-9 recheck is 5 or more, route to provider for next steps.    Patient is due for:  PHQ9      Composite cancer screening  Chart review shows that this patient is due/due soon for the following None  Summary:    Patient is due/failing the following:   PHQ9    Action needed:   Patient needs to do PHQ9.    Type of outreach:    Sent MyChart message.    Questions for provider review:    None                                                                                                                                    Laura Bansal       Chart routed to Provider .

## 2019-05-17 NOTE — TELEPHONE ENCOUNTER
Last office visit was with Amelia Strauss PA-C in October.    When I last saw patient a year ago, I advised her to follow-up with mental health specialist for ongoing prescriptions of mental health medications.    Please contact patient and advise while I can refill her bupropion and Lamictal, I am not comfortable refilling her lithium.    If I am to continue these, have her schedule an appointment with me now for October.

## 2019-05-17 NOTE — TELEPHONE ENCOUNTER
methocarbamol (ROBAXIN) 750 MG tablet 30 tablet 2     Sig: TAKE 1 TABLET(750 MG) BY MOUTH FOUR TIMES DAILY AS NEEDED       There is no refill protocol information for this order          Methocarbamol      Last Written Prescription Date:  7/31/2018  Last Fill Quantity: 30,   # refills: 2  Last Office Visit: 5/31/2018  Future Office visit:       Routing refill request to provider for review/approval because:  Drug not on the [facility] refill protocol or controlled substance      Tiffany MAGANA RN, BSN, PHN

## 2019-07-01 ENCOUNTER — MEDICAL CORRESPONDENCE (OUTPATIENT)
Dept: HEALTH INFORMATION MANAGEMENT | Facility: CLINIC | Age: 44
End: 2019-07-01

## 2019-07-01 LAB — PHQ9 SCORE: 15

## 2019-07-13 DIAGNOSIS — F41.1 GENERALIZED ANXIETY DISORDER: Primary | ICD-10-CM

## 2019-07-13 LAB
ANION GAP SERPL CALCULATED.3IONS-SCNC: 5 MMOL/L (ref 3–14)
BUN SERPL-MCNC: 13 MG/DL (ref 7–30)
CALCIUM SERPL-MCNC: 8.8 MG/DL (ref 8.5–10.1)
CHLORIDE SERPL-SCNC: 110 MMOL/L (ref 94–109)
CO2 SERPL-SCNC: 27 MMOL/L (ref 20–32)
CREAT SERPL-MCNC: 0.88 MG/DL (ref 0.52–1.04)
GFR SERPL CREATININE-BSD FRML MDRD: 80 ML/MIN/{1.73_M2}
GLUCOSE SERPL-MCNC: 95 MG/DL (ref 70–99)
LITHIUM SERPL-SCNC: 0.65 MMOL/L (ref 0.6–1.2)
POTASSIUM SERPL-SCNC: 4 MMOL/L (ref 3.4–5.3)
SODIUM SERPL-SCNC: 142 MMOL/L (ref 133–144)
T4 FREE SERPL-MCNC: 1 NG/DL (ref 0.76–1.46)
TSH SERPL DL<=0.005 MIU/L-ACNC: 4.26 MU/L (ref 0.4–4)

## 2019-07-13 PROCEDURE — 80048 BASIC METABOLIC PNL TOTAL CA: CPT | Performed by: INTERNAL MEDICINE

## 2019-07-13 PROCEDURE — 80178 ASSAY OF LITHIUM: CPT | Performed by: INTERNAL MEDICINE

## 2019-07-13 PROCEDURE — 84443 ASSAY THYROID STIM HORMONE: CPT | Performed by: INTERNAL MEDICINE

## 2019-07-13 PROCEDURE — 84439 ASSAY OF FREE THYROXINE: CPT | Performed by: INTERNAL MEDICINE

## 2019-07-13 PROCEDURE — 36415 COLL VENOUS BLD VENIPUNCTURE: CPT | Performed by: INTERNAL MEDICINE

## 2019-09-29 ENCOUNTER — HEALTH MAINTENANCE LETTER (OUTPATIENT)
Age: 44
End: 2019-09-29

## 2019-10-06 DIAGNOSIS — F33.1 MAJOR DEPRESSIVE DISORDER, RECURRENT EPISODE, MODERATE (H): ICD-10-CM

## 2019-10-06 DIAGNOSIS — R10.13 EPIGASTRIC PAIN: ICD-10-CM

## 2019-10-06 NOTE — LETTER
[facility]  [address] [id number] [phone]            Lena Morrow  [address]    [facility] [address]        October 9, 2019    Dear Lena,    While refilling your prescription today, we noticed that you are due for an appointment with your provider.  We will refill your prescription for 30 days, but a follow-up appointment must be made before any additional refills can be approved.     Taking care of your health is important to us and we look forward to seeing you in the near future.  Please call us at [phone] or [phone] (or use [facility]_) to schedule an appointment.     Please disregard this notice if you have already made an appointment.    Sincerely,        [facility]

## 2019-10-07 NOTE — TELEPHONE ENCOUNTER
"Requested Prescriptions   Pending Prescriptions Disp Refills     buPROPion (WELLBUTRIN XL) 300 MG 24 hr tablet [Pharmacy Med Name: BUPROPION XL 300MG TABLETS] 90 tablet 0     Sig: TAKE 1 TABLET(300 MG) BY MOUTH DAILY   Last Written Prescription Date:  5/16/2019  Last Fill Quantity: 90,  # refills: 1   Last Office Visit: 10/3/2018   Future Office Visit:         SSRIs Protocol Failed - 10/6/2019  4:42 PM        Failed - PHQ-9 score less than 5 in past 6 months     Please review last PHQ-9 score.   PHQ-9 SCORE 1/15/2018 10/3/2018 5/14/2019   PHQ-9 Total Score - - -   PHQ-9 Total Score MyChart - - -   PHQ-9 Total Score 12 21 12     PHIL-7 SCORE 4/24/2018 5/31/2018 10/3/2018   Total Score - - -   Total Score 17 13 21                 Failed - Recent (6 mo) or future (30 days) visit within the authorizing provider's specialty     Patient had office visit in the last 6 months or has a visit in the next 30 days with authorizing provider or within the authorizing provider's specialty.  See \"Patient Info\" tab in inbasket, or \"Choose Columns\" in Meds & Orders section of the refill encounter.            Passed - Medication is Bupropion     If the medication is Bupropion (Wellbutrin), and the patient is taking for smoking cessation; OK to refill.          Passed - Medication is active on med list        Passed - Patient is age 18 or older        Passed - No active pregnancy on record        Passed - No positive pregnancy test in last 12 months        esomeprazole (NEXIUM) 40 MG DR capsule [Pharmacy Med Name: ESOMEPRAZOLE MAGNESIUM 40MG DR CAPS] 30 capsule 0     Sig: TAKE 1 CAPSULE(40 MG) BY MOUTH DAILY   Last Written Prescription Date:  5/15/2019  Last Fill Quantity: 30,  # refills: 4   Last Office Visit: 10/3/2018   Future Office Visit:         PPI Protocol Failed - 10/6/2019  4:42 PM        Failed - Recent (12 mo) or future (30 days) visit within the authorizing provider's specialty     Patient has had an office visit with the " "authorizing provider or a provider within the authorizing providers department within the previous 12 mos or has a future within next 30 days. See \"Patient Info\" tab in inbasket, or \"Choose Columns\" in Meds & Orders section of the refill encounter.              Passed - Not on Clopidogrel (unless Pantoprazole ordered)        Passed - No diagnosis of osteoporosis on record        Passed - Medication is active on med list        Passed - Patient is age 18 or older        Passed - No active pregnacy on record        Passed - No positive pregnancy test in past 12 months          "

## 2019-10-09 LAB — PHQ9 SCORE: 16

## 2019-10-09 RX ORDER — BUPROPION HYDROCHLORIDE 300 MG/1
TABLET ORAL
Qty: 30 TABLET | Refills: 0 | Status: SHIPPED | OUTPATIENT
Start: 2019-10-09 | End: 2021-08-04

## 2019-10-09 RX ORDER — ESOMEPRAZOLE MAGNESIUM 40 MG/1
CAPSULE, DELAYED RELEASE ORAL
Qty: 30 CAPSULE | Refills: 0 | Status: SHIPPED | OUTPATIENT
Start: 2019-10-09 | End: 2019-11-07

## 2019-11-07 DIAGNOSIS — R10.13 EPIGASTRIC PAIN: ICD-10-CM

## 2019-11-07 RX ORDER — ESOMEPRAZOLE MAGNESIUM 40 MG/1
CAPSULE, DELAYED RELEASE ORAL
Qty: 90 CAPSULE | Refills: 1 | Status: SHIPPED | OUTPATIENT
Start: 2019-11-07 | End: 2020-05-08

## 2019-11-07 NOTE — TELEPHONE ENCOUNTER
"Requested Prescriptions   Pending Prescriptions Disp Refills     esomeprazole (NEXIUM) 40 MG DR capsule [Pharmacy Med Name: ESOMEPRAZOLE MAGNESIUM 40MG DR CAPS] 30 capsule 0     Sig: TAKE ONE CAPSULE BY MOUTH EVERY DAY. MUST BE SEEN BY DOCTOR BEFORE MORE REFILLS WILL BE GIVEN.       PPI Protocol Failed - 11/7/2019 11:56 AM        Failed - Recent (12 mo) or future (30 days) visit within the authorizing provider's specialty     Patient has had an office visit with the authorizing provider or a provider within the authorizing providers department within the previous 12 mos or has a future within next 30 days. See \"Patient Info\" tab in inbasket, or \"Choose Columns\" in Meds & Orders section of the refill encounter.              Passed - Not on Clopidogrel (unless Pantoprazole ordered)        Passed - No diagnosis of osteoporosis on record        Passed - Medication is active on med list        Passed - Patient is age 18 or older        Passed - No active pregnacy on record        Passed - No positive pregnancy test in past 12 months          Last Written Prescription Date:  10/9/2019  Last Fill Quantity: 30,  # refills: 0   Last office visit: 10/3/2018 with prescribing provider:  Trevon Crisostomo     Future Office Visit:      Routing refill request to provider for review/approval because:  Pari given x1 and patient did not follow up, please advise  Patient needs to be seen because it has been more than 1 year since last office visit.      "

## 2019-11-08 NOTE — TELEPHONE ENCOUNTER
Last office visit greater than a year, and patient has not seen me for 18 months.   No follow-up scheduled.  Med refilled, will need office visit-schedule now

## 2019-12-30 ENCOUNTER — HOSPITAL ENCOUNTER (EMERGENCY)
Facility: CLINIC | Age: 44
Discharge: HOME | End: 2019-12-31
Attending: EMERGENCY MEDICINE | Admitting: EMERGENCY MEDICINE
Payer: COMMERCIAL

## 2019-12-30 ENCOUNTER — MYC MEDICAL ADVICE (OUTPATIENT)
Dept: INTERNAL MEDICINE | Facility: CLINIC | Age: 44
End: 2019-12-30

## 2019-12-30 VITALS
HEIGHT: 64 IN | DIASTOLIC BLOOD PRESSURE: 56 MMHG | BODY MASS INDEX: 34.15 KG/M2 | SYSTOLIC BLOOD PRESSURE: 114 MMHG | WEIGHT: 200 LBS | RESPIRATION RATE: 16 BRPM | TEMPERATURE: 97.6 F | OXYGEN SATURATION: 99 %

## 2019-12-30 DIAGNOSIS — M25.551 HIP PAIN, RIGHT: ICD-10-CM

## 2019-12-30 DIAGNOSIS — M54.41 ACUTE MIDLINE LOW BACK PAIN WITH RIGHT-SIDED SCIATICA: ICD-10-CM

## 2019-12-30 PROCEDURE — 99283 EMERGENCY DEPT VISIT LOW MDM: CPT

## 2019-12-30 ASSESSMENT — MIFFLIN-ST. JEOR: SCORE: 1542.19

## 2019-12-30 NOTE — ED AVS SNAPSHOT
Emergency Department  [address] [place] [id number]  Phone:  [phone]  Fax:  [phone]                                    Lena Morrow   MRN: [id number]    Department:   Emergency Department   Date of Visit:  12/30/2019           After Visit Summary Signature Page    I have received my discharge instructions, and my questions have been answered. I have discussed any challenges I see with this plan with the nurse or doctor.    ..........................................................................................................................................  Patient/Patient Representative Signature      ..........................................................................................................................................  Patient Representative Print Name and Relationship to Patient    ..................................................               ................................................  Date                                   Time    ..........................................................................................................................................  Reviewed by Signature/Title    ...................................................              ..............................................  Date                                               Time          22EPIC Rev 08/18

## 2019-12-31 ENCOUNTER — APPOINTMENT (OUTPATIENT)
Dept: GENERAL RADIOLOGY | Facility: CLINIC | Age: 44
End: 2019-12-31
Attending: EMERGENCY MEDICINE
Payer: COMMERCIAL

## 2019-12-31 PROCEDURE — 25000131 ZZH RX MED GY IP 250 OP 636 PS 637: Performed by: EMERGENCY MEDICINE

## 2019-12-31 PROCEDURE — 73502 X-RAY EXAM HIP UNI 2-3 VIEWS: CPT

## 2019-12-31 PROCEDURE — 25000132 ZZH RX MED GY IP 250 OP 250 PS 637: Performed by: EMERGENCY MEDICINE

## 2019-12-31 RX ORDER — OXYCODONE AND ACETAMINOPHEN 5; 325 MG/1; MG/1
1 TABLET ORAL ONCE
Status: COMPLETED | OUTPATIENT
Start: 2019-12-31 | End: 2019-12-31

## 2019-12-31 RX ORDER — CYCLOBENZAPRINE HCL 10 MG
10 TABLET ORAL 3 TIMES DAILY PRN
Qty: 21 TABLET | Refills: 0 | Status: SHIPPED | OUTPATIENT
Start: 2019-12-31 | End: 2020-01-16

## 2019-12-31 RX ORDER — METHYLPREDNISOLONE 4 MG
TABLET, DOSE PACK ORAL
Qty: 21 TABLET | Refills: 0 | Status: SHIPPED | OUTPATIENT
Start: 2019-12-31 | End: 2020-01-16

## 2019-12-31 RX ORDER — IBUPROFEN 600 MG/1
600 TABLET, FILM COATED ORAL EVERY 6 HOURS PRN
Qty: 28 TABLET | Refills: 0 | Status: SHIPPED | OUTPATIENT
Start: 2019-12-31 | End: 2020-02-18

## 2019-12-31 RX ORDER — CYCLOBENZAPRINE HCL 10 MG
10 TABLET ORAL ONCE
Status: COMPLETED | OUTPATIENT
Start: 2019-12-31 | End: 2019-12-31

## 2019-12-31 RX ORDER — LIDOCAINE 50 MG/G
1 PATCH TOPICAL EVERY 24 HOURS
Qty: 10 PATCH | Refills: 0 | Status: SHIPPED | OUTPATIENT
Start: 2019-12-31 | End: 2020-02-18

## 2019-12-31 RX ORDER — PREDNISONE 20 MG/1
60 TABLET ORAL ONCE
Status: COMPLETED | OUTPATIENT
Start: 2019-12-31 | End: 2019-12-31

## 2019-12-31 RX ORDER — OXYCODONE AND ACETAMINOPHEN 5; 325 MG/1; MG/1
1-2 TABLET ORAL EVERY 4 HOURS PRN
Qty: 10 TABLET | Refills: 0 | Status: SHIPPED | OUTPATIENT
Start: 2019-12-31 | End: 2020-01-16 | Stop reason: SINTOL

## 2019-12-31 RX ADMIN — OXYCODONE HYDROCHLORIDE AND ACETAMINOPHEN 1 TABLET: 5; 325 TABLET ORAL at 00:28

## 2019-12-31 RX ADMIN — CYCLOBENZAPRINE HYDROCHLORIDE 10 MG: 10 TABLET, FILM COATED ORAL at 00:28

## 2019-12-31 RX ADMIN — PREDNISONE 60 MG: 20 TABLET ORAL at 00:28

## 2019-12-31 ASSESSMENT — ENCOUNTER SYMPTOMS
BACK PAIN: 1
APPETITE CHANGE: 0
ABDOMINAL PAIN: 0
WEAKNESS: 0
FEVER: 0
SHORTNESS OF BREATH: 1
COLOR CHANGE: 0
NUMBNESS: 0
WOUND: 0
MYALGIAS: 1
SLEEP DISTURBANCE: 1
CHILLS: 0
ARTHRALGIAS: 1

## 2019-12-31 NOTE — ED PROVIDER NOTES
"  History     Chief Complaint:  Hip Pain    The history is provided by the patient.      Lena Morrow is a 44 year old female who presents with hip pain. A couple of months ago, the patient experienced pain in her right hip fold/hip flexor region. She notes that she was not able to do anything, including putting on pants without suffering sever pain. She denies any known trauma to her back or hip or new shoes. She states that she has been battling lower back pain for the last 20 years, but denies getting any imaging done other than x-rays.     For the last couple of weeks, the patient's right hip pain has worsened, radiates laterally into her buttocks, and sends a burning, shooting, and stabbing pain down her right leg with every step she takes. She is having a harder time sleeping and unable to sleep on her right side for more than 15 minutes. Patient is also becoming more short of breath than normal, secondary to pain, with simple things such as going up a flight of stairs. She has been trying to combat her pain with Advil, Aleve, ibuprofen, Tylenol, and Excedrin. She finds little relief with that as it \"barely takes the edge off\" and is trying to take something every four hours.  She has been to the chiropractor four times in the last three weeks to get adjusted for her hip pain. She states that her pain improves for about a day then comes back. The patient also reports tightness in her left calf and pain on the lateral side of her left ankle, she believes is from limping and putting more weight on her left side.    For the last couple of days, her pain shoots down the lateral side of her right leg and goes underneath her kneecap. She notes she is only able to lift her leg 6 inches off of the ground. Her persisting and worsening pain prompted the patient to seek evaluation in the ED today.    Patient denies any fevers, chills, generalized body aches, pelvic pain, abdominal pain, leg weakness, appetite changes, " "erythema, swelling, or lumps on her skin, loss of bowel or bladder control, or any groin or buttock numbness.     Allergies:  Amoxicillin  Aripiprazole  Ativan  Augmentin   Azithromycin  Clonazepam  Estrogens  Lexapro  Mirtazapine  Prochlorperazine  Risperdal   Seasonal Allergies  Seroquel   Sertraline  Venlafaxine  Xanax      Medications:    Albuterol  Wellbutrin  Caripraxine  Zyrtec  Nexium  Lamictal  Lithobid  Loratadine  Inderal  Valtrex    Past Medical History:    OCD  Endometriosis  Bipolar disorder  Kidney stones   PHIL  Depression   DDD  Migraines  Asthma  Atrial flutter  Cholelithiasis   Dyspepsia  Herpes  GERD     Past Surgical History:    Tonsillectomy  Right carpal tunnel release  Colonoscopy  Cystoscopy  Total hysterectomy, salpingectomy bilateral  D&C  ENT surgery    Cholecystectomy    Family History:    Cancer  Alcohol/drug  Diabetes  Hypertension  Depression  Allergies  Asthma  Substance abuse      Social History:  Negative for tobacco use.  Positive for alcohol use.  Negative for drug use.  Marital Status:       Review of Systems   Constitutional: Negative for appetite change, chills and fever.   Respiratory: Positive for shortness of breath.    Gastrointestinal: Negative for abdominal pain.   Musculoskeletal: Positive for arthralgias (right hip and left ankle), back pain and myalgias (left calf and right buttocks).   Skin: Negative for color change and wound.   Neurological: Negative for weakness and numbness.   Psychiatric/Behavioral: Positive for sleep disturbance.   All other systems reviewed and are negative.        Physical Exam     Patient Vitals for the past 24 hrs:   BP Temp Temp src Heart Rate Resp SpO2 Height Weight   19 114/56 97.6  F (36.4  C) Oral 75 16 99 % 1.626 m (5' 4\") 90.7 kg (200 lb)     Physical Exam  Constitutional:  Appears well-developed and well-nourished. Looks uncomfortable. Cooperative.   HENT:   Head:    Atraumatic.   Mouth/Throat:   Oropharynx " is without erythema or exudate and mucous     membranes are moist.   Eyes:    Conjunctivae normal and EOM are normal.      Pupils are equal, round, and reactive to light.   Neck:    Normal range of motion. Neck supple.   Cardiovascular:  Normal rate, regular rhythm, normal heart sounds and radial and    dorsalis pedis pulses are 2+ and symmetric.    Pulmonary/Chest:  Effort normal and breath sounds normal.   Abdominal:   Soft. Bowel sounds are normal.      No splenomegaly or hepatomegaly. No tenderness. No rebound.   Musculoskeletal:  No midline bony tenderness or step off. Tenderness to the right of the lower lumbar spine. Positive straight leg raise on the right side - negative on the left. No bony tenderness over hip or pelvis.  Neurological:  Alert. Normal strength and sensation in bilateral lower extremities. No cranial nerve deficit.   Skin:    Skin is warm and dry. No skin changes to back or lower extremities.  Psychiatric:   Normal mood and affect.     Emergency Department Course   Imaging:  XR Pelvis and Hip, Right, G/E 1 views:   No acute fracture or dislocation. No acute osseous abnormality. Bladder partially filled. Minimal phleboliths lower pelvis. As per radiology.     Interventions:  0028 Prednisone 60 mg PO  0028 Percocet 1 tablet PO  0028 Flexeril 10 mg PO    Emergency Department Course:  Nursing notes and vitals reviewed. 0009 I performed an exam of the patient as documented above.     Medicine administered as documented above.     The patient was sent for a hip and pelvis x-ray while in the emergency department, findings above.     0115 I rechecked the patient and discussed the results of her workup thus far.     Findings and plan explained to the Patient and spouse. Patient discharged home with instructions regarding supportive care, medications, and reasons to return. The importance of close follow-up was reviewed. The patient was prescribed Flexeril, ibuprofen, Lidocaine Patches, Medrol  Dosepak, and Percocet.    I personally answered all related questions prior to discharge.     Impression & Plan    Medical Decision Making:  Lena Morrow is a 44 year old female who presents for evaluation of right hip pain that radiates into the right leg.  She is also experiencing low back pain.  I suspect that her leg pain is radicular. They have a history of back pain in the past.  Her pain has improved with interventions in the emergency department. She did not sustain any trauma, but x-rays of the hip and pelvis were done and returned looking unremarkable.  Advanced imaging with CT/MRI is not indicated at this time, but may be indicated in the future if symptoms fail to resolve.  Nor is there any indication for consultation with neurosurgery or orthopedic spinal surgeon.  There is no clinical evidence of cauda equina syndrome, discitis, spinal/epidural space hematoma or epidural abscess or other emergently worrisome etiology.     The neurological exam is normal, without any weakness or paresthesias.  The patient was advised that radiculopathy often takes significant time to resolve, and that follow up with primary care and/or spine surgeon will be indicated if symptoms do not improve. She will be discharged with pain medications to use as directed.  No heavy lifting, bending or twisting. Return if increasing pain, muscular weakness, or bowel or bladder dysfunction.       Diagnosis:    ICD-10-CM    1. Hip pain, right M25.551    2. Acute midline low back pain with right-sided sciatica M54.41        Disposition:  discharged to home with her     Discharge Medications:  Discharge Medication List as of 12/31/2019  1:22 AM      START taking these medications    Details   cyclobenzaprine (FLEXERIL) 10 MG tablet Take 1 tablet (10 mg) by mouth 3 times daily as needed for muscle spasms, Disp-21 tablet, R-0, Local Print      !! ibuprofen (ADVIL/MOTRIN) 600 MG tablet Take 1 tablet (600 mg) by mouth every 6 hours  as needed for moderate pain, Disp-28 tablet, R-0, Local Print      lidocaine (LIDODERM) 5 % patch Place 1 patch onto the skin every 24 hours for 10 daysDisp-10 patch, R-0Local Print      methylPREDNISolone (MEDROL DOSEPAK) 4 MG tablet therapy pack Follow Package Directions, Disp-21 tablet, R-0, Local Print      oxyCODONE-acetaminophen (PERCOCET) 5-325 MG tablet Take 1-2 tablets by mouth every 4 hours as needed for pain, Disp-10 tablet, R-0, Local Print       !! - Potential duplicate medications found. Please discuss with provider.        Scribe Disclosure:  IHarmony, am serving as a scribe on 12/31/2019 at 12:09 AM to personally document services performed by Anselmo Judd MD based on my observations and the provider's statements to me.     Harmony Silva  12/30/2019    EMERGENCY DEPARTMENT       Anselmo Judd MD  12/31/19 0702

## 2019-12-31 NOTE — ED TRIAGE NOTES
Right hip pain started a month ago, now buttock pain and radiation down to knee. Burning  /stinging pain. Unable to walk up the stairs regularly, takes one step at a time.

## 2020-01-16 ENCOUNTER — OFFICE VISIT (OUTPATIENT)
Dept: INTERNAL MEDICINE | Facility: CLINIC | Age: 45
End: 2020-01-16
Payer: COMMERCIAL

## 2020-01-16 VITALS
HEART RATE: 78 BPM | TEMPERATURE: 98.4 F | BODY MASS INDEX: 35.51 KG/M2 | OXYGEN SATURATION: 98 % | HEIGHT: 64 IN | WEIGHT: 208 LBS | SYSTOLIC BLOOD PRESSURE: 116 MMHG | RESPIRATION RATE: 14 BRPM | DIASTOLIC BLOOD PRESSURE: 78 MMHG

## 2020-01-16 DIAGNOSIS — M25.551 HIP PAIN, RIGHT: Primary | ICD-10-CM

## 2020-01-16 DIAGNOSIS — F31.81 BIPOLAR 2 DISORDER (H): ICD-10-CM

## 2020-01-16 DIAGNOSIS — F31.30 BIPOLAR AFFECTIVE DISORDER, CURRENT EPISODE DEPRESSED, CURRENT EPISODE SEVERITY UNSPECIFIED (H): ICD-10-CM

## 2020-01-16 DIAGNOSIS — J45.20 MILD INTERMITTENT ASTHMA WITHOUT COMPLICATION: ICD-10-CM

## 2020-01-16 DIAGNOSIS — J45.21 MILD INTERMITTENT ASTHMA WITH ACUTE EXACERBATION: ICD-10-CM

## 2020-01-16 PROCEDURE — 99214 OFFICE O/P EST MOD 30 MIN: CPT | Performed by: INTERNAL MEDICINE

## 2020-01-16 RX ORDER — CYCLOBENZAPRINE HCL 10 MG
10 TABLET ORAL 2 TIMES DAILY PRN
Qty: 40 TABLET | Refills: 1 | Status: SHIPPED | OUTPATIENT
Start: 2020-01-16 | End: 2020-04-22

## 2020-01-16 RX ORDER — ALBUTEROL SULFATE 90 UG/1
2 AEROSOL, METERED RESPIRATORY (INHALATION) EVERY 6 HOURS PRN
Qty: 1 INHALER | Status: SHIPPED | OUTPATIENT
Start: 2020-01-16 | End: 2020-10-27

## 2020-01-16 RX ORDER — CYCLOBENZAPRINE HCL 10 MG
10 TABLET ORAL 2 TIMES DAILY PRN
Refills: 1 | COMMUNITY
Start: 2020-01-16 | End: 2020-01-16

## 2020-01-16 ASSESSMENT — MIFFLIN-ST. JEOR: SCORE: 1578.48

## 2020-01-16 NOTE — TELEPHONE ENCOUNTER
"Requested Prescriptions   Pending Prescriptions Disp Refills     lamoTRIgine (LAMICTAL) 200 MG tablet 180 tablet 1     Sig: Take 1 tablet (200 mg) by mouth 2 times daily       Anti-Seizure Meds Protocol  Failed - 1/16/2020  3:53 PM        Failed - Review Authorizing provider's last note.      Refer to last progress notes: confirm request is for original authorizing provider (cannot be through other providers).          Failed - Normal CBC on file in past 26 months     Recent Labs   Lab Test 07/17/17  1054   WBC 7.0   RBC 4.67   HGB 14.1   HCT 43.5                    Failed - Normal ALT or AST on file in past 26 months     Recent Labs   Lab Test 07/17/17  1054   ALT 28     Recent Labs   Lab Test 07/17/17  1054   AST 13             Failed - Normal platelet count on file in past 26 months     Recent Labs   Lab Test 07/17/17  1054                  Passed - Recent (12 mo) or future (30 days) visit within the authorizing provider's specialty     Patient has had an office visit with the authorizing provider or a provider within the authorizing providers department within the previous 12 mos or has a future within next 30 days. See \"Patient Info\" tab in inbasket, or \"Choose Columns\" in Meds & Orders section of the refill encounter.              Passed - Medication is active on med list        Passed - No active pregnancy on record        Passed - No positive pregnancy test in last 12 months        Last Written Prescription Date:  5/16/19  Last Fill Quantity: 180,  # refills: 1   Last office visit: No previous visit found with prescribing provider:  1/16/20   Future Office Visit:      "

## 2020-01-16 NOTE — PATIENT INSTRUCTIONS
PLAN:                                                      1.  MEDICATIONS:        - Trial of piroxicam 20 mg daily to start tomorrow       - Continue other medications without change  2.  Plan to check lithium level Monday.  Watch for possible side effects of excess lithium:   tremor, coordination problems, speech problems, muscle twitches, etc.   More severe symptoms could be seizures, stupor, and coma   3.  See hip and lower extremity specialist at [facility] in 2-3 weeks     [phone]   Mention possible trochanteric bursitis

## 2020-01-16 NOTE — NURSING NOTE
"Chief Complaint   Patient presents with     Hospital F/U     RECHECK     mood disorder       Initial /78   Pulse 78   Temp 98.4  F (36.9  C) (Oral)   Resp 14   Ht 1.626 m (5' 4\")   Wt 94.3 kg (208 lb)   LMP 12/10/2015   SpO2 98%   BMI 35.70 kg/m   Estimated body mass index is 35.7 kg/m  as calculated from the following:    Height as of this encounter: 1.626 m (5' 4\").    Weight as of this encounter: 94.3 kg (208 lb).  BP completed using cuff size: large    Health Maintenance that is potentially due pending provider review:  ACT and AAP, PX HPV    PHQ/ACT/PHIL--Gave pt questionnare  "

## 2020-01-16 NOTE — PROGRESS NOTES
"Subjective     Lena Morrow is a 44 year old female who presents to clinic today for the following health issues:    HPI   ED/UC Followup:    Facility:  [facility]  Date of visit: 12-30-19  Reason for visit: right hip pain  Current Status: about the same-some worse moments  Pain at 5/10 now-can be as bad as 10/10 when severe     Few month history of pain in R groin, with lifting lowering.   Now pain is more in trochanteric area, with severe pain with stairs, putting weight on leg, etc.   Pain was so bad she went to ED.  Pain has woken her up.  Pain all day.  No weakness, paresthesias, some pain from trochanter to knee.  No symptoms on left, but now some soreness from limping.  Given medrol with good results, but symptoms came back a week later.      Chiropractic treatment with benefit for a day only.  Had adjustment of low back, tailbone area, massage to hip (very painful).     Had used aleve 1 prn, tylenol, ibuprofen 800 mg bid.   Mild help only.      Patient has appointment with Dr. Yip next week.        Problem list and histories reviewed & adjusted, as indicated.  Additional history: as documented    Additional issues to address:  1.  Follow-up mood disorder.   Seeing REGINALDO Baker at [facility].   Doing well.       ROS:    Constitutional, HEENT, cardiovascular, pulmonary, gi and gu systems are negative, except as otherwise noted.    OBJECTIVE:                                                    /78   Pulse 78   Temp 98.4  F (36.9  C) (Oral)   Resp 14   Ht 1.626 m (5' 4\")   Wt 94.3 kg (208 lb)   LMP 12/10/2015   SpO2 98%   BMI 35.70 kg/m    Body mass index is 35.7 kg/m .   No apparent distress  Abdomen soft, nontender without hepatosplenomegaly   Spine nontender, sacroiliac .nontender, some tenderness laterally  Normal ROM hips, no significant pain   Focal tenderness over R >> L trochanteric area  Tenderness lateral thigh below trochanteric area ,as well  Affect " bright       ASSESSMENT:                                                      1.  R hip pain, consider trochanteric bursitis, fasciitis, etc.   Does not suggest join origin.  2.  Bipolar, doing well with current treatments  3.  Intermittent asthma, managed    PLAN:                                                      1.  MEDICATIONS:        - Trial of piroxicam 20 mg daily to start tomorrow       - Continue other medications without change  2.  Plan to check lithium level Monday, due to starting the NSAID.  Patient will watch for possible side effects of excess lithium:   tremor, coordination problems, speech problems, muscle twitches, etc.   More severe symptoms could be seizures, stupor, and coma.   Patient understands that med could raise lithium level, and what to watch for.  She will stop med and call for problems.   3.  See hip and lower extremity specialist at [facility] in 2-3 weeks     [phone]           Trevon Crisostomo MD  [facility]

## 2020-01-16 NOTE — TELEPHONE ENCOUNTER
I understood that she has different provider managing her mental health medications (lithium, bupropion, lamictal, and adderall), that provider should refill these meds.   If that's not correct, please advise.

## 2020-01-17 RX ORDER — LAMOTRIGINE 200 MG/1
TABLET ORAL
Qty: 180 TABLET | Refills: 1 | Status: SHIPPED | OUTPATIENT
Start: 2020-01-17 | End: 2021-08-04

## 2020-01-17 ASSESSMENT — ASTHMA QUESTIONNAIRES: ACT_TOTALSCORE: 25

## 2020-01-17 NOTE — TELEPHONE ENCOUNTER
Routing refill request to provider for review/approval because:  Labs not current:  CBC, Hepatic Panel

## 2020-01-17 NOTE — TELEPHONE ENCOUNTER
"Requested Prescriptions   Pending Prescriptions Disp Refills     lamoTRIgine (LAMICTAL) 200 MG tablet [Pharmacy Med Name: LAMOTRIGINE 200MG TABLETS] 180 tablet 1     Sig: TAKE 1 TABLET(200 MG) BY MOUTH TWICE DAILY       Anti-Seizure Meds Protocol  Failed - 1/16/2020  8:35 PM        Failed - Review Authorizing provider's last note.      Refer to last progress notes: confirm request is for original authorizing provider (cannot be through other providers).          Failed - Normal CBC on file in past 26 months     Recent Labs   Lab Test 07/17/17  1054   WBC 7.0   RBC 4.67   HGB 14.1   HCT 43.5                    Failed - Normal ALT or AST on file in past 26 months     Recent Labs   Lab Test 07/17/17  1054   ALT 28     Recent Labs   Lab Test 07/17/17  1054   AST 13             Failed - Normal platelet count on file in past 26 months     Recent Labs   Lab Test 07/17/17  1054                  Passed - Recent (12 mo) or future (30 days) visit within the authorizing provider's specialty     Patient has had an office visit with the authorizing provider or a provider within the authorizing providers department within the previous 12 mos or has a future within next 30 days. See \"Patient Info\" tab in inbasket, or \"Choose Columns\" in Meds & Orders section of the refill encounter.              Passed - Medication is active on med list        Passed - No active pregnancy on record        Passed - No positive pregnancy test in last 12 months        Last Written Prescription Date:  5/16/19  Last Fill Quantity: 180,  # refills: 1   Last office visit: No previous visit found with prescribing provider:  1/16/20   Future Office Visit:      "

## 2020-01-20 DIAGNOSIS — F31.30 BIPOLAR AFFECTIVE DISORDER, CURRENT EPISODE DEPRESSED, CURRENT EPISODE SEVERITY UNSPECIFIED (H): ICD-10-CM

## 2020-01-20 DIAGNOSIS — M25.551 HIP PAIN, RIGHT: ICD-10-CM

## 2020-01-20 LAB — LITHIUM SERPL-SCNC: 0.75 MMOL/L (ref 0.6–1.2)

## 2020-01-20 PROCEDURE — 36415 COLL VENOUS BLD VENIPUNCTURE: CPT | Performed by: INTERNAL MEDICINE

## 2020-01-20 PROCEDURE — 80178 ASSAY OF LITHIUM: CPT | Performed by: INTERNAL MEDICINE

## 2020-01-20 RX ORDER — LAMOTRIGINE 200 MG/1
200 TABLET ORAL 2 TIMES DAILY
Qty: 180 TABLET | Refills: 1 | OUTPATIENT
Start: 2020-01-20

## 2020-01-27 ENCOUNTER — TRANSFERRED RECORDS (OUTPATIENT)
Dept: HEALTH INFORMATION MANAGEMENT | Facility: CLINIC | Age: 45
End: 2020-01-27

## 2020-02-05 DIAGNOSIS — M25.551 HIP PAIN, RIGHT: ICD-10-CM

## 2020-02-05 NOTE — TELEPHONE ENCOUNTER
Partly due to potential drug interaction with lithium, plan was not to continue the piroxicam indefinitely.  Patient was given a month, and should have 10 days left.   By now, she should have an appointment scheduled with [facility].    Please check with her, to see how much help piroxicam provided.  OK to extend until sees ortho, if refill needed until then.   Longer term, should stop medication.

## 2020-02-05 NOTE — TELEPHONE ENCOUNTER
Triage spoke with patient. Informed of provider notes below.    Patient states she gets some relief and this takes the edge off. She last saw [facility]- 1/27/2020- they recommended 12 sessions of physical therapy; in which she will start this  Thursday and will attend 2x a week for the remainer of the month;     Patient reports no f/u appointment made with [facility] at this time.    Tiffany FUENTES, RN, PHN

## 2020-02-05 NOTE — TELEPHONE ENCOUNTER
Thanks.   Will order one refill of the piroxicam, while she is doing the Physical Therapy.   If not better then, follow-up with ortho.  Please contact patient and advise.

## 2020-02-17 ENCOUNTER — TRANSFERRED RECORDS (OUTPATIENT)
Dept: HEALTH INFORMATION MANAGEMENT | Facility: CLINIC | Age: 45
End: 2020-02-17

## 2020-02-17 ENCOUNTER — MEDICAL CORRESPONDENCE (OUTPATIENT)
Dept: HEALTH INFORMATION MANAGEMENT | Facility: CLINIC | Age: 45
End: 2020-02-17

## 2020-02-17 LAB — PHQ9 SCORE: 13

## 2020-02-18 ENCOUNTER — OFFICE VISIT (OUTPATIENT)
Dept: INTERNAL MEDICINE | Facility: CLINIC | Age: 45
End: 2020-02-18
Payer: COMMERCIAL

## 2020-02-18 VITALS
SYSTOLIC BLOOD PRESSURE: 106 MMHG | RESPIRATION RATE: 18 BRPM | HEART RATE: 79 BPM | TEMPERATURE: 97.8 F | WEIGHT: 205 LBS | DIASTOLIC BLOOD PRESSURE: 70 MMHG | BODY MASS INDEX: 35.19 KG/M2 | OXYGEN SATURATION: 98 %

## 2020-02-18 DIAGNOSIS — R12 HEARTBURN: Primary | ICD-10-CM

## 2020-02-18 DIAGNOSIS — E66.01 MORBID OBESITY (H): ICD-10-CM

## 2020-02-18 PROCEDURE — 99214 OFFICE O/P EST MOD 30 MIN: CPT | Performed by: PHYSICIAN ASSISTANT

## 2020-02-18 RX ORDER — SUCRALFATE 1 G/1
1 TABLET ORAL 4 TIMES DAILY PRN
Qty: 30 TABLET | Refills: 1 | Status: SHIPPED | OUTPATIENT
Start: 2020-02-18 | End: 2020-02-27

## 2020-02-18 NOTE — PROGRESS NOTES
Subjective     Lena Morrow is a 44 year old female who presents to clinic today for the following health issues:    HPI   GERD/Heartburn  Onset: 3  weeks    Description:     Burning in chest: YES    Intensity: severe    Progression of Symptoms: worsening    Accompanying Signs & Symptoms:  Does it feel like food gets stuck: no  Nausea: YES  Vomiting (bloody?): no  Abdominal Pain: no  Black-Tarry stools: no:  Bloody stools: no    History:   Previous ulcers: YES    Precipitating factors:   Caffeine use: YES- but has cut back, none in last 2 days  Alcohol use: YES- 1-3 a week  NSAID/Aspirin use: no  Tobacco use: no  Worse with: apple juice, apple cider vinegar pills.    Alleviating factors:  None    Therapies Tried and outcome:chewable antacid and milk- helps temporarily  Has been on anti- inflammatories for trochanteric bursitis. Seeing [facility] and doing PT now too.   -------------------------------------    BP Readings from Last 3 Encounters:   02/18/20 106/70   01/16/20 116/78   12/30/19 114/56    Wt Readings from Last 3 Encounters:   02/18/20 93 kg (205 lb)   01/16/20 94.3 kg (208 lb)   12/30/19 90.7 kg (200 lb)                    -------------------------------------  Reviewed and updated as needed this visit by Provider  Allergies  Meds         Review of Systems   ROS COMP: Constitutional, HEENT, cardiovascular, pulmonary, gi and gu systems are negative, except as otherwise noted.      Objective    /70 (BP Location: Left arm, Patient Position: Chair, Cuff Size: Adult Large)   Pulse 79   Temp 97.8  F (36.6  C) (Temporal)   Resp 18   Wt 93 kg (205 lb)   LMP 12/10/2015   SpO2 98%   BMI 35.19 kg/m    Body mass index is 35.19 kg/m .  Physical Exam   GENERAL: healthy, alert and no distress  RESP: lungs clear to auscultation - no rales, rhonchi or wheezes  CV: regular rate and rhythm, normal S1 S2, no S3 or S4, no murmur, click or rub, no peripheral edema and peripheral pulses strong  ABDOMEN: soft,  "nontender, no hepatosplenomegaly, no masses and bowel sounds normal  SKIN: no suspicious lesions or rashes    Diagnostic Test Results:  none         Assessment & Plan     1. Heartburn  In setting of GERD  And use of NSAID   - sucralfate 1 GM PO tablet; Take 1 tablet (1 g) by mouth 4 times daily as needed (heartburn)  Dispense: 30 tablet; Refill: 1    2. Morbid obesity (H)  Updated problem list       BMI:   Estimated body mass index is 35.19 kg/m  as calculated from the following:    Height as of 1/16/20: 1.626 m (5' 4\").    Weight as of this encounter: 93 kg (205 lb).   Weight management plan: Patient was referred to their PCP to discuss a diet and exercise plan.        Reviewed diet.   Reviewed use of NSAID very sparingly with food  Carafate as above.  Hopefully as bursitis improves Heartburn will improve as well.   If not improving though then consider EGD again. - last in 2014        Return in about 3 months (around 5/18/2020) for Routine Visit, regular primary provider.    Amelia Strauss PA-C  [facility]      "

## 2020-02-27 ENCOUNTER — MYC MEDICAL ADVICE (OUTPATIENT)
Dept: INTERNAL MEDICINE | Facility: CLINIC | Age: 45
End: 2020-02-27

## 2020-02-27 NOTE — TELEPHONE ENCOUNTER
PCP please see MyChart message regarding recurrent GERD sx and further recommendations that may help provide some relief.    Tiffany ALVESN, RN, PHN

## 2020-04-20 ENCOUNTER — MYC MEDICAL ADVICE (OUTPATIENT)
Dept: INTERNAL MEDICINE | Facility: CLINIC | Age: 45
End: 2020-04-20

## 2020-04-20 DIAGNOSIS — M25.551 HIP PAIN, RIGHT: ICD-10-CM

## 2020-04-21 NOTE — TELEPHONE ENCOUNTER
Patient has been on NSAID for a long time.   At this point, should not still be needing this and it seems time to contact specialist again.   Dr. Pabon injected hip 2 months ago.   Should not still need NSAID.    See what she is using the muscle relaxant for, and how much it helps.   I will plan to refill this.

## 2020-04-22 RX ORDER — CYCLOBENZAPRINE HCL 10 MG
10 TABLET ORAL 2 TIMES DAILY PRN
Qty: 40 TABLET | Refills: 1 | Status: SHIPPED | OUTPATIENT
Start: 2020-04-22 | End: 2020-07-03

## 2020-04-23 ENCOUNTER — TRANSFERRED RECORDS (OUTPATIENT)
Dept: HEALTH INFORMATION MANAGEMENT | Facility: CLINIC | Age: 45
End: 2020-04-23

## 2020-04-30 ENCOUNTER — MYC MEDICAL ADVICE (OUTPATIENT)
Dept: INTERNAL MEDICINE | Facility: CLINIC | Age: 45
End: 2020-04-30

## 2020-04-30 DIAGNOSIS — R11.0 NAUSEA: ICD-10-CM

## 2020-04-30 RX ORDER — ONDANSETRON 8 MG/1
16 TABLET, ORALLY DISINTEGRATING ORAL EVERY 8 HOURS PRN
Qty: 60 TABLET | Refills: 5 | Status: CANCELLED | OUTPATIENT
Start: 2020-04-30

## 2020-05-03 RX ORDER — ONDANSETRON 4 MG/1
4-8 TABLET, ORALLY DISINTEGRATING ORAL EVERY 8 HOURS PRN
Qty: 50 TABLET | Refills: 1 | Status: SHIPPED | OUTPATIENT
Start: 2020-05-03 | End: 2021-04-21

## 2020-05-14 ENCOUNTER — MYC MEDICAL ADVICE (OUTPATIENT)
Dept: INTERNAL MEDICINE | Facility: CLINIC | Age: 45
End: 2020-05-14

## 2020-05-14 NOTE — TELEPHONE ENCOUNTER
PCP please see MyChart message regarding several complaints:    1) recurrent right hip/leg pain. MRI results from ortho are pending.   2) also complains of sudden left flank pain. Possible r/t recurrent kidney stone. Triage awaiting additional information, triage would recommend in clinic eval for proper imaging, correct?     Tiffany ALVESN, RN, PHN

## 2020-05-18 ENCOUNTER — TRANSFERRED RECORDS (OUTPATIENT)
Dept: HEALTH INFORMATION MANAGEMENT | Facility: CLINIC | Age: 45
End: 2020-05-18

## 2020-05-19 ENCOUNTER — TRANSFERRED RECORDS (OUTPATIENT)
Dept: HEALTH INFORMATION MANAGEMENT | Facility: CLINIC | Age: 45
End: 2020-05-19

## 2020-05-20 ENCOUNTER — DOCUMENTATION ONLY (OUTPATIENT)
Dept: LAB | Facility: CLINIC | Age: 45
End: 2020-05-20

## 2020-05-20 DIAGNOSIS — R94.6 ABNORMAL FINDING ON THYROID FUNCTION TEST: ICD-10-CM

## 2020-05-20 DIAGNOSIS — Z13.1 SCREENING FOR DIABETES MELLITUS: Primary | ICD-10-CM

## 2020-05-20 NOTE — PROGRESS NOTES
Upcoming scheduled test(s) have been reviewed.         Due to the current COVID-19 pandemic, it is felt that it would be safer for patient to defer testing at this time, until after COVID situation has improved    Please contact patient to reschedule.

## 2020-05-20 NOTE — PROGRESS NOTES
Informed pt of msg  Pt states that it is orders from [facility]  Pt is on litium and they want to topher sure her livers are ok.    informed pt if she does not hear from us to keep her appt.

## 2020-05-21 DIAGNOSIS — Z79.899 ENCOUNTER FOR LONG-TERM (CURRENT) USE OF OTHER MEDICATIONS: Primary | ICD-10-CM

## 2020-05-23 DIAGNOSIS — Z79.899 ENCOUNTER FOR LONG-TERM (CURRENT) USE OF OTHER MEDICATIONS: ICD-10-CM

## 2020-05-23 LAB
ANION GAP SERPL CALCULATED.3IONS-SCNC: 5 MMOL/L (ref 3–14)
BUN SERPL-MCNC: 19 MG/DL (ref 7–30)
CALCIUM SERPL-MCNC: 9.8 MG/DL (ref 8.5–10.1)
CHLORIDE SERPL-SCNC: 108 MMOL/L (ref 94–109)
CO2 SERPL-SCNC: 26 MMOL/L (ref 20–32)
CREAT SERPL-MCNC: 1.03 MG/DL (ref 0.52–1.04)
GFR SERPL CREATININE-BSD FRML MDRD: 66 ML/MIN/{1.73_M2}
GLUCOSE SERPL-MCNC: 106 MG/DL (ref 70–99)
LITHIUM SERPL-SCNC: 0.81 MMOL/L (ref 0.6–1.2)
POTASSIUM SERPL-SCNC: 3.7 MMOL/L (ref 3.4–5.3)
SODIUM SERPL-SCNC: 139 MMOL/L (ref 133–144)
T4 FREE SERPL-MCNC: 1.04 NG/DL (ref 0.76–1.46)
TSH SERPL DL<=0.005 MIU/L-ACNC: 3.21 MU/L (ref 0.4–4)

## 2020-05-23 PROCEDURE — 80178 ASSAY OF LITHIUM: CPT | Performed by: PHYSICIAN ASSISTANT

## 2020-05-23 PROCEDURE — 80048 BASIC METABOLIC PNL TOTAL CA: CPT | Performed by: PHYSICIAN ASSISTANT

## 2020-05-23 PROCEDURE — 84439 ASSAY OF FREE THYROXINE: CPT | Performed by: PHYSICIAN ASSISTANT

## 2020-05-23 PROCEDURE — 84443 ASSAY THYROID STIM HORMONE: CPT | Performed by: PHYSICIAN ASSISTANT

## 2020-05-23 PROCEDURE — 36415 COLL VENOUS BLD VENIPUNCTURE: CPT | Performed by: PHYSICIAN ASSISTANT

## 2020-06-02 ENCOUNTER — TRANSFERRED RECORDS (OUTPATIENT)
Dept: HEALTH INFORMATION MANAGEMENT | Facility: CLINIC | Age: 45
End: 2020-06-02

## 2020-06-02 NOTE — PROGRESS NOTES
Order(s) created erroneously. Erroneous order ID: [id number]   Order canceled by: RAVIN PALMER   Order cancel date/time: 06/02/2020 3:21 PM

## 2020-07-03 ENCOUNTER — HOSPITAL ENCOUNTER (OUTPATIENT)
Facility: CLINIC | Age: 45
Setting detail: OBSERVATION
Discharge: HOME | End: 2020-07-04
Attending: EMERGENCY MEDICINE | Admitting: UROLOGY
Payer: COMMERCIAL

## 2020-07-03 ENCOUNTER — APPOINTMENT (OUTPATIENT)
Dept: CT IMAGING | Facility: CLINIC | Age: 45
End: 2020-07-03
Attending: EMERGENCY MEDICINE
Payer: COMMERCIAL

## 2020-07-03 ENCOUNTER — NURSE TRIAGE (OUTPATIENT)
Dept: INTERNAL MEDICINE | Facility: CLINIC | Age: 45
End: 2020-07-03

## 2020-07-03 DIAGNOSIS — N20.1 URETERAL STONE: ICD-10-CM

## 2020-07-03 DIAGNOSIS — N20.0 KIDNEY STONE: ICD-10-CM

## 2020-07-03 LAB
ALBUMIN SERPL-MCNC: 4.3 G/DL (ref 3.4–5)
ALBUMIN UR-MCNC: 10 MG/DL
ALP SERPL-CCNC: 92 U/L (ref 40–150)
ALT SERPL W P-5'-P-CCNC: 28 U/L (ref 0–50)
ANION GAP SERPL CALCULATED.3IONS-SCNC: 5 MMOL/L (ref 3–14)
APPEARANCE UR: ABNORMAL
AST SERPL W P-5'-P-CCNC: 16 U/L (ref 0–45)
BASOPHILS # BLD AUTO: 0.1 10E9/L (ref 0–0.2)
BASOPHILS NFR BLD AUTO: 0.4 %
BILIRUB SERPL-MCNC: 0.4 MG/DL (ref 0.2–1.3)
BILIRUB UR QL STRIP: NEGATIVE
BUN SERPL-MCNC: 20 MG/DL (ref 7–30)
CALCIUM SERPL-MCNC: 10.3 MG/DL (ref 8.5–10.1)
CHLORIDE SERPL-SCNC: 110 MMOL/L (ref 94–109)
CO2 SERPL-SCNC: 24 MMOL/L (ref 20–32)
COLOR UR AUTO: YELLOW
CREAT SERPL-MCNC: 1.13 MG/DL (ref 0.52–1.04)
DIFFERENTIAL METHOD BLD: ABNORMAL
EOSINOPHIL # BLD AUTO: 0.2 10E9/L (ref 0–0.7)
EOSINOPHIL NFR BLD AUTO: 1.9 %
ERYTHROCYTE [DISTWIDTH] IN BLOOD BY AUTOMATED COUNT: 12.8 % (ref 10–15)
GFR SERPL CREATININE-BSD FRML MDRD: 59 ML/MIN/{1.73_M2}
GLUCOSE SERPL-MCNC: 109 MG/DL (ref 70–99)
GLUCOSE UR STRIP-MCNC: NEGATIVE MG/DL
HCT VFR BLD AUTO: 43.7 % (ref 35–47)
HGB BLD-MCNC: 14.4 G/DL (ref 11.7–15.7)
HGB UR QL STRIP: ABNORMAL
IMM GRANULOCYTES # BLD: 0 10E9/L (ref 0–0.4)
IMM GRANULOCYTES NFR BLD: 0.2 %
KETONES UR STRIP-MCNC: NEGATIVE MG/DL
LACTATE BLD-SCNC: 0.8 MMOL/L (ref 0.7–2)
LEUKOCYTE ESTERASE UR QL STRIP: NEGATIVE
LYMPHOCYTES # BLD AUTO: 2.9 10E9/L (ref 0.8–5.3)
LYMPHOCYTES NFR BLD AUTO: 23.2 %
MCH RBC QN AUTO: 30.3 PG (ref 26.5–33)
MCHC RBC AUTO-ENTMCNC: 33 G/DL (ref 31.5–36.5)
MCV RBC AUTO: 92 FL (ref 78–100)
MONOCYTES # BLD AUTO: 0.8 10E9/L (ref 0–1.3)
MONOCYTES NFR BLD AUTO: 6.7 %
NEUTROPHILS # BLD AUTO: 8.5 10E9/L (ref 1.6–8.3)
NEUTROPHILS NFR BLD AUTO: 67.6 %
NITRATE UR QL: NEGATIVE
NRBC # BLD AUTO: 0 10*3/UL
NRBC BLD AUTO-RTO: 0 /100
PH UR STRIP: 6.5 PH (ref 5–7)
PLATELET # BLD AUTO: 401 10E9/L (ref 150–450)
POTASSIUM SERPL-SCNC: 4.1 MMOL/L (ref 3.4–5.3)
PROT SERPL-MCNC: 8.2 G/DL (ref 6.8–8.8)
RBC # BLD AUTO: 4.76 10E12/L (ref 3.8–5.2)
RBC #/AREA URNS AUTO: 115 /HPF (ref 0–2)
SODIUM SERPL-SCNC: 139 MMOL/L (ref 133–144)
SOURCE: ABNORMAL
SP GR UR STRIP: 1.03 (ref 1–1.03)
SQUAMOUS #/AREA URNS AUTO: 7 /HPF (ref 0–1)
UROBILINOGEN UR STRIP-MCNC: NORMAL MG/DL (ref 0–2)
WBC # BLD AUTO: 12.5 10E9/L (ref 4–11)
WBC #/AREA URNS AUTO: 0 /HPF (ref 0–5)

## 2020-07-03 PROCEDURE — 99207 ZZC DOWN CODE DUE TO INITIAL EXAM: CPT | Performed by: HOSPITALIST

## 2020-07-03 PROCEDURE — 96376 TX/PRO/DX INJ SAME DRUG ADON: CPT

## 2020-07-03 PROCEDURE — G0378 HOSPITAL OBSERVATION PER HR: HCPCS

## 2020-07-03 PROCEDURE — 36415 COLL VENOUS BLD VENIPUNCTURE: CPT | Performed by: HOSPITALIST

## 2020-07-03 PROCEDURE — 25000128 H RX IP 250 OP 636: Performed by: HOSPITALIST

## 2020-07-03 PROCEDURE — 96375 TX/PRO/DX INJ NEW DRUG ADDON: CPT

## 2020-07-03 PROCEDURE — C9803 HOPD COVID-19 SPEC COLLECT: HCPCS

## 2020-07-03 PROCEDURE — U0003 INFECTIOUS AGENT DETECTION BY NUCLEIC ACID (DNA OR RNA); SEVERE ACUTE RESPIRATORY SYNDROME CORONAVIRUS 2 (SARS-COV-2) (CORONAVIRUS DISEASE [COVID-19]), AMPLIFIED PROBE TECHNIQUE, MAKING USE OF HIGH THROUGHPUT TECHNOLOGIES AS DESCRIBED BY CMS-2020-01-R: HCPCS | Performed by: EMERGENCY MEDICINE

## 2020-07-03 PROCEDURE — 85025 COMPLETE CBC W/AUTO DIFF WBC: CPT | Performed by: EMERGENCY MEDICINE

## 2020-07-03 PROCEDURE — 25000128 H RX IP 250 OP 636: Performed by: EMERGENCY MEDICINE

## 2020-07-03 PROCEDURE — 80053 COMPREHEN METABOLIC PANEL: CPT | Performed by: EMERGENCY MEDICINE

## 2020-07-03 PROCEDURE — 99218 ZZC INITIAL OBSERVATION CARE,LEVL I: CPT | Performed by: HOSPITALIST

## 2020-07-03 PROCEDURE — 96361 HYDRATE IV INFUSION ADD-ON: CPT

## 2020-07-03 PROCEDURE — 83605 ASSAY OF LACTIC ACID: CPT | Performed by: HOSPITALIST

## 2020-07-03 PROCEDURE — 25800030 ZZH RX IP 258 OP 636: Performed by: EMERGENCY MEDICINE

## 2020-07-03 PROCEDURE — 81001 URINALYSIS AUTO W/SCOPE: CPT | Performed by: EMERGENCY MEDICINE

## 2020-07-03 PROCEDURE — 96374 THER/PROPH/DIAG INJ IV PUSH: CPT

## 2020-07-03 PROCEDURE — 99285 EMERGENCY DEPT VISIT HI MDM: CPT | Mod: 25

## 2020-07-03 PROCEDURE — 74176 CT ABD & PELVIS W/O CONTRAST: CPT

## 2020-07-03 RX ORDER — KETOROLAC TROMETHAMINE 15 MG/ML
15 INJECTION, SOLUTION INTRAMUSCULAR; INTRAVENOUS EVERY 6 HOURS PRN
Status: DISCONTINUED | OUTPATIENT
Start: 2020-07-03 | End: 2020-07-04 | Stop reason: HOSPADM

## 2020-07-03 RX ORDER — LITHIUM CARBONATE 300 MG/1
600 TABLET, FILM COATED, EXTENDED RELEASE ORAL AT BEDTIME
Status: DISCONTINUED | OUTPATIENT
Start: 2020-07-03 | End: 2020-07-04 | Stop reason: HOSPADM

## 2020-07-03 RX ORDER — HYDROMORPHONE HYDROCHLORIDE 1 MG/ML
.2-.3 INJECTION, SOLUTION INTRAMUSCULAR; INTRAVENOUS; SUBCUTANEOUS
Status: DISCONTINUED | OUTPATIENT
Start: 2020-07-03 | End: 2020-07-04 | Stop reason: HOSPADM

## 2020-07-03 RX ORDER — HYDROCODONE BITARTRATE AND ACETAMINOPHEN 5; 325 MG/1; MG/1
1-2 TABLET ORAL EVERY 4 HOURS PRN
Status: DISCONTINUED | OUTPATIENT
Start: 2020-07-03 | End: 2020-07-04 | Stop reason: HOSPADM

## 2020-07-03 RX ORDER — MULTIPLE VITAMINS W/ MINERALS TAB 9MG-400MCG
1 TAB ORAL DAILY
COMMUNITY
End: 2021-08-04

## 2020-07-03 RX ORDER — ALBUTEROL SULFATE 90 UG/1
2 AEROSOL, METERED RESPIRATORY (INHALATION) EVERY 6 HOURS PRN
Status: DISCONTINUED | OUTPATIENT
Start: 2020-07-03 | End: 2020-07-04 | Stop reason: HOSPADM

## 2020-07-03 RX ORDER — ONDANSETRON 2 MG/ML
4 INJECTION INTRAMUSCULAR; INTRAVENOUS
Status: COMPLETED | OUTPATIENT
Start: 2020-07-03 | End: 2020-07-03

## 2020-07-03 RX ORDER — AMOXICILLIN 250 MG
2 CAPSULE ORAL 2 TIMES DAILY PRN
Status: DISCONTINUED | OUTPATIENT
Start: 2020-07-03 | End: 2020-07-04 | Stop reason: HOSPADM

## 2020-07-03 RX ORDER — POLYETHYLENE GLYCOL 3350 17 G/17G
17 POWDER, FOR SOLUTION ORAL DAILY PRN
Status: DISCONTINUED | OUTPATIENT
Start: 2020-07-03 | End: 2020-07-04 | Stop reason: HOSPADM

## 2020-07-03 RX ORDER — TAMSULOSIN HYDROCHLORIDE 0.4 MG/1
0.4 CAPSULE ORAL DAILY
Status: DISCONTINUED | OUTPATIENT
Start: 2020-07-03 | End: 2020-07-04 | Stop reason: HOSPADM

## 2020-07-03 RX ORDER — MULTIVIT WITH MINERALS/LUTEIN
4000 TABLET ORAL DAILY
COMMUNITY
End: 2022-06-11

## 2020-07-03 RX ORDER — BISACODYL 10 MG
10 SUPPOSITORY, RECTAL RECTAL DAILY PRN
Status: DISCONTINUED | OUTPATIENT
Start: 2020-07-03 | End: 2020-07-04 | Stop reason: HOSPADM

## 2020-07-03 RX ORDER — ACETAMINOPHEN 650 MG/1
650 SUPPOSITORY RECTAL EVERY 4 HOURS PRN
Status: DISCONTINUED | OUTPATIENT
Start: 2020-07-03 | End: 2020-07-04 | Stop reason: HOSPADM

## 2020-07-03 RX ORDER — LAMOTRIGINE 100 MG/1
200 TABLET ORAL 2 TIMES DAILY
Status: DISCONTINUED | OUTPATIENT
Start: 2020-07-03 | End: 2020-07-04 | Stop reason: HOSPADM

## 2020-07-03 RX ORDER — SODIUM CHLORIDE, SODIUM LACTATE, POTASSIUM CHLORIDE, CALCIUM CHLORIDE 600; 310; 30; 20 MG/100ML; MG/100ML; MG/100ML; MG/100ML
INJECTION, SOLUTION INTRAVENOUS CONTINUOUS
Status: DISCONTINUED | OUTPATIENT
Start: 2020-07-03 | End: 2020-07-04 | Stop reason: HOSPADM

## 2020-07-03 RX ORDER — PROPRANOLOL HYDROCHLORIDE 20 MG/1
20 TABLET ORAL DAILY PRN
COMMUNITY
End: 2021-08-04

## 2020-07-03 RX ORDER — HYDROMORPHONE HYDROCHLORIDE 1 MG/ML
0.5 INJECTION, SOLUTION INTRAMUSCULAR; INTRAVENOUS; SUBCUTANEOUS
Status: DISCONTINUED | OUTPATIENT
Start: 2020-07-03 | End: 2020-07-03

## 2020-07-03 RX ORDER — ONDANSETRON 2 MG/ML
4 INJECTION INTRAMUSCULAR; INTRAVENOUS EVERY 6 HOURS PRN
Status: DISCONTINUED | OUTPATIENT
Start: 2020-07-03 | End: 2020-07-04 | Stop reason: HOSPADM

## 2020-07-03 RX ORDER — NALOXONE HYDROCHLORIDE 0.4 MG/ML
.1-.4 INJECTION, SOLUTION INTRAMUSCULAR; INTRAVENOUS; SUBCUTANEOUS
Status: DISCONTINUED | OUTPATIENT
Start: 2020-07-03 | End: 2020-07-04 | Stop reason: HOSPADM

## 2020-07-03 RX ORDER — ONDANSETRON 4 MG/1
4 TABLET, ORALLY DISINTEGRATING ORAL EVERY 6 HOURS PRN
Status: DISCONTINUED | OUTPATIENT
Start: 2020-07-03 | End: 2020-07-04 | Stop reason: HOSPADM

## 2020-07-03 RX ORDER — LIDOCAINE 40 MG/G
CREAM TOPICAL
Status: DISCONTINUED | OUTPATIENT
Start: 2020-07-03 | End: 2020-07-04 | Stop reason: HOSPADM

## 2020-07-03 RX ORDER — KETOROLAC TROMETHAMINE 15 MG/ML
15 INJECTION, SOLUTION INTRAMUSCULAR; INTRAVENOUS ONCE
Status: COMPLETED | OUTPATIENT
Start: 2020-07-03 | End: 2020-07-03

## 2020-07-03 RX ORDER — ACETAMINOPHEN 325 MG/1
650 TABLET ORAL EVERY 4 HOURS PRN
Status: DISCONTINUED | OUTPATIENT
Start: 2020-07-03 | End: 2020-07-04 | Stop reason: HOSPADM

## 2020-07-03 RX ORDER — MORPHINE SULFATE 4 MG/ML
4 INJECTION, SOLUTION INTRAMUSCULAR; INTRAVENOUS
Status: COMPLETED | OUTPATIENT
Start: 2020-07-03 | End: 2020-07-03

## 2020-07-03 RX ORDER — BUPROPION HYDROCHLORIDE 300 MG/1
300 TABLET ORAL DAILY
Status: DISCONTINUED | OUTPATIENT
Start: 2020-07-04 | End: 2020-07-04 | Stop reason: HOSPADM

## 2020-07-03 RX ORDER — LANOLIN ALCOHOL/MO/W.PET/CERES
3 CREAM (GRAM) TOPICAL
Status: DISCONTINUED | OUTPATIENT
Start: 2020-07-03 | End: 2020-07-04 | Stop reason: HOSPADM

## 2020-07-03 RX ORDER — AMPICILLIN TRIHYDRATE 250 MG
1 CAPSULE ORAL DAILY
COMMUNITY
End: 2021-04-21

## 2020-07-03 RX ORDER — AMOXICILLIN 250 MG
1 CAPSULE ORAL 2 TIMES DAILY PRN
Status: DISCONTINUED | OUTPATIENT
Start: 2020-07-03 | End: 2020-07-04 | Stop reason: HOSPADM

## 2020-07-03 RX ORDER — IBUPROFEN 600 MG/1
600 TABLET, FILM COATED ORAL EVERY 6 HOURS PRN
Status: DISCONTINUED | OUTPATIENT
Start: 2020-07-03 | End: 2020-07-04 | Stop reason: HOSPADM

## 2020-07-03 RX ADMIN — ONDANSETRON 4 MG: 2 INJECTION INTRAMUSCULAR; INTRAVENOUS at 23:56

## 2020-07-03 RX ADMIN — HYDROMORPHONE HYDROCHLORIDE 0.3 MG: 1 INJECTION, SOLUTION INTRAMUSCULAR; INTRAVENOUS; SUBCUTANEOUS at 23:56

## 2020-07-03 RX ADMIN — MORPHINE SULFATE 4 MG: 4 INJECTION INTRAVENOUS at 17:14

## 2020-07-03 RX ADMIN — SODIUM CHLORIDE 1000 ML: 9 INJECTION, SOLUTION INTRAVENOUS at 17:15

## 2020-07-03 RX ADMIN — KETOROLAC TROMETHAMINE 15 MG: 15 INJECTION, SOLUTION INTRAMUSCULAR; INTRAVENOUS at 17:37

## 2020-07-03 RX ADMIN — HYDROMORPHONE HYDROCHLORIDE 0.5 MG: 1 INJECTION, SOLUTION INTRAMUSCULAR; INTRAVENOUS; SUBCUTANEOUS at 20:45

## 2020-07-03 RX ADMIN — HYDROMORPHONE HYDROCHLORIDE 1 MG: 1 INJECTION, SOLUTION INTRAMUSCULAR; INTRAVENOUS; SUBCUTANEOUS at 17:37

## 2020-07-03 RX ADMIN — ONDANSETRON 4 MG: 2 INJECTION INTRAMUSCULAR; INTRAVENOUS at 17:14

## 2020-07-03 ASSESSMENT — ENCOUNTER SYMPTOMS: FLANK PAIN: 1

## 2020-07-03 ASSESSMENT — MIFFLIN-ST. JEOR: SCORE: 1537.19

## 2020-07-03 NOTE — ED PROVIDER NOTES
History     Chief Complaint:  Flank Pain; Shortness of Breath; and Chills    HPI   Lena Morrow is a 45 year old female with history of kidney stone several years ago, who presents with left flank pain.  Pain started this morning, has been progressively worsening throughout the day.  It is severe, did not respond to initial dose of morphine.  She states that in the past, her stone passed without intervention.  She has had nausea and vomiting associated with the pain.      Allergies:  Amoxicillin  Aripiprazole  Ativan  Augmentin [Amoxicillin-Pot Clavulanate]  Azithromycin  Clonazepam  Estrogens  Lexapro  Mirtazapine  Oxycodone  Prochlorperazine  Risperdal [Risperidone]  Seasonal Allergies  Seroquel [Quetiapine]  Sertraline  Venlafaxine  Xanax [Alprazolam]     Medications:    Albuterol   Wellbutrin   Flexeril   Nexium   Lamictal   Lithobid   Loratadine   Zofran   Feldene   Inderal   Valtrex      Past Medical History:    Abnormal pap   Allergic rhinitis cause unspecified  Arrhythmia    Asthma   Atrial flutter, paroxysmal   C. Difficile colitis   Cholelithiasis   Dyspepsia   Endometriosis   GERD  Herpes simplex without mention of complication   Kidney stones, calcium oxalate monohydrate   Low grade squamous intraepithelial lesion on pap smear  Major depression   Mild intermittent asthma   Obesity   Other chronic pain   Other forms of migraine, without mention of intractable migraine without mention of status migrainosus  Palpitations   Panic disorder without agoraphobia     Past Surgical History:    Tonsillectomy   Open right carpal tunnel release. Excision right dorsal carpal ganglion cyst. Excision, terminal branch, right posterior interosseous nerve   Colonoscopy    Cystoscopy   DaVinci hysterectomy total, salpingectomy bilateral; robotic assisted laparoscopic total hysterectomy; bilateral salpingectomy; cystoscopy  Dilation and curettage   ENT surgery   GYN surgery   HC colp cervix/upper vagina W BX  "cervix  Hysterectomy   Laparoscopic cholecystectomy   Laparoscopy diagnostic   Carpal tunnel with ganglian cyst removal     Family History:    Cancer  Alcohol/drug  Diabetes   Hypertension   Depression   Allergies   Respiratory  Substance abuse     Social History:  Smoking status: No  Alcohol use: Yes  Drug use: No  PCP: Trevon Andressa  Presents to the ED by her self  Marital Status:   [2]       Review of Systems   Constitutional: Positive for diaphoresis. Negative for chills and fever.   Cardiovascular: Negative for chest pain and palpitations.   Gastrointestinal: Positive for nausea and vomiting. Negative for diarrhea.   Genitourinary: Positive for flank pain. Negative for hematuria and urgency.   Neurological: Negative for syncope.   All other systems reviewed and are negative.    Physical Exam     Patient Vitals for the past 24 hrs:   BP Temp Temp src Pulse Resp SpO2 Height Weight   07/03/20 1649 (!) 150/92 96.2  F (35.7  C) Temporal 71 24 100 % 1.626 m (5' 4\") 90.7 kg (200 lb)       Physical Exam  General: In severe pain, seated upright in the bed, accompanied by .  HENT: mucous membranes moist  CV: regular rate, regular rhythm  Resp: normal effort, clear throughout, no crackles or wheezing  GI: abdomen soft and nontender, no guarding.  L CVA tenderness.  MSK: no bony tenderness  Skin: appropriately warm and dry  Neuro: alert, clear speech, oriented  Psych: normal mood and affect      Emergency Department Course     Imaging:  Radiology findings were communicated with the patient who voiced understanding of the findings.    CT Abd/pelvis without contrast:  IMPRESSION:   1.  There are at least two obstructing stones in the distal left   ureter, with the largest measuring 0.6 cm, causing mild to moderate   left hydronephrosis.   2.  There are two nonobstructing stones in the lower pole of the left   kidney, with the largest measuring 0.6 cm.   3.  Small fat-containing spigelian hernia in the right lower " quadrant,   new since the previous exam.     Imaging independently reviewed and agree with radiologist interpretation.     Laboratory:  Laboratory findings were communicated with the patient who voiced understanding of the findings.    CBC: WBC 12.5 (H), o/w WNL (HGB 14.4, )    CMP: chloride 110 (H), glucose 109 (H), GFR 59 (L), calcium 10.3 (H), Creatinine 1.13 (H), o/w WNL    UA: urine blood present, protein albumin urine 10, RBC/ (H), squamous epithelial/HPF urine 7 (H), o/w Negative    Interventions:  1714: Morphine 4 mg IV  1714: Zofran 4 mg IV  1715: NS 1L IV Bolus   1737: Toradol 15 mg IV  1737: Dilaudid 1 mg IV  2045: Dilaudid 0.5 mg IV    Emergency Department Course:  Past medical records, nursing notes, and vitals reviewed.    1710 I performed an exam of the patient as documented above.     IV was inserted and blood was drawn for laboratory testing, results above.  The patient provided a urine sample here in the emergency department. This was sent for laboratory testing, findings above.  The patient was sent for a CT while in the emergency department, results above.     2150: I spoke to Dr. Mcclain on-call for hospitalist.     I personally reviewed the laboratory  and imaging results with the Patient and answered all related questions prior to admission.     Impression & Plan     Medical Decision Making:  Lena Morrow is a 45-year-old female with history of kidney stones, as well as multiple other medical problems who presents today with left-sided flank pain.  Patient was in severe pain on arrival to the ED.  Pain is been difficult to control.  Work-up in the ED confirms the presence of 2 stones the largest being 6 mm, in the left ureter.  She has proximal hydronephrosis.  Creatinine today is 1.13 which is slightly elevated from baseline.  She has a mild leukocytosis likely an acute pain response.  UA is negative for infection, but has expected hematuria.  CT scan is otherwise negative for  another cause of the patient's symptoms.  Given ongoing pain as well as size of the stones, the patient will be brought in for evaluation by urology as well as pain control.    Diagnosis:    ICD-10-CM    1. Kidney stone  N20.0 UA with Microscopic       Disposition:  Admitted to adult med/surg.    Scribe Disclosure:  JC, Norris Edna, am serving as a scribe at 5:10 PM on 7/3/2020 to document services personally performed by Terrie Sr MD based on my observations and the provider's statements to me.        Terrie Sr MD  07/04/20 0018

## 2020-07-03 NOTE — ED TRIAGE NOTES
Left flank pain started at 1000. Similar to when pt had kidney stone in past. Diaphoretic. Denies urinary changes. No N/V. Hyperventilating.

## 2020-07-03 NOTE — TELEPHONE ENCOUNTER
"Patient called reporting severe left flank pain today.  Has hx of kidney stones. Denies dysuria, hematuria or vomiting. Advised she seek care ER for further evaluation and Tx.     Reason for Disposition    SEVERE pain (e.g., excruciating, scale 8-10) and present > 1 hour    Additional Information    Negative: Passed out (i.e., lost consciousness, collapsed and was not responding)    Negative: Shock suspected (e.g., cold/pale/clammy skin, too weak to stand, low BP, rapid pulse)    Negative: Sounds like a life-threatening emergency to the triager    Negative: Followed a major injury to the back (e.g., MVA, fall > 10 feet or 3 meters, penetrating injury, etc.)    Negative: Upper, mid or lower back pain that occurs mainly in the midline    Answer Assessment - Initial Assessment Questions  1. LOCATION: \"Where does it hurt?\" (e.g., left, right)      Left low back  2. ONSET: \"When did the pain start?\"      today  3. SEVERITY: \"How bad is the pain?\" (e.g., Scale 1-10; mild, moderate, or severe)    - MILD (1-3): doesn't interfere with normal activities     - MODERATE (4-7): interferes with normal activities or awakens from sleep     - SEVERE (8-10): excruciating pain and patient unable to do normal activities (stays in bed)        Severe pain rating pain 15  4. PATTERN: \"Does the pain come and go, or is it constant?\"       constant  5. CAUSE: \"What do you think is causing the pain?\"        6. OTHER SYMPTOMS:  \"Do you have any other symptoms?\" (e.g., fever, abdominal pain, vomiting, leg weakness, burning with urination, blood in urine)      Pain and vaginal discomfort  7. PREGNANCY:  \"Is there any chance you are pregnant?\" \"When was your last menstrual period?\"    Protocols used: FLANK PAIN-A-OH    "

## 2020-07-04 ENCOUNTER — APPOINTMENT (OUTPATIENT)
Dept: GENERAL RADIOLOGY | Facility: CLINIC | Age: 45
End: 2020-07-04
Attending: HOSPITALIST
Payer: COMMERCIAL

## 2020-07-04 ENCOUNTER — ANESTHESIA EVENT (OUTPATIENT)
Dept: SURGERY | Facility: CLINIC | Age: 45
End: 2020-07-04
Payer: COMMERCIAL

## 2020-07-04 ENCOUNTER — ANESTHESIA (OUTPATIENT)
Dept: SURGERY | Facility: CLINIC | Age: 45
End: 2020-07-04
Payer: COMMERCIAL

## 2020-07-04 VITALS
TEMPERATURE: 98 F | DIASTOLIC BLOOD PRESSURE: 78 MMHG | RESPIRATION RATE: 16 BRPM | WEIGHT: 202.8 LBS | HEIGHT: 64 IN | OXYGEN SATURATION: 94 % | SYSTOLIC BLOOD PRESSURE: 112 MMHG | BODY MASS INDEX: 34.62 KG/M2 | HEART RATE: 72 BPM

## 2020-07-04 DIAGNOSIS — N20.1 URETERAL STONE: Primary | ICD-10-CM

## 2020-07-04 LAB
ANION GAP SERPL CALCULATED.3IONS-SCNC: 4 MMOL/L (ref 3–14)
BASOPHILS # BLD AUTO: 0 10E9/L (ref 0–0.2)
BASOPHILS NFR BLD AUTO: 0.3 %
BUN SERPL-MCNC: 21 MG/DL (ref 7–30)
CALCIUM SERPL-MCNC: 9 MG/DL (ref 8.5–10.1)
CHLORIDE SERPL-SCNC: 111 MMOL/L (ref 94–109)
CO2 SERPL-SCNC: 27 MMOL/L (ref 20–32)
CREAT SERPL-MCNC: 1.35 MG/DL (ref 0.52–1.04)
DIFFERENTIAL METHOD BLD: ABNORMAL
EOSINOPHIL # BLD AUTO: 0.1 10E9/L (ref 0–0.7)
EOSINOPHIL NFR BLD AUTO: 0.9 %
ERYTHROCYTE [DISTWIDTH] IN BLOOD BY AUTOMATED COUNT: 12.8 % (ref 10–15)
GFR SERPL CREATININE-BSD FRML MDRD: 47 ML/MIN/{1.73_M2}
GLUCOSE SERPL-MCNC: 101 MG/DL (ref 70–99)
HCT VFR BLD AUTO: 40.6 % (ref 35–47)
HGB BLD-MCNC: 12.6 G/DL (ref 11.7–15.7)
IMM GRANULOCYTES # BLD: 0 10E9/L (ref 0–0.4)
IMM GRANULOCYTES NFR BLD: 0.2 %
LYMPHOCYTES # BLD AUTO: 1.5 10E9/L (ref 0.8–5.3)
LYMPHOCYTES NFR BLD AUTO: 14.1 %
MCH RBC QN AUTO: 29.2 PG (ref 26.5–33)
MCHC RBC AUTO-ENTMCNC: 31 G/DL (ref 31.5–36.5)
MCV RBC AUTO: 94 FL (ref 78–100)
MONOCYTES # BLD AUTO: 0.8 10E9/L (ref 0–1.3)
MONOCYTES NFR BLD AUTO: 7.4 %
NEUTROPHILS # BLD AUTO: 8 10E9/L (ref 1.6–8.3)
NEUTROPHILS NFR BLD AUTO: 77.1 %
NRBC # BLD AUTO: 0 10*3/UL
NRBC BLD AUTO-RTO: 0 /100
PLATELET # BLD AUTO: 265 10E9/L (ref 150–450)
POTASSIUM SERPL-SCNC: 3.7 MMOL/L (ref 3.4–5.3)
RBC # BLD AUTO: 4.32 10E12/L (ref 3.8–5.2)
SARS-COV-2 RNA SPEC QL NAA+PROBE: NOT DETECTED
SODIUM SERPL-SCNC: 142 MMOL/L (ref 133–144)
SPECIMEN SOURCE: NORMAL
WBC # BLD AUTO: 10.4 10E9/L (ref 4–11)

## 2020-07-04 PROCEDURE — 37000008 ZZH ANESTHESIA TECHNICAL FEE, 1ST 30 MIN: Performed by: UROLOGY

## 2020-07-04 PROCEDURE — 25800030 ZZH RX IP 258 OP 636: Performed by: NURSE ANESTHETIST, CERTIFIED REGISTERED

## 2020-07-04 PROCEDURE — 36000056 ZZH SURGERY LEVEL 3 1ST 30 MIN: Performed by: UROLOGY

## 2020-07-04 PROCEDURE — 27210794 ZZH OR GENERAL SUPPLY STERILE: Performed by: UROLOGY

## 2020-07-04 PROCEDURE — 88300 SURGICAL PATH GROSS: CPT | Mod: 26 | Performed by: UROLOGY

## 2020-07-04 PROCEDURE — 37000009 ZZH ANESTHESIA TECHNICAL FEE, EACH ADDTL 15 MIN: Performed by: UROLOGY

## 2020-07-04 PROCEDURE — 82365 CALCULUS SPECTROSCOPY: CPT | Performed by: UROLOGY

## 2020-07-04 PROCEDURE — 25000128 H RX IP 250 OP 636: Performed by: NURSE ANESTHETIST, CERTIFIED REGISTERED

## 2020-07-04 PROCEDURE — 96375 TX/PRO/DX INJ NEW DRUG ADDON: CPT

## 2020-07-04 PROCEDURE — C1769 GUIDE WIRE: HCPCS | Performed by: UROLOGY

## 2020-07-04 PROCEDURE — 25800030 ZZH RX IP 258 OP 636: Performed by: HOSPITALIST

## 2020-07-04 PROCEDURE — C1894 INTRO/SHEATH, NON-LASER: HCPCS | Performed by: UROLOGY

## 2020-07-04 PROCEDURE — 25000132 ZZH RX MED GY IP 250 OP 250 PS 637: Performed by: HOSPITALIST

## 2020-07-04 PROCEDURE — 71000012 ZZH RECOVERY PHASE 1 LEVEL 1 FIRST HR: Performed by: UROLOGY

## 2020-07-04 PROCEDURE — 25800030 ZZH RX IP 258 OP 636: Performed by: ANESTHESIOLOGY

## 2020-07-04 PROCEDURE — 74420 UROGRAPHY RTRGR +-KUB: CPT | Mod: 26 | Performed by: UROLOGY

## 2020-07-04 PROCEDURE — 25000125 ZZHC RX 250: Performed by: UROLOGY

## 2020-07-04 PROCEDURE — 36415 COLL VENOUS BLD VENIPUNCTURE: CPT | Performed by: HOSPITALIST

## 2020-07-04 PROCEDURE — C2617 STENT, NON-COR, TEM W/O DEL: HCPCS | Performed by: UROLOGY

## 2020-07-04 PROCEDURE — 88300 SURGICAL PATH GROSS: CPT | Performed by: UROLOGY

## 2020-07-04 PROCEDURE — 80048 BASIC METABOLIC PNL TOTAL CA: CPT | Performed by: HOSPITALIST

## 2020-07-04 PROCEDURE — 25000128 H RX IP 250 OP 636: Performed by: UROLOGY

## 2020-07-04 PROCEDURE — 40000170 ZZH STATISTIC PRE-PROCEDURE ASSESSMENT II: Performed by: UROLOGY

## 2020-07-04 PROCEDURE — 88300 SURGICAL PATH GROSS: CPT | Mod: 26 | Performed by: PATHOLOGY

## 2020-07-04 PROCEDURE — 99217 ZZC OBSERVATION CARE DISCHARGE: CPT | Performed by: INTERNAL MEDICINE

## 2020-07-04 PROCEDURE — 25000128 H RX IP 250 OP 636: Performed by: HOSPITALIST

## 2020-07-04 PROCEDURE — 52356 CYSTO/URETERO W/LITHOTRIPSY: CPT | Mod: LT | Performed by: UROLOGY

## 2020-07-04 PROCEDURE — G0378 HOSPITAL OBSERVATION PER HR: HCPCS

## 2020-07-04 PROCEDURE — 99213 OFFICE O/P EST LOW 20 MIN: CPT | Mod: 25 | Performed by: UROLOGY

## 2020-07-04 PROCEDURE — 85025 COMPLETE CBC W/AUTO DIFF WBC: CPT | Performed by: HOSPITALIST

## 2020-07-04 PROCEDURE — 36000058 ZZH SURGERY LEVEL 3 EA 15 ADDTL MIN: Performed by: UROLOGY

## 2020-07-04 PROCEDURE — 40000278 XR SURGERY CARM FLUORO LESS THAN 5 MIN

## 2020-07-04 PROCEDURE — 25000566 ZZH SEVOFLURANE, EA 15 MIN: Performed by: UROLOGY

## 2020-07-04 PROCEDURE — 25000125 ZZHC RX 250: Performed by: NURSE ANESTHETIST, CERTIFIED REGISTERED

## 2020-07-04 PROCEDURE — 96376 TX/PRO/DX INJ SAME DRUG ADON: CPT

## 2020-07-04 PROCEDURE — 71000027 ZZH RECOVERY PHASE 2 EACH 15 MINS: Performed by: UROLOGY

## 2020-07-04 DEVICE — STENT URETERAL PERCUFLEX PLUS 4.8FRX26CM: Type: IMPLANTABLE DEVICE | Site: URETHRA | Status: FUNCTIONAL

## 2020-07-04 RX ORDER — NALOXONE HYDROCHLORIDE 0.4 MG/ML
.1-.4 INJECTION, SOLUTION INTRAMUSCULAR; INTRAVENOUS; SUBCUTANEOUS
Status: DISCONTINUED | OUTPATIENT
Start: 2020-07-04 | End: 2020-07-04 | Stop reason: HOSPADM

## 2020-07-04 RX ORDER — ATROPA BELLADONNA AND OPIUM 16.2; 3 MG/1; MG/1
SUPPOSITORY RECTAL PRN
Status: DISCONTINUED | OUTPATIENT
Start: 2020-07-04 | End: 2020-07-04 | Stop reason: HOSPADM

## 2020-07-04 RX ORDER — ONDANSETRON 2 MG/ML
4 INJECTION INTRAMUSCULAR; INTRAVENOUS EVERY 30 MIN PRN
Status: DISCONTINUED | OUTPATIENT
Start: 2020-07-04 | End: 2020-07-04 | Stop reason: HOSPADM

## 2020-07-04 RX ORDER — DEXAMETHASONE SODIUM PHOSPHATE 4 MG/ML
INJECTION, SOLUTION INTRA-ARTICULAR; INTRALESIONAL; INTRAMUSCULAR; INTRAVENOUS; SOFT TISSUE PRN
Status: DISCONTINUED | OUTPATIENT
Start: 2020-07-04 | End: 2020-07-04

## 2020-07-04 RX ORDER — CEFAZOLIN SODIUM 1 G
1 VIAL (EA) INJECTION SEE ADMIN INSTRUCTIONS
Status: CANCELLED | OUTPATIENT
Start: 2020-07-04

## 2020-07-04 RX ORDER — PROPOFOL 10 MG/ML
INJECTION, EMULSION INTRAVENOUS PRN
Status: DISCONTINUED | OUTPATIENT
Start: 2020-07-04 | End: 2020-07-04

## 2020-07-04 RX ORDER — HYDRALAZINE HYDROCHLORIDE 20 MG/ML
2.5-5 INJECTION INTRAMUSCULAR; INTRAVENOUS EVERY 10 MIN PRN
Status: DISCONTINUED | OUTPATIENT
Start: 2020-07-04 | End: 2020-07-04 | Stop reason: HOSPADM

## 2020-07-04 RX ORDER — EPHEDRINE SULFATE 50 MG/ML
INJECTION, SOLUTION INTRAMUSCULAR; INTRAVENOUS; SUBCUTANEOUS PRN
Status: DISCONTINUED | OUTPATIENT
Start: 2020-07-04 | End: 2020-07-04

## 2020-07-04 RX ORDER — CEFAZOLIN SODIUM 2 G/100ML
2 INJECTION, SOLUTION INTRAVENOUS
Status: COMPLETED | OUTPATIENT
Start: 2020-07-04 | End: 2020-07-04

## 2020-07-04 RX ORDER — KETOROLAC TROMETHAMINE 30 MG/ML
INJECTION, SOLUTION INTRAMUSCULAR; INTRAVENOUS PRN
Status: DISCONTINUED | OUTPATIENT
Start: 2020-07-04 | End: 2020-07-04

## 2020-07-04 RX ORDER — SODIUM CHLORIDE, SODIUM LACTATE, POTASSIUM CHLORIDE, CALCIUM CHLORIDE 600; 310; 30; 20 MG/100ML; MG/100ML; MG/100ML; MG/100ML
INJECTION, SOLUTION INTRAVENOUS CONTINUOUS
Status: DISCONTINUED | OUTPATIENT
Start: 2020-07-04 | End: 2020-07-04 | Stop reason: HOSPADM

## 2020-07-04 RX ORDER — FENTANYL CITRATE 50 UG/ML
25-50 INJECTION, SOLUTION INTRAMUSCULAR; INTRAVENOUS
Status: DISCONTINUED | OUTPATIENT
Start: 2020-07-04 | End: 2020-07-04 | Stop reason: HOSPADM

## 2020-07-04 RX ORDER — FENTANYL CITRATE 50 UG/ML
INJECTION, SOLUTION INTRAMUSCULAR; INTRAVENOUS PRN
Status: DISCONTINUED | OUTPATIENT
Start: 2020-07-04 | End: 2020-07-04

## 2020-07-04 RX ORDER — ALBUTEROL SULFATE 0.83 MG/ML
2.5 SOLUTION RESPIRATORY (INHALATION) EVERY 4 HOURS PRN
Status: DISCONTINUED | OUTPATIENT
Start: 2020-07-04 | End: 2020-07-04 | Stop reason: HOSPADM

## 2020-07-04 RX ORDER — MEPERIDINE HYDROCHLORIDE 25 MG/ML
12.5 INJECTION INTRAMUSCULAR; INTRAVENOUS; SUBCUTANEOUS EVERY 5 MIN PRN
Status: DISCONTINUED | OUTPATIENT
Start: 2020-07-04 | End: 2020-07-04 | Stop reason: HOSPADM

## 2020-07-04 RX ORDER — LABETALOL HYDROCHLORIDE 5 MG/ML
10 INJECTION, SOLUTION INTRAVENOUS
Status: DISCONTINUED | OUTPATIENT
Start: 2020-07-04 | End: 2020-07-04 | Stop reason: HOSPADM

## 2020-07-04 RX ORDER — HYDROMORPHONE HYDROCHLORIDE 1 MG/ML
.3-.5 INJECTION, SOLUTION INTRAMUSCULAR; INTRAVENOUS; SUBCUTANEOUS EVERY 5 MIN PRN
Status: DISCONTINUED | OUTPATIENT
Start: 2020-07-04 | End: 2020-07-04 | Stop reason: HOSPADM

## 2020-07-04 RX ORDER — LORAZEPAM 2 MG/ML
1 INJECTION INTRAMUSCULAR ONCE
Status: COMPLETED | OUTPATIENT
Start: 2020-07-04 | End: 2020-07-04

## 2020-07-04 RX ORDER — LIDOCAINE HYDROCHLORIDE 20 MG/ML
INJECTION, SOLUTION INFILTRATION; PERINEURAL PRN
Status: DISCONTINUED | OUTPATIENT
Start: 2020-07-04 | End: 2020-07-04

## 2020-07-04 RX ORDER — CEFAZOLIN SODIUM 1 G/3ML
1 INJECTION, POWDER, FOR SOLUTION INTRAMUSCULAR; INTRAVENOUS SEE ADMIN INSTRUCTIONS
Status: DISCONTINUED | OUTPATIENT
Start: 2020-07-04 | End: 2020-07-04 | Stop reason: HOSPADM

## 2020-07-04 RX ORDER — ONDANSETRON 4 MG/1
4 TABLET, ORALLY DISINTEGRATING ORAL EVERY 30 MIN PRN
Status: DISCONTINUED | OUTPATIENT
Start: 2020-07-04 | End: 2020-07-04 | Stop reason: HOSPADM

## 2020-07-04 RX ORDER — ONDANSETRON 2 MG/ML
INJECTION INTRAMUSCULAR; INTRAVENOUS PRN
Status: DISCONTINUED | OUTPATIENT
Start: 2020-07-04 | End: 2020-07-04

## 2020-07-04 RX ADMIN — SODIUM CHLORIDE, POTASSIUM CHLORIDE, SODIUM LACTATE AND CALCIUM CHLORIDE: 600; 310; 30; 20 INJECTION, SOLUTION INTRAVENOUS at 10:27

## 2020-07-04 RX ADMIN — TAMSULOSIN HYDROCHLORIDE 0.4 MG: 0.4 CAPSULE ORAL at 08:28

## 2020-07-04 RX ADMIN — LITHIUM CARBONATE 300 MG: 300 TABLET, EXTENDED RELEASE ORAL at 01:20

## 2020-07-04 RX ADMIN — BUPROPION HYDROCHLORIDE 300 MG: 300 TABLET, EXTENDED RELEASE ORAL at 08:28

## 2020-07-04 RX ADMIN — TAMSULOSIN HYDROCHLORIDE 0.4 MG: 0.4 CAPSULE ORAL at 00:02

## 2020-07-04 RX ADMIN — DEXAMETHASONE SODIUM PHOSPHATE 4 MG: 4 INJECTION, SOLUTION INTRA-ARTICULAR; INTRALESIONAL; INTRAMUSCULAR; INTRAVENOUS; SOFT TISSUE at 14:42

## 2020-07-04 RX ADMIN — KETOROLAC TROMETHAMINE 30 MG: 30 INJECTION, SOLUTION INTRAMUSCULAR at 14:58

## 2020-07-04 RX ADMIN — LIDOCAINE HYDROCHLORIDE 100 MG: 20 INJECTION, SOLUTION INFILTRATION; PERINEURAL at 14:16

## 2020-07-04 RX ADMIN — SUCCINYLCHOLINE CHLORIDE 100 MG: 20 INJECTION, SOLUTION INTRAMUSCULAR; INTRAVENOUS; PARENTERAL at 14:16

## 2020-07-04 RX ADMIN — SODIUM CHLORIDE, POTASSIUM CHLORIDE, SODIUM LACTATE AND CALCIUM CHLORIDE: 600; 310; 30; 20 INJECTION, SOLUTION INTRAVENOUS at 00:27

## 2020-07-04 RX ADMIN — LAMOTRIGINE 200 MG: 100 TABLET ORAL at 08:28

## 2020-07-04 RX ADMIN — SODIUM CHLORIDE, POTASSIUM CHLORIDE, SODIUM LACTATE AND CALCIUM CHLORIDE: 600; 310; 30; 20 INJECTION, SOLUTION INTRAVENOUS at 13:49

## 2020-07-04 RX ADMIN — ONDANSETRON 4 MG: 2 INJECTION INTRAMUSCULAR; INTRAVENOUS at 14:42

## 2020-07-04 RX ADMIN — Medication 10 MG: at 14:30

## 2020-07-04 RX ADMIN — FENTANYL CITRATE 100 MCG: 50 INJECTION, SOLUTION INTRAMUSCULAR; INTRAVENOUS at 14:16

## 2020-07-04 RX ADMIN — PHENYLEPHRINE HYDROCHLORIDE 100 MCG: 10 INJECTION INTRAVENOUS at 14:30

## 2020-07-04 RX ADMIN — LORAZEPAM 1 MG: 2 INJECTION INTRAMUSCULAR; INTRAVENOUS at 01:42

## 2020-07-04 RX ADMIN — HYDROMORPHONE HYDROCHLORIDE 0.3 MG: 1 INJECTION, SOLUTION INTRAMUSCULAR; INTRAVENOUS; SUBCUTANEOUS at 04:46

## 2020-07-04 RX ADMIN — KETOROLAC TROMETHAMINE 15 MG: 15 INJECTION, SOLUTION INTRAMUSCULAR; INTRAVENOUS at 06:11

## 2020-07-04 RX ADMIN — CEFAZOLIN SODIUM 2 G: 2 INJECTION, SOLUTION INTRAVENOUS at 14:25

## 2020-07-04 RX ADMIN — MIDAZOLAM 2 MG: 1 INJECTION INTRAMUSCULAR; INTRAVENOUS at 14:07

## 2020-07-04 RX ADMIN — SODIUM CHLORIDE, POTASSIUM CHLORIDE, SODIUM LACTATE AND CALCIUM CHLORIDE: 600; 310; 30; 20 INJECTION, SOLUTION INTRAVENOUS at 14:10

## 2020-07-04 RX ADMIN — HYDROCODONE BITARTRATE AND ACETAMINOPHEN 1 TABLET: 5; 325 TABLET ORAL at 05:27

## 2020-07-04 RX ADMIN — PROPOFOL 200 MG: 10 INJECTION, EMULSION INTRAVENOUS at 14:16

## 2020-07-04 ASSESSMENT — ENCOUNTER SYMPTOMS
DYSRHYTHMIAS: 1
HEMATURIA: 0
DIAPHORESIS: 1
PALPITATIONS: 0
NAUSEA: 1
FEVER: 0
DIARRHEA: 0
CHILLS: 0
VOMITING: 1

## 2020-07-04 ASSESSMENT — MIFFLIN-ST. JEOR: SCORE: 1549.89

## 2020-07-04 NOTE — ED NOTES
"[facility]  ED Nurse Handoff Report    ED Chief complaint: Flank Pain; Shortness of Breath; and Chills      ED Diagnosis:   Final diagnoses:   Kidney stone       Code Status: Full Code    Allergies:   Allergies   Allergen Reactions     Amoxicillin      yeast vaginal inf     Aripiprazole      Increased sadness and moodiness     Ativan Fatigue     Augmentin [Amoxicillin-Pot Clavulanate]      Itching,hives     Azithromycin      GI cramps     Clonazepam      Sedation, even at 0.5 mg dose     Estrogens      # bcps cause cns sx     Lexapro      diarrhea       Mirtazapine      Sedation      Oxycodone      Epigastric pain, headache     Prochlorperazine      Compazine- agitation     Risperdal [Risperidone]      Weight gain     Seasonal Allergies      Seroquel [Quetiapine]      Increased appetite     Sertraline      sweats     Venlafaxine      sweats     Xanax [Alprazolam] Fatigue       Patient Story: From home. Ambulates independently. Started having left flank pain today at 10am. Pain 10/10 upon arrival. History of kidney stones. VSS.   Focused Assessment:    Cardiac WDL  Resp WDL  Neuro WDL   Left flank pain  GI Some nausea with pain    Treatments and/or interventions provided: See MAR  Patient's response to treatments and/or interventions: Tolerated well    To be done/followed up on inpatient unit:  Pain control    Does this patient have any cognitive concerns?: None    Activity level - Baseline/Home:  Independent  Activity Level - Current:   Independent    Patient's Preferred language: English   Needed?: No    Isolation: None  Infection: Not Applicable  Bariatric?: No    Vital Signs:   Vitals:    07/03/20 1649   BP: (!) 150/92   Pulse: 71   Resp: 24   Temp: 96.2  F (35.7  C)   TempSrc: Temporal   SpO2: 100%   Weight: 90.7 kg (200 lb)   Height: 1.626 m (5' 4\")       Cardiac Rhythm:     Was the PSS-3 completed:   Yes  What interventions are required if any?               Family Comments:  "  at bedside  OBS brochure/video discussed/provided to patient/family: N/A              Name of person given brochure if not patient:                Relationship to patient:      For the majority of the shift this patient's behavior was Green.   Behavioral interventions performed were  .    ED NURSE PHONE NUMBER: *[phone]
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
## 2020-07-04 NOTE — PROGRESS NOTES
VSS on room air. Patient slept majority of day. Denied pain. Did not require any PRN medications. Patient left the unit at about 1330 for surgery. Pre-op report given via telephone. All belongings/valuables sent with spouse to pre-op area. Patient discharged home directly from PACU.  Kee to transport patient home.

## 2020-07-04 NOTE — DISCHARGE INSTRUCTIONS
Today you received Toradol, an antiinflammatory medication similar to Ibuprofen.  You should not take other antiinflammatory medication, such as Ibuprofen, Motrin, Advil, Aleve, Naprosyn, etc until 9pm.     Same Day Surgery Discharge Instructions for  Sedation and General Anesthesia       It's not unusual to feel dizzy, light-headed or faint for up to 24 hours after surgery or while taking pain medication.  If you have these symptoms: sit for a few minutes before standing and have someone assist you when you get up to walk or use the bathroom.      You should rest and relax for the next 24 hours. We recommend you make arrangements to have an adult stay with you for at least 24 hours after your discharge.  Avoid hazardous and strenuous activity.      DO NOT DRIVE any vehicle or operate mechanical equipment for 24 hours following the end of your surgery.  Even though you may feel normal, your reactions may be affected by the medication you have received.      Do not drink alcoholic beverages for 24 hours following surgery.       Slowly progress to your regular diet as you feel able. It's not unusual to feel nauseated and/or vomit after receiving anesthesia.  If you develop these symptoms, drink clear liquids (apple juice, ginger ale, broth, 7-up, etc. ) until you feel better.  If your nausea and vomiting persists for 24 hours, please notify your surgeon.        All narcotic pain medications, along with inactivity and anesthesia, can cause constipation. Drinking plenty of liquids and increasing fiber intake will help.      For any questions of a medical nature, call your surgeon.      Do not make important decisions for 24 hours.      If you had general anesthesia, you may have a sore throat for a couple of days related to the breathing tube used during surgery.  You may use Cepacol lozenges to help with this discomfort.  If it worsens or if you develop a fever, contact your surgeon.       If you feel your pain is not  well managed with the pain medications prescribed by your surgeon, please contact your surgeon's office to let them know so they can address your concerns.       CoVid 19 Information    We want to give you information regarding Covid. Please consult your primary care provider with any questions you might have.     Patient who have symptoms (cough, fever, or shortness of breath), need to isolate for 7 days from when symptoms started OR 72 hours after fever resolves (without fever reducing medications) AND improvement of respiratory symptoms (whichever is longer).      Isolate yourself at home (in own room/own bathroom if possible)    Do Not allow any visitors    Do Not go to work or school    Do Not go to [employer],  centers, shopping, or other public places.    Do Not shake hands.    Avoid close and intimate contact with others (hugging, kissing).    Follow CDC recommendations for household cleaning of frequently touched services.     After the initial 7 days, continue to isolate yourself from household members as much as possible. To continue decrease the risk of community spread and exposure, you and any members of your household should limit activities in public for 14 days after starting home isolation.     You can reference the following CDC link for helpful home isolation/care tips:  https://www.cdc.gov/coronavirus/2019-ncov/downloads/10Things.pdf    Protect Others:    Cover Your Mouth and Nose with a mask, disposable tissue or wash cloth to avoid spreading germs to others.    Wash your hands and face frequently with soap and water    Call Your Primary Doctor If: Breathing difficulty develops or you become worse.    For more information about COVID19 and options for caring for yourself at home, please visit the CDC website at https://www.cdc.gov/coronavirus/2019-ncov/about/steps-when-sick.html  For more options for care at [facility], please visit our website at  [email or web address]    Cystoscopy, Holmium Laser with Stent Placement Discharge Instructions    Holmium laser lithotripsy was used to break up your kidney stone(s). It is normal to have visible blood in the urine, burning, urgency and frequency following this procedure. These symptoms may last for a few days to weeks.     Diet:    To help pass stone fragments and clear the blood out of the urine, it is important to drink 6-8 glasses of fluids per day at home - at least 3-4 glasses should be water.       Return to the diet that you were on before the procedure, unless you are given specific diet instructions.    Activity:    Walk short distances and increase as your strength allows.    You may climb stairs.    Do not do strenuous exercise or heavy lifting until approved by surgeon.    Do not drive while taking narcotic pain medications.    Bathing:    You may take a shower.          Stent information    During surgery, a stent was placed in the ureter.  The ureter is the tube that drains urine from the kidney to the bladder.  The stent is placed to dilate (open) the ureter so the stone fragments can pass easily through the ureter or to decrease ureteral swelling after surgery, or to relieve an obstruction. The stent is made of rubber. The upper end of the stent curls in the kidney while the lower end rests in the bladder.    While the stent is in place you may experience the following symptoms:    Blood and/or small blood clots in urine.    Bladder spasm (frequency and urgency of urination).    Discomfort or aching in the back or side where the stent is.    Burning or discomfort at the end of urine stream.    To decrease these symptoms you should:    Take pain medication as prescribed.    Drink plenty of fluids.    If you experience pain at the end of urination try not emptying your bladder completely.    If having discomfort in back or side, decrease activity.    Call your physician if  these signs/symptoms are present:    Pain that is not relieved by a short rest or ordered pain medications.    Temperature at or above 101.0 F or chills.    Inability or difficulty urinating.     Excessive blood in urine.    Any questions or concerns.    **If you have questions or concerns about your procedure,   call Dr. Lambert at [phone]**

## 2020-07-04 NOTE — PROVIDER NOTIFICATION
MD Notification    Notified Person: MD    Notified Person Name: Dr. Mcclain    Notification Date/Time: 4 July 2020 @ 0055    Notification Interaction: answering service/telephone    Purpose of Notification: Zofran not effective, patient vomiting    Orders Received:  Ativan ordered    Comments:

## 2020-07-04 NOTE — ANESTHESIA CARE TRANSFER NOTE
Patient: Lena Morrow    Procedure(s):  Cystoscopy, left ureteroscopy with holmium laser lithotripsy and left stent placement    Diagnosis: Ureteral stone [N20.1]  Diagnosis Additional Information: No value filed.    Anesthesia Type:   General     Note:  Airway :Room Air  Patient transferred to:PACU  Comments: Neuromuscular blockade not used after succinylcholine for intubation, spontaneous return of TOF 4/4 with sustained tetany, spontaneous respirations, adequate tidal volumes, followed commands to voice, oropharynx suctioned with soft flexible catheter, extubated atraumatically, extubated with suction, airway patent after extubation.  Oxygen via room air at room air to PACU. After extubation, patient remained in OR for 14 minutes until transport to PACU due to standard hospital COVID-19 precautions, Oxygen tubing connected to wall O2 in PACU, SpO2, NiBP, and EKG monitors and alarms on and functioning, Gerson Hugger warmer connected to patient gown, report on patient's clinical status given to PACU RN, RN questions answered.   Handoff Report: Identifed the Patient, Identified the Reponsible Provider, Reviewed the pertinent medical history, Discussed the surgical course, Reviewed Intra-OP anesthesia mangement and issues during anesthesia, Set expectations for post-procedure period and Allowed opportunity for questions and acknowledgement of understanding      Vitals: (Last set prior to Anesthesia Care Transfer)    CRNA VITALS  7/4/2020 1454 - 7/4/2020 1528      7/4/2020             Pulse:  79    SpO2:  90 %    Resp Rate (set):  10                Electronically Signed By: JUDIE Guadalupe CRNA  July 4, 2020  3:28 PM

## 2020-07-04 NOTE — PHARMACY-ADMISSION MEDICATION HISTORY
Pharmacy Medication History  Admission medication history interview status for the 7/3/2020  admission is complete. See EPIC admission navigator for prior to admission medications     Medication history sources: Patient and dispense report  Medication history source reliability: Good  Adherence assessment: appears to be good    Significant changes made to the medication list:  none      Additional medication history information:   The patient reports taking vitamins and supplements - not all included on this list        Prior to Admission medications    Medication Sig Last Dose Taking? Auth Provider   albuterol (PROAIR HFA/PROVENTIL HFA/VENTOLIN HFA) 108 (90 Base) MCG/ACT inhaler Inhale 2 puffs into the lungs every 6 hours as needed for shortness of breath / dyspnea or wheezing prn Yes Trevon Crisostomo MD   buPROPion (WELLBUTRIN XL) 300 MG 24 hr tablet TAKE 1 TABLET(300 MG) BY MOUTH DAILY 7/3/2020 at am Yes Trevon Crisostomo MD   calcium carbonate (OS-JOSE J) 1500 (600 Ca) MG tablet Take 600 mg by mouth daily 7/3/2020 at Unknown time Yes Unknown, Entered By History   cinnamon 500 MG CAPS Take 1 Dose by mouth daily 7/3/2020 at Unknown time Yes Unknown, Entered By History   esomeprazole (NEXIUM) 40 MG DR capsule TAKE 1 CAPSULE BY MOUTH EVERY DAY 7/3/2020 at am Yes Trevon Crisostomo MD   lamoTRIgine (LAMICTAL) 200 MG tablet TAKE 1 TABLET(200 MG) BY MOUTH TWICE DAILY 7/3/2020 at x1 Yes Katherine Zamora MD   lithium ER (LITHOBID) 300 MG CR tablet Take 600 mg by mouth At Bedtime  7/2/2020 at Unknown time Yes Trevon Crisostomo MD   LORATADINE PO Take 10 mg by mouth daily 7/3/2020 at am Yes Reported, Patient   Magnesium Oxide 250 MG TABS Take 500 mg by mouth daily  7/3/2020 at Unknown time Yes Reported, Patient   multivitamin w/minerals (MULTI-VITAMIN) tablet Take 1 tablet by mouth daily 7/3/2020 at Unknown time Yes Unknown, Entered By History   ondansetron (ZOFRAN-ODT) 4 MG ODT tab Take 1-2 tablets (4-8 mg) by mouth  every 8 hours as needed for nausea prn Yes Trevon Crisostomo MD   propranolol (INDERAL) 20 MG tablet Take 20 mg by mouth daily as needed prn Yes Unknown, Entered By History   propranolol ER (INDERAL LA) 80 MG 24 hr capsule TAKE 1 CAPSULE(80 MG) BY MOUTH DAILY 7/3/2020 at am Yes Amelia Strauss PA-C   vitamin C (ASCORBIC ACID) 500 MG tablet Take 500 mg by mouth daily 7/3/2020 at Unknown time Yes Unknown, Entered By History   Vitamin D, Cholecalciferol, 1000 UNITS CAPS Take 1,000 Units by mouth daily 7/3/2020 at Unknown time Yes Trevon Crisostomo MD

## 2020-07-04 NOTE — PROGRESS NOTES
RECEIVING UNIT ED HANDOFF REVIEW    ED Nurse Handoff Report was reviewed by: Thuy Barajas RN on July 3, 2020 at 10:31 PM

## 2020-07-04 NOTE — ANESTHESIA POSTPROCEDURE EVALUATION
Patient: Lena Morrow    Procedure(s):  Cystoscopy, left ureteroscopy with holmium laser lithotripsy and left stent placement    Diagnosis:Ureteral stone [N20.1]  Diagnosis Additional Information: No value filed.    Anesthesia Type:  General    Note:  Anesthesia Post Evaluation    Patient location during evaluation: PACU  Patient participation: Able to fully participate in evaluation  Level of consciousness: awake  Pain management: adequate  Airway patency: patent  Cardiovascular status: acceptable  Respiratory status: acceptable  Hydration status: acceptable  PONV: none     Anesthetic complications: None          Last vitals:  Vitals:    07/04/20 1526 07/04/20 1530 07/04/20 1540   BP: 111/50 109/68 110/61   Pulse: 81 84 83   Resp: 13 13 21   Temp: 36.7  C (98  F)     SpO2: 96% 94% 92%         Electronically Signed By: Zahra Colin MD, MD  July 4, 2020  3:52 PM

## 2020-07-04 NOTE — OP NOTE
Procedure Date: 07/04/2020      SURGEON:  Devonte Lambert MD      PREOPERATIVE DIAGNOSIS:  Left distal ureteral stones.      POSTOPERATIVE DIAGNOSIS:  Left distal ureteral stones.      PROCEDURE PERFORMED:  Cystoscopy, left retrograde pyelogram, interpretation of fluoroscopic images, left ureteroscopy with holmium lithotripsy and stone basketing, placement of 4.8 x 26 double-J left ureteral stent.      ANESTHESIA:  General.      COMPLICATIONS:  None.      INDICATIONS FOR PROCEDURE:  Cayden is a 45-year-old woman with a distal left ureteral stone who now presents for removal.      DETAILS OF THE PROCEDURE:  The risks and benefits of the procedure were explained in detail to the patient and informed consent was obtained.  The patient was brought to the operating room, placed supine on the table, where she underwent general endotracheal anesthetic.  She was then moved down to the dorsal lithotomy position where she was prepped and draped in standard sterile fashion.      The procedure began by introducing the 22-Nauruan cystoscope through the urethra into the bladder and performing cystoscopy.  There were no urothelial abnormalities identified.  The left ureter was identified and cannulated with ureteral catheter.  Retrograde pyelogram was taken.  This found hydronephrosis, hydroureter down to the level of the stone.  I passed a Glidewire into the left kidney and backed off the ureteral catheter along with the scope.  Alongside the Glidewire, I passed the rigid ureteroscope through this into the bladder and up the left ureter.  The stone was seen in the very distal left ureter.  I used the 365 micron holmium laser fiber to break up the stone into multiple fragments.  These fragments were basketed out and placed in the bladder.  I then performed ureteroscopy all the way up to the left kidney and no stones remained.  I removed the ureteroscope.  I loaded a 4.8 x 26 double-J ureteral stent over the wire.  The wire was  pulled back and a good curl could be seen in the renal pelvis under fluoroscopy.  I reinserted the scope and a good curl was seen in the bladder under direct visualization.  I drained the stone fragments from the bladder and removed the scope.  I placed a B and O suppository at the end the case.      The patient tolerated the procedure without complications.  She went to the post-anesthetic care in good condition.  She will go home from there.  I will see her back in 1-2 weeks for stent removal in the office.         TREV HUDDLESTON MD             D: 2020   T: 2020   MT:       Name:     NATHAN HUGGINS   MRN:      [id number]        Account:        [id number]   :      1975           Procedure Date: 2020      Document: [id number]

## 2020-07-04 NOTE — PLAN OF CARE
A&Ox4.  VSS, RA.  Complains of L flank pain; PRN Norco and Dilaudid given with minimal relief, Toradol PRN given with good results.  SBA.  Continent of bowel/bladder; straining urine, no sediment/stones noted this shift.  Patient vomited, Zofran ineffective; one time dose of Ativan given, effective.  PIV infusing LR @ 100 mL/hr.  Urology consulted.  Discharge pending progress.

## 2020-07-04 NOTE — CONSULTS
Consult Date:  07/04/2020      REASON FOR CONSULTATION:  Left ureteral stones.      HISTORY OF PRESENT ILLNESS:  Lena Morrow is a 45-year-old woman with a prior history of stones who presented to the Emergency Department yesterday afternoon with left-sided flank pain and nausea and vomiting.  She had no fevers.  She had a prior history of stones and suspected these were stones.  She had a CT scan performed that showed 2 stones adjacent to one another in the distal left ureter, one of the stones measured up to 6 mm.  There was a small stone in the left kidney as well.  Her creatinine was found to be slightly elevated at 1.35 this morning.  Her nausea and pain have now resolved, but she has been straining her urine and has not yet seen a stone.        In 2017, the patient had 2 stones in on the opposite side, in the distal right ureter.  The largest measured 4 mm at this time.  She had made plans to have a stone extraction performed by Dr. Kimbrough at that time, but ended up passing the stone the day before scheduled procedure.      PAST MEDICAL HISTORY:  Kidney stones as above, she has asthma and history of atrial flutter as well as hypertension.      PAST SURGICAL HISTORY:  She has had no prior urologic surgeries.  She has had a prior hysterectomy and cholecystectomy.      HOME MEDICATIONS:  Albuterol, Wellbutrin, Flexeril, Nexium, Lamictal, loratadine, Zofran, Feldene, Inderal, Valtrex.      ALLERGIES:  AMOXICILLIN, ATIVAN, AUGMENTIN, AZITHROMYCIN, CLONAZEPAM, ESTROGEN, LEXAPRO, MIRTAZAPINE, OXYCODONE, PROCHLORPERAZINE, RISPERDAL, SEROQUEL, SERTRALINE, VENLAFAXINE, XANAX.      SOCIAL HISTORY:  She is a nonsmoker.      FAMILY HISTORY:  No family history of urologic malignancy or stones.      REVIEW OF SYSTEMS:  Currently negative.  She has had some nausea and vomiting, now resolved.  She has had no fevers.      PHYSICAL EXAMINATION:   VITAL SIGNS:  She is currently afebrile.  Vital signs stable.   GENERAL:  Alert and  oriented, in no acute distress.   HEENT:  Normocephalic and atraumatic.   RESPIRATORY:  Normal nonlabored breathing.   ABDOMEN:  Soft, nontender, nondistended.      IMAGING STUDIES:  I reviewed her CT scan images and there appeared to be 2 stones adjacent to one another in the distal left ureter.  The larger of the 2 stones is the more distal stone, it appears to be about 6 mm in size.  In the lower pole of the left kidney, there is a tiny punctate renal calculus.  There were no stones in the right side.      LABORATORY STUDIES:  Her urinalysis is unremarkable showing hematuria only.  Serum white blood count is 10.4.  Creatinine slightly elevated at 1.3.      IMPRESSION AND PLAN:  A 45-year-old woman with 2 distal left ureteral stones.  We discussed trial passage versus treating the stone.  She elected to have the stones treated.  We discussed cystoscopy with left ureteroscopy, holmium lithotripsy, stone basketing and stent placement.  The procedure were discussed along with its risks and expected recovery.  She wishes to proceed to the operating room.  We will get her scheduled in the operating room later today.  She will be n.p.o. for her procedure.  She will be able to discharge home after her procedure later today.         TREV HUDDLESTON MD             D: 2020   T: 2020   MT: MICHAEL      Name:     NATHAN HUGGINS   MRN:      [id number]        Account:       [id number]   :      1975           Consult Date:  2020      Document: [id number]
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
## 2020-07-04 NOTE — PROGRESS NOTES
[facility]  Hospitalist Progress Note  Name: Lena Morrow    MRN: [id number]  Provider:  Kacy Doshi DO    Initial presenting complaint/issue to hospital (Diagnosis): left -sided back pain         Assessment and Plan:      Summary of Stay: Lena Morrow is a 45 year old female admitted on 7/3/2020 with left-sided back/flank pain.  Noted to have left ureteral stones on CT imaging.    Problem List:   1.  Left-sided ureteral stones  2 stones seen on CT imaging  On IVF  Seen by urology and is going down for surgical intervention this afternoon - further discharge planning and recs after procedure is completed  H/o past kidney stones 2017 that she was able to pass on her own    2. NELLI  Elevated creat noted this am  Continue IVF  Urology procedure, as above    3.  Asthma  Respiratory status is stable    4.  Chronic back pain  Being followed outpt by [facility] for injections    5.  H/o Bipolar disorder and anxiety  Continue PTA meds    6.  Remote h/o of post-partum a flutter  This was 2007; no documented re-occurrence since.    DVT Prophylaxis:  -  ambulation  Code Status: Full Code  Discharge Dispo: home with family  Estimated Disch Date / # of Days until Discharge: likely later today pending urology procedure        Interval History:      Seen with  and nursing in the room.  About to go down to the OR.  No c/o CP, SOB. Up to bathroom without significant difficulty.  No F/C overnight.                  Physical Exam:      Last Vital Signs:  Temp: 97.3  F (36.3  C) Temp src: Oral BP: 132/67 Pulse: 74 Heart Rate: 73 Resp: 18 SpO2: 96 % O2 Device: None (Room air)    I/O last 3 completed shifts:  In: -   Out: 350 [Urine:350]    GEN:  Alert, oriented x 3, comfortable, no acute respiratory distress  HEENT:  Normocephalic/atraumatic, PERRL, no scleral icterus, no nasal discharge, mouth moist,  CV:  Regular rate and rhythm, no murmur to ausc.  S1 + S2 noted, no S3 or S4.  LUNGS:  Clear to  auscultation ant/lat/post bilaterally.  No rales/rhonchi/wheezing auscultated bilaterally.  No costal retractions bilaterally.  Symmetric chest rise on inhalation noted.  EXT:  No edema or cyanosis bilaterally. No clear  joint synovitis noted.   SKIN:  Dry to touch, no rashes or jaundice noted.  PSYCH:  Mood mildly anxious Not tearful or depressed.  Maintains direct eye contact.         Medications:      All current medications were reviewed.         Data:      All new lab and imaging data was reviewed.   Labs:  Recent Labs   Lab 07/04/20 0653 07/03/20  1716    139   POTASSIUM 3.7 4.1   CHLORIDE 111* 110*   CO2 27 24   ANIONGAP 4 5   * 109*   BUN 21 20   CR 1.35* 1.13*   GFRESTIMATED 47* 59*   GFRESTBLACK 55* 68   JOSE J 9.0 10.3*     Recent Labs   Lab 07/04/20  0653 07/03/20  1716   WBC 10.4 12.5*   HGB 12.6 14.4   HCT 40.6 43.7   MCV 94 92    401     No results for input(s): INR in the last 168 hours.  Recent Labs   Lab 07/03/20 2010   COLOR Yellow   APPEARANCE Slightly Cloudy   URINEGLC Negative   URINEBILI Negative   URINEKETONE Negative   SG 1.026   UBLD Small*   URINEPH 6.5   PROTEIN 10*   NITRITE Negative   LEUKEST Negative   RBCU 115*   WBCU 0      Recent Imaging:   Recent Results (from the past 24 hour(s))   CT Abdomen Pelvis w/o Contrast    Narrative    CT ABDOMEN AND PELVIS WITHOUT CONTRAST 7/3/2020 6:43 PM    CLINICAL HISTORY: Left flank pain.    TECHNIQUE: CT scan of the abdomen and pelvis was performed without IV  contrast. Multiplanar reformats were obtained. Dose reduction  techniques were used.  CONTRAST: None.    COMPARISON: CT of the abdomen and pelvis without IV contrast performed  8/16/2017.    FINDINGS:   LOWER CHEST: Small calcified granuloma in the lingula laterally. The  visualized lung bases are otherwise clear.    HEPATOBILIARY: Prior cholecystectomy.    PANCREAS: Normal.    SPLEEN: Normal.    ADRENAL GLANDS: Normal.    KIDNEYS/BLADDER: There are at least two obstructing  stones in the  distal left ureter, with the largest measuring 0.6 cm, causing mild to  moderate left hydronephrosis. There are two nonobstructing stones also  noted in the lower pole of the left kidney, with the largest measuring  0.6 cm. No urinary calculi or hydronephrosis on the right.    BOWEL: No bowel obstruction. Unremarkable appendix.    LYMPH NODES: No enlarged lymph nodes are identified in the abdomen or  pelvis.    VASCULATURE: Unremarkable.    PELVIC ORGANS: The uterus is not seen, and is surgically absent by  history.    OTHER: There is a small fat-containing spigelian hernia in the right  lower quadrant. No free fluid in the pelvis. No free intraperitoneal  air.    MUSCULOSKELETAL: Normal.      Impression    IMPRESSION:   1.  There are at least two obstructing stones in the distal left  ureter, with the largest measuring 0.6 cm, causing mild to moderate  left hydronephrosis.  2.  There are two nonobstructing stones in the lower pole of the left  kidney, with the largest measuring 0.6 cm.  3.  Small fat-containing spigelian hernia in the right lower quadrant,  new since the previous exam.    KIMBERLY CORREA MD

## 2020-07-06 ENCOUNTER — TELEPHONE (OUTPATIENT)
Dept: INTERNAL MEDICINE | Facility: CLINIC | Age: 45
End: 2020-07-06

## 2020-07-06 LAB — COPATH REPORT: NORMAL

## 2020-07-06 NOTE — TELEPHONE ENCOUNTER
"Spoke to pt about hosptal f/u for kidney stone & cystoscopy.   1) Still in pain - had a stent placed, and will be getting it removed on Thursday. Concerned because she still has muscle aches in her neck muscles - from the jaw (bith right and left side) down to collar bone up. And also on her stomach muslces, up and down on both right & left side (like she just got done working out).   Says that Friday night was she given pain meds in the hospital, and kept vomiting. Wondering if the muscle pain is most likely r/t that? And if its normal that the pain has not gotten better? (hasn't gotten worse either).    2) Has a sore throat and hurts to swallow, but did have a tube placed down her throat. Unable to to ibuprofen because on Lithium.     3) CT from 7/3 showed a hernia:  Small fat-containing spigelian hernia in the right lower quadrant,  new since the previous exam.  She is wondering if this hernia could be the reason for the bursitis she has in her hip, right leg, and groin - which started in Oct, and has been on PT for.    ____________________________________________      Hospital/TCU/ED for chronic condition Discharge Protocol    \"Hi, my name is Samantha Perez RN, a registered nurse, and I am calling from [facility].  I am calling to follow up and see how things are going for you after your recent emergency visit/hospital/TCU stay.\"    Tell me how you are doing now that you are home?\" see above.      Discharge Instructions    \"Let's review your discharge instructions.  What is/are the follow-up recommendations?  Pt. Response: stent removal on: 7/9/2020 11:45 AM .    \"Has an appointment with your primary care provider been scheduled?\"   No (schedule appointment)    \"When you see the provider, I would recommend that you bring your medications with you.\"    Medications    \"Tell me what changed about your medicines when you discharged?\"    Changes to chronic meds?    0-1    \"What questions do you have about your " "medications?\"    None     New diagnoses of heart failure, COPD, diabetes, or MI?    No              Post Discharge Medication Reconciliation Status: discharge medications reconciled, continue medications without change.    Was MTM referral placed (*Make sure to put transitions as reason for referral)?   No    Call Summary    \"What questions or concerns do you have about your recent visit and your follow-up care?\"     none    \"If you have questions or things don't continue to improve, we encourage you contact us through the main clinic number (give number).  Even if the clinic is not open, triage nurses are available 24/7 to help you.     We would like you to know that our clinic has extended hours (provide information).  We also have urgent care (provide details on closest location and hours/contact info)\"      \"Thank you for your time and take care!\"                       "

## 2020-07-08 ENCOUNTER — NURSE TRIAGE (OUTPATIENT)
Dept: NURSING | Facility: CLINIC | Age: 45
End: 2020-07-08

## 2020-07-08 DIAGNOSIS — N20.1 URETERAL STONE: Primary | ICD-10-CM

## 2020-07-08 RX ORDER — TAMSULOSIN HYDROCHLORIDE 0.4 MG/1
0.4 CAPSULE ORAL DAILY
Qty: 30 CAPSULE | Refills: 0 | Status: SHIPPED | OUTPATIENT
Start: 2020-07-08 | End: 2021-04-21

## 2020-07-08 NOTE — TELEPHONE ENCOUNTER
"Informed patient per MD-\"She should talk flomax daily   If she is already on that then add detrol 4mg daily thank.\"    Patient would like flomax called in and then she will call back if she needs the other one. Flomax sent to patient's preferred pharmacy.      Nesha Bal LPN    "

## 2020-07-08 NOTE — TELEPHONE ENCOUNTER
Spoke with patient on the phone today. She informed me that she is having flank pain and cramps in back. She also informed this nurse that she is having blood in urine and some urgency/ dysuria feeling when urinating. Patient was assured that all these symptoms are common with a stent in place. Informed patient that a message would be sent to MD for further recommendation.     Nesha Bal LPN

## 2020-07-08 NOTE — TELEPHONE ENCOUNTER
"Lena calling due to last night 7:30-8 pm developed lower abdominal cramping, pain when urinated and urine pink.  Since that time has developed a sense of needing to urinate all the, since 3 am urine is bloody. Also experiencing left lower back pain.  Denies fever, chilling.  Denies vaginal discharge,  No blood seen on underware. Denies burning with urination, but uncomfortable with urination.  Able to provide warm transfer to [facility] to [facility].    Additional Information    Negative: Sounds like a life-threatening emergency to the triager    Negative: Chest pain    Negative: Difficulty breathing    Negative: Surgical incision symptoms and questions    Negative: Discomfort (pain, burning or stinging) when passing urine and male    Negative: Discomfort (pain, burning or stinging) when passing urine and female    Negative: Constipation    Negative: New or worsening leg (calf, thigh) pain    Negative: New or worsening leg swelling    Negative: Dizziness is severe, or persists > 24 hours after surgery    Negative: Symptoms arising from use of a urinary catheter (Arias or Coude)    Negative: Cast problems or questions    Negative: Bright red, wide-spread, sunburn-like rash    Negative: SEVERE headache and after spinal (epidural) anesthesia    Negative: Vomiting and persists > 4 hours    Negative: Vomiting and abdomen looks much more swollen than usual    Negative: Drinking very little and dehydration suspected (e.g., no urine > 12 hours, very dry mouth, very lightheaded)    Negative: Patient sounds very sick or weak to the triager    Negative: Sounds like a serious complication to the triager    Negative: Fever > 100.4 F (38.0 C)    Caller has URGENT question and triager unable to answer question    Answer Assessment - Initial Assessment Questions  1. SYMPTOM: \"What's the main symptom you're concerned about?\" (e.g., pain, fever, vomiting)      Lower abdominal cramping, urine bloody  2. ONSET: \"When did lower " "abdominal cramping and blood urine  start?\"      Lower abdominal cramping started 7:30-8 pm last night, bloody urine since 3 am  3. SURGERY: \"What surgery was performed?\"      Cystoscopy, left ureteroscopy with holmium laser lithotripsy and left stent placement   4. DATE of SURGERY: \"When was surgery performed?\"       7/4/20  5. ANESTHESIA: \" What type of anesthesia did you have?\" (e.g., general, spinal, epidural, local)      General  6. PAIN: \"Is there any pain?\" If so, ask: \"How bad is it?\"  (Scale 1-10; or mild, moderate, severe)      Yes, lower abdominal pain 5-6/10  7. FEVER: \"Do you have a fever?\" If so, ask: \"What is your temperature, how was it measured, and when did it start?\"      Denies, also denies chilling  8. VOMITING: \"Is there any vomiting?\" If yes, ask: \"How many times?\"      No  9. BLEEDING: \"Is there any bleeding?\" If so, ask: \"How much?\" and \"Where?\"      Yes, bloody urine  10. OTHER SYMPTOMS: \"Do you have any other symptoms?\" (e.g., drainage from wound, painful urination, constipation)        Has developed left lower back pain, feeling needs to urinate all the time.  Experienced pain when urinated last night between 7:30-8, denies burning.  States now feels uncomfortable    Protocols used: POST-OP SYMPTOMS AND [id number]      "

## 2020-07-09 ENCOUNTER — OFFICE VISIT (OUTPATIENT)
Dept: UROLOGY | Facility: CLINIC | Age: 45
End: 2020-07-09
Payer: COMMERCIAL

## 2020-07-09 VITALS
DIASTOLIC BLOOD PRESSURE: 60 MMHG | WEIGHT: 202 LBS | BODY MASS INDEX: 34.49 KG/M2 | HEIGHT: 64 IN | OXYGEN SATURATION: 98 % | HEART RATE: 98 BPM | SYSTOLIC BLOOD PRESSURE: 90 MMHG

## 2020-07-09 DIAGNOSIS — Z79.2 PROPHYLACTIC ANTIBIOTIC: Primary | ICD-10-CM

## 2020-07-09 DIAGNOSIS — N20.0 KIDNEY STONE: ICD-10-CM

## 2020-07-09 PROCEDURE — 99213 OFFICE O/P EST LOW 20 MIN: CPT | Mod: 25 | Performed by: UROLOGY

## 2020-07-09 PROCEDURE — 52310 CYSTOSCOPY AND TREATMENT: CPT | Performed by: UROLOGY

## 2020-07-09 RX ORDER — CIPROFLOXACIN 500 MG/1
500 TABLET, FILM COATED ORAL ONCE
Qty: 1 TABLET | Refills: 0 | Status: SHIPPED | OUTPATIENT
Start: 2020-07-09 | End: 2020-07-09

## 2020-07-09 ASSESSMENT — MIFFLIN-ST. JEOR: SCORE: 1546.27

## 2020-07-09 ASSESSMENT — PAIN SCALES - GENERAL: PAINLEVEL: MILD PAIN (2)

## 2020-07-09 NOTE — PROGRESS NOTES
Office Visit Note  [facility]  [phone]    Jul 9, 2020    [unfilled]    1975    UROLOGIC DIAGNOSES:    Left distal ureteral stone    CURRENT INTERVENTIONS:    S/P extraction    History:    This is a 45-year-old woman who presented 5 days ago with a distal left ureteral stone.  She underwent stone extraction and stent placement.  She has felt well since her procedure.  Stone composition is pending.      Imaging:      Labs:      MEDICATIONS:    Current Outpatient Medications:      albuterol (PROAIR HFA/PROVENTIL HFA/VENTOLIN HFA) 108 (90 Base) MCG/ACT inhaler, Inhale 2 puffs into the lungs every 6 hours as needed for shortness of breath / dyspnea or wheezing, Disp: 1 Inhaler, Rfl: prn     buPROPion (WELLBUTRIN XL) 300 MG 24 hr tablet, TAKE 1 TABLET(300 MG) BY MOUTH DAILY, Disp: 30 tablet, Rfl: 0     calcium carbonate (OS-JOSE J) 1500 (600 Ca) MG tablet, Take 600 mg by mouth daily, Disp: , Rfl:      cinnamon 500 MG CAPS, Take 1 Dose by mouth daily, Disp: , Rfl:      lamoTRIgine (LAMICTAL) 200 MG tablet, TAKE 1 TABLET(200 MG) BY MOUTH TWICE DAILY, Disp: 180 tablet, Rfl: 1     lithium ER (LITHOBID) 300 MG CR tablet, Take 600 mg by mouth At Bedtime , Disp: , Rfl: 0     LORATADINE PO, Take 10 mg by mouth daily, Disp: , Rfl:      Magnesium Oxide 250 MG TABS, Take 500 mg by mouth daily , Disp: , Rfl:      multivitamin w/minerals (MULTI-VITAMIN) tablet, Take 1 tablet by mouth daily, Disp: , Rfl:      ondansetron (ZOFRAN-ODT) 4 MG ODT tab, Take 1-2 tablets (4-8 mg) by mouth every 8 hours as needed for nausea, Disp: 50 tablet, Rfl: 1     propranolol (INDERAL) 20 MG tablet, Take 20 mg by mouth daily as needed, Disp: , Rfl:      propranolol ER (INDERAL LA) 80 MG 24 hr capsule, TAKE 1 CAPSULE(80 MG) BY MOUTH DAILY, Disp: 90 capsule, Rfl: 2     tamsulosin (FLOMAX) 0.4 MG capsule, Take 1 capsule (0.4 mg) by mouth daily, Disp: 30 capsule, Rfl: 0     vitamin C (ASCORBIC ACID) 500 MG tablet, Take 500 mg by mouth  daily, Disp: , Rfl:      Vitamin D, Cholecalciferol, 1000 UNITS CAPS, Take 1,000 Units by mouth daily, Disp: , Rfl:      esomeprazole (NEXIUM) 40 MG DR capsule, TAKE 1 CAPSULE BY MOUTH EVERY DAY, Disp: 90 capsule, Rfl: 1    ALLERGIES:     Allergies   Allergen Reactions     Amoxicillin      yeast vaginal inf     Aripiprazole      Increased sadness and moodiness     Ativan Fatigue     Augmentin [Amoxicillin-Pot Clavulanate]      Itching,hives     Azithromycin      GI cramps     Clonazepam      Sedation, even at 0.5 mg dose     Estrogens      # bcps cause cns sx     Lexapro      diarrhea       Mirtazapine      Sedation      Oxycodone      Epigastric pain, headache     Prochlorperazine      Compazine- agitation     Risperdal [Risperidone]      Weight gain     Seasonal Allergies      Seroquel [Quetiapine]      Increased appetite     Sertraline      sweats     Venlafaxine      sweats     Xanax [Alprazolam] Fatigue       REVIEW OF SYSTEMS: Ten point review of systems without change as outlined in HPI    SURGICAL HISTORY:    Past Surgical History:   Procedure Laterality Date     C NONSPECIFIC PROCEDURE      tonsillectomy     C NONSPECIFIC PROCEDURE      Open right carpal tunnel release.  Excision right dorsal carpal ganglion cyst. Excision, terminal branch, right posterior interosseous nerve.        COLONOSCOPY       CYSTOSCOPY N/A 2017    Procedure: CYSTOSCOPY;;  Surgeon: Toni Da Silva MD;  Location:  OR     DAVINCI HYSTERECTOMY TOTAL, SALPINGECTOMY BILATERAL N/A 2017    Procedure: DAVINCI HYSTERECTOMY TOTAL, SALPINGECTOMY BILATERAL;  ROBOTIC ASSISTED LAPAROSCOPIC TOTAL HYSTERECTOMY; BILATERAL SALPINGECTOMY; CYSTOSCOPY;  Surgeon: Toni Da Silva MD;  Location:  OR     DILATION AND CURETTAGE N/A 2016    Procedure: DILATION AND CURETTAGE;  Surgeon: Josue Jama MD;  Location: [facility]     ENT SURGERY       GYN SURGERY           HC COLP CERVIX/UPPER VAGINA W BX CERVIX  10/30/12     "[facility]     HYSTERECTOMY       LAPAROSCOPIC CHOLECYSTECTOMY  4/3/2012    Procedure:LAPAROSCOPIC CHOLECYSTECTOMY; LAPAROSCOPIC CHOLECYSTECTOMY ; Surgeon:KARYNA CAMARA; Location:[facility]     LAPAROSCOPY DIAGNOSTIC (GYN) N/A 1/5/2016    Procedure: LAPAROSCOPY DIAGNOSTIC (GYN);  Surgeon: Josue Jama MD;  Location: [facility]     LASER HOLMIUM LITHOTRIPSY URETER(S), INSERT STENT, COMBINED Left 7/4/2020    Procedure: Cystoscopy, left ureteroscopy with holmium laser lithotripsy and left stent placement;  Surgeon: Devonte Lambert MD;  Location:  OR     ORTHOPEDIC SURGERY      carpal tunnel with ganglian cyst removal         PHYSICAL EXAM:    BP 90/60 (BP Location: Right arm, Patient Position: Sitting, Cuff Size: Adult Regular)   Pulse 98   Ht 1.626 m (5' 4\")   Wt 91.6 kg (202 lb)   LMP 12/10/2015   SpO2 98%   BMI 34.67 kg/m      Constitutional: Well developed. Conversant and in no acute distress  Eyes: Anicteric sclera, conjunctiva clear, normal extraocular movements  ENT: Normocephalic and atraumatic,   Skin: Warm and dry. No rashes or lesions  Cardiac: No peripheral edema  Back/Flank: Not done  CNS/PNS: Normal musculature and movements, moves all extremities normally  Respiratory: Normal non-labored breathing  Abdomen: Soft nontender and nondistended  Peripheral Vascular: No peripheral edema  Mental Status/Psych: Alert and Oriented x 3. Normal mood and affect      External Genitalia: Not done  Bladder: Not done  Urethra: Not done   Vagina: Not done    Cystoscopy: I performed flexible cystoscopy today and the stent was removed difficulty      Urinalysis:  UA RESULTS:  Recent Labs   Lab Test 07/03/20 2010 08/11/17  1435   COLOR Yellow Yellow   APPEARANCE Slightly Cloudy Clear   URINEGLC Negative Negative   URINEBILI Negative Negative   URINEKETONE Negative Negative   SG 1.026 1.020   UBLD Small* Trace*   URINEPH 6.5 6.0   PROTEIN 10* Negative   UROBILINOGEN  --  0.2   NITRITE Negative " Negative   LEUKEST Negative Small*   RBCU 115*  --    WBCU 0  --          IMPRESSION:    Doing well, stent removed    PLAN:    We discussed prevention methods for future stones.  I recommended she follow-up with me again in 1 year for a KUB to make certain she is not forming any new stones.    Total Time:  15 min  Time in Consultation: 10 min      Devonte Lambert M.D.

## 2020-07-09 NOTE — LETTER
7/9/2020       RE: Lena Morrow  [address]  [address]  [place] [address]     Dear Colleague,    Thank you for referring your patient, Lena Morrow, to the [facility] at [facility]. Please see a copy of my visit note below.    Office Visit Note  [facility]  [phone]    Jul 9, 2020    [unfilled]    1975    UROLOGIC DIAGNOSES:    Left distal ureteral stone    CURRENT INTERVENTIONS:    S/P extraction    History:    This is a 45-year-old woman who presented 5 days ago with a distal left ureteral stone.  She underwent stone extraction and stent placement.  She has felt well since her procedure.  Stone composition is pending.      Imaging:      Labs:      MEDICATIONS:    Current Outpatient Medications:      albuterol (PROAIR HFA/PROVENTIL HFA/VENTOLIN HFA) 108 (90 Base) MCG/ACT inhaler, Inhale 2 puffs into the lungs every 6 hours as needed for shortness of breath / dyspnea or wheezing, Disp: 1 Inhaler, Rfl: prn     buPROPion (WELLBUTRIN XL) 300 MG 24 hr tablet, TAKE 1 TABLET(300 MG) BY MOUTH DAILY, Disp: 30 tablet, Rfl: 0     calcium carbonate (OS-JOSE J) 1500 (600 Ca) MG tablet, Take 600 mg by mouth daily, Disp: , Rfl:      cinnamon 500 MG CAPS, Take 1 Dose by mouth daily, Disp: , Rfl:      lamoTRIgine (LAMICTAL) 200 MG tablet, TAKE 1 TABLET(200 MG) BY MOUTH TWICE DAILY, Disp: 180 tablet, Rfl: 1     lithium ER (LITHOBID) 300 MG CR tablet, Take 600 mg by mouth At Bedtime , Disp: , Rfl: 0     LORATADINE PO, Take 10 mg by mouth daily, Disp: , Rfl:      Magnesium Oxide 250 MG TABS, Take 500 mg by mouth daily , Disp: , Rfl:      multivitamin w/minerals (MULTI-VITAMIN) tablet, Take 1 tablet by mouth daily, Disp: , Rfl:      ondansetron (ZOFRAN-ODT) 4 MG ODT tab, Take 1-2 tablets (4-8 mg) by mouth every 8 hours as needed for nausea, Disp: 50 tablet, Rfl: 1     propranolol (INDERAL) 20 MG tablet, Take 20 mg by mouth  daily as needed, Disp: , Rfl:      propranolol ER (INDERAL LA) 80 MG 24 hr capsule, TAKE 1 CAPSULE(80 MG) BY MOUTH DAILY, Disp: 90 capsule, Rfl: 2     tamsulosin (FLOMAX) 0.4 MG capsule, Take 1 capsule (0.4 mg) by mouth daily, Disp: 30 capsule, Rfl: 0     vitamin C (ASCORBIC ACID) 500 MG tablet, Take 500 mg by mouth daily, Disp: , Rfl:      Vitamin D, Cholecalciferol, 1000 UNITS CAPS, Take 1,000 Units by mouth daily, Disp: , Rfl:      esomeprazole (NEXIUM) 40 MG DR capsule, TAKE 1 CAPSULE BY MOUTH EVERY DAY, Disp: 90 capsule, Rfl: 1    ALLERGIES:     Allergies   Allergen Reactions     Amoxicillin      yeast vaginal inf     Aripiprazole      Increased sadness and moodiness     Ativan Fatigue     Augmentin [Amoxicillin-Pot Clavulanate]      Itching,hives     Azithromycin      GI cramps     Clonazepam      Sedation, even at 0.5 mg dose     Estrogens      # bcps cause cns sx     Lexapro      diarrhea       Mirtazapine      Sedation      Oxycodone      Epigastric pain, headache     Prochlorperazine      Compazine- agitation     Risperdal [Risperidone]      Weight gain     Seasonal Allergies      Seroquel [Quetiapine]      Increased appetite     Sertraline      sweats     Venlafaxine      sweats     Xanax [Alprazolam] Fatigue       REVIEW OF SYSTEMS: Ten point review of systems without change as outlined in HPI    SURGICAL HISTORY:    Past Surgical History:   Procedure Laterality Date     C NONSPECIFIC PROCEDURE      tonsillectomy     C NONSPECIFIC PROCEDURE  2010    Open right carpal tunnel release.  Excision right dorsal carpal ganglion cyst. Excision, terminal branch, right posterior interosseous nerve.        COLONOSCOPY       CYSTOSCOPY N/A 5/26/2017    Procedure: CYSTOSCOPY;;  Surgeon: Toni Da Silva MD;  Location: [facility]     DAVINCI HYSTERECTOMY TOTAL, SALPINGECTOMY BILATERAL N/A 5/26/2017    Procedure: DAVINCI HYSTERECTOMY TOTAL, SALPINGECTOMY BILATERAL;  ROBOTIC ASSISTED LAPAROSCOPIC TOTAL HYSTERECTOMY;  "BILATERAL SALPINGECTOMY; CYSTOSCOPY;  Surgeon: Toni Da Silva MD;  Location:  OR     DILATION AND CURETTAGE N/A 2016    Procedure: DILATION AND CURETTAGE;  Surgeon: Josue Jama MD;  Location: [facility]     ENT SURGERY       GYN SURGERY           HC COLP CERVIX/UPPER VAGINA W BX CERVIX  10/30/12    [facility]     HYSTERECTOMY       LAPAROSCOPIC CHOLECYSTECTOMY  4/3/2012    Procedure:LAPAROSCOPIC CHOLECYSTECTOMY; LAPAROSCOPIC CHOLECYSTECTOMY ; Surgeon:KARYNA CAMARA; Location:[facility]     LAPAROSCOPY DIAGNOSTIC (GYN) N/A 2016    Procedure: LAPAROSCOPY DIAGNOSTIC (GYN);  Surgeon: Josue Jama MD;  Location: [facility]     LASER HOLMIUM LITHOTRIPSY URETER(S), INSERT STENT, COMBINED Left 2020    Procedure: Cystoscopy, left ureteroscopy with holmium laser lithotripsy and left stent placement;  Surgeon: Devonte Lambert MD;  Location:  OR     ORTHOPEDIC SURGERY      carpal tunnel with ganglian cyst removal         PHYSICAL EXAM:    BP 90/60 (BP Location: Right arm, Patient Position: Sitting, Cuff Size: Adult Regular)   Pulse 98   Ht 1.626 m (5' 4\")   Wt 91.6 kg (202 lb)   LMP 12/10/2015   SpO2 98%   BMI 34.67 kg/m      Constitutional: Well developed. Conversant and in no acute distress  Eyes: Anicteric sclera, conjunctiva clear, normal extraocular movements  ENT: Normocephalic and atraumatic,   Skin: Warm and dry. No rashes or lesions  Cardiac: No peripheral edema  Back/Flank: Not done  CNS/PNS: Normal musculature and movements, moves all extremities normally  Respiratory: Normal non-labored breathing  Abdomen: Soft nontender and nondistended  Peripheral Vascular: No peripheral edema  Mental Status/Psych: Alert and Oriented x 3. Normal mood and affect  External Genitalia: Not done  Bladder: Not done  Urethra: Not done   Vagina: Not done    Cystoscopy: I performed flexible cystoscopy today and the stent was removed difficulty      Urinalysis:  UA " RESULTS:  Recent Labs   Lab Test 07/03/20 2010 08/11/17  1435   COLOR Yellow Yellow   APPEARANCE Slightly Cloudy Clear   URINEGLC Negative Negative   URINEBILI Negative Negative   URINEKETONE Negative Negative   SG 1.026 1.020   UBLD Small* Trace*   URINEPH 6.5 6.0   PROTEIN 10* Negative   UROBILINOGEN  --  0.2   NITRITE Negative Negative   LEUKEST Negative Small*   RBCU 115*  --    WBCU 0  --        IMPRESSION:    Doing well, stent removed    PLAN:    We discussed prevention methods for future stones.  I recommended she follow-up with me again in 1 year for a KUB to make certain she is not forming any new stones.    Total Time:  15 min  Time in Consultation: 10 min      Devonte Lambert M.D.

## 2020-07-09 NOTE — PATIENT INSTRUCTIONS

## 2020-07-09 NOTE — NURSING NOTE
Chief Complaint   Patient presents with     Cystoscopy     , stones     Prior to the start of the procedure and with procedural staff participation, I verbally confirmed the patient s identity using two indicators, relevant allergies, that the procedure was appropriate and matched the consent or emergent situation, and that the correct equipment/implants were available. Immediately prior to starting the procedure I conducted the Time Out with the procedural staff and re-confirmed the patient s name, procedure, and site/side. I have wiped the patient off with the povidone-Iodine solution, draped them,  used Lidocaine hydrochloride jelly, and instilled sterile water into the bladder. (The Joint Commission universal protocol was followed.)  Yes    Sedation (Moderate or Deep): None  Laura Yoo LPN

## 2020-07-10 LAB
APPEARANCE STONE: NORMAL
COMPN STONE: NORMAL
NUMBER STONE: 3
SIZE STONE: NORMAL MM
WT STONE: 33 MG

## 2020-07-13 NOTE — TELEPHONE ENCOUNTER
Messages were not to be held for me last week.   Please apologize to patient for lack of response.   Just back today.    This hernia should be fully unrelated to any symptoms in her hip area.    Other issues should be addressed per urology, if continuing.
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
## 2020-07-14 NOTE — TELEPHONE ENCOUNTER
Spoke with patient.     Informed of provider note below.     She was hoping the Hernia would be part of the problem but will follow up with Orthopedic for future steroid injection. She otherwise feels good. Had stent removed and every thing is back to normal- urine is clear.  Patient appreciated provider getting back to her.     PCP MADIE, patient wanted MD to review Urology recommendation of a future 1 year follow up. Patient does not require a follow up call.     No further action required.     Tiffany ALVESN, RN, PHN

## 2020-07-20 ENCOUNTER — TRANSFERRED RECORDS (OUTPATIENT)
Dept: HEALTH INFORMATION MANAGEMENT | Facility: CLINIC | Age: 45
End: 2020-07-20

## 2020-08-12 ENCOUNTER — TRANSFERRED RECORDS (OUTPATIENT)
Dept: HEALTH INFORMATION MANAGEMENT | Facility: CLINIC | Age: 45
End: 2020-08-12

## 2020-09-17 ENCOUNTER — TRANSFERRED RECORDS (OUTPATIENT)
Dept: HEALTH INFORMATION MANAGEMENT | Facility: CLINIC | Age: 45
End: 2020-09-17

## 2020-09-25 DIAGNOSIS — B00.9 HERPES SIMPLEX VIRUS (HSV) INFECTION: ICD-10-CM

## 2020-09-28 RX ORDER — VALACYCLOVIR HYDROCHLORIDE 1 G/1
TABLET, FILM COATED ORAL
Qty: 4 TABLET | Status: SHIPPED | OUTPATIENT
Start: 2020-09-28 | End: 2021-07-08

## 2020-10-11 DIAGNOSIS — R10.13 EPIGASTRIC PAIN: ICD-10-CM

## 2020-10-13 RX ORDER — ESOMEPRAZOLE MAGNESIUM 40 MG/1
CAPSULE, DELAYED RELEASE ORAL
Qty: 90 CAPSULE | Refills: 1 | Status: SHIPPED | OUTPATIENT
Start: 2020-10-13 | End: 2021-03-01

## 2020-10-22 ENCOUNTER — MEDICAL CORRESPONDENCE (OUTPATIENT)
Dept: HEALTH INFORMATION MANAGEMENT | Facility: CLINIC | Age: 45
End: 2020-10-22

## 2020-10-22 ENCOUNTER — TRANSFERRED RECORDS (OUTPATIENT)
Dept: HEALTH INFORMATION MANAGEMENT | Facility: CLINIC | Age: 45
End: 2020-10-22

## 2020-10-27 DIAGNOSIS — J45.21 MILD INTERMITTENT ASTHMA WITH ACUTE EXACERBATION: ICD-10-CM

## 2020-10-27 RX ORDER — ALBUTEROL SULFATE 90 UG/1
2 AEROSOL, METERED RESPIRATORY (INHALATION) EVERY 6 HOURS PRN
Qty: 1 INHALER | Refills: 0 | Status: SHIPPED | OUTPATIENT
Start: 2020-10-27 | End: 2020-11-17

## 2020-10-27 NOTE — TELEPHONE ENCOUNTER
Medication is being filled for 1 time refill only due to:  Patient needs to be seen because due for Asthma follow up.    Prescription approved per [facility] Refill Protocol.    Tiffany ALVESN, RN, PHN

## 2020-11-07 DIAGNOSIS — Z13.1 SCREENING FOR DIABETES MELLITUS: ICD-10-CM

## 2020-11-07 DIAGNOSIS — F41.1 GENERALIZED ANXIETY DISORDER: Primary | ICD-10-CM

## 2020-11-07 DIAGNOSIS — Z79.899 ENCOUNTER FOR LONG-TERM (CURRENT) USE OF HIGH-RISK MEDICATION: ICD-10-CM

## 2020-11-07 DIAGNOSIS — R94.6 ABNORMAL FINDING ON THYROID FUNCTION TEST: ICD-10-CM

## 2020-11-07 LAB
ANION GAP SERPL CALCULATED.3IONS-SCNC: 1 MMOL/L (ref 3–14)
BUN SERPL-MCNC: 13 MG/DL (ref 7–30)
CALCIUM SERPL-MCNC: 9.3 MG/DL (ref 8.5–10.1)
CHLORIDE SERPL-SCNC: 111 MMOL/L (ref 94–109)
CO2 SERPL-SCNC: 29 MMOL/L (ref 20–32)
CREAT SERPL-MCNC: 0.76 MG/DL (ref 0.52–1.04)
GFR SERPL CREATININE-BSD FRML MDRD: >90 ML/MIN/{1.73_M2}
GLUCOSE SERPL-MCNC: 97 MG/DL (ref 70–99)
LITHIUM SERPL-SCNC: 0.3 MMOL/L (ref 0.6–1.2)
POTASSIUM SERPL-SCNC: 4 MMOL/L (ref 3.4–5.3)
SODIUM SERPL-SCNC: 141 MMOL/L (ref 133–144)
T4 FREE SERPL-MCNC: 1.01 NG/DL (ref 0.76–1.46)
TSH SERPL DL<=0.005 MIU/L-ACNC: 2.08 MU/L (ref 0.4–4)

## 2020-11-07 PROCEDURE — 84439 ASSAY OF FREE THYROXINE: CPT | Performed by: PHYSICIAN ASSISTANT

## 2020-11-07 PROCEDURE — 84443 ASSAY THYROID STIM HORMONE: CPT | Performed by: PHYSICIAN ASSISTANT

## 2020-11-07 PROCEDURE — 80178 ASSAY OF LITHIUM: CPT | Performed by: PHYSICIAN ASSISTANT

## 2020-11-07 PROCEDURE — 36415 COLL VENOUS BLD VENIPUNCTURE: CPT | Performed by: PHYSICIAN ASSISTANT

## 2020-11-07 PROCEDURE — 80048 BASIC METABOLIC PNL TOTAL CA: CPT | Performed by: PHYSICIAN ASSISTANT

## 2020-11-09 ENCOUNTER — VIRTUAL VISIT (OUTPATIENT)
Dept: FAMILY MEDICINE | Facility: OTHER | Age: 45
End: 2020-11-09

## 2020-11-09 NOTE — PROGRESS NOTES
"Date: 2020 11:35:56  Clinician: Sonam El  Clinician NPI: [id number]  Patient: Lena Morrow  Patient : 1975  Patient Address: [address] [place] [address]  Patient Phone: [phone]  Visit Protocol: URI  Patient Summary:  Lena is a 45 year old ( : 1975 ) female who initiated a [facility] Visit for COVID-19 (Coronavirus) evaluation and screening. When asked the question \"Please sign me up to receive news, health information and promotions. \", Lena responded \"No\".    When asked when her symptoms started, Lena reported that she does not have any symptoms.   She denies taking antibiotic medication in the past month and having recent facial or sinus surgery in the past 60 days.    Pertinent COVID-19 (Coronavirus) information  Lnea does not work or volunteer as healthcare worker or a . In the past 14 days, Lena has not worked or volunteered at a healthcare facility or group living setting.   In the past 14 days, she also has not lived in a congregate living setting.   Lena has had a close contact with a laboratory-confirmed COVID-19 patient in the last 14 days. She was exposed at her work. She does not know when she was exposed to the laboratory-confirmed COVID-19 patient.   Additional information about contact with COVID-19 (Coronavirus) patient as reported by the patient (free text): My co workers daughter   Lena is not living in the same household with the COVID-19 positive patient. She was not in an enclosed space for greater than 15 minutes with the COVID-19 patient.   Since 2019, Lena has been tested for COVID-19 and has not had upper respiratory infection or influenza-like illness.      Result of COVID-19 test: Negative     Date of her COVID-19 test: 2020      Pertinent medical history  Lena does not get yeast infections when she takes antibiotics.   Lena does not need a return to work/school note.   Weight: 200 lbs   Lena does not smoke " or use smokeless tobacco.   She denies pregnancy and denies breastfeeding. She does not menstruate.   Additional information as reported by the patient (free text): I have asthma   Weight: 200 lbs    MEDICATIONS: Wellbutrin XL oral, lithium carbonate oral, Lamictal oral, esomeprazole magnesium oral, ALLERGIES: Augmentin  Clinician Response:  Dear Lena,   Your symptoms show that you may have coronavirus (COVID-19). This illness can cause fever, cough and trouble breathing. Many people get a mild case and get better on their own. Some people can get very sick.  What should I do?  We would like to test you for this virus.   1. Please call [phone] to schedule your visit. Explain that you were referred by [facility] to have a COVID-19 test. Be ready to share your [facility] visit ID number.  * If you need to schedule in [facility] please call [phone] or for [employer] employees please call [phone].  * If you need to schedule in the [place] area please call [phone]. [employer] employees call [phone].  The following will serve as your written order for this COVID Test, ordered by me, for the indication of suspected COVID [Z20.828]: The test will be ordered in [employer], our electronic health record, after you are scheduled. It will show as ordered and authorized by Alex Hammond MD.  Order: COVID-19 (Coronavirus) PCR for SYMPTOMATIC testing from [facility].   2. When it's time for your COVID test:  Stay at least 6 feet away from others. (If someone will drive you to your test, stay in the backseat, as far away from the  as you can.)   Cover your mouth and nose with a mask, tissue or washcloth.  Go straight to the testing site. Don't make any stops on the way there or back.      3.Starting now: Stay home and away from others (self-isolate) until:   You've had no fever---and no medicine that reduces fever---for one full day (24 hours). And...   Your other symptoms have gotten better. For example, your cough or  "breathing has improved. And...   At least 10 days have passed since your symptoms started.       During this time, don't leave the house except for testing or medical care.   Stay in your own room, even for meals. Use your own bathroom if you can.   Stay away from others in your home. No hugging, kissing or shaking hands. No visitors.  Don't go to work, school or anywhere else.    Clean \"high touch\" surfaces often (doorknobs, counters, handles, etc.). Use a household cleaning spray or wipes. You'll find a full list of  on the EPA website: www.epa.gov/pesticide-registration/list-n-disinfectants-use-against-sars-cov-2.   Cover your mouth and nose with a mask, tissue or washcloth to avoid spreading germs.  Wash your hands and face often. Use soap and water.  Caregivers in these groups are at risk for severe illness due to COVID-19:  o People 65 years and older  o People who live in a nursing home or long-term care facility  o People with chronic disease (lung, heart, cancer, diabetes, kidney, liver, immunologic)  o People who have a weakened immune system, including those who:   Are in cancer treatment  Take medicine that weakens the immune system, such as corticosteroids  Had a bone marrow or organ transplant  Have an immune deficiency  Have poorly controlled HIV or AIDS  Are obese (body mass index of 40 or higher)  Smoke regularly   o Caregivers should wear gloves while washing dishes, handling laundry and cleaning bedrooms and bathrooms.  o Use caution when washing and drying laundry: Don't shake dirty laundry, and use the warmest water setting that you can.  o For more tips, go to www.cdc.gov/coronavirus/2019-ncov/downloads/10Things.pdf.    4.Sign up for Gift [employer]. We know it's scary to hear that you might have COVID-19. We want to track your symptoms to make sure you're okay over the next 2 weeks. Please look for an email from [employer]---this is a free, online program that we'll use to keep in " touch. To sign up, follow the link in the email. Learn more at [email or web address]  How can I take care of myself?   Get lots of rest. Drink extra fluids (unless a doctor has told you not to).   Take Tylenol (acetaminophen) for fever or pain. If you have liver or kidney problems, ask your family doctor if it's okay to take Tylenol.   Adults can take either:    650 mg (two 325 mg pills) every 4 to 6 hours, or...   1,000 mg (two 500 mg pills) every 8 hours as needed.    Note: Don't take more than 3,000 mg in one day. Acetaminophen is found in many medicines (both prescribed and over-the-counter medicines). Read all labels to be sure you don't take too much.   For children, check the Tylenol bottle for the right dose. The dose is based on the child's age or weight.    If you have other health problems (like cancer, heart failure, an organ transplant or severe kidney disease): Call your specialty clinic if you don't feel better in the next 2 days.       Know when to call 911. Emergency warning signs include:    Trouble breathing or shortness of breath Pain or pressure in the chest that doesn't go away Feeling confused like you haven't felt before, or not being able to wake up Bluish-colored lips or face.  Where can I get more information?   [facility] -- About COVID-19: [email or web address]   CDC -- What to Do If You're Sick: www.cdc.gov/coronavirus/2019-ncov/about/steps-when-sick.html   CDC -- Ending Home Isolation: www.cdc.gov/coronavirus/2019-ncov/hcp/disposition-in-home-patients.html   CDC -- Caring for Someone: www.cdc.gov/coronavirus/2019-ncov/if-you-are-sick/care-for-someone.html   [facility] -- Interim Guidance for Hospital Discharge to Home: www.health.[facility].[place].us/diseases/coronavirus/hcp/hospdischarge.pdf   [facility] clinical trials (COVID-19 research studies): clinicalaffairs.[facility]/umn-clinical-trials    Below are the COVID-19 hotlines at the [facility] of  Health ([facility]). Interpreters are available.    For health questions: Call 592-747-5832 or 1-425.112.9332 (7 a.m. to 7 p.m.) For questions about schools and childcare: Call 754-027-9473 or 1-823.626.7543 (7 a.m. to 7 p.m.)    Diagnosis: Cough  Diagnosis ICD: R05

## 2020-11-12 DIAGNOSIS — Z20.822 SUSPECTED COVID-19 VIRUS INFECTION: Primary | ICD-10-CM

## 2020-11-13 DIAGNOSIS — Z20.822 SUSPECTED COVID-19 VIRUS INFECTION: ICD-10-CM

## 2020-11-13 PROCEDURE — U0003 INFECTIOUS AGENT DETECTION BY NUCLEIC ACID (DNA OR RNA); SEVERE ACUTE RESPIRATORY SYNDROME CORONAVIRUS 2 (SARS-COV-2) (CORONAVIRUS DISEASE [COVID-19]), AMPLIFIED PROBE TECHNIQUE, MAKING USE OF HIGH THROUGHPUT TECHNOLOGIES AS DESCRIBED BY CMS-2020-01-R: HCPCS | Performed by: FAMILY MEDICINE

## 2020-11-14 LAB
SARS-COV-2 RNA SPEC QL NAA+PROBE: NOT DETECTED
SPECIMEN SOURCE: NORMAL

## 2020-11-17 DIAGNOSIS — J45.21 MILD INTERMITTENT ASTHMA WITH ACUTE EXACERBATION: ICD-10-CM

## 2020-11-17 RX ORDER — ALBUTEROL SULFATE 90 UG/1
AEROSOL, METERED RESPIRATORY (INHALATION)
Qty: 6.7 G | Refills: 3 | Status: SHIPPED | OUTPATIENT
Start: 2020-11-17 | End: 2021-11-19

## 2020-11-17 NOTE — TELEPHONE ENCOUNTER
Routing refill request to provider for review/approval because:  not current:  ACT  Due for 6 month asthma appt

## 2020-11-24 ENCOUNTER — TRANSFERRED RECORDS (OUTPATIENT)
Dept: HEALTH INFORMATION MANAGEMENT | Facility: CLINIC | Age: 45
End: 2020-11-24

## 2020-12-07 ENCOUNTER — TRANSFERRED RECORDS (OUTPATIENT)
Dept: HEALTH INFORMATION MANAGEMENT | Facility: CLINIC | Age: 45
End: 2020-12-07

## 2021-01-14 ENCOUNTER — HEALTH MAINTENANCE LETTER (OUTPATIENT)
Age: 46
End: 2021-01-14

## 2021-01-27 ENCOUNTER — TRANSFERRED RECORDS (OUTPATIENT)
Dept: HEALTH INFORMATION MANAGEMENT | Facility: CLINIC | Age: 46
End: 2021-01-27

## 2021-02-02 ENCOUNTER — APPOINTMENT (OUTPATIENT)
Dept: LAB | Facility: CLINIC | Age: 46
End: 2021-02-02
Payer: COMMERCIAL

## 2021-02-06 DIAGNOSIS — F41.1 GENERALIZED ANXIETY DISORDER: ICD-10-CM

## 2021-02-07 RX ORDER — PROPRANOLOL HYDROCHLORIDE 80 MG/1
CAPSULE, EXTENDED RELEASE ORAL
Qty: 90 CAPSULE | Refills: 2 | Status: SHIPPED | OUTPATIENT
Start: 2021-02-07 | End: 2021-08-04

## 2021-02-26 ENCOUNTER — TELEPHONE (OUTPATIENT)
Dept: INTERNAL MEDICINE | Facility: CLINIC | Age: 46
End: 2021-02-26

## 2021-02-26 DIAGNOSIS — R10.13 EPIGASTRIC PAIN: ICD-10-CM

## 2021-02-26 NOTE — LETTER
March 1, 2021    Lena Morrow  [address]    [facility] [place] [address]        Dear Lena,    While refilling your prescription today, we noticed that you are due to have a IN PERSON visit with your Provider.  We will refill your prescription for 90 days, but that appointment must be made before any additional refills can be approved.     Taking care of your health is important to us and we look forward to seeing you in the near future.  Please call us at [phone] or [phone] (or use [employer]) to schedule.  Please disregard this notice if you have already made an appointment.        Sincerely,          [facility] Team

## 2021-03-01 RX ORDER — ESOMEPRAZOLE MAGNESIUM 40 MG/1
CAPSULE, DELAYED RELEASE ORAL
Qty: 90 CAPSULE | Refills: 0 | Status: SHIPPED | OUTPATIENT
Start: 2021-03-01 | End: 2021-04-21

## 2021-03-01 NOTE — TELEPHONE ENCOUNTER
Patient care team- Pari refill given by triage nurse.     Appointment needed for patient.  For: annual visit     Please call patient or send a letter. Thank you.

## 2021-03-14 ENCOUNTER — HEALTH MAINTENANCE LETTER (OUTPATIENT)
Age: 46
End: 2021-03-14

## 2021-03-31 DIAGNOSIS — Z12.31 VISIT FOR SCREENING MAMMOGRAM: ICD-10-CM

## 2021-03-31 PROCEDURE — 77067 SCR MAMMO BI INCL CAD: CPT | Mod: TC | Performed by: RADIOLOGY

## 2021-03-31 PROCEDURE — 77063 BREAST TOMOSYNTHESIS BI: CPT | Mod: TC | Performed by: RADIOLOGY

## 2021-04-21 ENCOUNTER — ANCILLARY PROCEDURE (OUTPATIENT)
Dept: GENERAL RADIOLOGY | Facility: CLINIC | Age: 46
End: 2021-04-21
Attending: INTERNAL MEDICINE
Payer: COMMERCIAL

## 2021-04-21 ENCOUNTER — OFFICE VISIT (OUTPATIENT)
Dept: INTERNAL MEDICINE | Facility: CLINIC | Age: 46
End: 2021-04-21
Payer: COMMERCIAL

## 2021-04-21 VITALS
DIASTOLIC BLOOD PRESSURE: 64 MMHG | RESPIRATION RATE: 16 BRPM | BODY MASS INDEX: 35.86 KG/M2 | HEART RATE: 81 BPM | OXYGEN SATURATION: 96 % | WEIGHT: 208.9 LBS | SYSTOLIC BLOOD PRESSURE: 108 MMHG

## 2021-04-21 DIAGNOSIS — M79.672 LEFT FOOT PAIN: ICD-10-CM

## 2021-04-21 DIAGNOSIS — M79.672 LEFT FOOT PAIN: Primary | ICD-10-CM

## 2021-04-21 DIAGNOSIS — R10.13 EPIGASTRIC PAIN: ICD-10-CM

## 2021-04-21 PROCEDURE — 73630 X-RAY EXAM OF FOOT: CPT | Mod: LT | Performed by: RADIOLOGY

## 2021-04-21 PROCEDURE — 99213 OFFICE O/P EST LOW 20 MIN: CPT | Performed by: INTERNAL MEDICINE

## 2021-04-21 RX ORDER — OXYCODONE AND ACETAMINOPHEN 5; 325 MG/1; MG/1
TABLET ORAL
COMMUNITY
Start: 2021-02-19 | End: 2021-04-21

## 2021-04-21 RX ORDER — ESOMEPRAZOLE MAGNESIUM 40 MG/1
40 CAPSULE, DELAYED RELEASE ORAL
Qty: 90 CAPSULE | Refills: 3 | Status: SHIPPED | OUTPATIENT
Start: 2021-04-21 | End: 2022-05-12

## 2021-04-21 ASSESSMENT — ANXIETY QUESTIONNAIRES
GAD7 TOTAL SCORE: 16
1. FEELING NERVOUS, ANXIOUS, OR ON EDGE: SEVERAL DAYS
5. BEING SO RESTLESS THAT IT IS HARD TO SIT STILL: SEVERAL DAYS
7. FEELING AFRAID AS IF SOMETHING AWFUL MIGHT HAPPEN: MORE THAN HALF THE DAYS
3. WORRYING TOO MUCH ABOUT DIFFERENT THINGS: NEARLY EVERY DAY
6. BECOMING EASILY ANNOYED OR IRRITABLE: NEARLY EVERY DAY
2. NOT BEING ABLE TO STOP OR CONTROL WORRYING: NEARLY EVERY DAY
IF YOU CHECKED OFF ANY PROBLEMS ON THIS QUESTIONNAIRE, HOW DIFFICULT HAVE THESE PROBLEMS MADE IT FOR YOU TO DO YOUR WORK, TAKE CARE OF THINGS AT HOME, OR GET ALONG WITH OTHER PEOPLE: VERY DIFFICULT

## 2021-04-21 ASSESSMENT — PATIENT HEALTH QUESTIONNAIRE - PHQ9
5. POOR APPETITE OR OVEREATING: NEARLY EVERY DAY
SUM OF ALL RESPONSES TO PHQ QUESTIONS 1-9: 15

## 2021-04-21 NOTE — PROGRESS NOTES
"    Assessment & Plan     ASSESSMENT:   1.  Epigastric pain fully controlled with morning Nexium  2.  L foot pain, traumatic.   Consider strain, fracture with healing      PLAN:  1.  Continue present medications   2.  Follow-up with mental health provider as planned  3.  L foot xray and follow-up with podiatry             BMI:   Estimated body mass index is 35.86 kg/m  as calculated from the following:    Height as of 7/9/20: 1.626 m (5' 4\").    Weight as of this encounter: 94.8 kg (208 lb 14.4 oz).           No follow-ups on file.    Trevon Crisostomo MD  [facility] is a 45 year old who presents for the following health issues     HPI     Depression Followup    How are you doing with your depression since your last visit? Got bad and has been ok since than     Are you having other symptoms that might be associated with depression? No    Have you had a significant life event?  OTHER: life things     Are you feeling anxious or having panic attacks?   No    Do you have any concerns with your use of alcohol or other drugs? No     Patient was tried on Abil[facility] at [facility].   Some paranoia, stopped.  - still on Lithium, helps.  Also lamictal.  - has follow-up with provider next week  - on beta-blocker too, helps anxiety      PHQ 5/14/2019 2/17/2020 4/21/2021   PHQ-9 Total Score 12 - 15   Q9: Thoughts of better off dead/self-harm past 2 weeks Not at all - Not at all   F/U: Thoughts of suicide or self-harm No - -   F/U: Safety concerns No - -   PHQ-9 External Data - 13 -        Social History     Tobacco Use     Smoking status: Never Smoker     Smokeless tobacco: Never Used   Substance Use Topics     Alcohol use: Yes     Comment: 1 beer monthly     Drug use: No       PHIL-7 SCORE 5/31/2018 10/3/2018 4/21/2021   Total Score - - -   Total Score 13 21 16         Suicide Assessment Five-step Evaluation and Treatment (SAFE-T)      How many servings of fruits and " vegetables do you eat daily?  2-3    On average, how many sweetened beverages do you drink each day (Examples: soda, juice, sweet tea, etc.  Do NOT count diet or artificially sweetened beverages)?   1    How many days per week do you exercise enough to make your heart beat faster? 3 or less    How many minutes a day do you exercise enough to make your heart beat faster? 9 or less    How many days per week do you miss taking your medication? 0    Additional issues to address:  1.  Had nerve block R hip.   Worked but pain back.   Ablation not covered, considering.     2.  Tripped and rolled L ankle ~ 3 months ago.  Pain roly of L foot and left lateral foot.   Hurts to wear shoes since.   Burning pain present.       Review of Systems         Objective    /64   Pulse 81   Resp 16   Wt 94.8 kg (208 lb 14.4 oz)   LMP 12/10/2015   SpO2 96%   BMI 35.86 kg/m    There is no height or weight on file to calculate BMI.  Physical Exam   Very minimally abnormal L foot exam, without focal tenderness, grossly range of motion, and only very mild pain with full flex against pressure  Affect bright  Abdomen soft, nontender

## 2021-04-21 NOTE — PATIENT INSTRUCTIONS
If you have technical difficulty using [employer], call [phone] for assistance.     PLAN:  1.  Continue present medications   2.  Follow-up with mental health provider as planned  3.  L foot xray and follow-up with podiatry

## 2021-04-22 ASSESSMENT — ANXIETY QUESTIONNAIRES: GAD7 TOTAL SCORE: 16

## 2021-04-26 ENCOUNTER — TRANSFERRED RECORDS (OUTPATIENT)
Dept: HEALTH INFORMATION MANAGEMENT | Facility: CLINIC | Age: 46
End: 2021-04-26

## 2021-04-26 ENCOUNTER — MEDICAL CORRESPONDENCE (OUTPATIENT)
Dept: HEALTH INFORMATION MANAGEMENT | Facility: CLINIC | Age: 46
End: 2021-04-26

## 2021-05-15 DIAGNOSIS — F41.1 GENERALIZED ANXIETY DISORDER: ICD-10-CM

## 2021-05-15 DIAGNOSIS — Z79.899 HIGH RISK MEDICATION USE: Primary | ICD-10-CM

## 2021-05-15 LAB
ANION GAP SERPL CALCULATED.3IONS-SCNC: 3 MMOL/L (ref 3–14)
BUN SERPL-MCNC: 12 MG/DL (ref 7–30)
CALCIUM SERPL-MCNC: 9 MG/DL (ref 8.5–10.1)
CHLORIDE SERPL-SCNC: 109 MMOL/L (ref 94–109)
CHOLEST SERPL-MCNC: 199 MG/DL
CO2 SERPL-SCNC: 28 MMOL/L (ref 20–32)
CREAT SERPL-MCNC: 0.92 MG/DL (ref 0.52–1.04)
GFR SERPL CREATININE-BSD FRML MDRD: 75 ML/MIN/{1.73_M2}
GLUCOSE SERPL-MCNC: 110 MG/DL (ref 70–99)
HBA1C MFR BLD: 5.2 % (ref 0–5.6)
HDLC SERPL-MCNC: 44 MG/DL
LDLC SERPL CALC-MCNC: 110 MG/DL
LITHIUM SERPL-SCNC: 0.28 MMOL/L (ref 0.6–1.2)
NONHDLC SERPL-MCNC: 155 MG/DL
POTASSIUM SERPL-SCNC: 4.2 MMOL/L (ref 3.4–5.3)
SODIUM SERPL-SCNC: 140 MMOL/L (ref 133–144)
T4 FREE SERPL-MCNC: 0.98 NG/DL (ref 0.76–1.46)
TRIGL SERPL-MCNC: 227 MG/DL
TSH SERPL DL<=0.005 MIU/L-ACNC: 1.98 MU/L (ref 0.4–4)

## 2021-05-15 PROCEDURE — 80178 ASSAY OF LITHIUM: CPT | Performed by: PHYSICIAN ASSISTANT

## 2021-05-15 PROCEDURE — 83036 HEMOGLOBIN GLYCOSYLATED A1C: CPT | Performed by: PHYSICIAN ASSISTANT

## 2021-05-15 PROCEDURE — 36415 COLL VENOUS BLD VENIPUNCTURE: CPT | Performed by: PHYSICIAN ASSISTANT

## 2021-05-15 PROCEDURE — 84443 ASSAY THYROID STIM HORMONE: CPT | Performed by: PHYSICIAN ASSISTANT

## 2021-05-15 PROCEDURE — 80061 LIPID PANEL: CPT | Performed by: PHYSICIAN ASSISTANT

## 2021-05-15 PROCEDURE — 80048 BASIC METABOLIC PNL TOTAL CA: CPT | Performed by: PHYSICIAN ASSISTANT

## 2021-05-15 PROCEDURE — 84439 ASSAY OF FREE THYROXINE: CPT | Performed by: PHYSICIAN ASSISTANT

## 2021-05-25 ENCOUNTER — ANCILLARY PROCEDURE (OUTPATIENT)
Dept: GENERAL RADIOLOGY | Facility: CLINIC | Age: 46
End: 2021-05-25
Attending: PODIATRIST
Payer: COMMERCIAL

## 2021-05-25 ENCOUNTER — OFFICE VISIT (OUTPATIENT)
Dept: PODIATRY | Facility: CLINIC | Age: 46
End: 2021-05-25
Attending: INTERNAL MEDICINE
Payer: COMMERCIAL

## 2021-05-25 VITALS
SYSTOLIC BLOOD PRESSURE: 96 MMHG | WEIGHT: 207.74 LBS | DIASTOLIC BLOOD PRESSURE: 72 MMHG | BODY MASS INDEX: 35.47 KG/M2 | HEIGHT: 64 IN

## 2021-05-25 DIAGNOSIS — M79.672 LEFT FOOT PAIN: ICD-10-CM

## 2021-05-25 DIAGNOSIS — S99.922A INJURY OF LEFT FOOT, INITIAL ENCOUNTER: Primary | ICD-10-CM

## 2021-05-25 DIAGNOSIS — M20.12 HALLUX VALGUS OF LEFT FOOT: ICD-10-CM

## 2021-05-25 DIAGNOSIS — M21.42 FLAT FEET: ICD-10-CM

## 2021-05-25 DIAGNOSIS — M21.41 FLAT FEET: ICD-10-CM

## 2021-05-25 PROCEDURE — 99243 OFF/OP CNSLTJ NEW/EST LOW 30: CPT | Performed by: PODIATRIST

## 2021-05-25 PROCEDURE — 73630 X-RAY EXAM OF FOOT: CPT | Mod: LT | Performed by: RADIOLOGY

## 2021-05-25 RX ORDER — ANTIARTHRITIC COMBINATION NO.2 900 MG
5000 TABLET ORAL DAILY
COMMUNITY
End: 2021-08-04

## 2021-05-25 ASSESSMENT — MIFFLIN-ST. JEOR: SCORE: 1572.3

## 2021-05-25 NOTE — PATIENT INSTRUCTIONS
Thank you for choosing [facility] / Foot [facility] Ankle Surgery!    DR. REIS'S CLINIC LOCATIONS     [facility] SCHEDULE SURGERY: [phone]   [address] [place] [address] APPOINTMENTS: [phone]   [place] [address] BILLING QUESTIONS: [phone]  [phone] RADIOLOGY: [phone]       [facility]    [address]    [place], [place] [address] [phone]  [phone]      Follow up: 1 month    Next steps: wear short CAM walking boot for the next 2 to 4 weeks      [employer] / CAM WALKING BOOT INSTRUCTIONS  - Do NOT drive with CAM walker on. This is due to safety and legal issues.   - Do NOT wear the CAM walker on long car/train rides or on an airplane.  - Remove the CAM walker several times a day and do ankle range of motion (ROM) exercises/wiggle toes.  - It is recommended that a thick-soled shoe be worn on the other foot to offset any created leg length issue.   - The boot does not have to be worn at night.   - There is an increased risk of developing a blood clot with lower extremity immobilization. ROM exercises and knee-high compression (tenso /ACE wrap) is recommended to lower that risk.   - You should seek medical attention if you experience calf swelling and/or pain, chest pain, or shortness of breath.     If you need to return or exchange the boot, please contact [facility] @ [phone]
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
## 2021-06-23 ENCOUNTER — TELEPHONE (OUTPATIENT)
Dept: INTERNAL MEDICINE | Facility: CLINIC | Age: 46
End: 2021-06-23

## 2021-06-23 NOTE — TELEPHONE ENCOUNTER
Pt calling and I scheduled her for podiatry at [facility] please if advise if not appropriate. She needs to schedule a follow up appt and was transferred multiple time. Andressa is the one who placed referral first time and she is hoping her new PCP Sera who she has not met would be willing to do the same. Or have the care team at [facility] help in assist in seeing if she can be seen sooner. Please call pt at [phone] and okay to leave detailed vm.   Kamala Cason

## 2021-06-28 ENCOUNTER — HOSPITAL ENCOUNTER (OUTPATIENT)
Dept: GENERAL RADIOLOGY | Facility: CLINIC | Age: 46
Discharge: HOME | End: 2021-06-28
Attending: UROLOGY | Admitting: UROLOGY
Payer: COMMERCIAL

## 2021-06-28 DIAGNOSIS — N20.0 KIDNEY STONE: ICD-10-CM

## 2021-06-28 PROCEDURE — 74019 RADEX ABDOMEN 2 VIEWS: CPT

## 2021-07-07 ENCOUNTER — TRANSFERRED RECORDS (OUTPATIENT)
Dept: HEALTH INFORMATION MANAGEMENT | Facility: CLINIC | Age: 46
End: 2021-07-07

## 2021-07-08 ENCOUNTER — OFFICE VISIT (OUTPATIENT)
Dept: UROLOGY | Facility: CLINIC | Age: 46
End: 2021-07-08
Payer: COMMERCIAL

## 2021-07-08 VITALS
BODY MASS INDEX: 35.51 KG/M2 | SYSTOLIC BLOOD PRESSURE: 120 MMHG | WEIGHT: 208 LBS | DIASTOLIC BLOOD PRESSURE: 70 MMHG | HEART RATE: 77 BPM | HEIGHT: 64 IN | OXYGEN SATURATION: 97 %

## 2021-07-08 DIAGNOSIS — N20.0 KIDNEY STONE: Primary | ICD-10-CM

## 2021-07-08 PROCEDURE — 99214 OFFICE O/P EST MOD 30 MIN: CPT | Performed by: UROLOGY

## 2021-07-08 ASSESSMENT — PAIN SCALES - GENERAL: PAINLEVEL: EXTREME PAIN (9)

## 2021-07-08 ASSESSMENT — MIFFLIN-ST. JEOR: SCORE: 1568.48

## 2021-07-08 NOTE — PROGRESS NOTES
Office Visit Note  [facility]  [phone]    Jul 8, 2021    [unfilled]    1975    UROLOGIC DIAGNOSES:    History of stones    CURRENT INTERVENTIONS:    Prior ureteroscopy    History:    Lena returns to clinic today for kidney stone follow-up.  She has had no symptoms or problems in the past year.      Imaging: I personally reviewed the KUB images from today and compared them to last year's CT scan images.  Last year she had a tiny stone in the lower pole of left kidney that has now grown substantially in size, now measuring about 8 mm.    Labs:      MEDICATIONS:    Current Outpatient Medications:      albuterol (PROAIR HFA/PROVENTIL HFA/VENTOLIN HFA) 108 (90 Base) MCG/ACT inhaler, INHALE 2 PUFFS INTO THE LUNGS EVERY 6 HOURS AS NEEDED FOR SHORTNESS OF BREATH OR DIFFICULT BREATHING OR WHEEZING, Disp: 6.7 g, Rfl: 3     biotin 1000 MCG TABS tablet, Take 1,000 mcg by mouth daily, Disp: , Rfl:      buPROPion (WELLBUTRIN XL) 300 MG 24 hr tablet, TAKE 1 TABLET(300 MG) BY MOUTH DAILY, Disp: 30 tablet, Rfl: 0     calcium carbonate (OS-JOSE J) 1500 (600 Ca) MG tablet, Take 600 mg by mouth daily, Disp: , Rfl:      esomeprazole (NEXIUM) 40 MG DR capsule, Take 1 capsule (40 mg) by mouth every morning (before breakfast) Take 30-60 minutes before eating., Disp: 90 capsule, Rfl: 3     Ferrous Gluconate (IRON 27 PO), , Disp: , Rfl:      lamoTRIgine (LAMICTAL) 200 MG tablet, TAKE 1 TABLET(200 MG) BY MOUTH TWICE DAILY, Disp: 180 tablet, Rfl: 1     lithium ER (LITHOBID) 300 MG CR tablet, Take 600 mg by mouth At Bedtime , Disp: , Rfl: 0     LORATADINE PO, Take 10 mg by mouth daily, Disp: , Rfl:      Magnesium Oxide 250 MG TABS, Take 500 mg by mouth daily , Disp: , Rfl:      multivitamin w/minerals (MULTI-VITAMIN) tablet, Take 1 tablet by mouth daily, Disp: , Rfl:      Potassium 95 MG TABS, , Disp: , Rfl:      propranolol (INDERAL) 20 MG tablet, Take 20 mg by mouth daily as needed, Disp: , Rfl:      propranolol ER  (INDERAL LA) 80 MG 24 hr capsule, TAKE 1 CAPSULE(80 MG) BY MOUTH DAILY, Disp: 90 capsule, Rfl: 2     valACYclovir (VALTREX) 1000 mg tablet, TAKE 2 TABLETS(2000 MG) BY MOUTH TWICE DAILY FOR 2 DOSES, Disp: 4 tablet, Rfl: prn     valACYclovir (VALTREX) 1000 mg tablet, Take 2 tablets (2,000 mg) by mouth 2 times daily for 1 day, Disp: 4 tablet, Rfl: 0     vitamin C (ASCORBIC ACID) 500 MG tablet, Take 500 mg by mouth daily, Disp: , Rfl:      Vitamin D, Cholecalciferol, 1000 UNITS CAPS, Take 1,000 Units by mouth daily, Disp: , Rfl:     ALLERGIES:     Allergies   Allergen Reactions     Amoxicillin      yeast vaginal inf     Aripiprazole      Increased sadness and moodiness     Ativan Fatigue     Augmentin [Amoxicillin-Pot Clavulanate]      Itching,hives     Azithromycin      GI cramps     Clonazepam      Sedation, even at 0.5 mg dose     Estrogens      # bcps cause cns sx     Lexapro      diarrhea       Mirtazapine      Sedation      Oxycodone      Epigastric pain, headache     Prochlorperazine      Compazine- agitation     Risperdal [Risperidone]      Weight gain     Seasonal Allergies      Seroquel [Quetiapine]      Increased appetite     Sertraline      sweats     Venlafaxine      sweats     Xanax [Alprazolam] Fatigue       REVIEW OF SYSTEMS: Ten point review of systems without change as outlined in HPI    SURGICAL HISTORY:    Past Surgical History:   Procedure Laterality Date     COLONOSCOPY       CYSTOSCOPY N/A 5/26/2017    Procedure: CYSTOSCOPY;;  Surgeon: Toni Da Silva MD;  Location:  OR     DAVINCI HYSTERECTOMY TOTAL, SALPINGECTOMY BILATERAL N/A 5/26/2017    Procedure: DAVINCI HYSTERECTOMY TOTAL, SALPINGECTOMY BILATERAL;  ROBOTIC ASSISTED LAPAROSCOPIC TOTAL HYSTERECTOMY; BILATERAL SALPINGECTOMY; CYSTOSCOPY;  Surgeon: Toni Da Sliva MD;  Location:  OR     DILATION AND CURETTAGE N/A 1/5/2016    Procedure: DILATION AND CURETTAGE;  Surgeon: Josue Jama MD;  Location: [facility]     ENT SURGERY        GYN SURGERY           HC COLP CERVIX/UPPER VAGINA W BX CERVIX  10/30/12    [facility]     HYSTERECTOMY       LAPAROSCOPIC CHOLECYSTECTOMY  4/3/2012    Procedure:LAPAROSCOPIC CHOLECYSTECTOMY; LAPAROSCOPIC CHOLECYSTECTOMY ; Surgeon:KARYNA CAMARA; Location:[facility]     LAPAROSCOPY DIAGNOSTIC (GYN) N/A 2016    Procedure: LAPAROSCOPY DIAGNOSTIC (GYN);  Surgeon: Josue Jama MD;  Location: [facility]     LASER HOLMIUM LITHOTRIPSY URETER(S), INSERT STENT, COMBINED Left 2020    Procedure: Cystoscopy, left ureteroscopy with holmium laser lithotripsy and left stent placement;  Surgeon: Devonte Lambert MD;  Location:  OR     ORTHOPEDIC SURGERY      carpal tunnel with ganglian cyst removal     ZZC NONSPECIFIC PROCEDURE      tonsillectomy     [facility] NONSPECIFIC PROCEDURE      Open right carpal tunnel release.  Excision right dorsal carpal ganglion cyst. Excision, terminal branch, right posterior interosseous nerve.            PHYSICAL EXAM:    LMP 12/10/2015     Constitutional: Well developed. Conversant and in no acute distress  Eyes: Anicteric sclera, conjunctiva clear, normal extraocular movements  ENT: Normocephalic and atraumatic,   Skin: Warm and dry. No rashes or lesions  Cardiac: No peripheral edema  Back/Flank: Not done  CNS/PNS: Normal musculature and movements, moves all extremities normally  Respiratory: Normal non-labored breathing  Abdomen: Soft nontender and nondistended  Peripheral Vascular: No peripheral edema  Mental Status/Psych: Alert and Oriented x 3. Normal mood and affect      External Genitalia: Not done  Bladder: Not done  Urethra: Not done   Vagina: Not done    Cystoscopy:  Not Done      Urinalysis:  UA RESULTS:  Recent Labs   Lab Test 20  1435   COLOR Yellow Yellow   APPEARANCE Slightly Cloudy Clear   URINEGLC Negative Negative   URINEBILI Negative Negative   URINEKETONE Negative Negative   SG 1.026 1.020   UBLD Small* Trace*   URINEPH  6.5 6.0   PROTEIN 10* Negative   UROBILINOGEN  --  0.2   NITRITE Negative Negative   LEUKEST Negative Small*   RBCU 115*  --    WBCU 0  --          IMPRESSION:    Left kidney stone    PLAN:    She has developed a fairly large stone in the lower pole of her left kidney.  The stone is large and would be unlikely to pass spontaneously.  We discussed her options. I recommended she consider extracorporeal shockwave lithotripsy in order to fragment the stone. We discussed the procedure in detail along with its risk. She wishes to proceed. We will get her scheduled as an outpatient in the near future    Devonte Lambert M.D.

## 2021-07-08 NOTE — LETTER
7/8/2021       RE: Lena Morrow  [address]  [address]  [facility] [address]     Dear Colleague,    Thank you for referring your patient, Lena Morrow, to the [facility] at [facility]. Please see a copy of my visit note below.    Office Visit Note  [facility]  [phone]    Jul 8, 2021    [unfilled]    1975    UROLOGIC DIAGNOSES:    History of stones    CURRENT INTERVENTIONS:    Prior ureteroscopy    History:    Lena returns to clinic today for kidney stone follow-up.  She has had no symptoms or problems in the past year.      Imaging: I personally reviewed the KUB images from today and compared them to last year's CT scan images.  Last year she had a tiny stone in the lower pole of left kidney that has now grown substantially in size, now measuring about 8 mm.    Labs:      MEDICATIONS:    Current Outpatient Medications:      albuterol (PROAIR HFA/PROVENTIL HFA/VENTOLIN HFA) 108 (90 Base) MCG/ACT inhaler, INHALE 2 PUFFS INTO THE LUNGS EVERY 6 HOURS AS NEEDED FOR SHORTNESS OF BREATH OR DIFFICULT BREATHING OR WHEEZING, Disp: 6.7 g, Rfl: 3     biotin 1000 MCG TABS tablet, Take 1,000 mcg by mouth daily, Disp: , Rfl:      buPROPion (WELLBUTRIN XL) 300 MG 24 hr tablet, TAKE 1 TABLET(300 MG) BY MOUTH DAILY, Disp: 30 tablet, Rfl: 0     calcium carbonate (OS-JOSE J) 1500 (600 Ca) MG tablet, Take 600 mg by mouth daily, Disp: , Rfl:      esomeprazole (NEXIUM) 40 MG DR capsule, Take 1 capsule (40 mg) by mouth every morning (before breakfast) Take 30-60 minutes before eating., Disp: 90 capsule, Rfl: 3     Ferrous Gluconate (IRON 27 PO), , Disp: , Rfl:      lamoTRIgine (LAMICTAL) 200 MG tablet, TAKE 1 TABLET(200 MG) BY MOUTH TWICE DAILY, Disp: 180 tablet, Rfl: 1     lithium ER (LITHOBID) 300 MG CR tablet, Take 600 mg by mouth At Bedtime , Disp: , Rfl: 0     LORATADINE PO, Take 10 mg by mouth daily, Disp: , Rfl:       Magnesium Oxide 250 MG TABS, Take 500 mg by mouth daily , Disp: , Rfl:      multivitamin w/minerals (MULTI-VITAMIN) tablet, Take 1 tablet by mouth daily, Disp: , Rfl:      Potassium 95 MG TABS, , Disp: , Rfl:      propranolol (INDERAL) 20 MG tablet, Take 20 mg by mouth daily as needed, Disp: , Rfl:      propranolol ER (INDERAL LA) 80 MG 24 hr capsule, TAKE 1 CAPSULE(80 MG) BY MOUTH DAILY, Disp: 90 capsule, Rfl: 2     valACYclovir (VALTREX) 1000 mg tablet, TAKE 2 TABLETS(2000 MG) BY MOUTH TWICE DAILY FOR 2 DOSES, Disp: 4 tablet, Rfl: prn     valACYclovir (VALTREX) 1000 mg tablet, Take 2 tablets (2,000 mg) by mouth 2 times daily for 1 day, Disp: 4 tablet, Rfl: 0     vitamin C (ASCORBIC ACID) 500 MG tablet, Take 500 mg by mouth daily, Disp: , Rfl:      Vitamin D, Cholecalciferol, 1000 UNITS CAPS, Take 1,000 Units by mouth daily, Disp: , Rfl:     ALLERGIES:     Allergies   Allergen Reactions     Amoxicillin      yeast vaginal inf     Aripiprazole      Increased sadness and moodiness     Ativan Fatigue     Augmentin [Amoxicillin-Pot Clavulanate]      Itching,hives     Azithromycin      GI cramps     Clonazepam      Sedation, even at 0.5 mg dose     Estrogens      # bcps cause cns sx     Lexapro      diarrhea       Mirtazapine      Sedation      Oxycodone      Epigastric pain, headache     Prochlorperazine      Compazine- agitation     Risperdal [Risperidone]      Weight gain     Seasonal Allergies      Seroquel [Quetiapine]      Increased appetite     Sertraline      sweats     Venlafaxine      sweats     Xanax [Alprazolam] Fatigue       REVIEW OF SYSTEMS: Ten point review of systems without change as outlined in HPI    SURGICAL HISTORY:    Past Surgical History:   Procedure Laterality Date     COLONOSCOPY       CYSTOSCOPY N/A 5/26/2017    Procedure: CYSTOSCOPY;;  Surgeon: Toni Da Silva MD;  Location: [facility]     DAVINCI HYSTERECTOMY TOTAL, SALPINGECTOMY BILATERAL N/A 5/26/2017    Procedure: DAVINCI HYSTERECTOMY TOTAL,  SALPINGECTOMY BILATERAL;  ROBOTIC ASSISTED LAPAROSCOPIC TOTAL HYSTERECTOMY; BILATERAL SALPINGECTOMY; CYSTOSCOPY;  Surgeon: Toni Da Silva MD;  Location:  OR     DILATION AND CURETTAGE N/A 2016    Procedure: DILATION AND CURETTAGE;  Surgeon: Josue Jama MD;  Location: [facility]     ENT SURGERY       GYN SURGERY           HC COLP CERVIX/UPPER VAGINA W BX CERVIX  10/30/12    [facility]     HYSTERECTOMY       LAPAROSCOPIC CHOLECYSTECTOMY  4/3/2012    Procedure:LAPAROSCOPIC CHOLECYSTECTOMY; LAPAROSCOPIC CHOLECYSTECTOMY ; Surgeon:KARYNA CAMARA; Location:[facility]     LAPAROSCOPY DIAGNOSTIC (GYN) N/A 2016    Procedure: LAPAROSCOPY DIAGNOSTIC (GYN);  Surgeon: Josue Jama MD;  Location: [facility]     LASER HOLMIUM LITHOTRIPSY URETER(S), INSERT STENT, COMBINED Left 2020    Procedure: Cystoscopy, left ureteroscopy with holmium laser lithotripsy and left stent placement;  Surgeon: Devonte Lambert MD;  Location:  OR     ORTHOPEDIC SURGERY      carpal tunnel with ganglian cyst removal     [facility] NONSPECIFIC PROCEDURE      tonsillectomy     [facility] NONSPECIFIC PROCEDURE      Open right carpal tunnel release.  Excision right dorsal carpal ganglion cyst. Excision, terminal branch, right posterior interosseous nerve.            PHYSICAL EXAM:    LMP 12/10/2015     Constitutional: Well developed. Conversant and in no acute distress  Eyes: Anicteric sclera, conjunctiva clear, normal extraocular movements  ENT: Normocephalic and atraumatic,   Skin: Warm and dry. No rashes or lesions  Cardiac: No peripheral edema  Back/Flank: Not done  CNS/PNS: Normal musculature and movements, moves all extremities normally  Respiratory: Normal non-labored breathing  Abdomen: Soft nontender and nondistended  Peripheral Vascular: No peripheral edema  Mental Status/Psych: Alert and Oriented x 3. Normal mood and affect      External Genitalia: Not done  Bladder: Not done  Urethra: Not done    Vagina: Not done    Cystoscopy:  Not Done      Urinalysis:  UA RESULTS:  Recent Labs   Lab Test 07/03/20 2010 08/11/17  1435   COLOR Yellow Yellow   APPEARANCE Slightly Cloudy Clear   URINEGLC Negative Negative   URINEBILI Negative Negative   URINEKETONE Negative Negative   SG 1.026 1.020   UBLD Small* Trace*   URINEPH 6.5 6.0   PROTEIN 10* Negative   UROBILINOGEN  --  0.2   NITRITE Negative Negative   LEUKEST Negative Small*   RBCU 115*  --    WBCU 0  --          IMPRESSION:    Left kidney stone    PLAN:    She has developed a fairly large stone in the lower pole of her left kidney.  The stone is large and would be unlikely to pass spontaneously.  We discussed her options. I recommended she consider extracorporeal shockwave lithotripsy in order to fragment the stone. We discussed the procedure in detail along with its risk. She wishes to proceed. We will get her scheduled as an outpatient in the near future    Devonte Lambert M.D.

## 2021-07-09 DIAGNOSIS — Z11.59 ENCOUNTER FOR SCREENING FOR OTHER VIRAL DISEASES: ICD-10-CM

## 2021-07-09 PROBLEM — N20.0 KIDNEY STONE: Status: ACTIVE | Noted: 2021-07-09

## 2021-07-12 ENCOUNTER — APPOINTMENT (OUTPATIENT)
Dept: GENERAL RADIOLOGY | Facility: CLINIC | Age: 46
End: 2021-07-12
Attending: NURSE PRACTITIONER
Payer: COMMERCIAL

## 2021-07-12 ENCOUNTER — TELEPHONE (OUTPATIENT)
Dept: UROLOGY | Facility: CLINIC | Age: 46
End: 2021-07-12

## 2021-07-12 ENCOUNTER — HOSPITAL ENCOUNTER (EMERGENCY)
Facility: CLINIC | Age: 46
Discharge: HOME | End: 2021-07-12
Attending: NURSE PRACTITIONER | Admitting: NURSE PRACTITIONER
Payer: COMMERCIAL

## 2021-07-12 VITALS
DIASTOLIC BLOOD PRESSURE: 59 MMHG | HEART RATE: 82 BPM | RESPIRATION RATE: 16 BRPM | SYSTOLIC BLOOD PRESSURE: 145 MMHG | OXYGEN SATURATION: 98 % | TEMPERATURE: 97 F

## 2021-07-12 DIAGNOSIS — M75.42 IMPINGEMENT SYNDROME OF SHOULDER REGION, LEFT: ICD-10-CM

## 2021-07-12 DIAGNOSIS — M25.512 ACUTE PAIN OF LEFT SHOULDER: ICD-10-CM

## 2021-07-12 PROCEDURE — 99285 EMERGENCY DEPT VISIT HI MDM: CPT | Mod: 25

## 2021-07-12 PROCEDURE — 250N000011 HC RX IP 250 OP 636: Performed by: NURSE PRACTITIONER

## 2021-07-12 PROCEDURE — 73030 X-RAY EXAM OF SHOULDER: CPT | Mod: LT

## 2021-07-12 PROCEDURE — 96372 THER/PROPH/DIAG INJ SC/IM: CPT | Performed by: NURSE PRACTITIONER

## 2021-07-12 RX ORDER — ONDANSETRON 4 MG/1
4 TABLET, ORALLY DISINTEGRATING ORAL ONCE
Status: COMPLETED | OUTPATIENT
Start: 2021-07-12 | End: 2021-07-12

## 2021-07-12 RX ADMIN — ONDANSETRON 4 MG: 4 TABLET, ORALLY DISINTEGRATING ORAL at 11:38

## 2021-07-12 RX ADMIN — HYDROMORPHONE HYDROCHLORIDE 1 MG: 1 INJECTION, SOLUTION INTRAMUSCULAR; INTRAVENOUS; SUBCUTANEOUS at 11:39

## 2021-07-12 ASSESSMENT — ENCOUNTER SYMPTOMS
NUMBNESS: 0
SHORTNESS OF BREATH: 0
ARTHRALGIAS: 1

## 2021-07-12 NOTE — ED PROVIDER NOTES
History   Chief Complaint:  Shoulder Pain       HPI   Lena Morrow is a 46 year old female with history of anxiety, asthma, and atrial flutter who presents with left shoulder pain. Patient reports that 2 nights ago in the middle of the night she had a sudden onset of left shoulder pain radiating down her upper arm. She also reports an occasional warm sensation that radiates down to her left fingertips. Her pain is exacerbated with movement. Patient went to the chiropractor this morning and spoke with her nurse line who recommended she come into the ED. She has been Advil and is also taking Percocet every 4 hours, last 3.5 hours ago, as well as icing which she states has been minimally effective in relieving her pain. Denies history of left shoulder surgery or fractures. No chest pain, numbness/tingling, shortness of breath, history of blood clots.    Review of Systems   Respiratory: Negative for shortness of breath.    Cardiovascular: Negative for chest pain.   Musculoskeletal: Positive for arthralgias.   Neurological: Negative for numbness.   All other systems reviewed and are negative.    Allergies:  Amoxicillin  Aripiprazole  Ativan  Augmentin  Azithromycin  Clonazepam  Estrogens  Lexapro  Mirtazapine  Oxycodone  Prochlorperazine  Risperdal  Seasonal Allergies  Seroquel   Sertraline  Venlafaxine  Xanax   Augmentin    Medications:  Albuterol inhaler  Wellbutrin  Nexium  Iron  Lamictal  Lithobid  Loratidine  Inderal  Valtrex  Vitamin D    Past Medical History:  Arrhythmia  Asthma  Atrial flutter, paroxysmal  C. Diff colitis  Cholelithiasis  Dyspepsia  Endometriosis  GERD  Kidney stones  Depression  Migraine  Panic disorder without agoraphobia  OCD  Bipolar disorder  Recurrent sinusitis  Vitamin D deficiency  Anxiety   DDD    Past Surgical History:    Colonoscopy  Cytoscopy  Hysterectomy, salpingectomy  D & C  ENT surgery   section  Colposcopy  Cholecystectomy  Laparoscopy  Lithotripsy ureter, insert  stent  Carpal tunnel release with ganglion cyst removal  Tonsillectomy      Family History:    Lung cancer  Alcohol/drug abuse  Diabetes  Hypertension   Depression   Asthma   Brain cancer  CAD  Heart disease  Diabetes   Arthritis     Social History:  Patient presents with her .    Physical Exam     Patient Vitals for the past 24 hrs:   BP Temp Temp src Pulse Resp SpO2   07/12/21 1046 (!) 145/59 97  F (36.1  C) Temporal 82 16 98 %       Physical Exam  Physical Exam   Constitutional:  Appears uncomfortable laying in bed.   Head: Atraumatic.  Head moves freely with normal range of motion.   Eyes: Conjunctivae pink. EOMs intact.   Neck: Normal range of motion.   Cardiovascular: Intact distal pulses: radial pulse 2+ on the right, 2+ on the left.   Pulmonary/Chest: No respiratory distress.   Musculoskeletal: Left shoulder is generally tender to palpation around the joint. No erythema or heat to touch. No edema or deformity. Able to flex and extend at the elbow and pronate and supinate the forearm without pain. Any abduction or forward flexion at the shoulder increases the pain. Although she did use her left arm to push herself up in bed with no pain reaction. Distal capillary refill and sensation intact. Normal left bicep reflex. Normal coloration and temperature of the left arm.   Neurological: Oriented to person, place, and time. No focal deficits.   Skin: Skin is warm with no erythema or heat to touch.        Emergency Department Course     Imaging:  XR Left shoulder  Negative exam.  Per radiology    Emergency Department Course:  Reviewed:  I reviewed nursing notes, vitals and past medical history    Assessments:  1127 I obtained history and examined the patient as noted above.   1253 I rechecked the patient and explained findings.     Interventions:  1138 Zofran, 4 mg, PO  1139 Dilaudid, 1 mg, IM    Disposition:  The patient was discharged to home.     Impression & Plan       Medical Decision Making:  Patient  presents with atraumatic shoulder pain. I did consider a cardiac cause of her shoulder pain although she clearly has pain with movement and palpation of the joint, therefore I am not concerned about referred pain from abdominal or cardiac source.     There is no sign of vascular or neurologic compromise of the left arm. There is no history of recent trauma and x-ray is negative for acute process. No concerns here for septic joint or septic bursitis. Patient was provided with a sling and advised to use over the counter anti-inflammatories. She sees a pain clinic for her chronic right hip/low back pain and has Percocet she uses for this. We discussed possible causes of her left shoulder pain such as bursitis or rotator cuff impingement syndrome.     We discussed reasons to return here and need for [facility] follow up this week. She is amenable to plan.     Diagnosis:    ICD-10-CM    1. Acute pain of left shoulder  M25.512    2. Impingement syndrome of shoulder region, left  M75.42        Discharge Medications:  New Prescriptions    No medications on file       Scribe Disclosure:  I, Martina Dorsey, am serving as a scribe at 11:27 AM on 7/12/2021 to document services personally performed by Trudi Fisher CNP based on my observations and the provider's statements to me.      Trudi Fisher APRN CNP  07/12/21 [id number]

## 2021-07-12 NOTE — TELEPHONE ENCOUNTER
Returned call to Lena. She reports pain started at about 2:00 AM. It's located in (L) shoulder and down to elbow. No numbness in (L) hand. She denies SOB, chest pressure, headache, trauma. She does have nausea but feels this is due to pain med. She is tearful and breathing sounds uneven over the phone. She is advised to go to the ER now. She expressed understanding and states her  can take her.   MICHAELA Kapoor RN       [facility]    Phone Message    May a detailed message be left on voicemail: yes     Reason for Call: Symptoms or Concerns     If patient has red-flag symptoms, warm transfer to triage line    Current symptom or concern: Pain in left arm - Chiropractor thinks it's related to her kidney stone.  She states that her pain level is now at a 10/10.    Symptoms have been present for:  2 day(s)    Has patient previously been seen for this? No    By :     Date:     Are there any new or worsening symptoms? No      Action Taken: Message routed to:  [facility]  Urology    Travel Screening: Not Applicable

## 2021-07-12 NOTE — DISCHARGE INSTRUCTIONS
Ibuprofen 600mg with food every 6 hours as needed for pain.     Do not sleep on your left side/left shoulder.     Wear the sling to reduce motion in your shoulder. Gentle range of motion exercises daily to the left shoulder to prevent frozen shoulder from sling use.

## 2021-07-12 NOTE — ED TRIAGE NOTES
C/o left shoulder pain that started in the middle of the night on Saturday after going to bathroom. Hurts worse when she moves arm.

## 2021-07-13 ENCOUNTER — TRANSFERRED RECORDS (OUTPATIENT)
Dept: HEALTH INFORMATION MANAGEMENT | Facility: CLINIC | Age: 46
End: 2021-07-13

## 2021-07-13 ENCOUNTER — PATIENT OUTREACH (OUTPATIENT)
Dept: INTERNAL MEDICINE | Facility: CLINIC | Age: 46
End: 2021-07-13

## 2021-07-13 NOTE — TELEPHONE ENCOUNTER
ED/IP/TCU/OBS: ED   Hospital to ED risk:Low  Episode required?:No  Need a follow up visit by:30 days

## 2021-07-20 NOTE — TELEPHONE ENCOUNTER
We discussed reasons to return here and need for [facility] follow up this week. She is amenable to plan.     Patient agreed to Follow-up with [facility].  Please advise if you want her to Follow-up with PCP.    Harika Peraza RN  [facility]

## 2021-07-21 NOTE — TELEPHONE ENCOUNTER
No need to follow-up with me re: shoulder pain, BUT...    She may need a preop for her upcoming kidney stone surgery (may use any of my open virtual or morning chart review spots for an in-person preop appointment). Otherwise she can see Amelia.

## 2021-07-22 NOTE — TELEPHONE ENCOUNTER
Spoke with patient and gave her Dr. Zamora's message. Patient is going to double check with Urology in re: to scheduling a pre-op. They told her no need for one but she thinks she should have it done. She will schedule with Dr. Zamora or Amelia if needed.Rosalinda Gibbs, [facility]

## 2021-07-27 ENCOUNTER — ANCILLARY PROCEDURE (OUTPATIENT)
Dept: GENERAL RADIOLOGY | Facility: CLINIC | Age: 46
End: 2021-07-27
Attending: PODIATRIST
Payer: COMMERCIAL

## 2021-07-27 ENCOUNTER — OFFICE VISIT (OUTPATIENT)
Dept: PODIATRY | Facility: CLINIC | Age: 46
End: 2021-07-27
Payer: COMMERCIAL

## 2021-07-27 VITALS
HEIGHT: 64 IN | DIASTOLIC BLOOD PRESSURE: 72 MMHG | BODY MASS INDEX: 34.49 KG/M2 | SYSTOLIC BLOOD PRESSURE: 104 MMHG | WEIGHT: 202 LBS

## 2021-07-27 DIAGNOSIS — M21.41 FLAT FEET: ICD-10-CM

## 2021-07-27 DIAGNOSIS — M79.672 LEFT FOOT PAIN: Primary | ICD-10-CM

## 2021-07-27 DIAGNOSIS — M21.42 FLAT FEET: ICD-10-CM

## 2021-07-27 DIAGNOSIS — M79.671 RIGHT FOOT PAIN: ICD-10-CM

## 2021-07-27 DIAGNOSIS — M84.30XA STRESS REACTION OF BONE: ICD-10-CM

## 2021-07-27 DIAGNOSIS — M20.5X2 HALLUX LIMITUS OF LEFT FOOT: ICD-10-CM

## 2021-07-27 PROCEDURE — 73630 X-RAY EXAM OF FOOT: CPT | Mod: 59 | Performed by: RADIOLOGY

## 2021-07-27 PROCEDURE — 73630 X-RAY EXAM OF FOOT: CPT | Mod: LT | Performed by: RADIOLOGY

## 2021-07-27 PROCEDURE — 99214 OFFICE O/P EST MOD 30 MIN: CPT | Performed by: PODIATRIST

## 2021-07-27 ASSESSMENT — MIFFLIN-ST. JEOR: SCORE: 1541.27
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
## 2021-07-27 NOTE — PATIENT INSTRUCTIONS
Thank you for choosing [facility] Podiat[facility] / Foot & Ankle Surgery!    DR. REIS'S CLINIC LOCATIONS     [facility] SCHEDULE SURGERY: [phone]   [address] [place] [address] APPOINTMENTS: [phone]   [place] [address] BILLING QUESTIONS: [phone]  [phone] RADIOLOGY: [phone]       [place]    [phone]    [place] [address]    [phone]  [phone]        PRICE THERAPY  Many aches and pains throughout the foot and ankle can be helped with many simple treatments. This is usually described as PRICE Therapy.      P - Protection - often times, inflammation/pain in the lower extremity is not able to improve simply because the areas involved are never allowed to rest. Every step we take can bother the problematic area. Protecting those areas is an important step in the healing process. This may involve a walking cast boot, a special insert/orthotic device, an ankle brace, or simply avoiding barefoot walking.    R - Rest - in addition to protecting the foot/ankle, resting is an important, but often times difficult, treatment option. Getting off your feet when they bother you, and specifically avoiding activities that cause pain/discomfort, are very beneficial to prevent, and treat, foot/ankle pain.      I - Ice - icing regularly can help to decrease inflammation and swelling in the foot, thus decreasing pain. Using an ice pack or a bag of frozen veggies works very well. Ice for 20 minutes multiple times per day as needed.  Do not place the ice directly on the skin as this can cause tissue damage.    C - Compression - using a compression wrap or an ACE wrap can help to decrease swelling, which can help to decrease pain. Wearing the wraps is generally not needed at night, but they should be worn on a regular basis when you are going to be on your feet for prolonged periods as gravity tends to pull fluids down to your feet/ankles.    E - Elevation - elevating your lower  "extremities multiple times daily for 15-20 minutes can help to decrease swelling, which works well in decreasing pain levels.    NSAID/Tylenol - Anti-inflammatories like Aleve or ibuprofen, and/or a pain medication, such as Tylenol, can help to improve pain levels and get the issue resolved sooner rather than later. Anyone with liver issues should be careful with Tylenol, and anyone with high blood pressure or heart, stomach or kidney issues should be careful with anti-inflammatories. Please ask if you have questions about these medications, including dosage.    DEGENERATIVE ARTHRITIS OF THE BIG TOE JOINT   (hallux limitus/hallux rigidus)   Arthritis of the joint at the base of the big toe (metatarsophalangeal joint) has several causes. Usually it results from repetitive trauma to the joint, secondary to abnormal foot mechanics. Often it is hereditary. However, a one-time traumatic event can lead to arthritis. The condition doesn't improve with time, and even with treatment, can worsen. The cartilage wears out, joint surfaces are no longer smooth, bone rubs on bone, inflammation occurs with pain, and eventually bone spurs and loose fragments might develop.   The joint is often painful with activity, worse with flimsy shoes or walking barefoot, and it slowly progresses over time. A person might notice the toe \"locking up\" with walking. There often is an obvious, and irritating, bony bump on top of the foot. Shoes might be uncomfortable. In some people the pain is so bothersome that recreational activities sometimes even normal daily activities are difficult to perform.   The pain from this arthritis is likely a combination of joint jamming, cartilage loss and inflammation, and irritation from shoes rubbing on the bump. Sometimes other parts of the foot, leg, or back hurt from altering one's walk to compensate for the painful joint.     Ways to help a person live with the discomfort include wearing a good, supportive " shoe with a rigid, rocker-type bottom. An example is a hiking boot. A rigid sole minimizes bending of the joint, and therefore, joint motion and pain. Shoes with a high toe box allow for less rubbing on the bump. Avoiding barefoot walking, sandals, flip-flops and slippers usually helps.   Sometimes an insert or orthotic provides symptom relief. This might make shoe fit more difficult. Pads over the bump and occasionally injections into the joint provide relief.   Surgery for this condition is aimed towards alleviating pain. It does not cure the arthritis nor does it guarantee better joint motion. Depending on the condition of the metatarsophalangeal joint, there are several surgiqal options:    1.  Cutting off the bony bump(s) and cleaning the joint    2.  Loosening the joint up by making cuts in the first metatarsal bone or the big toe bone and removing a small section of bone.    3.  Repositioning bone to minimize jamming of the joint.    4.  In severe cases, the joint is fused. By fusing the joint, it will never bend again. This resolves the pain, because it's the movement of a worn out joint that causes pain. Oftentimes the operation involves a combination of these procedures and. requires the use of screws, pins, and/or a small surgical plate.     Healing after surgery requires about six weeks of protection. This allows the bone to heal. Maximum recovery takes about one year. The scar tissue and joint structures require this amount of time to finish the healing process. Expect stiffness, swelling and numbness during that time frame.   Surgery for arthritis of the metatarsophalangeal joint does involve side effects. Some side effects are predictable and others are less common but do occur. A scar will be visible and could be irritated by shoes. The shoe may rub on the screw or internal pin requiring surgical removal of these fixation devices. The screw and pin would likely be left in place for a full year. The  first toe may remain stiff after surgery. The amount of stiffness is variable. Most people never regain normal motion of the first toe. This is due to scar tissue inherent to any surgery, in addition to the cumUlative effects of arthritis. Sometimes the big toe drifts to one side or the other. Joint fusion is one option to correct an unstable, drifting toe. This procedure straightens the toe, however, no motion remains.   All surgical procedures involve risk of infection, numbness, pain, delayed bone healing, osteotomy (bone cut) dislocation, blood clots, continued foot pain, etc. Arthritic joint surgery is quite complex and should not be taken lightly.    Any skin incision can lead to infection. Deep infection might involve the bone and thus repeat surgery and six weeks of IV antibiotics. Scar tissue can cause nerve pain or numbness. his is generally temporary but can be permanent. We do not have treatments that cure nerve problems. Second toe pain could be related to altered mechanics and pressure transferred to the second toe. Delayed bone healing would lengthen the healing time. Some bones simply do not heal. This requires repeat surgery, electronic bone stimulation and/or extended protection. Smokers have an approximate 20% chance of poor bone healing. This is double that of a non-smoker. The bone cut may displace. This may need to be repaired with a second operation. Displacement can cause joint malalignment. Immobility after surgery can cause a blood clot in the legs and lungs. This could result in death.   Foot pain is complex. Most feet hurt for more than one reason. Operating on the arthritic   big toe joint will not necessarily create a pain free foot. Appropriate shoes, healthy body weight, avoidance of bare foot walking and moderation of activity will always be   necessary to enjoy foot comfort. Arthritis is incurable even with surgery.     Surgery for this type of arthritis is nevertheless quite  successful. Most surgical patients are pleased with their foot following surgery. Many of the issues described above can be controlled by taking proper care of your foot during the healing process.   Cosmetic bump surgery is discouraged for the reasons listed above. A bump and joint that is comfortable when wearing appropriate shoes should simply be treated with appropriate shoes.   Your surgeon would be happy to fully describe any of the above issues. You should pursue a full understanding of the operation, recovery process and any potential problems that could develop.     - What is an ingrown toenail? An ingrown toenail is a medical condition usually caused by a nail edge irritating the neighboring soft tissue and skin. It can cause pain and lead to infection.  - What causes ingrown toenails? There are likely multiple causes of ingrown toenails, including nail shape and width, narrow shoes, a person s activity level, and likely family history of nail problems.  - Risks of not treating condition: If left untreated, some people endure ongoing tenderness and pain. The biggest concern is infection from bacteria entering through the damage soft tissue.  - How is it treated? Some people achieve relief by soaking their feet and trimming the edge of the nail. If an infection has developed, then an oral antibiotic can be prescribed, but the condition often returns after the course of the medication is completed. Often self treatment is not successful and treatment by a qualified medical provider is needed. This often involves numbing the toe with a local anesthetic and then either removing the edge of a nail or the entire nail.  - Risks of surgery? As with any form of surgery, infections is a risk. However, a person is probably at greater risk for infection if the ingrown toenail is left untreated. Nail removal is a common, simple, and fairly quick procedure that in most cases is done in the physician's office. If an  infection exists, often the only treatment that is successful is removal.

## 2021-07-29 ENCOUNTER — TELEPHONE (OUTPATIENT)
Dept: UROLOGY | Facility: CLINIC | Age: 46
End: 2021-07-29

## 2021-07-29 NOTE — TELEPHONE ENCOUNTER
"Per MD- \"Yes she can take excedrin/ibuprofen through the weekend   But starting Monday she can no longer that that med.\"    Called patient and informed her of MD's instructions. Patient expressed understanding and was grateful for response.     Nesha Bal LPN    "

## 2021-07-29 NOTE — TELEPHONE ENCOUNTER
Patient called nurse line and spoke with this nurse. She reports that she has a Lithotripsy coming up next week. She was instructed to not take blood-thinners. She has a terrible migraine headache and tylenol extra strength is not working to relieve the discomfort. She would like to know if she could take Excedrin or she mentions that she has a 1/2 percocet that she's not sure would help will send message to MD to advise.     Nesha Bal LPN

## 2021-07-30 ENCOUNTER — OFFICE VISIT (OUTPATIENT)
Dept: URGENT CARE | Facility: URGENT CARE | Age: 46
End: 2021-07-30
Payer: COMMERCIAL

## 2021-07-30 VITALS
TEMPERATURE: 98.2 F | HEART RATE: 68 BPM | BODY MASS INDEX: 34.54 KG/M2 | SYSTOLIC BLOOD PRESSURE: 112 MMHG | OXYGEN SATURATION: 99 % | WEIGHT: 201.2 LBS | DIASTOLIC BLOOD PRESSURE: 78 MMHG

## 2021-07-30 DIAGNOSIS — L60.0 INGROWN TOENAIL OF LEFT FOOT: Primary | ICD-10-CM

## 2021-07-30 PROCEDURE — 99213 OFFICE O/P EST LOW 20 MIN: CPT | Performed by: FAMILY MEDICINE

## 2021-07-30 RX ORDER — CEPHALEXIN 500 MG/1
500 CAPSULE ORAL 2 TIMES DAILY
Qty: 20 CAPSULE | Refills: 0 | Status: SHIPPED | OUTPATIENT
Start: 2021-07-30 | End: 2021-08-09

## 2021-07-31 NOTE — H&P (VIEW-ONLY)
Patient presents with:  Urgent Care: Yesterday after patient had a pedicure her left big turned red and swollen and hurts       Subjective     Lena Morrow is a 46 year old female who presents to clinic today for the following health issues:    HPI     Great toe pain       Duration: last pm /pedicure yesterday     Description  Location: left great toe     Intensity:  moderate    Accompanying signs and symptoms: warmth, swelling, redness     History  Previous similar problem: YES- long history of ingrown toenails, has appointment pending with podiatry to have portion of toenail removed   Previous evaluation:  As noted     Precipitating or alleviating factors:  Trauma or overuse: no   Aggravating factors include: standing, walking     Therapies tried and outcome: nothing      Patient Active Problem List   Diagnosis     Gastroesophageal reflux disease     Allergic rhinitis     Panic disorder without agoraphobia     DYSPEPSIA     ASTHMA - MILD INTERMITTENT     Herpes simplex virus (HSV) infection     Other type of migraine     Backache     Obesity     DDD (degenerative disc disease), cervical     Depression     CARDIOVASCULAR SCREENING; LDL GOAL LESS THAN 160     Health Care Home     Generalized anxiety disorder     Vitamin D deficiency     Recurrent sinusitis     Kidney stones     Bipolar disorder (H)     Pelvic pain in female     Endometriosis     Status post laparoscopic hysterectomy     Obsessive-compulsive disorder     Morbid obesity (H)     Nephrolithiasis     Ureteral stone     Kidney stone     Past Surgical History:   Procedure Laterality Date     COLONOSCOPY       CYSTOSCOPY N/A 5/26/2017    Procedure: CYSTOSCOPY;;  Surgeon: Toni Da Silva MD;  Location:  OR     DAVINCI HYSTERECTOMY TOTAL, SALPINGECTOMY BILATERAL N/A 5/26/2017    Procedure: DAVINCI HYSTERECTOMY TOTAL, SALPINGECTOMY BILATERAL;  ROBOTIC ASSISTED LAPAROSCOPIC TOTAL HYSTERECTOMY; BILATERAL SALPINGECTOMY; CYSTOSCOPY;  Surgeon: Avinash  Toni BUSTOS MD;  Location:  OR     DILATION AND CURETTAGE N/A 2016    Procedure: DILATION AND CURETTAGE;  Surgeon: Josue Jama MD;  Location: [facility]     ENT SURGERY       GYN SURGERY           HC COLP CERVIX/UPPER VAGINA W BX CERVIX  10/30/12    [facility] [place]     HYSTERECTOMY       LAPAROSCOPIC CHOLECYSTECTOMY  4/3/2012    Procedure:LAPAROSCOPIC CHOLECYSTECTOMY; LAPAROSCOPIC CHOLECYSTECTOMY ; Surgeon:KARYNA CAMARA; Location:[facility]     LAPAROSCOPY DIAGNOSTIC (GYN) N/A 2016    Procedure: LAPAROSCOPY DIAGNOSTIC (GYN);  Surgeon: Josue Jama MD;  Location: [facility]     LASER HOLMIUM LITHOTRIPSY URETER(S), INSERT STENT, COMBINED Left 2020    Procedure: Cystoscopy, left ureteroscopy with holmium laser lithotripsy and left stent placement;  Surgeon: Devonte Lambert MD;  Location:  OR     ORTHOPEDIC SURGERY      carpal tunnel with ganglian cyst removal     [facility] NONSPECIFIC PROCEDURE      tonsillectomy     [facility] NONSPECIFIC PROCEDURE      Open right carpal tunnel release.  Excision right dorsal carpal ganglion cyst. Excision, terminal branch, right posterior interosseous nerve.          Social History     Tobacco Use     Smoking status: Never Smoker     Smokeless tobacco: Never Used   Substance Use Topics     Alcohol use: Yes     Comment: 1 beer monthly     Family History   Problem Relation Age of Onset     Cancer Father         lung; D: age 41     Alcohol/Drug Mother         d: age 55- complications r/t alcoholism     Diabetes Mother      Hypertension Mother      Depression Mother      Allergies Mother      Respiratory Mother         Asthma     Asthma Mother      Substance Abuse Mother      Cancer Paternal Aunt         Brain, removed tumor     C.A.D. Paternal Grandfather      Cancer Paternal Grandfather         unknown     Heart Disease Paternal Grandfather      Diabetes Paternal Grandmother      Arthritis Maternal Grandmother            Current Outpatient  Medications   Medication Sig Dispense Refill     albuterol (PROAIR HFA/PROVENTIL HFA/VENTOLIN HFA) 108 (90 Base) MCG/ACT inhaler INHALE 2 PUFFS INTO THE LUNGS EVERY 6 HOURS AS NEEDED FOR SHORTNESS OF BREATH OR DIFFICULT BREATHING OR WHEEZING 6.7 g 3     biotin 1000 MCG TABS tablet Take 1,000 mcg by mouth daily       buPROPion (WELLBUTRIN XL) 300 MG 24 hr tablet TAKE 1 TABLET(300 MG) BY MOUTH DAILY 30 tablet 0     calcium carbonate (OS-JOSE J) 1500 (600 Ca) MG tablet Take 600 mg by mouth daily       cephALEXin (KEFLEX) 500 MG capsule Take 1 capsule (500 mg) by mouth 2 times daily for 10 days 20 capsule 0     esomeprazole (NEXIUM) 40 MG DR capsule Take 1 capsule (40 mg) by mouth every morning (before breakfast) Take 30-60 minutes before eating. 90 capsule 3     Ferrous Gluconate (IRON 27 PO)        lamoTRIgine (LAMICTAL) 200 MG tablet TAKE 1 TABLET(200 MG) BY MOUTH TWICE DAILY 180 tablet 1     lithium ER (LITHOBID) 300 MG CR tablet Take 600 mg by mouth At Bedtime   0     LORATADINE PO Take 10 mg by mouth daily       Magnesium Oxide 250 MG TABS Take 500 mg by mouth daily        multivitamin w/minerals (MULTI-VITAMIN) tablet Take 1 tablet by mouth daily       Potassium 95 MG TABS        propranolol (INDERAL) 20 MG tablet Take 20 mg by mouth daily as needed       propranolol ER (INDERAL LA) 80 MG 24 hr capsule TAKE 1 CAPSULE(80 MG) BY MOUTH DAILY 90 capsule 2     vitamin C (ASCORBIC ACID) 500 MG tablet Take 500 mg by mouth daily       Vitamin D, Cholecalciferol, 1000 UNITS CAPS Take 1,000 Units by mouth daily       valACYclovir (VALTREX) 1000 mg tablet Take 2 tablets (2,000 mg) by mouth 2 times daily for 1 day 4 tablet 0     Allergies   Allergen Reactions     Amoxicillin      yeast vaginal inf     Aripiprazole      Increased sadness and moodiness     Ativan Fatigue     Augmentin [Amoxicillin-Pot Clavulanate]      Itching,hives     Azithromycin      GI cramps     Clonazepam      Sedation, even at 0.5 mg dose     Estrogens       # bcps cause cns sx     Lexapro      diarrhea       Mirtazapine      Sedation      Oxycodone      Epigastric pain, headache     Prochlorperazine      Compazine- agitation     Risperdal [Risperidone]      Weight gain     Seasonal Allergies      Seroquel [Quetiapine]      Increased appetite     Sertraline      sweats     Venlafaxine      sweats     Xanax [Alprazolam] Fatigue     Augmentin Rash             ROS are negative, except as otherwise noted HPI      Objective    /78   Pulse 68   Temp 98.2  F (36.8  C) (Oral)   Wt 91.3 kg (201 lb 3.2 oz)   LMP 12/10/2015   SpO2 99%   BMI 34.54 kg/m    Body mass index is 34.54 kg/m .  Physical Exam   GENERAL: healthy, alert and no distress  MS: no gross musculoskeletal defects noted, no edema  Left foot-great toe-erythema, swelling, tenderness, no drainage or crusting, no swelling/streaking of foot  DP+2  NEURO: Normal strength and tone, mentation intact and speech normal       Diagnostic Test Results:  Labs reviewed in Epic  none         ASSESSMENT/PLAN:      ICD-10-CM    1. Ingrown toenail of left foot  L60.0 cephALEXin (KEFLEX) 500 MG capsule            Reviewed medication instructions and side effects. Follow up if experiences side effects.     I reviewed supportive care, otc meds to use if needed, expected course, and signs of concern.  Follow up as needed or if she does not improve within 1-2 day(s) or if worsens in any way.  Reviewed red flag symptoms and is to go to the ER if experiences any of these.       \    Patient Instructions     Soak in warm water in evening before bed,  With small amount of antibacterial soap-pump soap      if redness, pain increase, streaking up foot to ER         Patient Education     Infected Ingrown Toenail (Antibiotics, No Excision)   An ingrown toenail occurs when the nail grows sideways into the skin alongside the nail. This can cause pain. It can also lead to an infection with redness, swelling, and sometimes drainage.    The most common cause of an ingrown toenail is trimming your nails wrong. Most people trim the nails too close to the skin and try to round the nail too tightly around the shape of the toe. When you do this, the nail can grow into the skin of your toe. It's safer to trim the nail ending in a straight line rather than a curve.   Other causes include injury or wearing shoes that are too short or tight. This can cause the same problem that happens when trimming your nails. Your genetics can also make this more likely to happen.   The following are the most common symptoms of an ingrown toenail:     Pain    Redness    Swelling    Drainage  If the infection is mild, you may be able to take care of it at home with the following measures:     Frequent warm water soaks    Keeping it clean    Wearing loose, comfortable shoes or sandals  Another method involves using a small piece of cotton or waxed dental floss to gently lift up the corner of the problem nail. Change the cotton or floss frequently, especially if it gets dirty.   If your infection is mild, and the above methods aren t working, or if the infection gets worse, see your healthcare provider. Signs of worsening infection include:     Swelling    Redness    Pus drainage    Increased pain  In some cases, you may need antibiotics along with warm soaks. If after 2 to 3 days of antibiotics the toenail doesn't get better or gets worse, part of the nail may need to be removed to drain the infection. With treatment, it can take 1 to 2 weeks to clear up completely.   Home care  Wound care  For the next 3 days, soak and clean your toe in warm water a few times a day.    Twice a day for the first 3 days, clean and soak the toe as follows:  1. Soak your foot in a tub of warm water for 5 minutes. Or, hold your toe under a faucet of warm running water for 5 minute  2. Clean any remaining crust away with soap and water using a cotton swab.  3. Put a small amount of antibiotic  ointment on the infected area.    Change the dressing or bandage every time you soak or clean it, or whenever it becomes wet or dirty.    If you were prescribed antibiotics, take them as directed until they are all gone.    Wear comfortable shoes with a lot of toe room, or open-toe sandals, while your toe is healing.  Medicines    You can take over-the-counter medicine for pain, unless you were given a different pain medicine to use. Note: Talk with your provider before using these medicines if you have chronic liver or kidney disease, ever had a stomach ulcer or digestive bleeding, or are taking blood-thinner medicines.    If you were given antibiotics, take them until they are used up or your provider tells you to stop, even if the wound looks better. This makes sure that the infection clears up.  Prevention  To prevent ingrown toenails:    Wear shoes that fit well. Don't wear shoes that pinch the toes together.    When you trim your toenails, don't cut them too short. Cut straight across at the top and don t round the edges.    Don t use a sharp object to clean under your nail since this might cause an infection.    If the toenail starts to grow into the skin again, put a small piece of waxed dental floss or cotton under that side of the nail to help it grow out straight.  Follow-up care  Follow up with your healthcare provider, or as advised. If the antibiotic doesn't work, or if the condition happens again, you may need to have part of the nail removed.   When to seek medical advice  Call your healthcare provider right away if any of the following occur:    Increasing redness, pain, or swelling of the toe    Red streaks in the skin leading away from the wound    Pus or fluid drainage    Fever of 100.4 F (38 C) or higher, or as directed by your provider  [employer] last reviewed this educational content on 8/1/2019 2000-2021 [employer]. All rights reserved. This information is not intended as a  substitute for professional medical care. Always follow your healthcare professional's instructions.

## 2021-07-31 NOTE — PATIENT INSTRUCTIONS
Soak in warm water in evening before bed,  With small amount of antibacterial soap-pump soap      if redness, pain increase, streaking up foot to ER         Patient Education     Infected Ingrown Toenail (Antibiotics, No Excision)   An ingrown toenail occurs when the nail grows sideways into the skin alongside the nail. This can cause pain. It can also lead to an infection with redness, swelling, and sometimes drainage.   The most common cause of an ingrown toenail is trimming your nails wrong. Most people trim the nails too close to the skin and try to round the nail too tightly around the shape of the toe. When you do this, the nail can grow into the skin of your toe. It's safer to trim the nail ending in a straight line rather than a curve.   Other causes include injury or wearing shoes that are too short or tight. This can cause the same problem that happens when trimming your nails. Your genetics can also make this more likely to happen.   The following are the most common symptoms of an ingrown toenail:     Pain    Redness    Swelling    Drainage  If the infection is mild, you may be able to take care of it at home with the following measures:     Frequent warm water soaks    Keeping it clean    Wearing loose, comfortable shoes or sandals  Another method involves using a small piece of cotton or waxed dental floss to gently lift up the corner of the problem nail. Change the cotton or floss frequently, especially if it gets dirty.   If your infection is mild, and the above methods aren t working, or if the infection gets worse, see your healthcare provider. Signs of worsening infection include:     Swelling    Redness    Pus drainage    Increased pain  In some cases, you may need antibiotics along with warm soaks. If after 2 to 3 days of antibiotics the toenail doesn't get better or gets worse, part of the nail may need to be removed to drain the infection. With treatment, it can take 1 to 2 weeks to clear up  completely.   Home care  Wound care  For the next 3 days, soak and clean your toe in warm water a few times a day.    Twice a day for the first 3 days, clean and soak the toe as follows:  1. Soak your foot in a tub of warm water for 5 minutes. Or, hold your toe under a faucet of warm running water for 5 minute  2. Clean any remaining crust away with soap and water using a cotton swab.  3. Put a small amount of antibiotic ointment on the infected area.    Change the dressing or bandage every time you soak or clean it, or whenever it becomes wet or dirty.    If you were prescribed antibiotics, take them as directed until they are all gone.    Wear comfortable shoes with a lot of toe room, or open-toe sandals, while your toe is healing.  Medicines    You can take over-the-counter medicine for pain, unless you were given a different pain medicine to use. Note: Talk with your provider before using these medicines if you have chronic liver or kidney disease, ever had a stomach ulcer or digestive bleeding, or are taking blood-thinner medicines.    If you were given antibiotics, take them until they are used up or your provider tells you to stop, even if the wound looks better. This makes sure that the infection clears up.  Prevention  To prevent ingrown toenails:    Wear shoes that fit well. Don't wear shoes that pinch the toes together.    When you trim your toenails, don't cut them too short. Cut straight across at the top and don t round the edges.    Don t use a sharp object to clean under your nail since this might cause an infection.    If the toenail starts to grow into the skin again, put a small piece of waxed dental floss or cotton under that side of the nail to help it grow out straight.  Follow-up care  Follow up with your healthcare provider, or as advised. If the antibiotic doesn't work, or if the condition happens again, you may need to have part of the nail removed.   When to seek medical advice  Call your  healthcare provider right away if any of the following occur:    Increasing redness, pain, or swelling of the toe    Red streaks in the skin leading away from the wound    Pus or fluid drainage    Fever of 100.4 F (38 C) or higher, or as directed by your provider  [employer] last reviewed this educational content on 8/1/2019 2000-2021 [employer]. All rights reserved. This information is not intended as a substitute for professional medical care. Always follow your healthcare professional's instructions.

## 2021-07-31 NOTE — PROGRESS NOTES
Patient presents with:  Urgent Care: Yesterday after patient had a pedicure her left big turned red and swollen and hurts       Subjective     Lena Morrow is a 46 year old female who presents to clinic today for the following health issues:    HPI     Great toe pain       Duration: last pm /pedicure yesterday     Description  Location: left great toe     Intensity:  moderate    Accompanying signs and symptoms: warmth, swelling, redness     History  Previous similar problem: YES- long history of ingrown toenails, has appointment pending with podiatry to have portion of toenail removed   Previous evaluation:  As noted     Precipitating or alleviating factors:  Trauma or overuse: no   Aggravating factors include: standing, walking     Therapies tried and outcome: nothing      Patient Active Problem List   Diagnosis     Gastroesophageal reflux disease     Allergic rhinitis     Panic disorder without agoraphobia     DYSPEPSIA     ASTHMA - MILD INTERMITTENT     Herpes simplex virus (HSV) infection     Other type of migraine     Backache     Obesity     DDD (degenerative disc disease), cervical     Depression     CARDIOVASCULAR SCREENING; LDL GOAL LESS THAN 160     Health Care Home     Generalized anxiety disorder     Vitamin D deficiency     Recurrent sinusitis     Kidney stones     Bipolar disorder (H)     Pelvic pain in female     Endometriosis     Status post laparoscopic hysterectomy     Obsessive-compulsive disorder     Morbid obesity (H)     Nephrolithiasis     Ureteral stone     Kidney stone     Past Surgical History:   Procedure Laterality Date     COLONOSCOPY       CYSTOSCOPY N/A 5/26/2017    Procedure: CYSTOSCOPY;;  Surgeon: Toni Da Silva MD;  Location:  OR     DAVINCI HYSTERECTOMY TOTAL, SALPINGECTOMY BILATERAL N/A 5/26/2017    Procedure: DAVINCI HYSTERECTOMY TOTAL, SALPINGECTOMY BILATERAL;  ROBOTIC ASSISTED LAPAROSCOPIC TOTAL HYSTERECTOMY; BILATERAL SALPINGECTOMY; CYSTOSCOPY;  Surgeon: Avinash  Toni BUSTOS MD;  Location:  OR     DILATION AND CURETTAGE N/A 2016    Procedure: DILATION AND CURETTAGE;  Surgeon: Josue Jama MD;  Location: [facility]     ENT SURGERY       GYN SURGERY           HC COLP CERVIX/UPPER VAGINA W BX CERVIX  10/30/12    [facility] [place]     HYSTERECTOMY       LAPAROSCOPIC CHOLECYSTECTOMY  4/3/2012    Procedure:LAPAROSCOPIC CHOLECYSTECTOMY; LAPAROSCOPIC CHOLECYSTECTOMY ; Surgeon:KARYNA CAMARA; Location:[facility] [place]     LAPAROSCOPY DIAGNOSTIC (GYN) N/A 2016    Procedure: LAPAROSCOPY DIAGNOSTIC (GYN);  Surgeon: Josue Jama MD;  Location: [facility] [place]     LASER HOLMIUM LITHOTRIPSY URETER(S), INSERT STENT, COMBINED Left 2020    Procedure: Cystoscopy, left ureteroscopy with holmium laser lithotripsy and left stent placement;  Surgeon: Devonte Lambert MD;  Location:  OR     ORTHOPEDIC SURGERY      carpal tunnel with ganglian cyst removal     [facility] NONSPECIFIC PROCEDURE      tonsillectomy     [facility] NONSPECIFIC PROCEDURE      Open right carpal tunnel release.  Excision right dorsal carpal ganglion cyst. Excision, terminal branch, right posterior interosseous nerve.          Social History     Tobacco Use     Smoking status: Never Smoker     Smokeless tobacco: Never Used   Substance Use Topics     Alcohol use: Yes     Comment: 1 beer monthly     Family History   Problem Relation Age of Onset     Cancer Father         lung; D: age 41     Alcohol/Drug Mother         d: age 55- complications r/t alcoholism     Diabetes Mother      Hypertension Mother      Depression Mother      Allergies Mother      Respiratory Mother         Asthma     Asthma Mother      Substance Abuse Mother      Cancer Paternal Aunt         Brain, removed tumor     C.A.D. Paternal Grandfather      Cancer Paternal Grandfather         unknown     Heart Disease Paternal Grandfather      Diabetes Paternal Grandmother      Arthritis Maternal Grandmother            Current Outpatient  Medications   Medication Sig Dispense Refill     albuterol (PROAIR HFA/PROVENTIL HFA/VENTOLIN HFA) 108 (90 Base) MCG/ACT inhaler INHALE 2 PUFFS INTO THE LUNGS EVERY 6 HOURS AS NEEDED FOR SHORTNESS OF BREATH OR DIFFICULT BREATHING OR WHEEZING 6.7 g 3     biotin 1000 MCG TABS tablet Take 1,000 mcg by mouth daily       buPROPion (WELLBUTRIN XL) 300 MG 24 hr tablet TAKE 1 TABLET(300 MG) BY MOUTH DAILY 30 tablet 0     calcium carbonate (OS-JOSE J) 1500 (600 Ca) MG tablet Take 600 mg by mouth daily       cephALEXin (KEFLEX) 500 MG capsule Take 1 capsule (500 mg) by mouth 2 times daily for 10 days 20 capsule 0     esomeprazole (NEXIUM) 40 MG DR capsule Take 1 capsule (40 mg) by mouth every morning (before breakfast) Take 30-60 minutes before eating. 90 capsule 3     Ferrous Gluconate (IRON 27 PO)        lamoTRIgine (LAMICTAL) 200 MG tablet TAKE 1 TABLET(200 MG) BY MOUTH TWICE DAILY 180 tablet 1     lithium ER (LITHOBID) 300 MG CR tablet Take 600 mg by mouth At Bedtime   0     LORATADINE PO Take 10 mg by mouth daily       Magnesium Oxide 250 MG TABS Take 500 mg by mouth daily        multivitamin w/minerals (MULTI-VITAMIN) tablet Take 1 tablet by mouth daily       Potassium 95 MG TABS        propranolol (INDERAL) 20 MG tablet Take 20 mg by mouth daily as needed       propranolol ER (INDERAL LA) 80 MG 24 hr capsule TAKE 1 CAPSULE(80 MG) BY MOUTH DAILY 90 capsule 2     vitamin C (ASCORBIC ACID) 500 MG tablet Take 500 mg by mouth daily       Vitamin D, Cholecalciferol, 1000 UNITS CAPS Take 1,000 Units by mouth daily       valACYclovir (VALTREX) 1000 mg tablet Take 2 tablets (2,000 mg) by mouth 2 times daily for 1 day 4 tablet 0     Allergies   Allergen Reactions     Amoxicillin      yeast vaginal inf     Aripiprazole      Increased sadness and moodiness     Ativan Fatigue     Augmentin [Amoxicillin-Pot Clavulanate]      Itching,hives     Azithromycin      GI cramps     Clonazepam      Sedation, even at 0.5 mg dose     Estrogens       # bcps cause cns sx     Lexapro      diarrhea       Mirtazapine      Sedation      Oxycodone      Epigastric pain, headache     Prochlorperazine      Compazine- agitation     Risperdal [Risperidone]      Weight gain     Seasonal Allergies      Seroquel [Quetiapine]      Increased appetite     Sertraline      sweats     Venlafaxine      sweats     Xanax [Alprazolam] Fatigue     Augmentin Rash             ROS are negative, except as otherwise noted HPI      Objective    /78   Pulse 68   Temp 98.2  F (36.8  C) (Oral)   Wt 91.3 kg (201 lb 3.2 oz)   LMP 12/10/2015   SpO2 99%   BMI 34.54 kg/m    Body mass index is 34.54 kg/m .  Physical Exam   GENERAL: healthy, alert and no distress  MS: no gross musculoskeletal defects noted, no edema  Left foot-great toe-erythema, swelling, tenderness, no drainage or crusting, no swelling/streaking of foot  DP+2  NEURO: Normal strength and tone, mentation intact and speech normal       Diagnostic Test Results:  Labs reviewed in Epic  none         ASSESSMENT/PLAN:      ICD-10-CM    1. Ingrown toenail of left foot  L60.0 cephALEXin (KEFLEX) 500 MG capsule            Reviewed medication instructions and side effects. Follow up if experiences side effects.     I reviewed supportive care, otc meds to use if needed, expected course, and signs of concern.  Follow up as needed or if she does not improve within 1-2 day(s) or if worsens in any way.  Reviewed red flag symptoms and is to go to the ER if experiences any of these.       \    Patient Instructions     Soak in warm water in evening before bed,  With small amount of antibacterial soap-pump soap      if redness, pain increase, streaking up foot to ER         Patient Education     Infected Ingrown Toenail (Antibiotics, No Excision)   An ingrown toenail occurs when the nail grows sideways into the skin alongside the nail. This can cause pain. It can also lead to an infection with redness, swelling, and sometimes drainage.    The most common cause of an ingrown toenail is trimming your nails wrong. Most people trim the nails too close to the skin and try to round the nail too tightly around the shape of the toe. When you do this, the nail can grow into the skin of your toe. It's safer to trim the nail ending in a straight line rather than a curve.   Other causes include injury or wearing shoes that are too short or tight. This can cause the same problem that happens when trimming your nails. Your genetics can also make this more likely to happen.   The following are the most common symptoms of an ingrown toenail:     Pain    Redness    Swelling    Drainage  If the infection is mild, you may be able to take care of it at home with the following measures:     Frequent warm water soaks    Keeping it clean    Wearing loose, comfortable shoes or sandals  Another method involves using a small piece of cotton or waxed dental floss to gently lift up the corner of the problem nail. Change the cotton or floss frequently, especially if it gets dirty.   If your infection is mild, and the above methods aren t working, or if the infection gets worse, see your healthcare provider. Signs of worsening infection include:     Swelling    Redness    Pus drainage    Increased pain  In some cases, you may need antibiotics along with warm soaks. If after 2 to 3 days of antibiotics the toenail doesn't get better or gets worse, part of the nail may need to be removed to drain the infection. With treatment, it can take 1 to 2 weeks to clear up completely.   Home care  Wound care  For the next 3 days, soak and clean your toe in warm water a few times a day.    Twice a day for the first 3 days, clean and soak the toe as follows:  1. Soak your foot in a tub of warm water for 5 minutes. Or, hold your toe under a faucet of warm running water for 5 minute  2. Clean any remaining crust away with soap and water using a cotton swab.  3. Put a small amount of antibiotic  ointment on the infected area.    Change the dressing or bandage every time you soak or clean it, or whenever it becomes wet or dirty.    If you were prescribed antibiotics, take them as directed until they are all gone.    Wear comfortable shoes with a lot of toe room, or open-toe sandals, while your toe is healing.  Medicines    You can take over-the-counter medicine for pain, unless you were given a different pain medicine to use. Note: Talk with your provider before using these medicines if you have chronic liver or kidney disease, ever had a stomach ulcer or digestive bleeding, or are taking blood-thinner medicines.    If you were given antibiotics, take them until they are used up or your provider tells you to stop, even if the wound looks better. This makes sure that the infection clears up.  Prevention  To prevent ingrown toenails:    Wear shoes that fit well. Don't wear shoes that pinch the toes together.    When you trim your toenails, don't cut them too short. Cut straight across at the top and don t round the edges.    Don t use a sharp object to clean under your nail since this might cause an infection.    If the toenail starts to grow into the skin again, put a small piece of waxed dental floss or cotton under that side of the nail to help it grow out straight.  Follow-up care  Follow up with your healthcare provider, or as advised. If the antibiotic doesn't work, or if the condition happens again, you may need to have part of the nail removed.   When to seek medical advice  Call your healthcare provider right away if any of the following occur:    Increasing redness, pain, or swelling of the toe    Red streaks in the skin leading away from the wound    Pus or fluid drainage    Fever of 100.4 F (38 C) or higher, or as directed by your provider  [employer] last reviewed this educational content on 8/1/2019 2000-2021 [employer]. All rights reserved. This information is not intended as a  substitute for professional medical care. Always follow your healthcare professional's instructions.

## 2021-08-01 ENCOUNTER — LAB (OUTPATIENT)
Dept: URGENT CARE | Facility: URGENT CARE | Age: 46
End: 2021-08-01
Attending: UROLOGY
Payer: COMMERCIAL

## 2021-08-01 DIAGNOSIS — Z11.59 ENCOUNTER FOR SCREENING FOR OTHER VIRAL DISEASES: ICD-10-CM

## 2021-08-01 LAB — SARS-COV-2 RNA RESP QL NAA+PROBE: NEGATIVE

## 2021-08-01 PROCEDURE — U0005 INFEC AGEN DETEC AMPLI PROBE: HCPCS

## 2021-08-01 PROCEDURE — U0003 INFECTIOUS AGENT DETECTION BY NUCLEIC ACID (DNA OR RNA); SEVERE ACUTE RESPIRATORY SYNDROME CORONAVIRUS 2 (SARS-COV-2) (CORONAVIRUS DISEASE [COVID-19]), AMPLIFIED PROBE TECHNIQUE, MAKING USE OF HIGH THROUGHPUT TECHNOLOGIES AS DESCRIBED BY CMS-2020-01-R: HCPCS

## 2021-08-01 NOTE — PROGRESS NOTES
After Visit Summary   11/2/2017    Sonya Foote    MRN: [id number]           Patient Information     Date Of Birth          1949        Visit Information        Provider Department      11/2/2017 9:15 AM Marely Robin MD [facility]        Today's Diagnoses     Primary open angle glaucoma of both eyes, severe stage    -  1    Pseudophakia of right eye          Care Instructions    Patient will continue on Cosopt (Timolol/Dorzolamide) which is a blue top drop 2x/day (12 hours apart) in the RIGHT EYE ONLY, Latanoprost which is a teal top drop at bedtime in both eyes, and Alphagan (Brimonidine) which is a purple top drop 2x/day (12 hours apart) in the RIGHT EYE ONLY.  Patient will return to clinic in 3-4 months for repeat IOP check, dilated eye exam and visual field test (OD:LVC only).            Follow-ups after your visit        Follow-up notes from your care team     Return 3-4 months for repeat IOP check, dilated eye exam and visual field test (OD:LVC only) and Manuela Mitchell .      Your next 10 appointments already scheduled     Nov 06, 2017  8:30 AM CST   (Arrive by 8:15 AM)   PAC EVALUATION with  Pac Bernice 2   [facility] [place] ([facility])    [address] [place] [id number] [phone]            Nov 06, 2017  9:30 AM CST   (Arrive by 9:15 AM)   PAC RN ASSESSMENT with  Pac Rn   [facility] [place] ([facility] [place])    [address] [place] [address]   [phone]            Nov 06, 2017 10:10 AM CST   (Arrive by 9:55 AM)   PAC Anesthesia Consult with  Pac Anesthesiologist   [facility] [place] ([facility])    [address] [place] [address]   [phone]            Nov 10, 2017  9:20 AM CST   (Arrive by 9:05 AM)   RETURN DIABETES with Michelle Irizarry MD   [facility]    COVID-19 PCR test completed. Patient handout For Patients Who Have Been Tested for Covid-19 (Coronavirus) was given to the patient, which includes test result notification process.     Endocrinology ([facility] [place])    [address]  3rd Floor  [facility] [address] [phone]            Nov 13, 2017   Procedure with Elizabeth Oliver MD   [facility]    [address]  [facility] [place] [address]   [phone]            Dec 06, 2017 10:00 AM CST   Evaluation with Manuela Mitchell OT   [facility] - [facility]pat[facility] ([facility])    [address]  9th Floor, Clinic 9a  [facility] [address] [phone]            Dec 14, 2017  1:45 PM CST   (Arrive by 1:30 PM)   Post-Op with Elizabeth Oliver MD   [facility] Prostate and Urologic Cancers ([facility])    [address]  [address] [facility] [address] [phone]            Dec 29, 2017 10:00 AM CST   (Arrive by 9:45 AM)   New Patient Visit with VICTOR M Floyd CNP   [facility] Gastroenterology and IBD Clinic ([facility])    [address]  [address] [facility] [address] [phone]            Feb 19, 2018  9:30 AM CST   (Arrive by 9:15 AM)   Return Visit with Alina Jennings MD   [facility] ([facility] [place] [facility])    [address]  [address] [facility] [address]   [phone]            Mar 06, 2018  9:15 AM CST   VISUAL FIELD with [facility] EYE VISUAL FIELD   Eye Clinic ([facility])    [address]  [address]  9th Fl Clin 9a  [facility] [address] [phone]              Future tests that were ordered for you today     Open Future Orders        Priority Expected Expires Ordered    Urine Culture Aerobic Bacterial Routine  11/1/2018 11/1/2017            Who to contact     Please call your clinic at [phone] to:    Ask questions about your health    Make or cancel appointments    Discuss your  medicines    Learn about your test results    Speak to your doctor   If you have compliments or concerns about an experience at your clinic, or if you wish to file a complaint, please contact [facility] Patient Relations at [phone] or email us at [email or web address]         Additional Information About Your Visit        [employer] Information     [employer] is an electronic gateway that provides easy, online access to your medical records. With QA on Request, you can request a clinic appointment, read your test results, renew a prescription or communicate with your care team.     To sign up for QA on Request visit the website at [email or web address]   You will be asked to enter the access code listed below, as well as some personal information. Please follow the directions to create your username and password.     Your access code is: [email or web address]  Expires: 2018 12:09 PM     Your access code will  in 90 days. If you need help or a new code, please contact your [facility] or call [phone] for assistance.        Care EveryWhere ID     This is your Care EveryWhere ID. This could be used by other organizations to access your [facility] medical records  [id number]         Blood Pressure from Last 3 Encounters:   10/23/17 140/80   10/18/17 108/62   10/11/17 155/79    Weight from Last 3 Encounters:   10/23/17 61.2 kg (135 lb)   10/18/17 61.2 kg (135 lb)   10/10/17 72.7 kg (160 lb 3.2 oz)              Today, you had the following     No orders found for display         Today's Medication Changes          These changes are accurate as of: 17 10:09 AM.  If you have any questions, ask your nurse or doctor.               Start taking these medicines.        Dose/Directions    brimonidine 0.2 % ophthalmic solution   Commonly known as:  ALPHAGAN   Used for:  Primary open angle glaucoma of both eyes, severe stage   Started by:  Marely Robin,  MD        Dose:  1 drop   Place 1 drop into the right eye 3 times daily   Quantity:  15 mL   Refills:  11       dorzolamide-timolol 2-0.5 % ophthalmic solution   Commonly known as:  COSOPT   Used for:  Primary open angle glaucoma of both eyes, severe stage   Started by:  Marely Robin MD        Dose:  1 drop   Place 1 drop into the right eye 2 times daily   Quantity:  1 Bottle   Refills:  11         These medicines have changed or have updated prescriptions.        Dose/Directions    aspirin 81 MG EC tablet   This may have changed:  when to take this   Used for:  Unstable angina (H)        Dose:  81 mg   Take 1 tablet (81 mg) by mouth daily   Quantity:  90 tablet   Refills:  3       atorvastatin 40 MG tablet   Commonly known as:  LIPITOR   This may have changed:  when to take this   Used for:  Hyperlipidemia LDL goal <100        Dose:  40 mg   Take 1 tablet (40 mg) by mouth daily   Quantity:  90 tablet   Refills:  3       hydrochlorothiazide 12.5 MG capsule   Commonly known as:  MICROZIDE   This may have changed:  additional instructions   Used for:  Essential hypertension with goal blood pressure less than 130/80        Dose:  12.5 mg   Take 1 capsule (12.5 mg) by mouth daily   Quantity:  90 capsule   Refills:  3       * latanoprost 0.005 % ophthalmic solution   Commonly known as:  XALATAN   This may have changed:  Another medication with the same name was added. Make sure you understand how and when to take each.   Used for:  Primary open angle glaucoma, stage unspecified   Changed by:  Azael Arriaga MD        Dose:  1 drop   Place 1 drop into both eyes At Bedtime   Quantity:  2.5 mL   Refills:  11       * latanoprost 0.005 % ophthalmic solution   Commonly known as:  XALATAN   This may have changed:  You were already taking a medication with the same name, and this prescription was added. Make sure you understand how and when to take each.   Used for:  Primary open angle glaucoma of both eyes,  severe stage   Changed by:  Marely Robin MD        Dose:  1 drop   Place 1 drop into both eyes At Bedtime   Quantity:  1 Bottle   Refills:  11       Phenytoin Sodium Extended 200 MG Caps   This may have changed:  additional instructions   Used for:  Seizure disorder (H)        Dose:  200 mg   Take 200 mg by mouth 2 times daily   Quantity:  60 capsule   Refills:  0       * Notice:  This list has 2 medication(s) that are the same as other medications prescribed for you. Read the directions carefully, and ask your doctor or other care provider to review them with you.         Where to get your medicines      These medications were sent to [facility], [place] - [address]  [address], [facility] [address]     Phone:  [phone]     brimonidine 0.2 % ophthalmic solution    dorzolamide-timolol 2-0.5 % ophthalmic solution    latanoprost 0.005 % ophthalmic solution                Primary Care Provider Office Phone # Fax #    Allan Casey [phone]       [address] [place] [address]        Equal Access to Services     [facility] AH: Hadii aad ku hadasho Soomaali, waaxda luqadaha, qaybta kaalmada adeegyada, tonie blanco haytuan marvin . So [facility] [phone].    ATENCIÓN: Si habla español, tiene a briones disposición servicios gratuitos de asistencia lingüística. [facility] [phone].    We comply with applicable federal civil rights laws and [place] laws. We do not discriminate on the basis of race, color, national origin, age, disability, sex, sexual orientation, or gender identity.            Thank you!     Thank you for choosing [facility]INIC  for your care. Our goal is always to provide you with excellent care. Hearing back from our patients is one way we can continue to improve our services. Please take a few minutes to complete the written survey that you may receive in the mail after your visit with us.  Thank you!             Your Updated Medication List - Protect others around you: Learn how to safely use, store and throw away your medicines at www.disposemymeds.org.          This list is accurate as of: 11/2/17 10:09 AM.  Always use your most recent med list.                   Brand Name Dispense Instructions for use Diagnosis    ACETAMINOPHEN PO      Take 1,000 mg by mouth every 6 hours as needed for fever Taking q4-6 hours, per MAR        ADVAIR DISKUS 250-50 MCG/DOSE diskus inhaler   Generic drug:  fluticasone-salmeterol      Inhale 1 puff into the lungs 2 times daily        * albuterol (2.5 MG/3ML) 0.083% neb solution     360 mL    INHALE 1 VIAL VIA NEBULIZER EVERY 6 HOURS AS NEEDED    Chronic obstructive pulmonary disease, unspecified COPD type (H)       * albuterol 108 (90 BASE) MCG/ACT Inhaler    PROAIR HFA/PROVENTIL HFA/VENTOLIN HFA    3 Inhaler    Inhale 2 puffs into the lungs every 6 hours as needed for shortness of breath / dyspnea or wheezing    JOSE (obstructive sleep apnea)       aspirin 81 MG EC tablet     90 tablet    Take 1 tablet (81 mg) by mouth daily    Unstable angina (H)       atorvastatin 40 MG tablet    LIPITOR    90 tablet    Take 1 tablet (40 mg) by mouth daily    Hyperlipidemia LDL goal <100       blood glucose monitoring lancets     100 each    Use to test blood sugar 3 times daily or as directed.    Type 2 diabetes mellitus with diabetic peripheral angiopathy without gangrene, without long-term current use of insulin (H)       blood glucose monitoring meter device kit     1 kit    Use to test blood sugars 3 times daily or as directed.    Type 2 diabetes, HbA1C goal < 8% (H)       blood glucose monitoring test strip    ONE TOUCH ULTRA    3 Box    Use to test blood sugars 3 times daily or as directed.    Type 2 diabetes mellitus with diabetic peripheral angiopathy without gangrene, without long-term current use of insulin (H)       brimonidine 0.2 % ophthalmic solution    ALPHAGAN    15  mL    Place 1 drop into the right eye 3 times daily    Primary open angle glaucoma of both eyes, severe stage       calcium citrate-vitamin D 315-250 MG-UNIT Tabs per tablet    CITRACAL    120 tablet    Take 2 tablets by mouth daily    Osteoporosis       cetirizine 10 MG tablet    zyrTEC    90 tablet    Take 1 tablet (10 mg) by mouth daily as needed for allergies    Seasonal allergic rhinitis, unspecified allergic rhinitis trigger       CYANOCOBALAMIN PO      Take 2,000 mcg by mouth daily        cyclobenzaprine 10 MG tablet    FLEXERIL    30 tablet    Take 1 tablet (10 mg) by mouth 2 times daily as needed for muscle spasms    Cervicalgia       diclofenac 1 % Gel topical gel    VOLTAREN    100 g    Apply 2 g topically 4 times daily to hands    Primary osteoarthritis of both hands       dorzolamide 2 % ophthalmic solution    TRUSOPT     Place 1 drop into both eyes 2 times daily        dorzolamide-timolol 2-0.5 % ophthalmic solution    COSOPT    1 Bottle    Place 1 drop into the right eye 2 times daily    Primary open angle glaucoma of both eyes, severe stage       EPINEPHrine 0.15 MG/0.3ML injection 2-pack    EPIPEN JR    0.6 mL    Inject 0.3 mLs (0.15 mg) into the muscle as needed for anaphylaxis    Bee sting reaction, undetermined intent, subsequent encounter       escitalopram 5 MG tablet    LEXAPRO    60 tablet    Take 2 tablets (10 mg) by mouth daily    Adjustment disorder with depressed mood       estradiol 1 MG tablet    ESTRACE    90 tablet    Take 1 tablet (1 mg) by mouth daily    Person who has had sex change operation       ferrous sulfate 325 (65 FE) MG tablet    IRON    60 tablet    Take 1 tablet (325 mg) by mouth 2 times daily With meals        fluticasone 50 MCG/ACT spray    FLONASE     Spray 1 spray into both nostrils daily        hydrochlorothiazide 12.5 MG capsule    MICROZIDE    90 capsule    Take 1 capsule (12.5 mg) by mouth daily    Essential hypertension with goal blood pressure less than 130/80        INCRUSE ELLIPTA 62.5 MCG/INH oral inhaler   Generic drug:  umeclidinium      Inhale 1 puff into the lungs daily        RENÉE Pack      Take 1 packet by mouth 2 times daily        * latanoprost 0.005 % ophthalmic solution    XALATAN    2.5 mL    Place 1 drop into both eyes At Bedtime    Primary open angle glaucoma, stage unspecified       * latanoprost 0.005 % ophthalmic solution    XALATAN    1 Bottle    Place 1 drop into both eyes At Bedtime    Primary open angle glaucoma of both eyes, severe stage       levETIRAcetam 500 MG tablet    KEPPRA    180 tablet    Take 1 tablet (500 mg) by mouth 2 times daily    Nausea       lidocaine 5 % Patch    LIDODERM    30 patch    APPLY 1 TO 3 PATCHES TO PAINFUL AREA AT ONCE FOR UP TO 12 HOURS WITHIN A 24 HOUR PERIOD REMOVE AFTER 12 HOURS    Chronic pain syndrome, Status post below knee amputation of right lower extremity (H)       lisinopril 10 MG tablet    PRINIVIL/ZESTRIL    90 tablet    Take 1 tablet (10 mg) by mouth daily    Essential hypertension with goal blood pressure less than 130/80       MEDICATION GIVEN BY INTRATHECAL PUMP - INSTRUCTION      continuous May 19, 2017 - per [facility] in [place] [phone]: Conc: Bupivacaine 20 mg/mL and Fentanyl 2000 mcg/mL. Continuous: Bupivacaine 7.265 mg/day and Fentanyl 726.5 mcg/day. Boluses: Up to 7 boluses per 24-hr period of Bupivicaine 0.599 mg and Fentanyl 59.9 mcg  Pump Refill Date at [facility]: 7/2/17        metoprolol 25 MG 24 hr tablet    TOPROL-XL    30 tablet    TAKE 1 TABLET (25 MG) BY MOUTH DAILY    Old myocardial infarction       MULTIVITAMIN PO      Take 1 tablet by mouth daily        nitroGLYcerin 0.4 MG sublingual tablet    NITROSTAT    25 tablet    Place 1 tablet (0.4 mg) under the tongue every 5 minutes as needed for chest pain if you are still having symptoms after 3 doses (15 minutes) call 911.    Chronic systolic congestive heart failure (H), Old myocardial infarction        ONDANSETRON PO      Take 4 mg by mouth 3 times daily        * order for DME     1 Month    Equipment being ordered: Depends briefs    Incontinence       * order for DME     1 Device    Equipment being ordered: Power Wheelchair    CVA (cerebral infarction), HTN (hypertension)       * order for DME     1 Box    Equipment being ordered: Glucerna daily shakes with each meal    Type 2 diabetes mellitus with other diabetic neurological complication       * order for DME     1 Units    Equipment being ordered: Nebulizer and tubing supplies    Simple chronic bronchitis (H)       * order for DME     1 Device    Equipment being ordered: CPAP supplies mask, hose, filters, cushion fax to [facility] at [phone] Disp: 10 DeviceRefills: prn Class: Local PrintStart: 2/10/2017    Chronic obstructive pulmonary disease, unspecified COPD type (H)       * order for DME     10 Device    Equipment being ordered: CPAP supplies mask, hose, filters, cushion  fax to [facility] at [phone]    COPD (chronic obstructive pulmonary disease) (H)       * order for DME     1 Device    Hospital bed with side rails    Status post below knee amputation of right lower extremity (H)       * order for DME     1 Device    Full electric hospital bed with half rails  Dx: X47767, I110, J449 Length of need: lifetime    Status post bilateral above knee amputation (H)       * order for DME     1 Device    Wheel Chair Cushion: 18 x 18 inch Roho cushion    Status post bilateral above knee amputation (H)       pantoprazole 40 MG EC tablet    PROTONIX     Take 40 mg by mouth daily        Phenytoin Sodium Extended 200 MG Caps     60 capsule    Take 200 mg by mouth 2 times daily    Seizure disorder (H)       polyethylene glycol Packet    MIRALAX/GLYCOLAX     Take 17 g by mouth daily Dissolved in water or juice        * pregabalin 75 MG capsule    LYRICA    120 capsule    Take 2 capsules (150 mg) by mouth 2 times daily    Pain in both upper  extremities       * LYRICA 100 MG capsule   Generic drug:  pregabalin           prochlorperazine 10 MG tablet    COMPAZINE    20 tablet    Take 1 tablet (10 mg) by mouth every 8 hours as needed    Nausea with vomiting       risperiDONE 0.5 MG tablet    risperDAL     Take 0.5 mg by mouth At Bedtime        sucralfate 1 GM tablet    CARAFATE    120 tablet    Take 1 tablet (1 g) by mouth 4 times daily May dissolve in 10 mL water is necessary. (Start upon completion of carafate suspension)    Adjustment disorder with depressed mood       * traZODone 100 MG tablet    DESYREL    90 tablet    Take 1.5 tablets (150 mg) by mouth At Bedtime    Anxiety state       * traZODone 50 MG tablet    DESYREL          vitamin D 2000 UNITS tablet     100 tablet    Take 2,000 Units by mouth daily.        zinc 50 MG Tabs      Take 1 tablet by mouth daily        * Notice:  This list has 17 medication(s) that are the same as other medications prescribed for you. Read the directions carefully, and ask your doctor or other care provider to review them with you.

## 2021-08-03 ENCOUNTER — MYC MEDICAL ADVICE (OUTPATIENT)
Dept: PODIATRY | Facility: CLINIC | Age: 46
End: 2021-08-03

## 2021-08-04 ENCOUNTER — ANESTHESIA EVENT (OUTPATIENT)
Dept: SURGERY | Facility: CLINIC | Age: 46
End: 2021-08-04
Payer: COMMERCIAL

## 2021-08-04 RX ORDER — ZINC GLUCONATE 50 MG
50 TABLET ORAL DAILY
COMMUNITY
End: 2022-06-11

## 2021-08-04 RX ORDER — BUPROPION HYDROCHLORIDE 300 MG/1
300 TABLET ORAL EVERY MORNING
COMMUNITY

## 2021-08-04 RX ORDER — PROPRANOLOL HYDROCHLORIDE 80 MG/1
80 CAPSULE, EXTENDED RELEASE ORAL DAILY
COMMUNITY
End: 2021-08-11

## 2021-08-04 RX ORDER — LAMOTRIGINE 200 MG/1
200 TABLET ORAL 2 TIMES DAILY
COMMUNITY

## 2021-08-04 RX ORDER — ANTIARTHRITIC COMBINATION NO.2 900 MG
5000 TABLET ORAL DAILY
COMMUNITY
End: 2022-06-11

## 2021-08-04 RX ORDER — VALACYCLOVIR HYDROCHLORIDE 1 G/1
1000 TABLET, FILM COATED ORAL 3 TIMES DAILY
COMMUNITY
End: 2022-03-11

## 2021-08-04 RX ORDER — FERROUS SULFATE 325(65) MG
325 TABLET ORAL
COMMUNITY
End: 2022-10-30 | Stop reason: ALTCHOICE

## 2021-08-05 ENCOUNTER — HOSPITAL ENCOUNTER (OUTPATIENT)
Facility: CLINIC | Age: 46
Discharge: HOME | End: 2021-08-05
Attending: UROLOGY | Admitting: UROLOGY
Payer: COMMERCIAL

## 2021-08-05 ENCOUNTER — APPOINTMENT (OUTPATIENT)
Dept: GENERAL RADIOLOGY | Facility: CLINIC | Age: 46
End: 2021-08-05
Attending: UROLOGY
Payer: COMMERCIAL

## 2021-08-05 ENCOUNTER — ANESTHESIA (OUTPATIENT)
Dept: SURGERY | Facility: CLINIC | Age: 46
End: 2021-08-05
Payer: COMMERCIAL

## 2021-08-05 VITALS
WEIGHT: 200.1 LBS | TEMPERATURE: 97.2 F | RESPIRATION RATE: 18 BRPM | OXYGEN SATURATION: 99 % | SYSTOLIC BLOOD PRESSURE: 121 MMHG | HEART RATE: 65 BPM | HEIGHT: 64 IN | DIASTOLIC BLOOD PRESSURE: 74 MMHG | BODY MASS INDEX: 34.16 KG/M2

## 2021-08-05 DIAGNOSIS — N20.0 KIDNEY STONE: ICD-10-CM

## 2021-08-05 DIAGNOSIS — N20.1 URETERAL STONE: Primary | ICD-10-CM

## 2021-08-05 LAB
CREAT SERPL-MCNC: 0.86 MG/DL (ref 0.52–1.04)
GFR SERPL CREATININE-BSD FRML MDRD: 81 ML/MIN/1.73M2
HGB BLD-MCNC: 14 G/DL (ref 11.7–15.7)
POTASSIUM BLD-SCNC: 4 MMOL/L (ref 3.4–5.3)

## 2021-08-05 PROCEDURE — 250N000025 HC SEVOFLURANE, PER MIN: Performed by: UROLOGY

## 2021-08-05 PROCEDURE — 258N000003 HC RX IP 258 OP 636: Performed by: ANESTHESIOLOGY

## 2021-08-05 PROCEDURE — 999N000094 HC STATISTIC LITHOTRIPSY IDENTIFIER: Performed by: UROLOGY

## 2021-08-05 PROCEDURE — 999N000054 HC STATISTIC EKG NON-CHARGEABLE

## 2021-08-05 PROCEDURE — 74019 RADEX ABDOMEN 2 VIEWS: CPT

## 2021-08-05 PROCEDURE — 84132 ASSAY OF SERUM POTASSIUM: CPT | Performed by: ANESTHESIOLOGY

## 2021-08-05 PROCEDURE — 250N000009 HC RX 250: Performed by: NURSE ANESTHETIST, CERTIFIED REGISTERED

## 2021-08-05 PROCEDURE — 250N000011 HC RX IP 250 OP 636: Performed by: UROLOGY

## 2021-08-05 PROCEDURE — 50590 FRAGMENTING OF KIDNEY STONE: CPT | Mod: LT | Performed by: UROLOGY

## 2021-08-05 PROCEDURE — 370N000017 HC ANESTHESIA TECHNICAL FEE, PER MIN: Performed by: UROLOGY

## 2021-08-05 PROCEDURE — 93005 ELECTROCARDIOGRAM TRACING: CPT

## 2021-08-05 PROCEDURE — 82565 ASSAY OF CREATININE: CPT | Performed by: ANESTHESIOLOGY

## 2021-08-05 PROCEDURE — 710N000009 HC RECOVERY PHASE 1, LEVEL 1, PER MIN: Performed by: UROLOGY

## 2021-08-05 PROCEDURE — 999N000141 HC STATISTIC PRE-PROCEDURE NURSING ASSESSMENT: Performed by: UROLOGY

## 2021-08-05 PROCEDURE — 250N000011 HC RX IP 250 OP 636: Performed by: NURSE ANESTHETIST, CERTIFIED REGISTERED

## 2021-08-05 PROCEDURE — 360N000077 HC SURGERY LEVEL 4, PER MIN: Performed by: UROLOGY

## 2021-08-05 PROCEDURE — 85018 HEMOGLOBIN: CPT | Performed by: ANESTHESIOLOGY

## 2021-08-05 PROCEDURE — 36415 COLL VENOUS BLD VENIPUNCTURE: CPT | Performed by: ANESTHESIOLOGY

## 2021-08-05 PROCEDURE — 710N000012 HC RECOVERY PHASE 2, PER MINUTE: Performed by: UROLOGY

## 2021-08-05 RX ORDER — ONDANSETRON 2 MG/ML
4 INJECTION INTRAMUSCULAR; INTRAVENOUS
Status: DISCONTINUED | OUTPATIENT
Start: 2021-08-05 | End: 2021-08-05 | Stop reason: HOSPADM

## 2021-08-05 RX ORDER — DEXAMETHASONE SODIUM PHOSPHATE 4 MG/ML
INJECTION, SOLUTION INTRA-ARTICULAR; INTRALESIONAL; INTRAMUSCULAR; INTRAVENOUS; SOFT TISSUE PRN
Status: DISCONTINUED | OUTPATIENT
Start: 2021-08-05 | End: 2021-08-05

## 2021-08-05 RX ORDER — CEFAZOLIN SODIUM 2 G/100ML
2 INJECTION, SOLUTION INTRAVENOUS SEE ADMIN INSTRUCTIONS
Status: DISCONTINUED | OUTPATIENT
Start: 2021-08-05 | End: 2021-08-05 | Stop reason: HOSPADM

## 2021-08-05 RX ORDER — ONDANSETRON 2 MG/ML
INJECTION INTRAMUSCULAR; INTRAVENOUS PRN
Status: DISCONTINUED | OUTPATIENT
Start: 2021-08-05 | End: 2021-08-05

## 2021-08-05 RX ORDER — OXYCODONE HYDROCHLORIDE 5 MG/1
5 TABLET ORAL EVERY 4 HOURS PRN
Status: DISCONTINUED | OUTPATIENT
Start: 2021-08-05 | End: 2021-08-05 | Stop reason: HOSPADM

## 2021-08-05 RX ORDER — LIDOCAINE 40 MG/G
CREAM TOPICAL
Status: DISCONTINUED | OUTPATIENT
Start: 2021-08-05 | End: 2021-08-05 | Stop reason: HOSPADM

## 2021-08-05 RX ORDER — FENTANYL CITRATE 50 UG/ML
INJECTION, SOLUTION INTRAMUSCULAR; INTRAVENOUS PRN
Status: DISCONTINUED | OUTPATIENT
Start: 2021-08-05 | End: 2021-08-05

## 2021-08-05 RX ORDER — CEFAZOLIN SODIUM 2 G/100ML
2 INJECTION, SOLUTION INTRAVENOUS
Status: COMPLETED | OUTPATIENT
Start: 2021-08-05 | End: 2021-08-05

## 2021-08-05 RX ORDER — FENTANYL CITRATE 0.05 MG/ML
25 INJECTION, SOLUTION INTRAMUSCULAR; INTRAVENOUS EVERY 5 MIN PRN
Status: DISCONTINUED | OUTPATIENT
Start: 2021-08-05 | End: 2021-08-05 | Stop reason: HOSPADM

## 2021-08-05 RX ORDER — FENTANYL CITRATE 0.05 MG/ML
50 INJECTION, SOLUTION INTRAMUSCULAR; INTRAVENOUS
Status: DISCONTINUED | OUTPATIENT
Start: 2021-08-05 | End: 2021-08-05 | Stop reason: HOSPADM

## 2021-08-05 RX ORDER — SODIUM CHLORIDE, SODIUM LACTATE, POTASSIUM CHLORIDE, CALCIUM CHLORIDE 600; 310; 30; 20 MG/100ML; MG/100ML; MG/100ML; MG/100ML
INJECTION, SOLUTION INTRAVENOUS CONTINUOUS
Status: DISCONTINUED | OUTPATIENT
Start: 2021-08-05 | End: 2021-08-05 | Stop reason: HOSPADM

## 2021-08-05 RX ORDER — PROPOFOL 10 MG/ML
INJECTION, EMULSION INTRAVENOUS CONTINUOUS PRN
Status: DISCONTINUED | OUTPATIENT
Start: 2021-08-05 | End: 2021-08-05

## 2021-08-05 RX ORDER — HYDROMORPHONE HCL IN WATER/PF 6 MG/30 ML
0.2 PATIENT CONTROLLED ANALGESIA SYRINGE INTRAVENOUS EVERY 5 MIN PRN
Status: DISCONTINUED | OUTPATIENT
Start: 2021-08-05 | End: 2021-08-05 | Stop reason: HOSPADM

## 2021-08-05 RX ORDER — EPHEDRINE SULFATE 50 MG/ML
INJECTION, SOLUTION INTRAMUSCULAR; INTRAVENOUS; SUBCUTANEOUS PRN
Status: DISCONTINUED | OUTPATIENT
Start: 2021-08-05 | End: 2021-08-05

## 2021-08-05 RX ORDER — MEPERIDINE HYDROCHLORIDE 25 MG/ML
12.5 INJECTION INTRAMUSCULAR; INTRAVENOUS; SUBCUTANEOUS
Status: DISCONTINUED | OUTPATIENT
Start: 2021-08-05 | End: 2021-08-05 | Stop reason: HOSPADM

## 2021-08-05 RX ORDER — LIDOCAINE HYDROCHLORIDE 20 MG/ML
INJECTION, SOLUTION INFILTRATION; PERINEURAL PRN
Status: DISCONTINUED | OUTPATIENT
Start: 2021-08-05 | End: 2021-08-05

## 2021-08-05 RX ORDER — TAMSULOSIN HYDROCHLORIDE 0.4 MG/1
0.4 CAPSULE ORAL DAILY
Qty: 5 CAPSULE | Refills: 0 | Status: SHIPPED | OUTPATIENT
Start: 2021-08-05 | End: 2021-08-10

## 2021-08-05 RX ORDER — PROPOFOL 10 MG/ML
INJECTION, EMULSION INTRAVENOUS PRN
Status: DISCONTINUED | OUTPATIENT
Start: 2021-08-05 | End: 2021-08-05

## 2021-08-05 RX ORDER — ONDANSETRON 4 MG/1
4 TABLET, ORALLY DISINTEGRATING ORAL EVERY 30 MIN PRN
Status: DISCONTINUED | OUTPATIENT
Start: 2021-08-05 | End: 2021-08-05 | Stop reason: HOSPADM

## 2021-08-05 RX ORDER — KETOROLAC TROMETHAMINE 30 MG/ML
INJECTION, SOLUTION INTRAMUSCULAR; INTRAVENOUS PRN
Status: DISCONTINUED | OUTPATIENT
Start: 2021-08-05 | End: 2021-08-05

## 2021-08-05 RX ORDER — ONDANSETRON 2 MG/ML
4 INJECTION INTRAMUSCULAR; INTRAVENOUS EVERY 30 MIN PRN
Status: DISCONTINUED | OUTPATIENT
Start: 2021-08-05 | End: 2021-08-05 | Stop reason: HOSPADM

## 2021-08-05 RX ADMIN — CEFAZOLIN SODIUM 2 G: 2 INJECTION, SOLUTION INTRAVENOUS at 14:28

## 2021-08-05 RX ADMIN — Medication 5 MG: at 14:39

## 2021-08-05 RX ADMIN — SODIUM CHLORIDE, POTASSIUM CHLORIDE, SODIUM LACTATE AND CALCIUM CHLORIDE: 600; 310; 30; 20 INJECTION, SOLUTION INTRAVENOUS at 14:19

## 2021-08-05 RX ADMIN — PROPOFOL 25 MCG/KG/MIN: 10 INJECTION, EMULSION INTRAVENOUS at 14:24

## 2021-08-05 RX ADMIN — KETOROLAC TROMETHAMINE 30 MG: 30 INJECTION, SOLUTION INTRAMUSCULAR at 15:10

## 2021-08-05 RX ADMIN — Medication 5 MG: at 14:50

## 2021-08-05 RX ADMIN — DEXMEDETOMIDINE 4 MCG: 100 INJECTION, SOLUTION, CONCENTRATE INTRAVENOUS at 14:54

## 2021-08-05 RX ADMIN — LIDOCAINE HYDROCHLORIDE 80 MG: 20 INJECTION, SOLUTION INFILTRATION; PERINEURAL at 14:24

## 2021-08-05 RX ADMIN — DEXMEDETOMIDINE 8 MCG: 100 INJECTION, SOLUTION, CONCENTRATE INTRAVENOUS at 14:43

## 2021-08-05 RX ADMIN — FENTANYL CITRATE 50 MCG: 50 INJECTION, SOLUTION INTRAMUSCULAR; INTRAVENOUS at 14:24

## 2021-08-05 RX ADMIN — PROPOFOL 200 MG: 10 INJECTION, EMULSION INTRAVENOUS at 14:24

## 2021-08-05 RX ADMIN — MIDAZOLAM 2 MG: 1 INJECTION INTRAMUSCULAR; INTRAVENOUS at 14:19

## 2021-08-05 RX ADMIN — ONDANSETRON 4 MG: 2 INJECTION INTRAMUSCULAR; INTRAVENOUS at 15:02

## 2021-08-05 RX ADMIN — DEXAMETHASONE SODIUM PHOSPHATE 4 MG: 4 INJECTION, SOLUTION INTRA-ARTICULAR; INTRALESIONAL; INTRAMUSCULAR; INTRAVENOUS; SOFT TISSUE at 14:33

## 2021-08-05 ASSESSMENT — LIFESTYLE VARIABLES: TOBACCO_USE: 0

## 2021-08-05 ASSESSMENT — ENCOUNTER SYMPTOMS
ORTHOPNEA: 0
DYSRHYTHMIAS: 1
SEIZURES: 0

## 2021-08-05 ASSESSMENT — COPD QUESTIONNAIRES: COPD: 0

## 2021-08-05 ASSESSMENT — MIFFLIN-ST. JEOR: SCORE: 1532.65

## 2021-08-05 NOTE — DISCHARGE INSTRUCTIONS
DISCHARGE INSTRUCTIONS FOLLOWING EXTRACORPOREAL SHOCK LITHOTRIPSY (ESWL)      Your stone(s) has been fragmented into many tiny pieces, which must now pass in your urine.  Usually this process is uneventful.  Most fragments pass in the first one or two week, but some may continue to pass for three months or more.  Some pain or discomfort may accompany the passage of these fragments.    To aid in the passage of fragments, drink lots of fluid.  Aim for 1 glass an hour for the next 1 to 2 weeks (2 quarts or more a day).  Most stone patients will benefit from a continued high fluid intake and urine output indefinitely, even after the fragments are gone.  This helps prevent new stone formation.    Strain your urine.  Take the stone fragments to your urologist.  They will have them analyzed to help determine the cause of your stone(s).    You should walk around and resume every day activities.  Activity may help the stone fragments pass.  You should, however, avoid sports or really strenuous exercise for about a week, or at least until there is no more blood in your urine.    You may resume regular diet.    Call your urologist if you have:  a. Persistent severe pain not relieved by oral medications.  b. Fever (over 101 ).  c. Persistent vomiting.    See your urologist as directed.  They will need to take an x-ray to check your progress.  Take your stone fragments to your follow-up appointment.  Same Day Surgery Discharge Instructions for  Sedation and General Anesthesia       It's not unusual to feel dizzy, light-headed or faint for up to 24 hours after surgery or while taking pain medication.  If you have these symptoms: sit for a few minutes before standing and have someone assist you when you get up to walk or use the bathroom.      You should rest and relax for the next 24 hours. We recommend you make arrangements to have an adult stay with you for at least 24 hours after your discharge.  Avoid hazardous and strenuous  activity.      DO NOT DRIVE any vehicle or operate mechanical equipment for 24 hours following the end of your surgery.  Even though you may feel normal, your reactions may be affected by the medication you have received.      Do not drink alcoholic beverages for 24 hours following surgery.       Slowly progress to your regular diet as you feel able. It's not unusual to feel nauseated and/or vomit after receiving anesthesia.  If you develop these symptoms, drink clear liquids (apple juice, ginger ale, broth, 7-up, etc. ) until you feel better.  If your nausea and vomiting persists for 24 hours, please notify your surgeon.        All narcotic pain medications, along with inactivity and anesthesia, can cause constipation. Drinking plenty of liquids and increasing fiber intake will help.      For any questions of a medical nature, call your surgeon.      Do not make important decisions for 24 hours.      If you had general anesthesia, you may have a sore throat for a couple of days related to the breathing tube used during surgery.  You may use Cepacol lozenges to help with this discomfort.  If it worsens or if you develop a fever, contact your surgeon.       If you feel your pain is not well managed with the pain medications prescribed by your surgeon, please contact your surgeon's office to let them know so they can address your concerns.       CoVid 19 Information    We want to give you information regarding Covid. Please consult your primary care provider with any questions you might have.     Patient who have symptoms (cough, fever, or shortness of breath), need to isolate for 7 days from when symptoms started OR 72 hours after fever resolves (without fever reducing medications) AND improvement of respiratory symptoms (whichever is longer).      Isolate yourself at home (in own room/own bathroom if possible)    Do Not allow any visitors    Do Not go to work or school    Do Not go to [employer],  centers,  shopping, or other public places.    Do Not shake hands.    Avoid close and intimate contact with others (hugging, kissing).    Follow CDC recommendations for household cleaning of frequently touched services.     After the initial 7 days, continue to isolate yourself from household members as much as possible. To continue decrease the risk of community spread and exposure, you and any members of your household should limit activities in public for 14 days after starting home isolation.     You can reference the following CDC link for helpful home isolation/care tips:  https://www.cdc.gov/coronavirus/2019-ncov/downloads/10Things.pdf    Protect Others:    Cover Your Mouth and Nose with a mask, disposable tissue or wash cloth to avoid spreading germs to others.    Wash your hands and face frequently with soap and water    Call Your Primary Doctor If: Breathing difficulty develops or you become worse.    For more information about COVID19 and options for caring for yourself at home, please visit the CDC website at https://www.cdc.gov/coronavirus/2019-ncov/about/steps-when-sick.html  For more options for care at [facility], please visit our website at [email or web address]    Today you received Toradol, an antiinflammatory medication similar to Ibuprofen.  You should not take other antiinflammatory medication, such as Ibuprofen, Motrin, Advil, Aleve, Naprosyn, etc until 9:00pm.       **If you have questions or concerns about your procedure,   call Dr. Lambert at [phone]**
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
## 2021-08-05 NOTE — ANESTHESIA POSTPROCEDURE EVALUATION
Patient: Lena Morrow    Procedure(s):  LEFT EXTRACORPOREAL SHOCK WAVE LITHOTRIPSY (ESWL)    Diagnosis:Kidney stone [N20.0]  Diagnosis Additional Information: No value filed.    Anesthesia Type:  General    Note:  Disposition: Outpatient   Postop Pain Control: Uneventful            Sign Out: Well controlled pain   PONV: No   Neuro/Psych: Uneventful            Sign Out: Acceptable/Baseline neuro status   Airway/Respiratory: Uneventful            Sign Out: Acceptable/Baseline resp. status   CV/Hemodynamics: Uneventful            Sign Out: Acceptable CV status   Other NRE: NONE   DID A NON-ROUTINE EVENT OCCUR? No    Event details/Postop Comments:  Pt doing well prior to discharge home.             Last vitals:  Vitals Value Taken Time   /73 08/05/21 1600   Temp     Pulse 66 08/05/21 1605   Resp 23 08/05/21 1605   SpO2 95 % 08/05/21 1605   Vitals shown include unvalidated device data.    Electronically Signed By: Armand Soto MD  August 5, 2021  6:02 PM

## 2021-08-05 NOTE — ANESTHESIA PROCEDURE NOTES
Airway       Patient location during procedure: OR ([facility] - Operating Room or Procedural Area)       Procedure Start/Stop Times: 8/5/2021 2:27 PM  Staff -        CRNA: Yajaira Reid APRN CRNA       Performed By: CRNA  Consent for Airway        Urgency: elective  Indications and Patient Condition       Indications for airway management: robles-procedural       Induction type:intravenous       Mask difficulty assessment: 1 - vent by mask    Final Airway Details       Final airway type: supraglottic airway    Supraglottic Airway Details        Type: LMA       Brand: Ambu AuraGain       LMA size: 4    Post intubation assessment        Number of attempts at approach: 1       Number of other approaches attempted: 0       Secured with: pink tape       Ease of procedure: easy       Dentition: Intact and Unchanged

## 2021-08-05 NOTE — ANESTHESIA CARE TRANSFER NOTE
Patient: Lena Morrow    Procedure(s):  LEFT EXTRACORPOREAL SHOCK WAVE LITHOTRIPSY (ESWL)    Diagnosis: Kidney stone [N20.0]  Diagnosis Additional Information: No value filed.    Anesthesia Type:   General     Note:    Oropharynx: oropharynx clear of all foreign objects and spontaneously breathing  Level of Consciousness: drowsy  Oxygen Supplementation: face mask  Level of Supplemental Oxygen (L/min / FiO2): 6  Independent Airway: airway patency satisfactory and stable  Dentition: dentition unchanged  Vital Signs Stable: post-procedure vital signs reviewed and stable  Report to RN Given: handoff report given  Patient transferred to: PACU    Handoff Report: Identifed the Patient, Identified the Reponsible Provider, Reviewed the pertinent medical history, Discussed the surgical course, Reviewed Intra-OP anesthesia mangement and issues during anesthesia, Set expectations for post-procedure period and Allowed opportunity for questions and acknowledgement of understanding      Vitals:  Vitals Value Taken Time   BP     Temp     Pulse     Resp 26 08/05/21 1519   SpO2 100 % 08/05/21 1519   Vitals shown include unvalidated device data.    Electronically Signed By: JUDIE Ortega CRNA  August 5, 2021  3:20 PM

## 2021-08-05 NOTE — OP NOTE
Procedure Date: 2021    SURGEON:  Devonte Lambert MD    PREOPERATIVE DIAGNOSIS:  Left kidney stones.    POSTOPERATIVE DIAGNOSIS:  Left kidney stones.     PROCEDURE PERFORMED:  Left extracorporeal shock wave lithotripsy.    ANESTHESIA:  General.    COMPLICATIONS:  None.    INDICATIONS FOR PROCEDURE:  Lena Huggins is a 46-year-old woman with an asymptomatic lower pole left kidney stone, who now presents for shockwave lithotripsy.    DETAILS OF PROCEDURE:  Risks and benefits of the procedure were explained in detail to the patient, informed consent was obtained.  The patient was brought to the operating room and placed on the operating table.  She underwent general endotracheal anesthetic.  She was then moved over the lithotripter.  Fluoroscopy was used to identify her stone.  I delivered 2400 shocks at a maximum power level 7 to the stone.  It appeared to fragment completely.  The patient was woken up and the procedure was concluded.    The patient tolerated the procedure without complications.  She went post-anesthetic care unit in good condition.  She will go home from there.  I will see her back late in the fall for a KUB to make certain she passed all her stone fragments.    Devonte Lambert MD        D: 2021   T: 2021   MT: FELIX/DCQA    Name:     LNEA HUGGINS  MRN:      [id number]        Account:        [id number]   :      1975           Procedure Date: 2021     Document: [id number]

## 2021-08-06 ENCOUNTER — TELEPHONE (OUTPATIENT)
Dept: UROLOGY | Facility: CLINIC | Age: 46
End: 2021-08-06

## 2021-08-06 DIAGNOSIS — N20.0 KIDNEY STONE: Primary | ICD-10-CM

## 2021-08-06 LAB
ATRIAL RATE - MUSE: 73 BPM
DIASTOLIC BLOOD PRESSURE - MUSE: NORMAL MMHG
INTERPRETATION ECG - MUSE: NORMAL
P AXIS - MUSE: 61 DEGREES
PR INTERVAL - MUSE: 196 MS
QRS DURATION - MUSE: 96 MS
QT - MUSE: 370 MS
QTC - MUSE: 407 MS
R AXIS - MUSE: 70 DEGREES
SYSTOLIC BLOOD PRESSURE - MUSE: NORMAL MMHG
T AXIS - MUSE: 47 DEGREES
VENTRICULAR RATE- MUSE: 73 BPM

## 2021-08-06 NOTE — TELEPHONE ENCOUNTER
[facility] Call Center    Phone Message    May a detailed message be left on voicemail: yes     Reason for Call: Other: Lena calling to ask a question for Dr. Lambert. Pt is requesting that he give her a call back when possible. Thank you!     Action Taken: Message routed to:  Other:  Clinic    Travel Screening: Not Applicable

## 2021-08-06 NOTE — TELEPHONE ENCOUNTER
Returned phone call and spoke with patient. She reports that her brother and cousin have a condition that was treated called Hyperparathyroidism. Once it was treated they stopped having kidney stones. Patient would like to know if MD thinks this may be the cause? Will send message to MD.     Nesha Bal LPN

## 2021-08-09 NOTE — TELEPHONE ENCOUNTER
"Per MD- \"I would recommend that she see Dr. Johnson at the [facility] kidney stone prevention clinic to help her identify the underlying cause.  Have the schedulers help her out with this if she would like to make an appointment thanks. \"    Called patient and informed, she is interested. Will have scheduling help arrange appointment.     Nesha Bal LPN    "

## 2021-08-10 NOTE — TELEPHONE ENCOUNTER
SR Pool,    Pleases see if Lena can be added to the scheduled opened tomorrow. Thank you. Otherwise I just don't have any openings.    Dr. Cruz

## 2021-08-10 NOTE — TELEPHONE ENCOUNTER
Called and spoke with patient.  Scheduled for 815 8/11/21 per Dr. Cruz.     Lorene Sifuentes MS ATC

## 2021-08-11 DIAGNOSIS — F41.1 GENERALIZED ANXIETY DISORDER: Primary | ICD-10-CM

## 2021-08-11 RX ORDER — PROPRANOLOL HYDROCHLORIDE 80 MG/1
CAPSULE, EXTENDED RELEASE ORAL
Qty: 90 CAPSULE | Refills: 0 | Status: SHIPPED | OUTPATIENT
Start: 2021-08-11 | End: 2021-11-19

## 2021-08-11 NOTE — TELEPHONE ENCOUNTER
Routing refill request to provider for review/approval because:  Drug interaction warning      Delilah Cortez RN  [facility]

## 2021-09-29 ENCOUNTER — TRANSFERRED RECORDS (OUTPATIENT)
Dept: HEALTH INFORMATION MANAGEMENT | Facility: CLINIC | Age: 46
End: 2021-09-29

## 2021-09-29 LAB
PHQ9 SCORE: 19
PHQ9 SCORE: 19

## 2021-10-05 ENCOUNTER — DOCUMENTATION ONLY (OUTPATIENT)
Dept: LAB | Facility: CLINIC | Age: 46
End: 2021-10-05

## 2021-10-05 NOTE — PROGRESS NOTES
Need lab orders. Patient is coming for appt on Saturday, October 10th states she needs labs due to lithium.     Thank you.

## 2021-10-11 ENCOUNTER — DOCUMENTATION ONLY (OUTPATIENT)
Dept: LAB | Facility: CLINIC | Age: 46
End: 2021-10-11
Payer: COMMERCIAL

## 2021-10-11 NOTE — PROGRESS NOTES
I have never seen this patient nor is she scheduled to see me at a future date. We have exchanged a few MyChart and telephone messages but this it because I cover Dr. Crisostomo's box. I am not her PCP. Please cancel appointment.

## 2021-10-11 NOTE — PROGRESS NOTES
Please place or confirm orders for upcoming lab appointment on 10/16/2021. Thank you.    Patient is coming in for lithium Level check.

## 2021-10-16 ENCOUNTER — LAB (OUTPATIENT)
Dept: LAB | Facility: CLINIC | Age: 46
End: 2021-10-16
Payer: COMMERCIAL

## 2021-10-16 DIAGNOSIS — F31.60 BIPOLAR DISORDER, MIXED (H): Primary | ICD-10-CM

## 2021-10-16 LAB
ANION GAP SERPL CALCULATED.3IONS-SCNC: 5 MMOL/L (ref 3–14)
BUN SERPL-MCNC: 10 MG/DL (ref 7–30)
CALCIUM SERPL-MCNC: 9.2 MG/DL (ref 8.5–10.1)
CHLORIDE BLD-SCNC: 109 MMOL/L (ref 94–109)
CO2 SERPL-SCNC: 26 MMOL/L (ref 20–32)
CREAT SERPL-MCNC: 0.93 MG/DL (ref 0.52–1.04)
GFR SERPL CREATININE-BSD FRML MDRD: 74 ML/MIN/1.73M2
GLUCOSE BLD-MCNC: 92 MG/DL (ref 70–99)
LITHIUM SERPL-SCNC: 0.3 MMOL/L
POTASSIUM BLD-SCNC: 4.1 MMOL/L (ref 3.4–5.3)
SODIUM SERPL-SCNC: 140 MMOL/L (ref 133–144)

## 2021-10-16 PROCEDURE — 36415 COLL VENOUS BLD VENIPUNCTURE: CPT

## 2021-10-16 PROCEDURE — 80178 ASSAY OF LITHIUM: CPT

## 2021-10-16 PROCEDURE — 80048 BASIC METABOLIC PNL TOTAL CA: CPT

## 2021-10-24 ENCOUNTER — HEALTH MAINTENANCE LETTER (OUTPATIENT)
Age: 46
End: 2021-10-24

## 2021-10-29 ENCOUNTER — TRANSFERRED RECORDS (OUTPATIENT)
Dept: HEALTH INFORMATION MANAGEMENT | Facility: CLINIC | Age: 46
End: 2021-10-29
Payer: COMMERCIAL

## 2021-10-29 ENCOUNTER — MEDICAL CORRESPONDENCE (OUTPATIENT)
Dept: HEALTH INFORMATION MANAGEMENT | Facility: CLINIC | Age: 46
End: 2021-10-29
Payer: COMMERCIAL

## 2021-10-29 LAB
PHQ9 SCORE: 16
PHQ9 SCORE: 16

## 2021-11-02 ENCOUNTER — HOSPITAL ENCOUNTER (OUTPATIENT)
Dept: GENERAL RADIOLOGY | Facility: CLINIC | Age: 46
Discharge: HOME | End: 2021-11-02
Attending: UROLOGY | Admitting: UROLOGY
Payer: COMMERCIAL

## 2021-11-02 DIAGNOSIS — N20.0 KIDNEY STONE: ICD-10-CM

## 2021-11-02 PROCEDURE — 74019 RADEX ABDOMEN 2 VIEWS: CPT

## 2021-11-06 ENCOUNTER — LAB (OUTPATIENT)
Dept: LAB | Facility: CLINIC | Age: 46
End: 2021-11-06
Payer: COMMERCIAL

## 2021-11-06 DIAGNOSIS — F31.60 MIXED BIPOLAR I DISORDER (H): Primary | ICD-10-CM

## 2021-11-06 DIAGNOSIS — Z79.899 ENCOUNTER FOR LONG-TERM (CURRENT) USE OF HIGH-RISK MEDICATION: ICD-10-CM

## 2021-11-06 DIAGNOSIS — F31.60 MIXED BIPOLAR I DISORDER (H): ICD-10-CM

## 2021-11-06 LAB
ANION GAP SERPL CALCULATED.3IONS-SCNC: 5 MMOL/L (ref 3–14)
BUN SERPL-MCNC: 8 MG/DL (ref 7–30)
CALCIUM SERPL-MCNC: 9.4 MG/DL (ref 8.5–10.1)
CHLORIDE BLD-SCNC: 112 MMOL/L (ref 94–109)
CO2 SERPL-SCNC: 24 MMOL/L (ref 20–32)
CREAT SERPL-MCNC: 0.87 MG/DL (ref 0.52–1.04)
GFR SERPL CREATININE-BSD FRML MDRD: 80 ML/MIN/1.73M2
GLUCOSE BLD-MCNC: 91 MG/DL (ref 70–99)
LITHIUM SERPL-SCNC: 0.9 MMOL/L
POTASSIUM BLD-SCNC: 4.1 MMOL/L (ref 3.4–5.3)
SODIUM SERPL-SCNC: 141 MMOL/L (ref 133–144)

## 2021-11-06 PROCEDURE — 36415 COLL VENOUS BLD VENIPUNCTURE: CPT

## 2021-11-06 PROCEDURE — 80048 BASIC METABOLIC PNL TOTAL CA: CPT

## 2021-11-06 PROCEDURE — 80178 ASSAY OF LITHIUM: CPT

## 2021-11-08 ENCOUNTER — VIRTUAL VISIT (OUTPATIENT)
Dept: UROLOGY | Facility: CLINIC | Age: 46
End: 2021-11-08
Payer: COMMERCIAL

## 2021-11-08 VITALS — WEIGHT: 200 LBS | BODY MASS INDEX: 34.15 KG/M2 | HEIGHT: 64 IN

## 2021-11-08 DIAGNOSIS — N20.0 NEPHROLITHIASIS: Primary | ICD-10-CM

## 2021-11-08 PROCEDURE — 99213 OFFICE O/P EST LOW 20 MIN: CPT | Mod: 95 | Performed by: UROLOGY

## 2021-11-08 ASSESSMENT — MIFFLIN-ST. JEOR: SCORE: 1532.19

## 2021-11-08 ASSESSMENT — PAIN SCALES - GENERAL: PAINLEVEL: NO PAIN (0)

## 2021-11-08 NOTE — PROGRESS NOTES
*Send link to cell phone*      Lena is a 46 year old who is being evaluated via a billable video visit.      How would you like to obtain your AVS? MyChart  If the video visit is dropped, the invitation should be resent by: Text to cell phone: [phone]  Will anyone else be joining your video visit? No         Office Visit Note  [facility]  [phone]    Nov 8, 2021    [unfilled]    1975    UROLOGIC DIAGNOSES:    Left kidney stone    CURRENT INTERVENTIONS:    ESWL in August    History:    Lena is a set up for virtual visit today for kidney stone follow-up.  She did well after her extracorporeal shockwave lithotripsy in August.  She had no pain or symptoms afterwards.      Imaging: I reviewed her KUB images from last week.  No stones identified.    Labs:      MEDICATIONS:    Current Outpatient Medications:      albuterol (PROAIR HFA/PROVENTIL HFA/VENTOLIN HFA) 108 (90 Base) MCG/ACT inhaler, INHALE 2 PUFFS INTO THE LUNGS EVERY 6 HOURS AS NEEDED FOR SHORTNESS OF BREATH OR DIFFICULT BREATHING OR WHEEZING, Disp: 6.7 g, Rfl: 3     Biotin 5000 MCG TABS, Take 5,000 mcg by mouth daily, Disp: , Rfl:      buPROPion (WELLBUTRIN XL) 300 MG 24 hr tablet, Take 300 mg by mouth every morning, Disp: , Rfl:      Calcium Carbonate-Vit D-Min (CALCIUM 1200 PO), Take 1 capsule by mouth daily, Disp: , Rfl:      cholecalciferol (VITAMIN D3) 25 mcg (1000 units) capsule, Take 1 capsule by mouth daily, Disp: , Rfl:      esomeprazole (NEXIUM) 40 MG DR capsule, Take 1 capsule (40 mg) by mouth every morning (before breakfast) Take 30-60 minutes before eating., Disp: 90 capsule, Rfl: 3     ferrous sulfate (FEROSUL) 325 (65 Fe) MG tablet, Take 325 mg by mouth daily (with breakfast), Disp: , Rfl:      GLUCOSAMINE-CHONDROITIN PO, Take 1 capsule by mouth 2 times daily, Disp: , Rfl:      lamoTRIgine (LAMICTAL) 200 MG tablet, Take 200 mg by mouth 2 times daily, Disp: , Rfl:      lithium ER (LITHOBID) 300 MG CR tablet, Take  1,200 mg by mouth At Bedtime , Disp: , Rfl: 0     loratadine-pseudoePHEDrine (CLARITIN-D 12-HOUR) 5-120 MG 12 hr tablet, Take 1 tablet by mouth 2 times daily, Disp: , Rfl:      Magnesium Oxide 250 MG TABS, Take 2 tablets by mouth daily, Disp: , Rfl:      Multiple Vitamins-Minerals (WOMENS MULTIVITAMIN PO), Take 2 capsules by mouth daily, Disp: , Rfl:      potassium 99 MG TABS, Take 99 mg by mouth daily , Disp: , Rfl:      propranolol ER (INDERAL LA) 80 MG 24 hr capsule, TAKE 1 CAPSULE(80 MG) BY MOUTH DAILY, Disp: 90 capsule, Rfl: 0     valACYclovir (VALTREX) 1000 mg tablet, Take 1,000 mg by mouth 3 times daily, Disp: , Rfl:      vitamin C (ASCORBIC ACID) 1000 MG TABS, Take 1,000 mg by mouth daily , Disp: , Rfl:      zinc gluconate 50 MG tablet, Take 50 mg by mouth daily, Disp: , Rfl:     ALLERGIES:     Allergies   Allergen Reactions     Amoxicillin      yeast vaginal inf     Aripiprazole      Increased sadness and moodiness     Ativan Fatigue     Augmentin [Amoxicillin-Pot Clavulanate]      Itching,hives     Azithromycin      GI cramps     Clonazepam      Sedation, even at 0.5 mg dose     Estrogens      # bcps cause cns sx     Lexapro      diarrhea       Mirtazapine      Sedation      Oxycodone      Epigastric pain, headache     Prochlorperazine      Compazine- agitation     Risperdal [Risperidone]      Weight gain     Seasonal Allergies      Seroquel [Quetiapine]      Increased appetite     Sertraline      sweats     Venlafaxine      sweats     Xanax [Alprazolam] Fatigue     Augmentin Rash       REVIEW OF SYSTEMS:   Skin: No rash, pruritis, or skin pigmentation  Eyes: No changes in vision  Ears/Nose/Throat: No changes in hearing, no nosebleeds  Respiratory: No shortness of breath, dyspnea on exertion, cough, or hemoptysis  Cardiovascular: No chest pain or palpitations  Gastrointestinal: No diarrhea or constipation. No abdominal pain. No hematochezia  Genitourinary: see HPI  Musculoskeletal: No pain or swelling of  joints, normal range of motion  Neurologic: No weakness or tremors  Psychiatric: No recent changes in memory or mood  Hematologic/Lymphatic/Immunologic: No easy bruising or enlarged lymph nodes  Endocrine: No weight gain or loss    SURGICAL HISTORY:    Past Surgical History:   Procedure Laterality Date     COLONOSCOPY       CYSTOSCOPY N/A 2017    Procedure: CYSTOSCOPY;;  Surgeon: Toni Da Silva MD;  Location:  OR     DAVINCI HYSTERECTOMY TOTAL, SALPINGECTOMY BILATERAL N/A 2017    Procedure: DAVINCI HYSTERECTOMY TOTAL, SALPINGECTOMY BILATERAL;  ROBOTIC ASSISTED LAPAROSCOPIC TOTAL HYSTERECTOMY; BILATERAL SALPINGECTOMY; CYSTOSCOPY;  Surgeon: Toni Da Silva MD;  Location: [facility]     DILATION AND CURETTAGE N/A 2016    Procedure: DILATION AND CURETTAGE;  Surgeon: Josue Jama MD;  Location: [facility]     ENT SURGERY       EXTRACORPOREAL SHOCK WAVE LITHOTRIPSY (ESWL) Left 2021    Procedure: LEFT EXTRACORPOREAL SHOCK WAVE LITHOTRIPSY (ESWL);  Surgeon: Devonte Lambert MD;  Location:  OR     GYN SURGERY           HC COLP CERVIX/UPPER VAGINA W BX CERVIX  10/30/12    [facility]     HYSTERECTOMY       LAPAROSCOPIC CHOLECYSTECTOMY  4/3/2012    Procedure:LAPAROSCOPIC CHOLECYSTECTOMY; LAPAROSCOPIC CHOLECYSTECTOMY ; Surgeon:KARYNA CAMARA; Location:[facility]     LAPAROSCOPY DIAGNOSTIC (GYN) N/A 2016    Procedure: LAPAROSCOPY DIAGNOSTIC (GYN);  Surgeon: Josue Jama MD;  Location: [facility]     LASER HOLMIUM LITHOTRIPSY URETER(S), INSERT STENT, COMBINED Left 2020    Procedure: Cystoscopy, left ureteroscopy with holmium laser lithotripsy and left stent placement;  Surgeon: Devonte Lambert MD;  Location:  OR     ORTHOPEDIC SURGERY      carpal tunnel with ganglian cyst removal     ZZ NONSPECIFIC PROCEDURE      tonsillectomy     [facility] NONSPECIFIC PROCEDURE      Open right carpal tunnel release.  Excision right dorsal carpal ganglion cyst. Excision,  terminal branch, right posterior interosseous nerve.            PHYSICAL EXAM:    General: Alert and oriented to time, place, and self. In NAD   HEENT: Head AT/NC, EOMI, CN Grossly intact   Lungs: no respiratory distress, or pursed lip breathing   Heart: No obvious jugular venous distension present   Musculoskeltal: Normal movements. Normal appearing musculature  Skin: no suspicious lesions or rashes   Neuro: Alert, oriented, speech and mentation normal; moving all 4 extremities equally.   Psych: affect and mood normal        Urinalysis:  UA RESULTS:  Recent Labs   Lab Test 07/03/20 2010 08/11/17  1435   COLOR Yellow Yellow   APPEARANCE Slightly Cloudy Clear   URINEGLC Negative Negative   URINEBILI Negative Negative   URINEKETONE Negative Negative   SG 1.026 1.020   UBLD Small* Trace*   URINEPH 6.5 6.0   PROTEIN 10* Negative   UROBILINOGEN  --  0.2   NITRITE Negative Negative   LEUKEST Negative Small*   RBCU 115*  --    WBCU 0  --          IMPRESSION:    Doing well, stone resolved    PLAN:    She is doing well and her left-sided kidney stone resolved with ESWL.  We discussed prevention methods for future stones.  I will have her follow-up with our physician assistant in 2 years with a KUB in order to make certain she is not forming any new stones.      Devonte Lambert M.D.          Video Start Time: 1:30 PM  Video-Visit Details    Type of service:  Video Visit    Video End Time:1:35 PM    Originating Location (pt. Location): Home    Distant Location (provider location):  [facility] [place]     Platform used for Video Visit: enGreet

## 2021-11-08 NOTE — LETTER
11/8/2021       RE: Lena Morrow  [address]  [address]  [place] [address]     Dear Colleague,    Thank you for referring your patient, Lena Morrow, to the [facility] [place] at [facility]. Please see a copy of my visit note below.    *Send link to cell phone*      Lena is a 46 year old who is being evaluated via a billable video visit.      How would you like to obtain your AVS? MyChart  If the video visit is dropped, the invitation should be resent by: Text to cell phone: [phone]  Will anyone else be joining your video visit? No         Office Visit Note  [facility]  [phone]    Nov 8, 2021    [unfilled]    1975    UROLOGIC DIAGNOSES:    Left kidney stone    CURRENT INTERVENTIONS:    ESWL in August    History:    Lena is a set up for virtual visit today for kidney stone follow-up.  She did well after her extracorporeal shockwave lithotripsy in August.  She had no pain or symptoms afterwards.      Imaging: I reviewed her KUB images from last week.  No stones identified.    Labs:      MEDICATIONS:    Current Outpatient Medications:      albuterol (PROAIR HFA/PROVENTIL HFA/VENTOLIN HFA) 108 (90 Base) MCG/ACT inhaler, INHALE 2 PUFFS INTO THE LUNGS EVERY 6 HOURS AS NEEDED FOR SHORTNESS OF BREATH OR DIFFICULT BREATHING OR WHEEZING, Disp: 6.7 g, Rfl: 3     Biotin 5000 MCG TABS, Take 5,000 mcg by mouth daily, Disp: , Rfl:      buPROPion (WELLBUTRIN XL) 300 MG 24 hr tablet, Take 300 mg by mouth every morning, Disp: , Rfl:      Calcium Carbonate-Vit D-Min (CALCIUM 1200 PO), Take 1 capsule by mouth daily, Disp: , Rfl:      cholecalciferol (VITAMIN D3) 25 mcg (1000 units) capsule, Take 1 capsule by mouth daily, Disp: , Rfl:      esomeprazole (NEXIUM) 40 MG DR capsule, Take 1 capsule (40 mg) by mouth every morning (before breakfast) Take 30-60 minutes before eating., Disp: 90 capsule, Rfl: 3     ferrous  sulfate (FEROSUL) 325 (65 Fe) MG tablet, Take 325 mg by mouth daily (with breakfast), Disp: , Rfl:      GLUCOSAMINE-CHONDROITIN PO, Take 1 capsule by mouth 2 times daily, Disp: , Rfl:      lamoTRIgine (LAMICTAL) 200 MG tablet, Take 200 mg by mouth 2 times daily, Disp: , Rfl:      lithium ER (LITHOBID) 300 MG CR tablet, Take 1,200 mg by mouth At Bedtime , Disp: , Rfl: 0     loratadine-pseudoePHEDrine (CLARITIN-D 12-HOUR) 5-120 MG 12 hr tablet, Take 1 tablet by mouth 2 times daily, Disp: , Rfl:      Magnesium Oxide 250 MG TABS, Take 2 tablets by mouth daily, Disp: , Rfl:      Multiple Vitamins-Minerals (WOMENS MULTIVITAMIN PO), Take 2 capsules by mouth daily, Disp: , Rfl:      potassium 99 MG TABS, Take 99 mg by mouth daily , Disp: , Rfl:      propranolol ER (INDERAL LA) 80 MG 24 hr capsule, TAKE 1 CAPSULE(80 MG) BY MOUTH DAILY, Disp: 90 capsule, Rfl: 0     valACYclovir (VALTREX) 1000 mg tablet, Take 1,000 mg by mouth 3 times daily, Disp: , Rfl:      vitamin C (ASCORBIC ACID) 1000 MG TABS, Take 1,000 mg by mouth daily , Disp: , Rfl:      zinc gluconate 50 MG tablet, Take 50 mg by mouth daily, Disp: , Rfl:     ALLERGIES:     Allergies   Allergen Reactions     Amoxicillin      yeast vaginal inf     Aripiprazole      Increased sadness and moodiness     Ativan Fatigue     Augmentin [Amoxicillin-Pot Clavulanate]      Itching,hives     Azithromycin      GI cramps     Clonazepam      Sedation, even at 0.5 mg dose     Estrogens      # bcps cause cns sx     Lexapro      diarrhea       Mirtazapine      Sedation      Oxycodone      Epigastric pain, headache     Prochlorperazine      Compazine- agitation     Risperdal [Risperidone]      Weight gain     Seasonal Allergies      Seroquel [Quetiapine]      Increased appetite     Sertraline      sweats     Venlafaxine      sweats     Xanax [Alprazolam] Fatigue     Augmentin Rash       REVIEW OF SYSTEMS:   Skin: No rash, pruritis, or skin pigmentation  Eyes: No changes in  vision  Ears/Nose/Throat: No changes in hearing, no nosebleeds  Respiratory: No shortness of breath, dyspnea on exertion, cough, or hemoptysis  Cardiovascular: No chest pain or palpitations  Gastrointestinal: No diarrhea or constipation. No abdominal pain. No hematochezia  Genitourinary: see HPI  Musculoskeletal: No pain or swelling of joints, normal range of motion  Neurologic: No weakness or tremors  Psychiatric: No recent changes in memory or mood  Hematologic/Lymphatic/Immunologic: No easy bruising or enlarged lymph nodes  Endocrine: No weight gain or loss    SURGICAL HISTORY:    Past Surgical History:   Procedure Laterality Date     COLONOSCOPY       CYSTOSCOPY N/A 2017    Procedure: CYSTOSCOPY;;  Surgeon: Toni Da Silva MD;  Location:  OR     DAVINCI HYSTERECTOMY TOTAL, SALPINGECTOMY BILATERAL N/A 2017    Procedure: DAVINCI HYSTERECTOMY TOTAL, SALPINGECTOMY BILATERAL;  ROBOTIC ASSISTED LAPAROSCOPIC TOTAL HYSTERECTOMY; BILATERAL SALPINGECTOMY; CYSTOSCOPY;  Surgeon: Toni Da Silva MD;  Location:  OR     DILATION AND CURETTAGE N/A 2016    Procedure: DILATION AND CURETTAGE;  Surgeon: Josue Jama MD;  Location: [facility]     ENT SURGERY       EXTRACORPOREAL SHOCK WAVE LITHOTRIPSY (ESWL) Left 2021    Procedure: LEFT EXTRACORPOREAL SHOCK WAVE LITHOTRIPSY (ESWL);  Surgeon: Devonte Lambert MD;  Location:  OR     GYN SURGERY           HC COLP CERVIX/UPPER VAGINA W BX CERVIX  10/30/12    [facility] [employer]     HYSTERECTOMY       LAPAROSCOPIC CHOLECYSTECTOMY  4/3/2012    Procedure:LAPAROSCOPIC CHOLECYSTECTOMY; LAPAROSCOPIC CHOLECYSTECTOMY ; Surgeon:KARYNA CAMARA; Location:[facility]     LAPAROSCOPY DIAGNOSTIC (GYN) N/A 2016    Procedure: LAPAROSCOPY DIAGNOSTIC (GYN);  Surgeon: Josue Jama MD;  Location: [facility]     LASER HOLMIUM LITHOTRIPSY URETER(S), INSERT STENT, COMBINED Left 2020    Procedure: Cystoscopy, left ureteroscopy with holmium laser  lithotripsy and left stent placement;  Surgeon: Devonte Lambert MD;  Location:  OR     ORTHOPEDIC SURGERY      carpal tunnel with ganglian cyst removal     [facility] NONSPECIFIC PROCEDURE      tonsillectomy     [facility] NONSPECIFIC PROCEDURE  2010    Open right carpal tunnel release.  Excision right dorsal carpal ganglion cyst. Excision, terminal branch, right posterior interosseous nerve.            PHYSICAL EXAM:    General: Alert and oriented to time, place, and self. In NAD   HEENT: Head AT/NC, EOMI, CN Grossly intact   Lungs: no respiratory distress, or pursed lip breathing   Heart: No obvious jugular venous distension present   Musculoskeltal: Normal movements. Normal appearing musculature  Skin: no suspicious lesions or rashes   Neuro: Alert, oriented, speech and mentation normal; moving all 4 extremities equally.   Psych: affect and mood normal        Urinalysis:  UA RESULTS:  Recent Labs   Lab Test 07/03/20 2010 08/11/17  1435   COLOR Yellow Yellow   APPEARANCE Slightly Cloudy Clear   URINEGLC Negative Negative   URINEBILI Negative Negative   URINEKETONE Negative Negative   SG 1.026 1.020   UBLD Small* Trace*   URINEPH 6.5 6.0   PROTEIN 10* Negative   UROBILINOGEN  --  0.2   NITRITE Negative Negative   LEUKEST Negative Small*   RBCU 115*  --    WBCU 0  --          IMPRESSION:    Doing well, stone resolved    PLAN:    She is doing well and her left-sided kidney stone resolved with ESWL.  We discussed prevention methods for future stones.  I will have her follow-up with our physician assistant in 2 years with a KUB in order to make certain she is not forming any new stones.      Devonte Lambert M.D.          Video Start Time: 1:30 PM  Video-Visit Details    Type of service:  Video Visit    Video End Time:1:35 PM    Originating Location (pt. Location): Home    Distant Location (provider location):  [facility]     Platform used for Video Visit: Kaleio

## 2021-11-18 PROBLEM — E66.9 OBESITY (BMI 30-39.9): Status: ACTIVE | Noted: 2021-11-18

## 2021-11-18 PROBLEM — Z90.710 STATUS POST LAPAROSCOPIC HYSTERECTOMY: Status: RESOLVED | Noted: 2017-05-26 | Resolved: 2021-11-18

## 2021-11-18 PROBLEM — F42.9 OBSESSIVE-COMPULSIVE DISORDER: Status: RESOLVED | Noted: 2018-04-24 | Resolved: 2021-11-18

## 2021-11-18 PROBLEM — F31.9 BIPOLAR 1 DISORDER (H): Status: ACTIVE | Noted: 2021-11-18

## 2021-11-18 PROBLEM — N80.9 ENDOMETRIOSIS: Status: RESOLVED | Noted: 2017-05-26 | Resolved: 2021-11-18

## 2021-11-18 PROBLEM — N20.0 KIDNEY STONE: Status: RESOLVED | Noted: 2021-07-09 | Resolved: 2021-11-18

## 2021-11-18 PROBLEM — N20.1 URETERAL STONE: Status: RESOLVED | Noted: 2020-07-03 | Resolved: 2021-11-18

## 2021-11-18 PROBLEM — N20.0 NEPHROLITHIASIS: Status: RESOLVED | Noted: 2020-07-03 | Resolved: 2021-11-18

## 2021-11-18 PROBLEM — F41.1 GAD (GENERALIZED ANXIETY DISORDER): Status: ACTIVE | Noted: 2021-11-18

## 2021-11-18 PROBLEM — E66.01 MORBID OBESITY (H): Status: RESOLVED | Noted: 2020-02-18 | Resolved: 2021-11-18

## 2021-11-18 PROBLEM — R10.2 PELVIC PAIN IN FEMALE: Status: RESOLVED | Noted: 2017-05-26 | Resolved: 2021-11-18

## 2021-11-19 ENCOUNTER — OFFICE VISIT (OUTPATIENT)
Dept: INTERNAL MEDICINE | Facility: CLINIC | Age: 46
End: 2021-11-19
Payer: COMMERCIAL

## 2021-11-19 VITALS
BODY MASS INDEX: 34.15 KG/M2 | WEIGHT: 200 LBS | RESPIRATION RATE: 18 BRPM | DIASTOLIC BLOOD PRESSURE: 70 MMHG | SYSTOLIC BLOOD PRESSURE: 110 MMHG | HEIGHT: 64 IN | TEMPERATURE: 97.5 F | HEART RATE: 76 BPM | OXYGEN SATURATION: 98 %

## 2021-11-19 DIAGNOSIS — J45.21 MILD INTERMITTENT ASTHMA WITH ACUTE EXACERBATION: ICD-10-CM

## 2021-11-19 DIAGNOSIS — F41.1 GENERALIZED ANXIETY DISORDER: ICD-10-CM

## 2021-11-19 DIAGNOSIS — Z00.00 ROUTINE HISTORY AND PHYSICAL EXAMINATION OF ADULT: Primary | ICD-10-CM

## 2021-11-19 DIAGNOSIS — Z23 NEED FOR PROPHYLACTIC VACCINATION AND INOCULATION AGAINST INFLUENZA: ICD-10-CM

## 2021-11-19 DIAGNOSIS — Z23 HIGH PRIORITY FOR 2019-NCOV VACCINE: ICD-10-CM

## 2021-11-19 DIAGNOSIS — R05.9 COUGH: ICD-10-CM

## 2021-11-19 PROBLEM — J45.990 EXERTIONAL ASTHMA: Status: ACTIVE | Noted: 2021-11-19

## 2021-11-19 PROCEDURE — 0004A COVID-19,PF,PFIZER (12+ YRS): CPT | Performed by: INTERNAL MEDICINE

## 2021-11-19 PROCEDURE — 90686 IIV4 VACC NO PRSV 0.5 ML IM: CPT | Performed by: INTERNAL MEDICINE

## 2021-11-19 PROCEDURE — 99396 PREV VISIT EST AGE 40-64: CPT | Mod: 25 | Performed by: INTERNAL MEDICINE

## 2021-11-19 PROCEDURE — 91300 COVID-19,PF,PFIZER (12+ YRS): CPT | Performed by: INTERNAL MEDICINE

## 2021-11-19 PROCEDURE — 90471 IMMUNIZATION ADMIN: CPT | Performed by: INTERNAL MEDICINE

## 2021-11-19 RX ORDER — PROPRANOLOL HYDROCHLORIDE 80 MG/1
80 CAPSULE, EXTENDED RELEASE ORAL DAILY
Qty: 90 CAPSULE | Refills: 3 | Status: SHIPPED | OUTPATIENT
Start: 2021-11-19 | End: 2022-09-15

## 2021-11-19 RX ORDER — ALBUTEROL SULFATE 90 UG/1
1-2 AEROSOL, METERED RESPIRATORY (INHALATION) EVERY 6 HOURS PRN
Qty: 18 G | Refills: 0 | Status: SHIPPED | OUTPATIENT
Start: 2021-11-19 | End: 2021-12-10

## 2021-11-19 RX ORDER — DOXYCYCLINE HYCLATE 100 MG
100 TABLET ORAL 2 TIMES DAILY
Qty: 14 TABLET | Refills: 0 | Status: SHIPPED | OUTPATIENT
Start: 2021-11-19 | End: 2021-11-26

## 2021-11-19 RX ORDER — GUAIFENESIN/DEXTROMETHORPHAN 100-10MG/5
10 SYRUP ORAL EVERY 4 HOURS PRN
Qty: 354 ML | Refills: 1 | Status: SHIPPED | OUTPATIENT
Start: 2021-11-19 | End: 2022-02-01

## 2021-11-19 ASSESSMENT — MIFFLIN-ST. JEOR: SCORE: 1532.19

## 2021-11-19 NOTE — PROGRESS NOTES
ASSESSMENT/PLAN                                                       (Z00.00) Routine history and physical examination of adult  (primary encounter diagnosis)  Comment: PMH, PSH, FH, SH, medications, allergies, immunizations, and preventative health measures reviewed and updated as appropriate.  Plan: see below for plans.      (Z23) Need for prophylactic vaccination and inoculation against influenza  Plan: flu shot given today.     (Z23) High priority for 2019-nCoV vaccine  Plan: COVID-19 booster given today.     (J45.21) Mild intermittent asthma with acute exacerbation  Comment: well-controlled on current regimen.    Plan: continue present management; refills provided.     (F41.1) Generalized anxiety disorder  Comment: well-controlled on current regimen.    Plan: continue present management; refills provided.     (R05.9) Cough  Plan: guaiFENesin-dextromethorphan and course of doxycycline prescribed; if symptoms worsen, change, or do not improve, patient to contact MD. Katherine Zamora MD   [address] [address]  T: [phone], F: [phone]    SUBJECTIVE                                                      Lena Morrow is a very pleasant 46 year old female who presents for a physical.    ROS:  Constitutional: no unintentional weight loss or gain reported; no fevers, chills, or sweats reported  Cardiovascular: no chest pain, palpitations, or edema reported  Respiratory: ongoing productive cough  Gastrointestinal: no nausea, vomiting, constipation, diarrhea, or abdominal pain reported  Genitourinary: no urinary frequency, urgency, dysuria, or hematuria reported  Integumentary: no rash or pruritus reported  Musculoskeletal: no back pain, muscle pain, joint pain, or joint swelling reported  Neurologic: no focal weakness, numbness, or tingling reported  Hematologic: no easy bruising or bleeding reported  Endocrine: no heat or cold intolerance reported; no polyuria  or polydipsia reported  Psychiatric: see PMH below    Past Medical History:   Diagnosis Date     Bipolar 1 disorder (H)      Exertional asthma      PHIL (generalized anxiety disorder)      Obesity (BMI 30-39.9)      Past Surgical History:   Procedure Laterality Date     CARPAL TUNNEL RELEASE RT/LT Right     + excision right dorsal carpal ganglion cyst & terminal branch, right posterior interosseous nerve.      SECTION       CYSTOSCOPY N/A 2017    Procedure: CYSTOSCOPY;;  Surgeon: Toni Da Silva MD;  Location:  OR     DAVINCI HYSTERECTOMY TOTAL, SALPINGECTOMY BILATERAL N/A 2017    Procedure: DAVINCI HYSTERECTOMY TOTAL, SALPINGECTOMY BILATERAL;  ROBOTIC ASSISTED LAPAROSCOPIC TOTAL HYSTERECTOMY; BILATERAL SALPINGECTOMY; CYSTOSCOPY;  Surgeon: Toni Da Silva MD;  Location:  OR     DILATION AND CURETTAGE N/A 2016    Procedure: DILATION AND CURETTAGE;  Surgeon: Josue Jama MD;  Location:  [facility]     EXTRACORPOREAL SHOCK WAVE LITHOTRIPSY (ESWL) Left 2021    Procedure: LEFT EXTRACORPOREAL SHOCK WAVE LITHOTRIPSY (ESWL);  Surgeon: Devonte Lambert MD;  Location:  OR     HYSTERECTOMY, PAP NO LONGER INDICATED       LAPAROSCOPIC CHOLECYSTECTOMY  2012    Procedure:LAPAROSCOPIC CHOLECYSTECTOMY; LAPAROSCOPIC CHOLECYSTECTOMY ; Surgeon:KARYNA CAMARA; Location: [facility]     LAPAROSCOPY DIAGNOSTIC (GYN) N/A 2016    Procedure: LAPAROSCOPY DIAGNOSTIC (GYN);  Surgeon: Josue Jama MD;  Location:  [facility]     LASER HOLMIUM LITHOTRIPSY URETER(S), INSERT STENT, COMBINED Left 2020    Procedure: Cystoscopy, left ureteroscopy with holmium laser lithotripsy and left stent placement;  Surgeon: Devonte Lambert MD;  Location:  OR     TONSILLECTOMY & ADENOIDECTOMY       Family History   Problem Relation Age of Onset     Alcoholism Mother      Diabetes Type 2  Mother      Hypertension Mother      Depression Mother      Lung Cancer Father         smoker      Brain Tumor Brother         GBM     Diabetes Type 2  Paternal Grandmother      Myocardial Infarction Paternal Grandmother      Heart Failure Paternal Grandmother      Cancer Paternal Grandfather         unknown     Brain Cancer Paternal Aunt      Cerebrovascular Disease No family hx of      Breast Cancer No family hx of      Colon Cancer No family hx of      Ovarian Cancer No family hx of      Social History     Occupational History     Occupation: [employer] Service - [employer]   Tobacco Use     Smoking status: Never Smoker     Smokeless tobacco: Never Used   Substance and Sexual Activity     Alcohol use: Yes     Comment: 3-4 beers monthly     Drug use: Never     Sexual activity: Yes     Partners: Male   Social History Narrative    .    Son (14 as of 2021).    Walks when weather permits.      Allergies   Allergen Reactions     Aripiprazole Other (See Comments)     increased sadness and moodiness     Augmentin Rash     Azithromycin GI Disturbance     Lexapro Diarrhea     Oxycodone Headache and GI Disturbance     Prochlorperazine Other (See Comments)     agitation     Sertraline Other (See Comments)     sweats     Venlafaxine Other (See Comments)     sweats     Current Outpatient Medications   Medication Sig     albuterol (PROAIR HFA/PROVENTIL HFA/VENTOLIN HFA) 108 (90 Base) MCG/ACT inhaler Inhale 1-2 puffs into the lungs every 6 hours as needed for shortness of breath / dyspnea or wheezing     Biotin 5000 MCG TABS Take 5,000 mcg by mouth daily     buPROPion (WELLBUTRIN XL) 300 MG 24 hr tablet Take 300 mg by mouth every morning     Calcium Carbonate-Vit D-Min (CALCIUM 1200 PO) Take 1 capsule by mouth daily     cholecalciferol (VITAMIN D3) 25 mcg (1000 units) capsule Take 1 capsule by mouth daily     doxycycline hyclate (VIBRA-TABS) 100 MG tablet Take 1 tablet (100 mg) by mouth 2 times daily for 7 days     esomeprazole (NEXIUM) 40 MG DR capsule Take 1 capsule (40 mg) by mouth every morning (before breakfast) Take  30-60 minutes before eating.     GLUCOSAMINE-CHONDROITIN PO Take 1 capsule by mouth 2 times daily     guaiFENesin-dextromethorphan (ROBITUSSIN DM) 100-10 MG/5ML syrup Take 10 mLs by mouth every 4 hours as needed for cough     lamoTRIgine (LAMICTAL) 200 MG tablet Take 200 mg by mouth 2 times daily     lithium ER (LITHOBID) 300 MG CR tablet Take 1,200 mg by mouth At Bedtime      loratadine-pseudoePHEDrine (CLARITIN-D 12-HOUR) 5-120 MG 12 hr tablet Take 1 tablet by mouth 2 times daily     Magnesium Oxide 250 MG TABS Take 2 tablets by mouth daily     Multiple Vitamins-Minerals (WOMENS MULTIVITAMIN PO) Take 2 capsules by mouth daily     potassium 99 MG TABS Take 99 mg by mouth daily      propranolol ER (INDERAL LA) 80 MG 24 hr capsule Take 1 capsule (80 mg) by mouth daily     valACYclovir (VALTREX) 1000 mg tablet Take 1,000 mg by mouth 3 times daily     vitamin C (ASCORBIC ACID) 1000 MG TABS Take 1,000 mg by mouth daily      zinc gluconate 50 MG tablet Take 50 mg by mouth daily     ferrous sulfate (FEROSUL) 325 (65 Fe) MG tablet Take 325 mg by mouth daily (with breakfast)     Immunization History   Administered Date(s) Administered     COVID-19,PF,Pfizer (12+ Yrs) 04/25/2021, 05/16/2021     DT (PEDS <7y) 06/12/1995     Influenza (IIV3) PF 01/15/2013, 10/01/2013, 10/15/2014     Influenza Vaccine IM > 6 months Valent IIV4 (Alfuria,Fluzone) 12/31/2016, 11/20/2017, 12/08/2018, 10/21/2019, 09/18/2020     TD (ADULT, 7+) 06/12/1995, 09/13/2005     TDAP Vaccine (Adacel) 04/29/2013     PREVENTATIVE HEALTH                                                      BMI: obese  Blood pressure: within normal limits   Breast CA screening: up to date   Cervical CA screening: n/a   Colon CA screening: not medically indicated at this time   Lung CA screening: n/a   Dexa: not medically indicated at this time   Screening cholesterol: up to date   Screening diabetes: up to date   STD testing: no risk factors present  Alcohol misuse screening:  "alcohol use reviewed - no intervention indicated at this time  Immunizations: reviewed; flu shot and COVID-19 booster DUE    OBJECTIVE                                                      /70 (BP Location: Left arm, Patient Position: Chair, Cuff Size: Adult Large)   Pulse 76   Temp 97.5  F (36.4  C) (Temporal)   Resp 18   Ht 1.626 m (5' 4\")   Wt 90.7 kg (200 lb)   LMP 12/10/2015   SpO2 98%   BMI 34.33 kg/m    Constitutional: well-appearing  Head, Ears, and Eyes: normocephalic; normal external auditory canal and pinna; tympanic membranes visualized and normal; normal lids and conjunctivae  Neck: supple, symmetric, no thyromegaly or lymphadenopathy  Respiratory: normal respiratory effort; clear to auscultation bilaterally  Cardiovascular: regular rate and rhythm; no edema  Gastrointestinal: soft, non-tender, and non-distended; no organomegaly or masses  Musculoskeletal: normal gait and station  Psych: normal judgment and insight; normal mood and affect; recent and remote memory intact    ---  (Note was completed, in part, with [employer] voice-recognition software. Documentation was reviewed, but some grammatical, spelling, and word errors may remain.)    "

## 2021-11-19 NOTE — PATIENT INSTRUCTIONS
Flu shot and COVID-19 booster today.    Refills sent in.    Metamucil to bulk up stools.    Start with a teaspoon once daily and increase, over several weeks/month to a tablespoon daily.

## 2021-11-20 ASSESSMENT — ASTHMA QUESTIONNAIRES: ACT_TOTALSCORE: 23

## 2021-12-03 ENCOUNTER — TRANSFERRED RECORDS (OUTPATIENT)
Dept: HEALTH INFORMATION MANAGEMENT | Facility: CLINIC | Age: 46
End: 2021-12-03
Payer: COMMERCIAL

## 2021-12-03 LAB
PHQ9 SCORE: 14
PHQ9 SCORE: 14

## 2021-12-09 DIAGNOSIS — J45.21 MILD INTERMITTENT ASTHMA WITH ACUTE EXACERBATION: ICD-10-CM

## 2021-12-10 RX ORDER — ALBUTEROL SULFATE 90 UG/1
AEROSOL, METERED RESPIRATORY (INHALATION)
Qty: 18 G | Refills: 0 | Status: SHIPPED | OUTPATIENT
Start: 2021-12-10 | End: 2022-02-01

## 2021-12-10 NOTE — TELEPHONE ENCOUNTER
Prescription approved per [facility] Refill Protocol.  Brooks Davis RN  [facility] Triage Nurse

## 2021-12-25 ENCOUNTER — OFFICE VISIT (OUTPATIENT)
Dept: URGENT CARE | Facility: URGENT CARE | Age: 46
End: 2021-12-25
Payer: COMMERCIAL

## 2021-12-25 VITALS
SYSTOLIC BLOOD PRESSURE: 116 MMHG | TEMPERATURE: 97.4 F | WEIGHT: 200 LBS | DIASTOLIC BLOOD PRESSURE: 68 MMHG | RESPIRATION RATE: 14 BRPM | BODY MASS INDEX: 34.33 KG/M2 | OXYGEN SATURATION: 100 % | HEART RATE: 73 BPM

## 2021-12-25 DIAGNOSIS — J20.9 ACUTE BRONCHITIS, UNSPECIFIED ORGANISM: ICD-10-CM

## 2021-12-25 DIAGNOSIS — R07.0 THROAT PAIN: Primary | ICD-10-CM

## 2021-12-25 LAB — SARS-COV-2 RNA RESP QL NAA+PROBE: NEGATIVE

## 2021-12-25 PROCEDURE — 99213 OFFICE O/P EST LOW 20 MIN: CPT | Performed by: FAMILY MEDICINE

## 2021-12-25 PROCEDURE — U0005 INFEC AGEN DETEC AMPLI PROBE: HCPCS | Performed by: FAMILY MEDICINE

## 2021-12-25 PROCEDURE — U0003 INFECTIOUS AGENT DETECTION BY NUCLEIC ACID (DNA OR RNA); SEVERE ACUTE RESPIRATORY SYNDROME CORONAVIRUS 2 (SARS-COV-2) (CORONAVIRUS DISEASE [COVID-19]), AMPLIFIED PROBE TECHNIQUE, MAKING USE OF HIGH THROUGHPUT TECHNOLOGIES AS DESCRIBED BY CMS-2020-01-R: HCPCS | Performed by: FAMILY MEDICINE

## 2021-12-25 RX ORDER — DOXYCYCLINE 100 MG/1
100 CAPSULE ORAL 2 TIMES DAILY
Qty: 20 CAPSULE | Refills: 0 | Status: SHIPPED | OUTPATIENT
Start: 2021-12-25 | End: 2022-01-04

## 2021-12-25 RX ORDER — FLUCONAZOLE 150 MG/1
150 TABLET ORAL WEEKLY
Qty: 2 TABLET | Refills: 0 | Status: SHIPPED | OUTPATIENT
Start: 2021-12-25 | End: 2022-11-03

## 2021-12-25 NOTE — PROGRESS NOTES
ASSESSMENT/ PLAN:    Throat pain     - Symptomatic; Unknown COVID-19 Virus (Coronavirus) by PCR Nose    Acute bronchitis, unspecified organism     - doxycycline hyclate (VIBRAMYCIN) 100 MG capsule; Take 1 capsule (100 mg) by mouth 2 times daily for 10 days  - fluconazole (DIFLUCAN) 150 MG tablet; Take 1 tablet (150 mg) by mouth once a week for 2 doses One tablet per week      We discussed that based on the patient's description of symptoms, history and physical exam, that a bacterial infection has likely developed in the chest requiring treatment with antibiotics.     We discussed that the chest infection often starts as a viral illness, however, over time, the secretions in the chest can start to grow bacteria, with increased chest discomfort , productive sputum.  Antibiotics are only helpful when bacteria has started to grow in the respiratory secretions.   Any residual symptoms from a viral illness will not respond to the antibiotic.     Albuterol inhaler - declined     Symptomatic measures encouraged, humidified air, plenty of fluids.  Patient may consider OTC expectorant and/or cough suppressant to treat symptoms.   acetaminophen, ibuprofen for pain and fever    Return if worsening  -------------------------------------------------------------------------------------------------------------------------------    SUBJECTIVE:  Chief Complaint   Patient presents with     Urgent Care     URI     sore amandaoat, HA, cough productive, -- negative covid tuesday/ sx began sunday- no fever     Lena Morrow is a 46 year old female who presents to the clinic today with a chief complaint of cough  and central chest pain. for 1 week(s).  Patient denies shortness of breath., pleuritic chest pain and wheezing.  Her cough is described as persistent, daytime, nightime and productive of yellow sputum.    The patient's symptoms are moderate and worsening.  Associated symptoms include malaise and headache. The patient's symptoms  are exacerbated by no particular triggers  Patient has been using OTC cough suppressants  to improve symptoms.    Negative covid test 4 days ago    Past Medical History:   Diagnosis Date     Bipolar 1 disorder (H)      Exertional asthma      PHIL (generalized anxiety disorder)      Obesity (BMI 30-39.9)      Patient Active Problem List   Diagnosis     Bipolar 1 disorder (H)     PHIL (generalized anxiety disorder)     Obesity (BMI 30-39.9)     Exertional asthma       ALLERGIES:  Aripiprazole, Augmentin, Azithromycin, Lexapro, Oxycodone, Prochlorperazine, Sertraline, and Venlafaxine    MEDs  Biotin 5000 MCG TABS, Take 5,000 mcg by mouth daily  buPROPion (WELLBUTRIN XL) 300 MG 24 hr tablet, Take 300 mg by mouth every morning  Calcium Carbonate-Vit D-Min (CALCIUM 1200 PO), Take 1 capsule by mouth daily  cholecalciferol (VITAMIN D3) 25 mcg (1000 units) capsule, Take 1 capsule by mouth daily  esomeprazole (NEXIUM) 40 MG DR capsule, Take 1 capsule (40 mg) by mouth every morning (before breakfast) Take 30-60 minutes before eating.  ferrous sulfate (FEROSUL) 325 (65 Fe) MG tablet, Take 325 mg by mouth daily (with breakfast)  GLUCOSAMINE-CHONDROITIN PO, Take 1 capsule by mouth 2 times daily  guaiFENesin-dextromethorphan (ROBITUSSIN DM) 100-10 MG/5ML syrup, Take 10 mLs by mouth every 4 hours as needed for cough  lamoTRIgine (LAMICTAL) 200 MG tablet, Take 200 mg by mouth 2 times daily  lithium ER (LITHOBID) 300 MG CR tablet, Take 1,200 mg by mouth At Bedtime   loratadine-pseudoePHEDrine (CLARITIN-D 12-HOUR) 5-120 MG 12 hr tablet, Take 1 tablet by mouth 2 times daily  Magnesium Oxide 250 MG TABS, Take 2 tablets by mouth daily  Multiple Vitamins-Minerals (WOMENS MULTIVITAMIN PO), Take 2 capsules by mouth daily  potassium 99 MG TABS, Take 99 mg by mouth daily   propranolol ER (INDERAL LA) 80 MG 24 hr capsule, Take 1 capsule (80 mg) by mouth daily  valACYclovir (VALTREX) 1000 mg tablet, Take 1,000 mg by mouth 3 times daily  VENTOLIN   (90 Base) MCG/ACT inhaler, INHALE 1 TO 2 PUFFS INTO THE LUNGS EVERY 6 HOURS AS NEEDED FOR SHORTNESS OF BREATH OR DIFFICULT BREATHING OR WHEEZING  vitamin C (ASCORBIC ACID) 1000 MG TABS, Take 1,000 mg by mouth daily   zinc gluconate 50 MG tablet, Take 50 mg by mouth daily    No current facility-administered medications on file prior to visit.      Social History     Tobacco Use     Smoking status: Never Smoker     Smokeless tobacco: Never Used   Substance Use Topics     Alcohol use: Yes     Comment: 3-4 beers monthly       Family History   Problem Relation Age of Onset     Alcoholism Mother      Diabetes Type 2  Mother      Hypertension Mother      Depression Mother      Lung Cancer Father         smoker     Brain Tumor Brother         GBM     Diabetes Type 2  Paternal Grandmother      Myocardial Infarction Paternal Grandmother      Heart Failure Paternal Grandmother      Cancer Paternal Grandfather         unknown     Brain Cancer Paternal Aunt      Cerebrovascular Disease No family hx of      Breast Cancer No family hx of      Colon Cancer No family hx of      Ovarian Cancer No family hx of          ROS  CONSTITUTIONAL:NEGATIVE for fever, chills,   INTEGUMENTARY/SKIN: NEGATIVE for worrisome rashes,  or lesions  EYES: NEGATIVE for vision changes or irritation  ENT/MOUTH: NEGATIVE for ear, mouth  problems  GI: NEGATIVE for nausea, abdominal pain,  or change in bowel habits    OBJECTIVE:  /68 (BP Location: Right arm)   Pulse 73   Temp 97.4  F (36.3  C) (Tympanic)   Resp 14   Wt 90.7 kg (200 lb)   LMP 12/10/2015   SpO2 100%   BMI 34.33 kg/m    GENERAL APPEARANCE: alert and moderate distress  EYES: EOMI,  PERRL, conjunctiva clear  HENT: ear canals and TM's normal.  Nose and mouth without ulcers, erythema or lesions  NECK: supple, nontender, no lymphadenopathy  RESP: lungs clear to auscultation - no rales, rhonchi or wheezes  CV: regular rates and rhythm, normal S1 S2, no murmur noted  ABDOMEN:   soft, nontender, no HSM or masses and bowel sounds normal  NEURO: Normal strength and tone, sensory exam grossly normal,  normal speech and mentation  SKIN: no suspicious lesions or rashes

## 2021-12-25 NOTE — PATIENT INSTRUCTIONS

## 2021-12-27 ENCOUNTER — MYC MEDICAL ADVICE (OUTPATIENT)
Dept: INTERNAL MEDICINE | Facility: CLINIC | Age: 46
End: 2021-12-27
Payer: COMMERCIAL

## 2022-01-10 ENCOUNTER — E-VISIT (OUTPATIENT)
Dept: INTERNAL MEDICINE | Facility: CLINIC | Age: 47
End: 2022-01-10
Payer: COMMERCIAL

## 2022-01-10 DIAGNOSIS — Z53.9 ERRONEOUS ENCOUNTER--DISREGARD: Primary | ICD-10-CM

## 2022-01-11 ENCOUNTER — OFFICE VISIT (OUTPATIENT)
Dept: URGENT CARE | Facility: URGENT CARE | Age: 47
End: 2022-01-11
Payer: COMMERCIAL

## 2022-01-11 VITALS
TEMPERATURE: 99.3 F | DIASTOLIC BLOOD PRESSURE: 72 MMHG | OXYGEN SATURATION: 97 % | SYSTOLIC BLOOD PRESSURE: 108 MMHG | HEART RATE: 93 BPM | RESPIRATION RATE: 19 BRPM

## 2022-01-11 DIAGNOSIS — J45.901 EXACERBATION OF ASTHMA, UNSPECIFIED ASTHMA SEVERITY, UNSPECIFIED WHETHER PERSISTENT: Primary | ICD-10-CM

## 2022-01-11 LAB
FLUAV AG SPEC QL IA: NEGATIVE
FLUBV AG SPEC QL IA: NEGATIVE

## 2022-01-11 PROCEDURE — 87804 INFLUENZA ASSAY W/OPTIC: CPT | Performed by: FAMILY MEDICINE

## 2022-01-11 PROCEDURE — 99214 OFFICE O/P EST MOD 30 MIN: CPT | Performed by: FAMILY MEDICINE

## 2022-01-11 RX ORDER — PREDNISONE 20 MG/1
20 TABLET ORAL 2 TIMES DAILY
Qty: 10 TABLET | Refills: 0 | Status: SHIPPED | OUTPATIENT
Start: 2022-01-11 | End: 2022-01-16

## 2022-01-11 NOTE — PROGRESS NOTES
SUBJECTIVE: Lena Morrow is a 46 year old female presenting with a chief complaint of cough .  Onset of symptoms was 3 week(s) ago.  Course of illness is same.    Severity moderate  Treatment measures tried include Doxycycline and alb inh.  Predisposing factors include HX of asthma.    Past Medical History:   Diagnosis Date     Bipolar 1 disorder (H)      Exertional asthma      PHIL (generalized anxiety disorder)      Obesity (BMI 30-39.9)      Allergies   Allergen Reactions     Aripiprazole Other (See Comments)     increased sadness and moodiness     Augmentin Rash     Azithromycin GI Disturbance     Lexapro Diarrhea            Oxycodone Headache and GI Disturbance     Prochlorperazine Other (See Comments)     agitation     Sertraline Other (See Comments)     sweats     Venlafaxine Other (See Comments)     sweats     Social History     Tobacco Use     Smoking status: Never Smoker     Smokeless tobacco: Never Used   Substance Use Topics     Alcohol use: Yes     Comment: 3-4 beers monthly       ROS:  SKIN: no rash  GI: no vomiting    OBJECTIVE:  /72   Pulse 93   Temp 99.3  F (37.4  C) (Tympanic)   Resp 19   LMP 12/10/2015   SpO2 97% GENERAL APPEARANCE: healthy, alert and no distress  EYES: EOMI,  PERRL, conjunctiva clear  HENT: ear canals and TM's normal.  Nose and mouth without ulcers, erythema or lesions  RESP: lungs clear to auscultation - no rales, rhonchi or wheezes  SKIN: no suspicious lesions or rashes      ICD-10-CM    1. Exacerbation of asthma, unspecified asthma severity, unspecified whether persistent  J45.901 Influenza A & B Antigen     predniSONE (DELTASONE) 20 MG tablet     Cont inhaler  Fluids/Rest, f/u if worse/not any better

## 2022-01-11 NOTE — PATIENT INSTRUCTIONS
Thank you for choosing us for your care. I think an in-clinic visit would be best next steps based on your symptoms. Please schedule a clinic appointment; you won t be charged for this eVisit.

## 2022-01-17 ENCOUNTER — ANCILLARY PROCEDURE (OUTPATIENT)
Dept: GENERAL RADIOLOGY | Facility: CLINIC | Age: 47
End: 2022-01-17
Attending: PHYSICIAN ASSISTANT
Payer: COMMERCIAL

## 2022-01-17 ENCOUNTER — OFFICE VISIT (OUTPATIENT)
Dept: URGENT CARE | Facility: URGENT CARE | Age: 47
End: 2022-01-17
Payer: COMMERCIAL

## 2022-01-17 VITALS
RESPIRATION RATE: 22 BRPM | TEMPERATURE: 98.1 F | SYSTOLIC BLOOD PRESSURE: 123 MMHG | OXYGEN SATURATION: 99 % | WEIGHT: 200 LBS | DIASTOLIC BLOOD PRESSURE: 86 MMHG | BODY MASS INDEX: 34.33 KG/M2 | HEART RATE: 75 BPM

## 2022-01-17 DIAGNOSIS — R06.02 SOB (SHORTNESS OF BREATH): ICD-10-CM

## 2022-01-17 DIAGNOSIS — J45.901 MODERATE ASTHMA WITH EXACERBATION, UNSPECIFIED WHETHER PERSISTENT: Primary | ICD-10-CM

## 2022-01-17 DIAGNOSIS — J45.901 MODERATE ASTHMA WITH EXACERBATION, UNSPECIFIED WHETHER PERSISTENT: ICD-10-CM

## 2022-01-17 DIAGNOSIS — R09.89 CHEST CONGESTION: ICD-10-CM

## 2022-01-17 PROCEDURE — 71046 X-RAY EXAM CHEST 2 VIEWS: CPT | Performed by: RADIOLOGY

## 2022-01-17 PROCEDURE — 99214 OFFICE O/P EST MOD 30 MIN: CPT | Performed by: PHYSICIAN ASSISTANT

## 2022-01-17 RX ORDER — ALBUTEROL SULFATE 90 UG/1
2 AEROSOL, METERED RESPIRATORY (INHALATION) EVERY 6 HOURS
Qty: 8.5 G | Refills: 0 | Status: SHIPPED | OUTPATIENT
Start: 2022-01-17 | End: 2024-06-10

## 2022-01-17 RX ORDER — ALBUTEROL SULFATE 0.83 MG/ML
2.5 SOLUTION RESPIRATORY (INHALATION) EVERY 4 HOURS PRN
Qty: 300 ML | Refills: 0 | Status: SHIPPED | OUTPATIENT
Start: 2022-01-17

## 2022-01-17 RX ORDER — PREDNISONE 20 MG/1
TABLET ORAL
Qty: 20 TABLET | Refills: 0 | Status: SHIPPED | OUTPATIENT
Start: 2022-01-17 | End: 2022-02-01

## 2022-01-17 RX ORDER — BENZONATATE 200 MG/1
200 CAPSULE ORAL 3 TIMES DAILY PRN
Qty: 21 CAPSULE | Refills: 0 | Status: SHIPPED | OUTPATIENT
Start: 2022-01-17 | End: 2022-01-24

## 2022-01-17 RX ORDER — DOXYCYCLINE 100 MG/1
100 CAPSULE ORAL 2 TIMES DAILY
Qty: 20 CAPSULE | Refills: 0 | Status: SHIPPED | OUTPATIENT
Start: 2022-01-17 | End: 2022-02-01

## 2022-01-17 NOTE — PROGRESS NOTES
Assessment & Plan     Moderate asthma with exacerbation, unspecified whether persistent  Chest xray Negative for acute findings, read by Armand EDMONDS at time of visit.  Prednisone and albuterol  DME albuterol neb at home  Follow up with PCP  - XR Chest 2 Views; Future  - predniSONE (DELTASONE) 20 MG tablet; 1 tab po tid for 3 days, then 1 tab po bid for 3 days, then 1 tab po every day for 3 days, then 1/2 tab po every day for 4 days  - albuterol (PROVENTIL HFA) 108 (90 Base) MCG/ACT inhaler; Inhale 2 puffs into the lungs every 6 hours  - Nebulizer and Supplies Order for DME - ONLY FOR DME  - albuterol (PROVENTIL) (2.5 MG/3ML) 0.083% neb solution; Take 1 vial (2.5 mg) by nebulization every 4 hours as needed for shortness of breath / dyspnea or wheezing    SOB (shortness of breath)  Chest xray Negative for acute findings, read by Armand EDMONDS at time of visit.  Prednisone and albuterol  Doxy for bronchial infection  - XR Chest 2 Views; Future  - predniSONE (DELTASONE) 20 MG tablet; 1 tab po tid for 3 days, then 1 tab po bid for 3 days, then 1 tab po every day for 3 days, then 1/2 tab po every day for 4 days  - albuterol (PROVENTIL HFA) 108 (90 Base) MCG/ACT inhaler; Inhale 2 puffs into the lungs every 6 hours  - Nebulizer and Supplies Order for DME - ONLY FOR DME  - albuterol (PROVENTIL) (2.5 MG/3ML) 0.083% neb solution; Take 1 vial (2.5 mg) by nebulization every 4 hours as needed for shortness of breath / dyspnea or wheezing    Chest congestion  doxy  - XR Chest 2 Views; Future  - doxycycline monohydrate (MONODOX) 100 MG capsule; Take 1 capsule (100 mg) by mouth 2 times daily  - benzonatate (TESSALON) 200 MG capsule; Take 1 capsule (200 mg) by mouth 3 times daily as needed    Review of external notes as documented elsewhere in note  30 minutes spent on the date of the encounter doing chart review, review of outside records, review of test results, interpretation of tests, patient visit and  "documentation        BMI:   Estimated body mass index is 34.33 kg/m  as calculated from the following:    Height as of 11/19/21: 1.626 m (5' 4\").    Weight as of this encounter: 90.7 kg (200 lb).           No follow-ups on file.    Armand Almanzar PA-C  [facility] JOSE Paredes is a 46 year old who presents for the following health issues     HPI     Asthma flare up  Chest congestion  SOB    Review of Systems   Constitutional, HEENT, cardiovascular, pulmonary, gi and gu systems are negative, except as otherwise noted.      Objective    /86   Pulse 75   Temp 98.1  F (36.7  C) (Tympanic)   Resp 22   Wt 90.7 kg (200 lb)   LMP 12/10/2015   SpO2 99%   BMI 34.33 kg/m    Body mass index is 34.33 kg/m .  Physical Exam   GENERAL: healthy, alert and no distress  EYES: Eyes grossly normal to inspection, PERRL and conjunctivae and sclerae normal  HENT: ear canals and TM's normal, nose and mouth without ulcers or lesions  NECK: no adenopathy, no asymmetry, masses, or scars and thyroid normal to palpation  RESP: expiratory wheezes generalized  CV: regular rate and rhythm, normal S1 S2, no S3 or S4, no murmur, click or rub, no peripheral edema and peripheral pulses strong  ABDOMEN: soft, nontender, no hepatosplenomegaly, no masses and bowel sounds normal  MS: no gross musculoskeletal defects noted, no edema  SKIN: no suspicious lesions or rashes  NEURO: Normal strength and tone, mentation intact and speech normal      Results for orders placed or performed in visit on 01/17/22   XR Chest 2 Views     Status: None    Narrative    CHEST TWO VIEWS 1/17/2022 12:05 PM     HISTORY: Moderate asthma with exacerbation, unspecified whether  persistent. SOB (shortness of breath). Chest congestion.    COMPARISON: 11/16/2015.    FINDINGS: Heart size and pulmonary vascularity are within normal  limits. The lungs are clear. No pneumothorax or pleural effusion.       Impression    IMPRESSION: No " radiographic evidence of acute chest abnormality.     KATHY WALLIS MD         SYSTEM ID:  [id number]

## 2022-02-01 ENCOUNTER — OFFICE VISIT (OUTPATIENT)
Dept: URGENT CARE | Facility: URGENT CARE | Age: 47
End: 2022-02-01
Payer: COMMERCIAL

## 2022-02-01 ENCOUNTER — ANCILLARY PROCEDURE (OUTPATIENT)
Dept: GENERAL RADIOLOGY | Facility: CLINIC | Age: 47
End: 2022-02-01
Attending: PHYSICIAN ASSISTANT
Payer: COMMERCIAL

## 2022-02-01 VITALS
RESPIRATION RATE: 17 BRPM | OXYGEN SATURATION: 99 % | HEART RATE: 72 BPM | SYSTOLIC BLOOD PRESSURE: 138 MMHG | TEMPERATURE: 98.1 F | DIASTOLIC BLOOD PRESSURE: 74 MMHG

## 2022-02-01 DIAGNOSIS — R07.9 ACUTE CHEST PAIN: Primary | ICD-10-CM

## 2022-02-01 DIAGNOSIS — D72.829 LEUKOCYTOSIS, UNSPECIFIED TYPE: ICD-10-CM

## 2022-02-01 DIAGNOSIS — R07.9 ACUTE CHEST PAIN: ICD-10-CM

## 2022-02-01 DIAGNOSIS — R07.1 CHEST PAIN VARYING WITH BREATHING: ICD-10-CM

## 2022-02-01 DIAGNOSIS — Z79.899 LITHIUM USE: ICD-10-CM

## 2022-02-01 LAB
ALBUMIN SERPL-MCNC: 3.6 G/DL (ref 3.4–5)
ALP SERPL-CCNC: 99 U/L (ref 40–150)
ALT SERPL W P-5'-P-CCNC: 142 U/L (ref 0–50)
ANION GAP SERPL CALCULATED.3IONS-SCNC: 2 MMOL/L (ref 3–14)
AST SERPL W P-5'-P-CCNC: 38 U/L (ref 0–45)
BASOPHILS # BLD AUTO: 0.1 10E3/UL (ref 0–0.2)
BASOPHILS NFR BLD AUTO: 0 %
BILIRUB SERPL-MCNC: 0.6 MG/DL (ref 0.2–1.3)
BUN SERPL-MCNC: 19 MG/DL (ref 7–30)
CALCIUM SERPL-MCNC: 9.8 MG/DL (ref 8.5–10.1)
CHLORIDE BLD-SCNC: 107 MMOL/L (ref 94–109)
CO2 SERPL-SCNC: 28 MMOL/L (ref 20–32)
CREAT SERPL-MCNC: 1.01 MG/DL (ref 0.52–1.04)
D DIMER PPP FEU-MCNC: 0.35 UG/ML FEU (ref 0–0.5)
EOSINOPHIL # BLD AUTO: 0.4 10E3/UL (ref 0–0.7)
EOSINOPHIL NFR BLD AUTO: 3 %
ERYTHROCYTE [DISTWIDTH] IN BLOOD BY AUTOMATED COUNT: 14.1 % (ref 10–15)
ERYTHROCYTE [SEDIMENTATION RATE] IN BLOOD BY WESTERGREN METHOD: 8 MM/HR (ref 0–20)
GFR SERPL CREATININE-BSD FRML MDRD: 69 ML/MIN/1.73M2
GLUCOSE BLD-MCNC: 99 MG/DL (ref 70–99)
HCT VFR BLD AUTO: 46.8 % (ref 35–47)
HGB BLD-MCNC: 14.1 G/DL (ref 11.7–15.7)
LYMPHOCYTES # BLD AUTO: 2.9 10E3/UL (ref 0.8–5.3)
LYMPHOCYTES NFR BLD AUTO: 18 %
MCH RBC QN AUTO: 29.4 PG (ref 26.5–33)
MCHC RBC AUTO-ENTMCNC: 30.1 G/DL (ref 31.5–36.5)
MCV RBC AUTO: 98 FL (ref 78–100)
MONOCYTES # BLD AUTO: 1.2 10E3/UL (ref 0–1.3)
MONOCYTES NFR BLD AUTO: 8 %
NEUTROPHILS # BLD AUTO: 11.6 10E3/UL (ref 1.6–8.3)
NEUTROPHILS NFR BLD AUTO: 72 %
PLATELET # BLD AUTO: 260 10E3/UL (ref 150–450)
POTASSIUM BLD-SCNC: 3.9 MMOL/L (ref 3.4–5.3)
PROT SERPL-MCNC: 7.1 G/DL (ref 6.8–8.8)
RBC # BLD AUTO: 4.8 10E6/UL (ref 3.8–5.2)
SODIUM SERPL-SCNC: 137 MMOL/L (ref 133–144)
TROPONIN I SERPL HS-MCNC: 4 NG/L
WBC # BLD AUTO: 16.2 10E3/UL (ref 4–11)

## 2022-02-01 PROCEDURE — 85652 RBC SED RATE AUTOMATED: CPT | Performed by: PHYSICIAN ASSISTANT

## 2022-02-01 PROCEDURE — 80053 COMPREHEN METABOLIC PANEL: CPT | Performed by: PHYSICIAN ASSISTANT

## 2022-02-01 PROCEDURE — 84484 ASSAY OF TROPONIN QUANT: CPT | Performed by: PHYSICIAN ASSISTANT

## 2022-02-01 PROCEDURE — 99215 OFFICE O/P EST HI 40 MIN: CPT | Mod: 25 | Performed by: PHYSICIAN ASSISTANT

## 2022-02-01 PROCEDURE — 93000 ELECTROCARDIOGRAM COMPLETE: CPT | Performed by: PHYSICIAN ASSISTANT

## 2022-02-01 PROCEDURE — 85379 FIBRIN DEGRADATION QUANT: CPT | Performed by: PHYSICIAN ASSISTANT

## 2022-02-01 PROCEDURE — 96372 THER/PROPH/DIAG INJ SC/IM: CPT | Performed by: PHYSICIAN ASSISTANT

## 2022-02-01 PROCEDURE — 85025 COMPLETE CBC W/AUTO DIFF WBC: CPT | Performed by: PHYSICIAN ASSISTANT

## 2022-02-01 PROCEDURE — 36415 COLL VENOUS BLD VENIPUNCTURE: CPT | Performed by: PHYSICIAN ASSISTANT

## 2022-02-01 PROCEDURE — 71046 X-RAY EXAM CHEST 2 VIEWS: CPT | Performed by: RADIOLOGY

## 2022-02-01 RX ORDER — KETOROLAC TROMETHAMINE 30 MG/ML
30 INJECTION, SOLUTION INTRAMUSCULAR; INTRAVENOUS ONCE
Status: COMPLETED | OUTPATIENT
Start: 2022-02-01 | End: 2022-02-01

## 2022-02-01 RX ORDER — KETOROLAC TROMETHAMINE 10 MG/1
10 TABLET, FILM COATED ORAL EVERY 6 HOURS PRN
Qty: 20 TABLET | Refills: 0 | Status: SHIPPED | OUTPATIENT
Start: 2022-02-01 | End: 2022-06-11

## 2022-02-01 RX ADMIN — KETOROLAC TROMETHAMINE 30 MG: 30 INJECTION, SOLUTION INTRAMUSCULAR; INTRAVENOUS at 11:39

## 2022-02-01 NOTE — PROGRESS NOTES
"  Assessment & Plan     Acute chest pain  EKG no st changes  Troponin neg for cardiac event  D-dimer neg for PE  Chest wall pain appears to be chest wall tenderness  Toradol for chest wall tenderness, pain  - EKG 12-lead complete w/read - Clinics  - Troponin I; Future  - XR Chest 2 Views; Future  - ketorolac (TORADOL) injection 30 mg  - INJECTION INTRAMUSCULAR OR SUB-Q  - Troponin I  - ketorolac (TORADOL) 10 MG tablet; Take 1 tablet (10 mg) by mouth every 6 hours as needed for moderate pain    Chest pain varying with breathing  D-dimer neg for PE  CMP neg for renal or hepatic problems  Chest xray Negative for acute findings, read by Armand Almanzar PA-C [facility] at time of visit.  ESR neg for inflammation around pericardial area  - D dimer, quantitative; Future  - ESR: Erythrocyte sedimentation rate; Future  - CBC with platelets and differential; Future  - Troponin I; Future  - Comprehensive metabolic panel (BMP + Alb, Alk Phos, ALT, AST, Total. Bili, TP); Future  - XR Chest 2 Views; Future  - INJECTION INTRAMUSCULAR OR SUB-Q  - D dimer, quantitative  - ESR: Erythrocyte sedimentation rate  - CBC with platelets and differential  - Troponin I  - Comprehensive metabolic panel (BMP + Alb, Alk Phos, ALT, AST, Total. Bili, TP)  - ketorolac (TORADOL) 10 MG tablet; Take 1 tablet (10 mg) by mouth every 6 hours as needed for moderate pain    Leukocytosis, unspecified type  CBC is elevated, unknown cause  Repeat CBC in 3 days for recheck,  Lab only scheduled  - CBC with platelets and differential; Future    Lithium use  Will recheck lithium levels in 3 days due to NSAID use  - Lithium level; Future      40 minutes spent on the date of the encounter doing chart review, history and exam, documentation and further activities per the note       BMI:   Estimated body mass index is 34.33 kg/m  as calculated from the following:    Height as of 11/19/21: 1.626 m (5' 4\").    Weight as of 1/17/22: 90.7 kg (200 lb).       No follow-ups on " file.    Armand Almanzar PA-C  [facility] JOSE Paredes is a 46 year old who presents for the following health issues     HPI     Acute chest wall tenderness  Chest wall pain present with movement    Review of Systems   Constitutional, HEENT, cardiovascular, pulmonary, gi and gu systems are negative, except as otherwise noted.      Objective    /74   Pulse 72   Temp 98.1  F (36.7  C) (Tympanic)   Resp 17   LMP 12/10/2015   SpO2 99%   There is no height or weight on file to calculate BMI.  Physical Exam   GENERAL: healthy, alert and no distress  HENT: ear canals and TM's normal, nose and mouth without ulcers or lesions  NECK: no adenopathy, no asymmetry, masses, or scars and thyroid normal to palpation  RESP: lungs clear to auscultation - no rales, rhonchi or wheezes  CV: regular rate and rhythm, normal S1 S2, no S3 or S4, no murmur, click or rub, no peripheral edema and peripheral pulses strong  ABDOMEN: soft, nontender, no hepatosplenomegaly, no masses and bowel sounds normal  MS: Positive for left side chest wall tenderness, localized pain that is reproducible  SKIN: no suspicious lesions or rashes  NEURO: Normal strength and tone, mentation intact and speech normal    Results for orders placed or performed in visit on 02/01/22   XR Chest 2 Views     Status: None    Narrative    CHEST TWO VIEWS  2/1/2022 11:44 AM     HISTORY:  Acute chest pain.    COMPARISON: 1/17/2022.      Impression    IMPRESSION: Negative chest. Lungs clear. Surgical clips RUQ.    KIMBERLY CORREA MD         SYSTEM ID:  [id number]   Results for orders placed or performed in visit on 02/01/22   D dimer, quantitative     Status: Normal   Result Value Ref Range    D-Dimer Quantitative 0.35 0.00 - 0.50 ug/mL FEU    Narrative    This D-dimer assay is intended for use in conjunction with a clinical pretest probability assessment model to exclude pulmonary embolism (PE) and deep venous thrombosis (DVT) in  outpatients suspected of PE or DVT. The cut-off value is 0.50 ug/mL FEU.   ESR: Erythrocyte sedimentation rate     Status: Normal   Result Value Ref Range    Erythrocyte Sedimentation Rate 8 0 - 20 mm/hr   Troponin I     Status: Normal   Result Value Ref Range    Troponin I High Sensitivity 4 <54 ng/L   Comprehensive metabolic panel (BMP + Alb, Alk Phos, ALT, AST, Total. Bili, TP)     Status: Abnormal   Result Value Ref Range    Sodium 137 133 - 144 mmol/L    Potassium 3.9 3.4 - 5.3 mmol/L    Chloride 107 94 - 109 mmol/L    Carbon Dioxide (CO2) 28 20 - 32 mmol/L    Anion Gap 2 (L) 3 - 14 mmol/L    Urea Nitrogen 19 7 - 30 mg/dL    Creatinine 1.01 0.52 - 1.04 mg/dL    Calcium 9.8 8.5 - 10.1 mg/dL    Glucose 99 70 - 99 mg/dL    Alkaline Phosphatase 99 40 - 150 U/L    AST 38 0 - 45 U/L     (H) 0 - 50 U/L    Protein Total 7.1 6.8 - 8.8 g/dL    Albumin 3.6 3.4 - 5.0 g/dL    Bilirubin Total 0.6 0.2 - 1.3 mg/dL    GFR Estimate 69 >60 mL/min/1.73m2   CBC with platelets and differential     Status: Abnormal   Result Value Ref Range    WBC Count 16.2 (H) 4.0 - 11.0 10e3/uL    RBC Count 4.80 3.80 - 5.20 10e6/uL    Hemoglobin 14.1 11.7 - 15.7 g/dL    Hematocrit 46.8 35.0 - 47.0 %    MCV 98 78 - 100 fL    MCH 29.4 26.5 - 33.0 pg    MCHC 30.1 (L) 31.5 - 36.5 g/dL    RDW 14.1 10.0 - 15.0 %    Platelet Count 260 150 - 450 10e3/uL    % Neutrophils 72 %    % Lymphocytes 18 %    % Monocytes 8 %    % Eosinophils 3 %    % Basophils 0 %    Absolute Neutrophils 11.6 (H) 1.6 - 8.3 10e3/uL    Absolute Lymphocytes 2.9 0.8 - 5.3 10e3/uL    Absolute Monocytes 1.2 0.0 - 1.3 10e3/uL    Absolute Eosinophils 0.4 0.0 - 0.7 10e3/uL    Absolute Basophils 0.1 0.0 - 0.2 10e3/uL   CBC with platelets and differential     Status: Abnormal    Narrative    The following orders were created for panel order CBC with platelets and differential.  Procedure                               Abnormality         Status                     ---------                                -----------         ------                     CBC with platelets and d...[[id number]]  Abnormal            Final result                 Please view results for these tests on the individual orders.

## 2022-02-01 NOTE — PROGRESS NOTES
Clinic Administered Medication Documentation    Administrations This Visit     ketorolac (TORADOL) injection 30 mg     Admin Date  02/01/2022 Action  Given Dose  30 mg Route  Intramuscular Site  Left Deltoid Administered By  Barby Hammond CMA    Ordering Provider: Armand Almanzar PA-C    Patient Supplied?: No

## 2022-02-04 ENCOUNTER — APPOINTMENT (OUTPATIENT)
Dept: CT IMAGING | Facility: CLINIC | Age: 47
End: 2022-02-04
Attending: EMERGENCY MEDICINE
Payer: COMMERCIAL

## 2022-02-04 ENCOUNTER — HOSPITAL ENCOUNTER (EMERGENCY)
Facility: CLINIC | Age: 47
Discharge: HOME | End: 2022-02-04
Attending: EMERGENCY MEDICINE | Admitting: EMERGENCY MEDICINE
Payer: COMMERCIAL

## 2022-02-04 ENCOUNTER — LAB (OUTPATIENT)
Dept: LAB | Facility: CLINIC | Age: 47
End: 2022-02-04
Payer: COMMERCIAL

## 2022-02-04 VITALS
SYSTOLIC BLOOD PRESSURE: 112 MMHG | RESPIRATION RATE: 20 BRPM | BODY MASS INDEX: 34.15 KG/M2 | OXYGEN SATURATION: 98 % | TEMPERATURE: 98.2 F | DIASTOLIC BLOOD PRESSURE: 63 MMHG | HEIGHT: 64 IN | WEIGHT: 200 LBS | HEART RATE: 66 BPM

## 2022-02-04 DIAGNOSIS — D72.829 LEUKOCYTOSIS, UNSPECIFIED TYPE: ICD-10-CM

## 2022-02-04 DIAGNOSIS — R07.9 CHEST PAIN, UNSPECIFIED TYPE: ICD-10-CM

## 2022-02-04 DIAGNOSIS — S29.011A STRAIN OF CHEST WALL, INITIAL ENCOUNTER: ICD-10-CM

## 2022-02-04 DIAGNOSIS — Z79.899 LITHIUM USE: ICD-10-CM

## 2022-02-04 LAB
ALBUMIN SERPL-MCNC: 3.4 G/DL (ref 3.4–5)
ALP SERPL-CCNC: 109 U/L (ref 40–150)
ALT SERPL W P-5'-P-CCNC: 97 U/L (ref 0–50)
ANION GAP SERPL CALCULATED.3IONS-SCNC: 5 MMOL/L (ref 3–14)
AST SERPL W P-5'-P-CCNC: 37 U/L (ref 0–45)
ATRIAL RATE - MUSE: 74 BPM
BASOPHILS # BLD AUTO: 0.1 10E3/UL (ref 0–0.2)
BASOPHILS # BLD AUTO: 0.1 10E3/UL (ref 0–0.2)
BASOPHILS NFR BLD AUTO: 0 %
BASOPHILS NFR BLD AUTO: 1 %
BILIRUB SERPL-MCNC: 0.2 MG/DL (ref 0.2–1.3)
BUN SERPL-MCNC: 15 MG/DL (ref 7–30)
CALCIUM SERPL-MCNC: 8.8 MG/DL (ref 8.5–10.1)
CHLORIDE BLD-SCNC: 111 MMOL/L (ref 94–109)
CO2 SERPL-SCNC: 23 MMOL/L (ref 20–32)
CREAT SERPL-MCNC: 0.94 MG/DL (ref 0.52–1.04)
DIASTOLIC BLOOD PRESSURE - MUSE: NORMAL MMHG
EOSINOPHIL # BLD AUTO: 0.3 10E3/UL (ref 0–0.7)
EOSINOPHIL # BLD AUTO: 0.4 10E3/UL (ref 0–0.7)
EOSINOPHIL NFR BLD AUTO: 2 %
EOSINOPHIL NFR BLD AUTO: 3 %
ERYTHROCYTE [DISTWIDTH] IN BLOOD BY AUTOMATED COUNT: 13.5 % (ref 10–15)
ERYTHROCYTE [DISTWIDTH] IN BLOOD BY AUTOMATED COUNT: 13.8 % (ref 10–15)
GFR SERPL CREATININE-BSD FRML MDRD: 75 ML/MIN/1.73M2
GLUCOSE BLD-MCNC: 113 MG/DL (ref 70–99)
HCT VFR BLD AUTO: 41.4 % (ref 35–47)
HCT VFR BLD AUTO: 42.2 % (ref 35–47)
HGB BLD-MCNC: 12.5 G/DL (ref 11.7–15.7)
HGB BLD-MCNC: 12.6 G/DL (ref 11.7–15.7)
HOLD SPECIMEN: NORMAL
IMM GRANULOCYTES # BLD: 0.1 10E3/UL
IMM GRANULOCYTES NFR BLD: 1 %
INTERPRETATION ECG - MUSE: NORMAL
LIPASE SERPL-CCNC: 251 U/L (ref 73–393)
LYMPHOCYTES # BLD AUTO: 2.3 10E3/UL (ref 0.8–5.3)
LYMPHOCYTES # BLD AUTO: 2.3 10E3/UL (ref 0.8–5.3)
LYMPHOCYTES NFR BLD AUTO: 18 %
LYMPHOCYTES NFR BLD AUTO: 18 %
MCH RBC QN AUTO: 29.6 PG (ref 26.5–33)
MCH RBC QN AUTO: 30.2 PG (ref 26.5–33)
MCHC RBC AUTO-ENTMCNC: 29.9 G/DL (ref 31.5–36.5)
MCHC RBC AUTO-ENTMCNC: 30.2 G/DL (ref 31.5–36.5)
MCV RBC AUTO: 100 FL (ref 78–100)
MCV RBC AUTO: 99 FL (ref 78–100)
MONOCYTES # BLD AUTO: 0.7 10E3/UL (ref 0–1.3)
MONOCYTES # BLD AUTO: 0.7 10E3/UL (ref 0–1.3)
MONOCYTES NFR BLD AUTO: 5 %
MONOCYTES NFR BLD AUTO: 5 %
NEUTROPHILS # BLD AUTO: 9.7 10E3/UL (ref 1.6–8.3)
NEUTROPHILS # BLD AUTO: 9.7 10E3/UL (ref 1.6–8.3)
NEUTROPHILS NFR BLD AUTO: 72 %
NEUTROPHILS NFR BLD AUTO: 75 %
NRBC # BLD AUTO: 0 10E3/UL
NRBC BLD AUTO-RTO: 0 /100
P AXIS - MUSE: 26 DEGREES
PLATELET # BLD AUTO: 231 10E3/UL (ref 150–450)
PLATELET # BLD AUTO: 234 10E3/UL (ref 150–450)
POTASSIUM BLD-SCNC: 3.8 MMOL/L (ref 3.4–5.3)
PR INTERVAL - MUSE: 216 MS
PROT SERPL-MCNC: 6.8 G/DL (ref 6.8–8.8)
QRS DURATION - MUSE: 84 MS
QT - MUSE: 402 MS
QTC - MUSE: 446 MS
R AXIS - MUSE: 6 DEGREES
RBC # BLD AUTO: 4.14 10E6/UL (ref 3.8–5.2)
RBC # BLD AUTO: 4.26 10E6/UL (ref 3.8–5.2)
SODIUM SERPL-SCNC: 139 MMOL/L (ref 133–144)
SYSTOLIC BLOOD PRESSURE - MUSE: NORMAL MMHG
T AXIS - MUSE: 21 DEGREES
TROPONIN I SERPL HS-MCNC: 4 NG/L
VENTRICULAR RATE- MUSE: 74 BPM
WBC # BLD AUTO: 13 10E3/UL (ref 4–11)
WBC # BLD AUTO: 13.2 10E3/UL (ref 4–11)

## 2022-02-04 PROCEDURE — 80053 COMPREHEN METABOLIC PANEL: CPT | Performed by: EMERGENCY MEDICINE

## 2022-02-04 PROCEDURE — 250N000009 HC RX 250: Performed by: EMERGENCY MEDICINE

## 2022-02-04 PROCEDURE — 250N000011 HC RX IP 250 OP 636: Performed by: EMERGENCY MEDICINE

## 2022-02-04 PROCEDURE — 99285 EMERGENCY DEPT VISIT HI MDM: CPT | Mod: 25

## 2022-02-04 PROCEDURE — 96374 THER/PROPH/DIAG INJ IV PUSH: CPT | Mod: 59

## 2022-02-04 PROCEDURE — 93005 ELECTROCARDIOGRAM TRACING: CPT

## 2022-02-04 PROCEDURE — 36415 COLL VENOUS BLD VENIPUNCTURE: CPT

## 2022-02-04 PROCEDURE — 84484 ASSAY OF TROPONIN QUANT: CPT | Performed by: EMERGENCY MEDICINE

## 2022-02-04 PROCEDURE — 36415 COLL VENOUS BLD VENIPUNCTURE: CPT | Performed by: EMERGENCY MEDICINE

## 2022-02-04 PROCEDURE — 80178 ASSAY OF LITHIUM: CPT

## 2022-02-04 PROCEDURE — 85025 COMPLETE CBC W/AUTO DIFF WBC: CPT

## 2022-02-04 PROCEDURE — 83690 ASSAY OF LIPASE: CPT | Performed by: EMERGENCY MEDICINE

## 2022-02-04 PROCEDURE — 85025 COMPLETE CBC W/AUTO DIFF WBC: CPT | Performed by: EMERGENCY MEDICINE

## 2022-02-04 PROCEDURE — 250N000013 HC RX MED GY IP 250 OP 250 PS 637: Performed by: EMERGENCY MEDICINE

## 2022-02-04 PROCEDURE — 71275 CT ANGIOGRAPHY CHEST: CPT

## 2022-02-04 RX ORDER — IOPAMIDOL 755 MG/ML
73 INJECTION, SOLUTION INTRAVASCULAR ONCE
Status: COMPLETED | OUTPATIENT
Start: 2022-02-04 | End: 2022-02-04

## 2022-02-04 RX ORDER — KETOROLAC TROMETHAMINE 15 MG/ML
15 INJECTION, SOLUTION INTRAMUSCULAR; INTRAVENOUS ONCE
Status: COMPLETED | OUTPATIENT
Start: 2022-02-04 | End: 2022-02-04

## 2022-02-04 RX ORDER — HYDROCODONE BITARTRATE AND ACETAMINOPHEN 5; 325 MG/1; MG/1
2 TABLET ORAL ONCE
Status: COMPLETED | OUTPATIENT
Start: 2022-02-04 | End: 2022-02-04

## 2022-02-04 RX ADMIN — KETOROLAC TROMETHAMINE 15 MG: 15 INJECTION, SOLUTION INTRAMUSCULAR; INTRAVENOUS at 21:01

## 2022-02-04 RX ADMIN — SODIUM CHLORIDE 96 ML: 900 INJECTION INTRAVENOUS at 20:32

## 2022-02-04 RX ADMIN — HYDROCODONE BITARTRATE AND ACETAMINOPHEN 2 TABLET: 5; 325 TABLET ORAL at 22:13

## 2022-02-04 RX ADMIN — IOPAMIDOL 73 ML: 755 INJECTION, SOLUTION INTRAVENOUS at 20:32

## 2022-02-04 ASSESSMENT — MIFFLIN-ST. JEOR: SCORE: 1532.19

## 2022-02-05 LAB — LITHIUM SERPL-SCNC: 0.9 MMOL/L

## 2022-02-05 NOTE — ED PROVIDER NOTES
History     Chief Complaint:  Chest Pain     HPI   Lena Morrow is a 46 year old female who presents with left-sided anterior chest wall pain.  She has undergone several visits to her primary care doctor and urgent care regarding this pain.  After negative chest x-ray today at urgent care she was sent to the ED for further evaluation.  She denies shortness of breath, hemoptysis, fever, abdominal pain.  She has tested negative for Covid in the outpatient setting multiple times over the recent past.    Per triage nurse notes:  patient came to the ED after having severe pain in the left upper chest since mid January. Patient reports having spasms that shoot to her back. Patient was last seen in the urgent care this past Tuesday. Patient denying falling or injuring the area. Patient has a history of atrial flutter. Last pain meds taken was Excedrin at 1pm.     ROS:  Review of Systems  See the HPI, otherwise the rest of the ROS is negative.    Allergies:  Amoxicillin  Aripiprazole  Ativan  Augmentin  Azithromycin  Clonazepam  Estrogens  Lexapro  Mirtazapine  Oxycodone  Prochlorperazine  Risperdal  Seasonal Allergies  Seroquel   Sertraline  Venlafaxine  Xanax   Augmentin    Medications:    Valtrex  Inderal   Lithium   Lamictal   Lithobid  Toradol   Glucosamine-Chondroitin   Ferosul   Nexium   Bupropion   Albuterol     Past Medical History:    Arrhythmia  Asthma  Atrial flutter, paroxysmal  C. Diff colitis  Cholelithiasis  Dyspepsia  Endometriosis  GERD  Kidney stones  Depression  Migraine  Panic disorder without agoraphobia  OCD  Bipolar disorder  Recurrent sinusitis  Vitamin D deficiency  Anxiety   DDD     Past Surgical History:    Colonoscopy  Cytoscopy  Hysterectomy, salpingectomy  D & C  ENT surgery   section  Colposcopy  Cholecystectomy  Laparoscopy  Lithotripsy ureter, insert stent  Carpal tunnel release with ganglion cyst removal  Tonsillectomy      Family History:    Lung cancer  Alcohol/drug  "abuse  Diabetes  Hypertension   Depression   Asthma   Brain cancer  CAD  Heart disease  Diabetes   Arthritis     Social History:   reports that she has never smoked. She has never used smokeless tobacco. She reports current alcohol use. She reports that she does not use drugs.  PCP: Katherine Zamora     Physical Exam     Patient Vitals for the past 24 hrs:   BP Temp Temp src Pulse Resp SpO2 Height Weight   02/04/22 2100 112/63 -- -- 66 20 98 % -- --   02/04/22 2000 117/73 -- -- 70 21 98 % -- --   02/04/22 1917 -- 98.2  F (36.8  C) Oral -- -- -- -- --   02/04/22 1915 97/56 -- -- 75 16 100 % 1.626 m (5' 4\") 90.7 kg (200 lb)      Physical Exam  General: Alert and Interactive.   Head: No signs of trauma.   Mouth/Throat: Oropharynx is clear and moist.   Eyes: Conjunctivae are normal. Pupils are equal, round, and reactive to light.   Neck: Normal range of motion. No nuchal rigidity.   CV: Normal rate and regular rhythm.    Resp: Effort normal and breath sounds normal. No respiratory distress.   GI: Soft. There is no tenderness or guarding.   MSK: Normal range of motion. no edema. Her chest pain is reproducible in the left anterior chest with palpation but not movement of her left upper extremity.  The pain does not involve the breast tissue and she has no lymphadenopathy or tenderness in her left axilla  Neuro: The patient is alert and oriented to person, place, and time.  PERRLA, EOMI, strength in upper/lower extremities normal and symmetrical.   Sensation normal. Speech normal.  GCS eye subscore is 4. GCS verbal subscore is 5. GCS motor subscore is 6.   Skin: Skin is warm and dry. No rash noted.   Psych: normal mood and affect. behavior is normal.       Emergency Department Course   ECG  ECG taken at 1920, ECG read at 1925  Sinus rhythm with 1st degree AV block. Anterior infarct, age undetermined.   Rate 74 bpm. [place] interval 216 ms. QRS duration 84 ms. QT/QTc 402/446 ms. P-R-T axes 26 6 21.     Imaging:  CT Chest " Pulmonary Embolism w Contrast   Final Result   IMPRESSION:   1.  No evidence for pulmonary embolism, acute airspace disease, or acute thoracic aortic abnormality.         Report per radiology    Laboratory:  Labs Ordered and Resulted from Time of ED Arrival to Time of ED Departure   COMPREHENSIVE METABOLIC PANEL - Abnormal       Result Value    Sodium 139      Potassium 3.8      Chloride 111 (*)     Carbon Dioxide (CO2) 23      Anion Gap 5      Urea Nitrogen 15      Creatinine 0.94      Calcium 8.8      Glucose 113 (*)     Alkaline Phosphatase 109      AST 37      ALT 97 (*)     Protein Total 6.8      Albumin 3.4      Bilirubin Total 0.2      GFR Estimate 75     CBC WITH PLATELETS AND DIFFERENTIAL - Abnormal    WBC Count 13.2 (*)     RBC Count 4.14      Hemoglobin 12.5      Hematocrit 41.4            MCH 30.2      MCHC 30.2 (*)     RDW 13.5      Platelet Count 234      % Neutrophils 72      % Lymphocytes 18      % Monocytes 5      % Eosinophils 3      % Basophils 1      % Immature Granulocytes 1      NRBCs per 100 WBC 0      Absolute Neutrophils 9.7 (*)     Absolute Lymphocytes 2.3      Absolute Monocytes 0.7      Absolute Eosinophils 0.4      Absolute Basophils 0.1      Absolute Immature Granulocytes 0.1      Absolute NRBCs 0.0     TROPONIN I - Normal    Troponin I High Sensitivity 4     LIPASE - Normal    Lipase 251            Emergency Department Course:    Reviewed:  I reviewed nursing notes, vitals, past medical history, Care Everywhere and [facility]    Interventions:    Medications   ketorolac (TORADOL) injection 15 mg (15 mg Intravenous Given 2/4/22 2101)   iopamidol (ISOVUE-370) solution 73 mL (73 mLs Intravenous Given 2/4/22 2032)   100mL Saline Flush (96 mLs Intravenous Given 2/4/22 2032)   HYDROcodone-acetaminophen (NORCO) 5-325 MG per tablet 2 tablet (2 tablets Oral Given 2/4/22 2213)        Disposition:  The patient was discharged to home.     Impression & Plan      Covid-19  Lena Morrow was  evaluated during a global COVID-19 pandemic, which necessitated consideration that the patient might be at risk for infection with the SARS-CoV-2 virus that causes COVID-19.   Applicable protocols for evaluation were followed during the patient's care.   COVID-19 was considered as part of the patient's evaluation. The plan for testing is: a test was obtained at a previous visit and reviewed & considered today.    Medical Decision Making:  Lena Morrow is a 46 year old female presented to the Emergency Department with a complaint of chest pain. Fortunately the workup in the ED has been unremarkable and at this time I am not concerned for ACS. The EKG shows no acute ischemic changes, she does have a deep Q-wave but it is isolated to lead III. The troponin is negative.     I considered other possible causes of chest pain including PE, infection, pneumothorax, aortic dissection, and even more benign causes such as reflux and esophageal motility issues.  Chest CT is negative for infiltrate, PE or dissection.  The physical exam, laboratory, and radiological findings listed above make life threatening conditions less likely. At this time I believe the patient is stable for discharge. I have encouraged close Primary Care Physician follow up.    Diagnosis:    ICD-10-CM    1. Chest pain, unspecified type  R07.9    2. Strain of chest wall, initial encounter  S29.011A         2/4/2022   Ambrosio Colindres MD Joing, Todd Roger, MD  02/04/22 3069

## 2022-02-05 NOTE — ED TRIAGE NOTES
Patient came to the ED after having severe pain in the left upper chest since mid January. Patient reports having spasms that shoot to her back. Patient was last seen in the urgent care this past Tuesday. Patient denying falling or injuring the area. Patient has a history of atrial flutter. Last pain meds taken was Excedrin at 1pm.

## 2022-03-10 ENCOUNTER — TRANSFERRED RECORDS (OUTPATIENT)
Dept: HEALTH INFORMATION MANAGEMENT | Facility: CLINIC | Age: 47
End: 2022-03-10
Payer: COMMERCIAL

## 2022-03-11 ENCOUNTER — OFFICE VISIT (OUTPATIENT)
Dept: INTERNAL MEDICINE | Facility: CLINIC | Age: 47
End: 2022-03-11
Payer: COMMERCIAL

## 2022-03-11 VITALS
OXYGEN SATURATION: 97 % | TEMPERATURE: 97 F | SYSTOLIC BLOOD PRESSURE: 102 MMHG | RESPIRATION RATE: 20 BRPM | DIASTOLIC BLOOD PRESSURE: 70 MMHG | BODY MASS INDEX: 34.84 KG/M2 | HEART RATE: 70 BPM | WEIGHT: 203 LBS

## 2022-03-11 DIAGNOSIS — Z12.11 SPECIAL SCREENING FOR MALIGNANT NEOPLASMS, COLON: ICD-10-CM

## 2022-03-11 DIAGNOSIS — R25.1 SHAKING: ICD-10-CM

## 2022-03-11 DIAGNOSIS — E03.4 HYPOTHYROIDISM DUE TO ACQUIRED ATROPHY OF THYROID: Primary | ICD-10-CM

## 2022-03-11 DIAGNOSIS — R25.1 INVOLUNTARY TREMBLING: Primary | ICD-10-CM

## 2022-03-11 DIAGNOSIS — Z12.31 ENCOUNTER FOR SCREENING MAMMOGRAM FOR BREAST CANCER: ICD-10-CM

## 2022-03-11 DIAGNOSIS — E83.52 HYPERCALCEMIA: ICD-10-CM

## 2022-03-11 PROBLEM — E03.9 HYPOTHYROIDISM: Status: ACTIVE | Noted: 2022-03-11

## 2022-03-11 LAB
ALBUMIN SERPL-MCNC: 3.6 G/DL (ref 3.4–5)
ALP SERPL-CCNC: 109 U/L (ref 40–150)
ALT SERPL W P-5'-P-CCNC: 48 U/L (ref 0–50)
ANION GAP SERPL CALCULATED.3IONS-SCNC: 5 MMOL/L (ref 3–14)
AST SERPL W P-5'-P-CCNC: 25 U/L (ref 0–45)
BILIRUB SERPL-MCNC: 0.2 MG/DL (ref 0.2–1.3)
BUN SERPL-MCNC: 16 MG/DL (ref 7–30)
CALCIUM SERPL-MCNC: 10.6 MG/DL (ref 8.5–10.1)
CHLORIDE BLD-SCNC: 106 MMOL/L (ref 94–109)
CO2 SERPL-SCNC: 29 MMOL/L (ref 20–32)
CREAT SERPL-MCNC: 0.93 MG/DL (ref 0.52–1.04)
ERYTHROCYTE [DISTWIDTH] IN BLOOD BY AUTOMATED COUNT: 13.6 % (ref 10–15)
FERRITIN SERPL-MCNC: 364 NG/ML (ref 8–252)
FOLATE SERPL-MCNC: 32.6 NG/ML
GFR SERPL CREATININE-BSD FRML MDRD: 76 ML/MIN/1.73M2
GLUCOSE BLD-MCNC: 89 MG/DL (ref 70–99)
HCT VFR BLD AUTO: 44.9 % (ref 35–47)
HGB BLD-MCNC: 13.7 G/DL (ref 11.7–15.7)
IRON SATN MFR SERPL: 20 % (ref 15–46)
IRON SERPL-MCNC: 59 UG/DL (ref 35–180)
LITHIUM SERPL-SCNC: 0.8 MMOL/L
MAGNESIUM SERPL-MCNC: 2.3 MG/DL (ref 1.6–2.3)
MCH RBC QN AUTO: 29.8 PG (ref 26.5–33)
MCHC RBC AUTO-ENTMCNC: 30.5 G/DL (ref 31.5–36.5)
MCV RBC AUTO: 98 FL (ref 78–100)
PHOSPHATE SERPL-MCNC: 3.5 MG/DL (ref 2.5–4.5)
PLATELET # BLD AUTO: 322 10E3/UL (ref 150–450)
POTASSIUM BLD-SCNC: 3.8 MMOL/L (ref 3.4–5.3)
PROT SERPL-MCNC: 7.4 G/DL (ref 6.8–8.8)
RBC # BLD AUTO: 4.59 10E6/UL (ref 3.8–5.2)
SODIUM SERPL-SCNC: 140 MMOL/L (ref 133–144)
T4 FREE SERPL-MCNC: 0.92 NG/DL (ref 0.76–1.46)
TIBC SERPL-MCNC: 291 UG/DL (ref 240–430)
TSH SERPL DL<=0.005 MIU/L-ACNC: 10.41 MU/L (ref 0.4–4)
VIT B12 SERPL-MCNC: 427 PG/ML (ref 193–986)
WBC # BLD AUTO: 11 10E3/UL (ref 4–11)

## 2022-03-11 PROCEDURE — 84443 ASSAY THYROID STIM HORMONE: CPT | Performed by: INTERNAL MEDICINE

## 2022-03-11 PROCEDURE — 80178 ASSAY OF LITHIUM: CPT | Performed by: INTERNAL MEDICINE

## 2022-03-11 PROCEDURE — 82728 ASSAY OF FERRITIN: CPT | Performed by: INTERNAL MEDICINE

## 2022-03-11 PROCEDURE — 80053 COMPREHEN METABOLIC PANEL: CPT | Performed by: INTERNAL MEDICINE

## 2022-03-11 PROCEDURE — 83735 ASSAY OF MAGNESIUM: CPT | Performed by: INTERNAL MEDICINE

## 2022-03-11 PROCEDURE — 85027 COMPLETE CBC AUTOMATED: CPT | Performed by: INTERNAL MEDICINE

## 2022-03-11 PROCEDURE — 99213 OFFICE O/P EST LOW 20 MIN: CPT | Performed by: INTERNAL MEDICINE

## 2022-03-11 PROCEDURE — 84100 ASSAY OF PHOSPHORUS: CPT | Performed by: INTERNAL MEDICINE

## 2022-03-11 PROCEDURE — 83550 IRON BINDING TEST: CPT | Performed by: INTERNAL MEDICINE

## 2022-03-11 PROCEDURE — 36415 COLL VENOUS BLD VENIPUNCTURE: CPT | Performed by: INTERNAL MEDICINE

## 2022-03-11 PROCEDURE — 82746 ASSAY OF FOLIC ACID SERUM: CPT | Performed by: INTERNAL MEDICINE

## 2022-03-11 PROCEDURE — 82607 VITAMIN B-12: CPT | Performed by: INTERNAL MEDICINE

## 2022-03-11 PROCEDURE — 84439 ASSAY OF FREE THYROXINE: CPT | Performed by: INTERNAL MEDICINE

## 2022-03-11 PROCEDURE — 82306 VITAMIN D 25 HYDROXY: CPT | Performed by: INTERNAL MEDICINE

## 2022-03-11 RX ORDER — LEVOTHYROXINE SODIUM 50 UG/1
50 TABLET ORAL DAILY
Qty: 90 TABLET | Refills: 1 | Status: SHIPPED | OUTPATIENT
Start: 2022-03-11 | End: 2022-05-13

## 2022-03-11 NOTE — PROGRESS NOTES
ASSESSMENT/PLAN                                                      (R25.1) Involuntary trembling  (primary encounter diagnosis)  (R25.1) Shaking  Plan: nonfasting labs today to evaluate for potential organic causes of/contributors to trembling and shaking; if labs are unrevealing, recommend holding Flexeril for several days to see if symptoms improve; if symptoms persist will refer to neurology for further evaluation.    (Z12.31) Encounter for screening mammogram for breast cancer  Plan: screening mammogram ordered - patient to schedule.     (Z12.11) Special screening for malignant neoplasms, colon  Plan: FIT test ordered - patient to pick-up, complete, and mail in when able.     Katherine Zamora MD   [address] [address]  T: [phone], F: [phone]    MICHAEL Morrow is a very pleasant 46 year old female who presents with trembling and shaking:    Ongoing for the last 2-3 weeks. Patient reports that her hands are constantly shaking, both at rest and with activity. Her handwriting has worsened but no difficulty with eating, drinking, dressing, or grooming. Patient also reports that her voice has been trembling at times and that she has had a couple episodes of leg trembling as well.    No significant caffeine intake (at most 1 soda daily; no coffee, tea, or energy drinks).  Infrequent alcohol (once or twice a month).  No illicit drug use.    Symptoms seem to have started after she started using Flexeril for costochondritis.    OBJECTIVE                                                      /70 (BP Location: Left arm, Patient Position: Chair, Cuff Size: Adult Regular)   Pulse 70   Temp 97  F (36.1  C) (Temporal)   Resp 20   Wt 92.1 kg (203 lb)   LMP 12/10/2015   SpO2 97%   BMI 34.84 kg/m    Constitutional: well-appearing  Musculoskeletal: normal gait and station    Tremor at rest and with  activity.    ---    (Note documentation was completed, in part, with [employer] voice-recognition software. Documentation was reviewed, but some grammatical, spelling, and word errors may remain.)

## 2022-03-14 LAB — DEPRECATED CALCIDIOL+CALCIFEROL SERPL-MC: 66 UG/L (ref 20–75)

## 2022-04-11 ENCOUNTER — ANCILLARY PROCEDURE (OUTPATIENT)
Dept: MAMMOGRAPHY | Facility: CLINIC | Age: 47
End: 2022-04-11
Attending: INTERNAL MEDICINE
Payer: COMMERCIAL

## 2022-04-11 DIAGNOSIS — Z12.31 ENCOUNTER FOR SCREENING MAMMOGRAM FOR BREAST CANCER: ICD-10-CM

## 2022-04-11 DIAGNOSIS — Z12.31 VISIT FOR SCREENING MAMMOGRAM: ICD-10-CM

## 2022-04-11 PROCEDURE — 77063 BREAST TOMOSYNTHESIS BI: CPT | Mod: TC | Performed by: RADIOLOGY

## 2022-04-11 PROCEDURE — 77067 SCR MAMMO BI INCL CAD: CPT | Mod: TC | Performed by: RADIOLOGY

## 2022-05-11 DIAGNOSIS — R10.13 EPIGASTRIC PAIN: ICD-10-CM

## 2022-05-12 ENCOUNTER — LAB (OUTPATIENT)
Dept: LAB | Facility: CLINIC | Age: 47
End: 2022-05-12
Payer: COMMERCIAL

## 2022-05-12 DIAGNOSIS — E03.4 HYPOTHYROIDISM DUE TO ACQUIRED ATROPHY OF THYROID: ICD-10-CM

## 2022-05-12 DIAGNOSIS — E83.52 HYPERCALCEMIA: ICD-10-CM

## 2022-05-12 LAB
CALCIUM SERPL-MCNC: 10.1 MG/DL (ref 8.5–10.1)
T4 FREE SERPL-MCNC: 1.04 NG/DL (ref 0.76–1.46)
TSH SERPL DL<=0.005 MIU/L-ACNC: 4.88 MU/L (ref 0.4–4)

## 2022-05-12 PROCEDURE — 84443 ASSAY THYROID STIM HORMONE: CPT

## 2022-05-12 PROCEDURE — 82310 ASSAY OF CALCIUM: CPT

## 2022-05-12 PROCEDURE — 36415 COLL VENOUS BLD VENIPUNCTURE: CPT

## 2022-05-12 PROCEDURE — 84439 ASSAY OF FREE THYROXINE: CPT

## 2022-05-12 RX ORDER — ESOMEPRAZOLE MAGNESIUM 40 MG/1
CAPSULE, DELAYED RELEASE ORAL
Qty: 90 CAPSULE | Refills: 2 | Status: SHIPPED | OUTPATIENT
Start: 2022-05-12 | End: 2023-02-17

## 2022-05-13 DIAGNOSIS — E03.4 HYPOTHYROIDISM DUE TO ACQUIRED ATROPHY OF THYROID: Primary | ICD-10-CM

## 2022-05-13 RX ORDER — LEVOTHYROXINE SODIUM 75 UG/1
75 TABLET ORAL DAILY
Qty: 90 TABLET | Refills: 1 | Status: SHIPPED | OUTPATIENT
Start: 2022-05-13 | End: 2022-10-10

## 2022-05-19 ENCOUNTER — TRANSFERRED RECORDS (OUTPATIENT)
Dept: INTERNAL MEDICINE | Facility: CLINIC | Age: 47
End: 2022-05-19

## 2022-06-07 ENCOUNTER — TRANSFERRED RECORDS (OUTPATIENT)
Dept: HEALTH INFORMATION MANAGEMENT | Facility: CLINIC | Age: 47
End: 2022-06-07
Payer: COMMERCIAL

## 2022-06-11 ENCOUNTER — E-VISIT (OUTPATIENT)
Dept: INTERNAL MEDICINE | Facility: CLINIC | Age: 47
End: 2022-06-11
Payer: COMMERCIAL

## 2022-06-11 ENCOUNTER — NURSE TRIAGE (OUTPATIENT)
Dept: NURSING | Facility: CLINIC | Age: 47
End: 2022-06-11
Payer: COMMERCIAL

## 2022-06-11 DIAGNOSIS — B00.9 RECURRENT HERPES SIMPLEX: Primary | ICD-10-CM

## 2022-06-11 PROCEDURE — 99421 OL DIG E/M SVC 5-10 MIN: CPT | Performed by: INTERNAL MEDICINE

## 2022-06-11 RX ORDER — VALACYCLOVIR HYDROCHLORIDE 500 MG/1
500 TABLET, FILM COATED ORAL 2 TIMES DAILY
Qty: 6 TABLET | Refills: 3 | Status: SHIPPED | OUTPATIENT
Start: 2022-06-11 | End: 2022-09-15

## 2022-06-11 NOTE — TELEPHONE ENCOUNTER
"\"Herpes rash on arm.\" \"Had it since a kid\". Asking for a refill of valacyclovir. Last prescription was in 9/2020. Medication not on medication list.  Advised to make a virtual visit using Oscar and instructed to select next available provider.  Patient hung up on triage.   Leatha DIA June 11, 2022        Reason for Disposition    [1] Caller requesting a NON-URGENT new prescription or refill AND [2] triager unable to refill per unit policy    Protocols used: MEDICATION QUESTION CALL-A-AH      "

## 2022-06-28 ASSESSMENT — PATIENT HEALTH QUESTIONNAIRE - PHQ9
SUM OF ALL RESPONSES TO PHQ QUESTIONS 1-9: 15
10. IF YOU CHECKED OFF ANY PROBLEMS, HOW DIFFICULT HAVE THESE PROBLEMS MADE IT FOR YOU TO DO YOUR WORK, TAKE CARE OF THINGS AT HOME, OR GET ALONG WITH OTHER PEOPLE: VERY DIFFICULT
SUM OF ALL RESPONSES TO PHQ QUESTIONS 1-9: 15

## 2022-06-29 ENCOUNTER — VIRTUAL VISIT (OUTPATIENT)
Dept: PEDIATRICS | Facility: CLINIC | Age: 47
End: 2022-06-29
Payer: COMMERCIAL

## 2022-06-29 DIAGNOSIS — U07.1 INFECTION DUE TO 2019 NOVEL CORONAVIRUS: Primary | ICD-10-CM

## 2022-06-29 PROCEDURE — 99213 OFFICE O/P EST LOW 20 MIN: CPT | Mod: CS | Performed by: INTERNAL MEDICINE

## 2022-06-29 NOTE — PROGRESS NOTES
Lena is a 47 year old who is being evaluated via a billable telephone  visit.      Pt could not log in for video visit    Assessment & Plan       ICD-10-CM    1. Infection due to 2019 novel coronavirus  U07.1 nirmatrelvir and ritonavir (PAXLOVID) therapy pack     COVID infection, positive test last  Night with symptom onset yesterday.  Has asthma, meets criteria for treatment.  Reviewed options.  Paxlovid order sent to [facility].  Reviewed medications and possible interactions.  She has rx for oxycodone for hip pain, she will not take this during the course of treatment.     Armand Sutton MD  [facility]    Niurka Paredes is a 47 year old, presenting for the following health issues:  RECHECK    HPI     COVID-19 Symptom Review  How many days ago did these symptoms start? 1 day  Test was positive last night    Are any of the following symptoms significant for you?    New or worsening difficulty breathing? No    Worsening cough? Occasional, does have asthma    Fever or chills? Yes, felt hot, having chills    Headache: Yes - severe headache, worse than her typical migraine headaches    Sore throat: no    Chest pain: no    Body aches? Yes - generalized     Does patient live in a nursing home, group home, or shelter? no  Does patient have a way to get food/medications during quarantined? Yes, I have a friend or family member who can help me.          Objective           Vitals:  No vitals were obtained today due to virtual visit.    Physical Exam   GEN: No distress  PSYCH: Alert, affect is normal  RESP: No cough, no audible wheezing, able to talk in full sentences  Remainder of exam unable to be completed due to telephone visit        Total telephone time: 12 minutes

## 2022-07-09 ENCOUNTER — DOCUMENTATION ONLY (OUTPATIENT)
Dept: LAB | Facility: CLINIC | Age: 47
End: 2022-07-09

## 2022-07-09 DIAGNOSIS — Z79.899 LITHIUM USE: Primary | ICD-10-CM

## 2022-07-09 NOTE — PROGRESS NOTES
Pt coming in for lithium level today 07/09/22.  There is not one ordered.  Please place order or contact patient.  Thank you.

## 2022-07-10 ENCOUNTER — LAB (OUTPATIENT)
Dept: LAB | Facility: CLINIC | Age: 47
End: 2022-07-10
Payer: COMMERCIAL

## 2022-07-10 DIAGNOSIS — Z79.899 LITHIUM USE: ICD-10-CM

## 2022-07-10 DIAGNOSIS — Z79.899 HIGH RISK MEDICATION USE: ICD-10-CM

## 2022-07-10 DIAGNOSIS — Z79.899 HIGH RISK MEDICATION USE: Primary | ICD-10-CM

## 2022-07-10 DIAGNOSIS — E03.4 HYPOTHYROIDISM DUE TO ACQUIRED ATROPHY OF THYROID: ICD-10-CM

## 2022-07-10 PROCEDURE — 80178 ASSAY OF LITHIUM: CPT

## 2022-07-10 PROCEDURE — 80048 BASIC METABOLIC PNL TOTAL CA: CPT

## 2022-07-10 PROCEDURE — 84443 ASSAY THYROID STIM HORMONE: CPT

## 2022-07-10 PROCEDURE — 36415 COLL VENOUS BLD VENIPUNCTURE: CPT

## 2022-07-11 LAB
ANION GAP SERPL CALCULATED.3IONS-SCNC: 3 MMOL/L (ref 3–14)
BUN SERPL-MCNC: 9 MG/DL (ref 7–30)
CALCIUM SERPL-MCNC: 9.6 MG/DL (ref 8.5–10.1)
CHLORIDE BLD-SCNC: 111 MMOL/L (ref 94–109)
CO2 SERPL-SCNC: 28 MMOL/L (ref 20–32)
CREAT SERPL-MCNC: 0.98 MG/DL (ref 0.52–1.04)
GFR SERPL CREATININE-BSD FRML MDRD: 71 ML/MIN/1.73M2
GLUCOSE BLD-MCNC: 86 MG/DL (ref 70–99)
LITHIUM SERPL-SCNC: 1.1 MMOL/L (ref 0.6–1.2)
POTASSIUM BLD-SCNC: 3.9 MMOL/L (ref 3.4–5.3)
SODIUM SERPL-SCNC: 142 MMOL/L (ref 133–144)
TSH SERPL DL<=0.005 MIU/L-ACNC: 2.9 MU/L (ref 0.4–4)

## 2022-07-22 ENCOUNTER — OFFICE VISIT (OUTPATIENT)
Dept: INTERNAL MEDICINE | Facility: CLINIC | Age: 47
End: 2022-07-22
Payer: COMMERCIAL

## 2022-07-22 VITALS
BODY MASS INDEX: 34.5 KG/M2 | SYSTOLIC BLOOD PRESSURE: 123 MMHG | WEIGHT: 201 LBS | OXYGEN SATURATION: 98 % | DIASTOLIC BLOOD PRESSURE: 78 MMHG | HEART RATE: 71 BPM

## 2022-07-22 DIAGNOSIS — G47.9 TROUBLE IN SLEEPING: ICD-10-CM

## 2022-07-22 DIAGNOSIS — E03.4 HYPOTHYROIDISM DUE TO ACQUIRED ATROPHY OF THYROID: ICD-10-CM

## 2022-07-22 DIAGNOSIS — B00.9 RECURRENT HERPES SIMPLEX: ICD-10-CM

## 2022-07-22 DIAGNOSIS — L21.9 SEBORRHEIC DERMATITIS: ICD-10-CM

## 2022-07-22 DIAGNOSIS — R21 RASH: Primary | ICD-10-CM

## 2022-07-22 DIAGNOSIS — G25.81 RESTLESS LEGS SYNDROME (RLS): ICD-10-CM

## 2022-07-22 PROCEDURE — 99214 OFFICE O/P EST MOD 30 MIN: CPT | Performed by: INTERNAL MEDICINE

## 2022-07-22 RX ORDER — GABAPENTIN 300 MG/1
300 CAPSULE ORAL AT BEDTIME
Qty: 90 CAPSULE | Refills: 3 | Status: SHIPPED | OUTPATIENT
Start: 2022-07-22 | End: 2022-08-29

## 2022-07-22 RX ORDER — KETOCONAZOLE 20 MG/ML
SHAMPOO TOPICAL DAILY PRN
Qty: 120 ML | Refills: 3 | Status: SHIPPED | OUTPATIENT
Start: 2022-07-22 | End: 2023-10-25

## 2022-07-22 RX ORDER — FLUCONAZOLE 150 MG/1
150 TABLET ORAL WEEKLY
Qty: 4 TABLET | Refills: 0 | Status: SHIPPED | OUTPATIENT
Start: 2022-07-22 | End: 2022-08-13

## 2022-07-22 ASSESSMENT — PATIENT HEALTH QUESTIONNAIRE - PHQ9
SUM OF ALL RESPONSES TO PHQ QUESTIONS 1-9: 17
10. IF YOU CHECKED OFF ANY PROBLEMS, HOW DIFFICULT HAVE THESE PROBLEMS MADE IT FOR YOU TO DO YOUR WORK, TAKE CARE OF THINGS AT HOME, OR GET ALONG WITH OTHER PEOPLE: VERY DIFFICULT
SUM OF ALL RESPONSES TO PHQ QUESTIONS 1-9: 17

## 2022-07-22 NOTE — PATIENT INSTRUCTIONS
Diflucan once weekly x 4 weeks for suspected tinea corporis.    Ketoconazole shampoo once daily as needed for dandruff. Shampoo in, leave for 5 minutes, then rinse. May use conditioner afterwards.    Gabapentin 300mg at night for restless legs (may also help with sleep).    Oxybutynin for excessive sweating (but dries out everything else too - dry eyes, dry mouth, and constipation).    ---    Ask psychiatry  re: tremors/shaking.

## 2022-07-22 NOTE — PROGRESS NOTES
ASSESSMENT/PLAN                                                      (R21) Rash  (primary encounter diagnosis)  Comment: suspect tinea corporis.  Plan: TRIAL of fluconazole 150mg weekly x 4 doses; if symptoms worsen, change, or do not improve, patient to contact MD.      (L21.9) Seborrheic dermatitis  Plan: TRIAL of ketoconazole 2% shampoo daily as needed; if symptoms worsen, change, or do not improve, patient to contact MD.      (G25.81) Restless legs syndrome (RLS)  (G47.9) Trouble in sleeping  Plan: TRIAL of gabapentin 300mg at bedtime; can increase as needed/tolerated; if symptoms worsen, change, or do not improve, patient to contact MD.      (B00.9) Recurrent herpes simplex  Comment: RUE; well-controlled on current regimen (Valtrex as needed).     (E03.4) Hypothyroidism due to acquired atrophy of thyroid  Comment: well-controlled on current regimen.      Katherine Zamora MD   [place] [address] [address] [address]  T: [phone], F: [phone]    SUBJECTIVE                                                      Lena Morrow is a very pleasant 47 year old female who presents several concerns:    Skin lesions: started on right leg, then on left leg, now on left forearm and right breast. Asymptomatic.     Dandruff: refractory to OTC shampoos.     Restless legs and trouble sleeping. Ongoing chronically. Worsening over time.     PMH significant for chronic pain (chronic bilateral hip, leg, and lower back pain) - followed and managed by [facility].    PMH also significant for bipolar 1 - followed and managed by psychiatry. On Lamictal and lithium.     OBJECTIVE                                                      /78 (BP Location: Right arm, Patient Position: Sitting, Cuff Size: Adult Regular)   Pulse 71   Wt 91.2 kg (201 lb)   LMP 12/10/2015   SpO2 98%   BMI 34.50 kg/m    Constitutional: well-appearing  Integumentary: two pink oval macules with fine overlying scale  (left arm and right breast)    ---    (Note documentation was completed, in part, with [employer] voice-recognition software. Documentation was reviewed, but some grammatical, spelling, and word errors may remain.)

## 2022-08-16 ENCOUNTER — TRANSFERRED RECORDS (OUTPATIENT)
Dept: HEALTH INFORMATION MANAGEMENT | Facility: CLINIC | Age: 47
End: 2022-08-16

## 2022-09-01 ENCOUNTER — TRANSFERRED RECORDS (OUTPATIENT)
Dept: HEALTH INFORMATION MANAGEMENT | Facility: CLINIC | Age: 47
End: 2022-09-01

## 2022-09-15 ENCOUNTER — OFFICE VISIT (OUTPATIENT)
Dept: URGENT CARE | Facility: URGENT CARE | Age: 47
End: 2022-09-15
Payer: COMMERCIAL

## 2022-09-15 VITALS — DIASTOLIC BLOOD PRESSURE: 78 MMHG | SYSTOLIC BLOOD PRESSURE: 126 MMHG | HEART RATE: 91 BPM | OXYGEN SATURATION: 97 %

## 2022-09-15 DIAGNOSIS — L60.0 INGROWN TOENAIL OF LEFT FOOT: Primary | ICD-10-CM

## 2022-09-15 PROCEDURE — 99213 OFFICE O/P EST LOW 20 MIN: CPT | Performed by: NURSE PRACTITIONER

## 2022-09-15 RX ORDER — CEPHALEXIN 500 MG/1
500 CAPSULE ORAL 3 TIMES DAILY
Qty: 21 CAPSULE | Refills: 0 | Status: SHIPPED | OUTPATIENT
Start: 2022-09-15 | End: 2022-09-22

## 2022-09-15 NOTE — PROGRESS NOTES
Chief Complaint   Patient presents with     Infection     Left big toe, possible infection         ICD-10-CM    1. Ingrown toenail of left foot  L60.0 cephALEXin (KEFLEX) 500 MG capsule     Orthopedic  Referral   She will try warm Epsom salts soaks a few days and if symptoms are improving she will not fill antibiotic.  If symptoms fail to improve she will start antibiotic and follow-up with podiatry.  Tylenol and ibuprofen as needed for pain.    Subjective     Lena Morrow is an 47 year old female who presents to clinic today for left great toe redness and pain. History of  Ingrown nails.  She denies any fever or chills.     Objective    /78 (BP Location: Right arm, Patient Position: Sitting, Cuff Size: Adult Regular)   Pulse 91   LMP 12/10/2015   SpO2 97%   Nurses notes and VS have been reviewed.    Physical Exam       GENERAL APPEARANCE: alert and mild distress     MS: extremities normal- no gross deformities noted; normal muscle tone.     SKIN: Red swollen area on the left great toe adjacent to the nail, no purulent drainage is noted, very tender to the touch, nail appears ingrown.     NEURO: Normal strength and tone, mentation intact and speech normal     PSYCH: normal thought process; no significant mood disturbance    Patient Instructions   Warm Epsom salts soaks 4 times a day for 15-20 minutes.      Start antibiotics if symptoms fail to improve or worsen.      See podiatry for further assessment and treatment.    Acetaminophen (Tylenol) may be taken up to 4000mg daily (3000mg if over 65) which would be 2 regular strength tablets (325mg) or two extra strength tablets(500mg) up to 4 times a day (3 times a day if over 65).   Check for acetaminophen in other OTC or prescription medications and be sure you add this in the maximum amount you take every day.    Too much acetaminophen can lead to serious liver damage. DO NOT TAKE if you have a history of liver disease.    Ibuprofen 200mg tablets  may be taken up to 4 pills 4 times a day(three times a day if over 65)  to the maximum of 3200mg,  (2400mg if >65)  daily as needed for pain.   Take with food.  Don't take with aspirin, Aleve or other antiinflammatory medication or with warfarin. DO NOT TAKE if you have a history of kidney disease.            JUDIE Vega, CNP  [facility] Provider    The use of Dragon/[employer] dictation services may have been used to construct the content in this note; any grammatical or spelling errors are non-intentional. Please contact the author of this note directly if you are in need of any clarification.

## 2022-09-15 NOTE — PATIENT INSTRUCTIONS
Warm Epsom salts soaks 4 times a day for 15-20 minutes.      Start antibiotics if symptoms fail to improve or worsen.      See podiatry for further assessment and treatment.    Acetaminophen (Tylenol) may be taken up to 4000mg daily (3000mg if over 65) which would be 2 regular strength tablets (325mg) or two extra strength tablets(500mg) up to 4 times a day (3 times a day if over 65).   Check for acetaminophen in other OTC or prescription medications and be sure you add this in the maximum amount you take every day.    Too much acetaminophen can lead to serious liver damage. DO NOT TAKE if you have a history of liver disease.    Ibuprofen 200mg tablets may be taken up to 4 pills 4 times a day(three times a day if over 65)  to the maximum of 3200mg,  (2400mg if >65)  daily as needed for pain.   Take with food.  Don't take with aspirin, Aleve or other antiinflammatory medication or with warfarin. DO NOT TAKE if you have a history of kidney disease.

## 2022-09-19 ENCOUNTER — TRANSFERRED RECORDS (OUTPATIENT)
Dept: HEALTH INFORMATION MANAGEMENT | Facility: CLINIC | Age: 47
End: 2022-09-19

## 2022-10-06 DIAGNOSIS — E03.4 HYPOTHYROIDISM DUE TO ACQUIRED ATROPHY OF THYROID: ICD-10-CM

## 2022-10-10 RX ORDER — LEVOTHYROXINE SODIUM 75 UG/1
TABLET ORAL
Qty: 90 TABLET | Refills: 2 | Status: SHIPPED | OUTPATIENT
Start: 2022-10-10 | End: 2023-04-18

## 2022-10-10 NOTE — TELEPHONE ENCOUNTER
Prescription approved per [facility] Refill Protocol.  Ngozi SANTOYO RN  [facility]

## 2022-10-15 ENCOUNTER — HEALTH MAINTENANCE LETTER (OUTPATIENT)
Age: 47
End: 2022-10-15

## 2022-10-30 ENCOUNTER — OFFICE VISIT (OUTPATIENT)
Dept: URGENT CARE | Facility: URGENT CARE | Age: 47
End: 2022-10-30
Payer: COMMERCIAL

## 2022-10-30 VITALS
DIASTOLIC BLOOD PRESSURE: 82 MMHG | HEART RATE: 68 BPM | OXYGEN SATURATION: 99 % | WEIGHT: 201 LBS | SYSTOLIC BLOOD PRESSURE: 124 MMHG | BODY MASS INDEX: 34.5 KG/M2 | TEMPERATURE: 97.3 F | RESPIRATION RATE: 16 BRPM

## 2022-10-30 DIAGNOSIS — L03.032 PARONYCHIA OF TOE, LEFT: Primary | ICD-10-CM

## 2022-10-30 PROCEDURE — 99213 OFFICE O/P EST LOW 20 MIN: CPT | Performed by: FAMILY MEDICINE

## 2022-10-30 RX ORDER — CEPHALEXIN 500 MG/1
500 CAPSULE ORAL 3 TIMES DAILY
Qty: 21 CAPSULE | Refills: 0 | Status: SHIPPED | OUTPATIENT
Start: 2022-10-30 | End: 2022-11-06

## 2022-10-30 NOTE — PROGRESS NOTES
Clinical Decision Making:    At the end of the encounter, I discussed results, diagnosis, medications. Discussed red flags for immediate return to clinic/ER, as well as indications for follow up if no improvement. Patient understood and agreed to plan. Patient was stable for discharge.      ICD-10-CM    1. Paronychia of toe, left  L03.032 cephALEXin (KEFLEX) 500 MG capsule     Orthopedic  Referral        Cephalexin 500 mg 3 times a day for 7 days  Continue Epsom salt soaks  Referral done to podiatry to consider nail removal  Follow-up if not improving as anticipated  Patient verbalizes understanding      Patient Instructions   Continue epsom salt soaks     Return in about 2 weeks (around 11/13/2022), or if symptoms worsen or fail to improve.      chief complaint    HPI:  Lena Morrow is a 47 year old female who presents today complaining of left great toe pain since yesterday.  She has a history of infected ingrown toenails.  She recently had a pedicure done at a place that she goes to frequently and they typically take very good care of her.  She is allergic to Augmentin but she has tolerated cephalosporins in the past.  She was just treated in September for an infected ingrown toenail with cephalexin and it worked very well.  Patient has not had any fevers  No injury to the toe  She feels that she has gained weight recently and is more difficult for her to bend over and take care of her feet    History obtained from chart review and the patient.    Problem List:  2022-03: Hypothyroidism  2021-11: Exertional asthma  2021-11: Bipolar 1 disorder (H)  2021-11: PHIL (generalized anxiety disorder)  2021-11: Obesity (BMI 30-39.9)  2021-07: Kidney stone  2020-07: Nephrolithiasis  2020-07: Ureteral stone  2020-02: Morbid obesity (H)  2018-04: Obsessive-compulsive disorder  2017-05: Pelvic pain in female  2017-05: Endometriosis  2017-05: Status post laparoscopic hysterectomy  2015-04: Bipolar disorder  (H)  2014-10: Kidney stones  2014-09: Recurrent sinusitis  2014-06: Vitamin D deficiency  2013-03: Generalized anxiety disorder  2012-10: LSIL (low grade squamous intraepithelial lesion) on Pap smear  2011-06: Health Care Home  2010-10: CARDIOVASCULAR SCREENING; LDL GOAL LESS THAN 160  2010-03: Pain in limb  2010-03: Injury to radial nerve  2009-02: Obesity  2007-08: Backache  2007-04: Supervision of normal first pregnancy  2005-12: ASTHMA - MILD INTERMITTENT  2005-09: Panic disorder without agoraphobia  2005-09: DYSPEPSIA  2005-02: Gastroesophageal reflux disease  2005-02: Allergic rhinitis  Herpes simplex virus (HSV) infection  Other type of migraine  DDD (degenerative disc disease), cervical  Depression  Atrial flutter, paroxysmal (H)  Abnormal pap      Past Medical History:   Diagnosis Date     Bipolar 1 disorder (H)      Exertional asthma      PHIL (generalized anxiety disorder)      Hypothyroidism      Obesity (BMI 30-39.9)        Social History     Tobacco Use     Smoking status: Never     Smokeless tobacco: Never   Substance Use Topics     Alcohol use: Yes     Comment: 3-4 beers monthly       Review of systems  negative except listed in HPI    Vitals:    10/30/22 1811   BP: 124/82   Pulse: 68   Resp: 16   Temp: 97.3  F (36.3  C)   TempSrc: Tympanic   SpO2: 99%   Weight: 91.2 kg (201 lb)       Physical Exam  Vitals noted and within normal limits  In general she is alert, oriented, and in no acute distress  Left foot with normal dorsalis pedis and posterior tibial pulses  Sensation intact distally  Left great toe with erythema and swelling medial and distal to the nail.  No fluctuance.  Positive tenderness.  No drainage.

## 2022-11-07 ENCOUNTER — MYC MEDICAL ADVICE (OUTPATIENT)
Dept: INTERNAL MEDICINE | Facility: CLINIC | Age: 47
End: 2022-11-07

## 2022-11-08 NOTE — TELEPHONE ENCOUNTER
"S-(situation): Received a call back from the patient in relation to the [employer] message sent below.     B-(background): Patient was seen in urgent care on 10/30/22 and was prescribed Cephalexin for an ingrown toe nail infection. Patient experienced severe itchiness (patient states she was \"climbing up the wall on Thursday ad Friday last week\") and the patient believes the itchiness was caused by Cephalexin. Patient states she took 4 out of the 7 doses of Cephalexin. Patient was then prescribed 2 doses of Diflucan to help her itchiness.     A-(assessment): Patient states the itchiness has improved but it is not 100% better. Patient states her toe nail also looks better: slight swelling still present. Normal color skin, no fever, no pain, and no drainage present.    R-(recommendations): Patient is wondering what else she can try for the itchiness as it is still interfering with her day to day activities? Does patient need another antibiotic?     Will route to PCP for review and further recommendations.     Can we leave a detailed message on this number? YES  Phone number patient can be reached at: Cell number on file:    Telephone Information:   Mobile [phone]       Yaritza Cardenas RN  [facility] Triage      "

## 2022-11-08 NOTE — TELEPHONE ENCOUNTER
Diflucan was prescribed for suspected yeast infection, not generalized pruritus.    If patient has generalized pruritus, recommend Allegra or Claritin during the day and Benadryl at night.

## 2022-12-22 DIAGNOSIS — F41.1 GENERALIZED ANXIETY DISORDER: ICD-10-CM

## 2022-12-22 RX ORDER — PROPRANOLOL HYDROCHLORIDE 80 MG/1
80 CAPSULE, EXTENDED RELEASE ORAL DAILY
Qty: 90 CAPSULE | Refills: 1 | Status: SHIPPED | OUTPATIENT
Start: 2022-12-22 | End: 2023-10-02

## 2022-12-30 ENCOUNTER — TELEPHONE (OUTPATIENT)
Dept: INTERNAL MEDICINE | Facility: CLINIC | Age: 47
End: 2022-12-30

## 2022-12-30 NOTE — TELEPHONE ENCOUNTER
Patient Quality Outreach      Summary:    Patient has the following on her problem list/HM:     Asthma review       ACT Total Scores 11/19/2021   ACT TOTAL SCORE -   ASTHMA ER VISITS -   ASTHMA HOSPITALIZATIONS -   ACT TOTAL SCORE (Goal Greater than or Equal to 20) 23   In the past 12 months, how many times did you visit the emergency room for your asthma without being admitted to the hospital? 0   In the past 12 months, how many times were you hospitalized overnight because of your asthma? 0          Patient is due/failing the following:   Asthma  -  ACT needed    Type of outreach:    Sent [employer] message.    Questions for provider review:    None                                                                                                                                     Marlyn Stringer, [facility]       Chart routed to None.

## 2023-01-18 ASSESSMENT — PATIENT HEALTH QUESTIONNAIRE - PHQ9
10. IF YOU CHECKED OFF ANY PROBLEMS, HOW DIFFICULT HAVE THESE PROBLEMS MADE IT FOR YOU TO DO YOUR WORK, TAKE CARE OF THINGS AT HOME, OR GET ALONG WITH OTHER PEOPLE: VERY DIFFICULT
SUM OF ALL RESPONSES TO PHQ QUESTIONS 1-9: 17
SUM OF ALL RESPONSES TO PHQ QUESTIONS 1-9: 17

## 2023-01-19 ENCOUNTER — ANCILLARY PROCEDURE (OUTPATIENT)
Dept: GENERAL RADIOLOGY | Facility: CLINIC | Age: 48
End: 2023-01-19
Attending: INTERNAL MEDICINE
Payer: OTHER MISCELLANEOUS

## 2023-01-19 ENCOUNTER — OFFICE VISIT (OUTPATIENT)
Dept: INTERNAL MEDICINE | Facility: CLINIC | Age: 48
End: 2023-01-19
Payer: OTHER MISCELLANEOUS

## 2023-01-19 VITALS
TEMPERATURE: 96.3 F | DIASTOLIC BLOOD PRESSURE: 76 MMHG | OXYGEN SATURATION: 98 % | HEART RATE: 77 BPM | SYSTOLIC BLOOD PRESSURE: 112 MMHG | BODY MASS INDEX: 36.32 KG/M2 | WEIGHT: 211.6 LBS
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
## 2023-01-19 DIAGNOSIS — M54.2 NECK PAIN: ICD-10-CM

## 2023-01-19 DIAGNOSIS — Z02.6 ENCOUNTER RELATED TO WORKER'S COMPENSATION CLAIM: Primary | ICD-10-CM

## 2023-01-19 PROCEDURE — 72040 X-RAY EXAM NECK SPINE 2-3 VW: CPT | Mod: TC | Performed by: RADIOLOGY

## 2023-01-19 PROCEDURE — 99213 OFFICE O/P EST LOW 20 MIN: CPT | Performed by: INTERNAL MEDICINE

## 2023-01-19 NOTE — PROGRESS NOTES
ASSESSMENT/PLAN                                                      (Z02.6) Encounter related to worker's compensation claim  (primary encounter diagnosis)  (W19.XXXA) Fall, initial encounter  (M54.2) Neck pain  Plan: cervical spine series today; provided this is within or near normal limits, proceed with trial of physical therapy (referral placed - patient will be contacted to schedule).     Katherine Zamora MD   [address] [address]  T: [phone], F: [phone]    SUBJECTIVE                                                      Lena Morrow is a very pleasant 47 year old female who presents with left sided neck pain:    Patient slipped on ice in the parking lot of her workplace 11/17/2022.  She landed on her bottom and left palm.  Since then she has been experiencing left lateral neck pain that is worse with turning her head in either direction.  Patient describes the pain as constantly burning.  Some improvement with ice application.    No radiation of pain to shoulder or upper back.  No upper extremity numbness, tingling, or weakness.  No loss of dexterity.  No gait instability, near falls, or falls.    This is her first evaluation of neck pain since her injury.    OBJECTIVE                                                      /76   Pulse 77   Temp (!) 96.3  F (35.7  C) (Temporal)   Wt 96 kg (211 lb 9.6 oz)   LMP 12/10/2015   SpO2 98%   BMI 36.32 kg/m    Constitutional: well-appearing  Cervical spine: No deformity, crepitus, or step-off; no spinal or paraspinal tenderness to palpation  Neck: supple, symmetric, no thyromegaly or lymphadenopathy; focal tenderness to palpation along left lateral neck    ---    (Note documentation was completed, in part, with [employer] voice-recognition software. Documentation was reviewed, but some grammatical, spelling, and word errors may remain.)

## 2023-01-20 ENCOUNTER — MYC MEDICAL ADVICE (OUTPATIENT)
Dept: INTERNAL MEDICINE | Facility: CLINIC | Age: 48
End: 2023-01-20
Payer: COMMERCIAL

## 2023-01-20 DIAGNOSIS — R11.0 NAUSEA: Primary | ICD-10-CM

## 2023-01-20 RX ORDER — ONDANSETRON 4 MG/1
4 TABLET, ORALLY DISINTEGRATING ORAL EVERY 6 HOURS PRN
Qty: 10 TABLET | Refills: 0 | Status: SHIPPED | OUTPATIENT
Start: 2023-01-20 | End: 2023-07-07

## 2023-01-20 NOTE — TELEPHONE ENCOUNTER
Dr. Zamora,    Patient seen in the clinic yesterday. Now patient is requesting a refill of Ondansetron. Medication pended for review.     Yaritza Cardenas RN

## 2023-01-31 ENCOUNTER — THERAPY VISIT (OUTPATIENT)
Dept: PHYSICAL THERAPY | Facility: CLINIC | Age: 48
End: 2023-01-31
Attending: INTERNAL MEDICINE
Payer: OTHER MISCELLANEOUS

## 2023-01-31 DIAGNOSIS — M54.2 NECK PAIN: ICD-10-CM

## 2023-01-31 PROCEDURE — 97140 MANUAL THERAPY 1/> REGIONS: CPT | Mod: GP | Performed by: PHYSICAL THERAPIST

## 2023-01-31 PROCEDURE — 97161 PT EVAL LOW COMPLEX 20 MIN: CPT | Mod: GP | Performed by: PHYSICAL THERAPIST

## 2023-01-31 NOTE — PROGRESS NOTES
"Physical Therapy Initial Evaluation  Subjective:  The history is provided by the patient. No  was used.   Patient Health History  Lena Morrow being seen for Neck pain.     Problem began: 2022.   Problem occurred: I slipped and fell on ice at work   Pain is reported as 6/10 on pain scale.  General health as reported by patient is fair.  Pertinent medical history includes: asthma, depression, migraines/headaches, overweight and thyroid problems.   Red flags:  None as reported by patient.  Medical allergies: see epic.   Surgeries include:  Other. Other surgery history details: Partial hysterectomy, Gall bladder, ,.    Current medications:  Anti-depressants, pain medication and thyroid medication.    Current occupation is Customer Service.   Primary job tasks include:  Computer work, prolonged sitting and other.   Other job/home tasks details: Phones.                Therapist Generated HPI Evaluation  Problem details: Pt presents with complaints of L shoulder pain sustained after a fall at work on 22. Pt reported she was on the way to her car at the end of her workday when she slipped on a patch of ice. Pt reported landing on her buttock and L hand. Pt reported experiencing shooting pain up the L UE into the neck at the time of impact. Pt reported several co-workers were in the vicinity at the time of the fall and assisted her upright and to her car. Pt reported she did not seek medical intervention at the time, assuming \"soreness\" would resolve with time. Pt reported finally scheduling a follow-up with her primary on 23 due to increasing sx's. Pt reported recent addition of L lateral shoulder pain. Pt reported she is unable to lie on either R/L side due to hips issues so sleeps on her back. Pt reports she lies with her UE's positioned in elevation-hands over head. Pt reported issues with low back/bilateral hips prior to the fall; sx's with the low back and hips have also " increased since the fall.         Type of problem:  Cervical spine.    This is a new condition.  Condition occurred with:  A fall/slip.  Where condition occurred: at work.  Patient reports pain:  Cervical left side.  Pain is described as burning and is constant.  Pain radiates to:  Shoulder left. Pain is worse during the day and worse during the night.  Since onset symptoms are gradually worsening.  Associated symptoms:  Loss of motion/stiffness. Symptoms are exacerbated by driving, lying down, rotating head, certain positions, carrying and lifting  and relieved by ice.  Special tests included:  X-ray (cervical spine).    Restrictions due to condition include:  Working in normal job with restrictions.                          Objective:    Gait:    Gait Type:  Normal                         Cervical/Thoracic Evaluation    AROM:  AROM Cervical:    Flexion:            Moderate loss secondary to increased left neck pain  Extension:       Moderate loss  Rotation:         Left: significant loss secondary to increased L sided neck pain     Right: moderate loss secondary to increased L sided neck pain  Side Bend:      Left: moderate to significant loss; reported onset of N/T x1; unable to reproduce with subsequent reps     Right:  WFL's      Headaches: cervical          Cervical Palpation:    Tenderness present at Left:    Sternocleidomstoid; Scalenes and Facet                 Shoulder Evaluation:  ROM:  AROM:    Flexion:  Left:  ~90 degrees; pain limited        Abduction:  Left: ~45 degrees; pain limited                                     Palpation:    Left shoulder tenderness present at:  Clavicle (significant tenderness noted along the supraclavicular fossa on the L)                                       General     ROS    Assessment/Plan:    Patient is a 47 year old female with cervical and left side shoulder complaints.    Patient has the following significant findings with corresponding treatment plan.                 Diagnosis 1:  L sided neck pain; L shoulder pain  Pain -  manual therapy, self management, education and home program  Decreased ROM/flexibility - manual therapy and therapeutic exercise  Decreased strength - therapeutic exercise and therapeutic activities  Impaired muscle performance - neuro re-education  Decreased function - therapeutic activities    Therapy Evaluation Codes:   1) History comprised of:   Personal factors that impact the plan of care:      Coping style and Past/current experiences.    Comorbidity factors that impact the plan of care are:      None.     Medications impacting care: None.  2) Examination of Body Systems comprised of:   Body structures and functions that impact the plan of care:      Cervical spine and Shoulder.   Activity limitations that impact the plan of care are:      Bathing, Driving, Dressing, Lifting, Reading/Computer work, Working and Sleeping.  3) Clinical presentation characteristics are:   Stable/Uncomplicated.  4) Decision-Making    Low complexity using standardized patient assessment instrument and/or measureable assessment of functional outcome.  Cumulative Therapy Evaluation is: Low complexity.    Previous and current functional limitations:  (See Goal Flow Sheet for this information)    Short term and Long term goals: (See Goal Flow Sheet for this information)     Communication ability:  Patient appears to be able to clearly communicate and understand verbal and written communication and follow directions correctly.  Treatment Explanation - The following has been discussed with the patient:   RX ordered/plan of care  Anticipated outcomes  Possible risks and side effects  This patient would benefit from PT intervention to resume normal activities.   Rehab potential is good.    Frequency:  2 X week, once daily  Duration:  for 2-3 weeks, tapering to 1x/week for 3-4 weeks  Discharge Plan:  Independent in home treatment program.  Reach maximal therapeutic  benefit.    Please refer to the daily flowsheet for treatment today, total treatment time and time spent performing 1:1 timed codes.

## 2023-02-07 ENCOUNTER — THERAPY VISIT (OUTPATIENT)
Dept: PHYSICAL THERAPY | Facility: CLINIC | Age: 48
End: 2023-02-07
Attending: INTERNAL MEDICINE
Payer: OTHER MISCELLANEOUS

## 2023-02-07 DIAGNOSIS — M54.2 NECK PAIN: ICD-10-CM

## 2023-02-07 PROCEDURE — 97110 THERAPEUTIC EXERCISES: CPT | Mod: GP | Performed by: PHYSICAL THERAPIST

## 2023-02-07 PROCEDURE — 97530 THERAPEUTIC ACTIVITIES: CPT | Mod: GP | Performed by: PHYSICAL THERAPIST

## 2023-02-16 DIAGNOSIS — R10.13 EPIGASTRIC PAIN: ICD-10-CM

## 2023-02-17 RX ORDER — ESOMEPRAZOLE MAGNESIUM 40 MG/1
CAPSULE, DELAYED RELEASE ORAL
Qty: 90 CAPSULE | Refills: 1 | Status: SHIPPED | OUTPATIENT
Start: 2023-02-17 | End: 2023-08-29

## 2023-03-10 ENCOUNTER — TELEPHONE (OUTPATIENT)
Dept: INTERNAL MEDICINE | Facility: CLINIC | Age: 48
End: 2023-03-10
Payer: COMMERCIAL

## 2023-03-14 ENCOUNTER — THERAPY VISIT (OUTPATIENT)
Dept: PHYSICAL THERAPY | Facility: CLINIC | Age: 48
End: 2023-03-14
Payer: OTHER MISCELLANEOUS

## 2023-03-14 DIAGNOSIS — M54.2 NECK PAIN: Primary | ICD-10-CM

## 2023-03-14 PROCEDURE — 97530 THERAPEUTIC ACTIVITIES: CPT | Mod: GP | Performed by: PHYSICAL THERAPIST

## 2023-03-14 PROCEDURE — 97140 MANUAL THERAPY 1/> REGIONS: CPT | Mod: GP | Performed by: PHYSICAL THERAPIST

## 2023-03-17 ENCOUNTER — OFFICE VISIT (OUTPATIENT)
Dept: INTERNAL MEDICINE | Facility: CLINIC | Age: 48
End: 2023-03-17
Payer: COMMERCIAL

## 2023-03-17 VITALS
HEIGHT: 64 IN | DIASTOLIC BLOOD PRESSURE: 78 MMHG | WEIGHT: 215 LBS | BODY MASS INDEX: 36.7 KG/M2 | SYSTOLIC BLOOD PRESSURE: 128 MMHG | HEART RATE: 64 BPM | RESPIRATION RATE: 20 BRPM

## 2023-03-17 DIAGNOSIS — Z02.6 ENCOUNTER RELATED TO WORKER'S COMPENSATION CLAIM: Primary | ICD-10-CM

## 2023-03-17 DIAGNOSIS — M54.12 CERVICAL RADICULOPATHY: ICD-10-CM

## 2023-03-17 PROCEDURE — 99213 OFFICE O/P EST LOW 20 MIN: CPT | Performed by: INTERNAL MEDICINE

## 2023-03-17 RX ORDER — PREDNISONE 20 MG/1
TABLET ORAL
Qty: 16 TABLET | Refills: 0 | Status: SHIPPED | OUTPATIENT
Start: 2023-03-17 | End: 2023-03-29

## 2023-03-17 ASSESSMENT — PAIN SCALES - GENERAL: PAINLEVEL: SEVERE PAIN (7)

## 2023-03-17 NOTE — PROGRESS NOTES
"  ASSESSMENT/PLAN                                                      (Z02.6) Encounter related to worker's compensation claim  (primary encounter diagnosis)  (M54.12) Cervical radiculopathy  Comment: progressive symptoms in spite of physical therapy.  Plan: MRI cervical spine ordered for further evaluation - patient to schedule; oral steroid taper prescribed  -patient to use as directed.    Katherine Zamora MD   [facility]  [address] [address]  T: [phone], F: [phone]    SUBJECTIVE                                                      Lena Morrow is a very pleasant 47 year old female who presents for follow-up:    Patient was seen 1/19/2023 after slipping on ice in the parking lot of her workplace 11/17/2022. Please see that note for details. She was referred to physical therapy for further evaluation and treatment of left-sided neck pain.    She has been meeting with physical therapy regularly since then. Unfortunately, her symptoms have actually progressed since her last visit. While her left neck pain has improved, she has developed left shoulder pain. Thisp ain often radiates down her left arm. Pain occurs with minimal abduction, is burning in quality, and moderate-severe in severity. Pain also occurs when turning her head from side to side, turning right more than turning left. The pain makes it difficult for her to dress herself, shower, and drive. Her pain is alleviated when she lets her left arm hang down, unsupported.    OBJECTIVE                                                      /78 (BP Location: Left arm, Patient Position: Sitting, Cuff Size: Adult Large)   Pulse 64   Resp 20   Ht 1.626 m (5' 4\")   Wt 97.5 kg (215 lb)   LMP 12/10/2015   BMI 36.90 kg/m    Constitutional: well-appearing  Psych: normal judgment and insight; normal mood and affect; recent and remote memory intact    ---    (Note documentation was completed, in part, with [employer] " voice-recognition software. Documentation was reviewed, but some grammatical, spelling, and word errors may remain.)

## 2023-03-21 ENCOUNTER — THERAPY VISIT (OUTPATIENT)
Dept: PHYSICAL THERAPY | Facility: CLINIC | Age: 48
End: 2023-03-21
Payer: OTHER MISCELLANEOUS

## 2023-03-21 DIAGNOSIS — M54.2 NECK PAIN: Primary | ICD-10-CM

## 2023-03-21 PROCEDURE — 97140 MANUAL THERAPY 1/> REGIONS: CPT | Mod: GP | Performed by: PHYSICAL THERAPIST

## 2023-03-21 PROCEDURE — 97012 MECHANICAL TRACTION THERAPY: CPT | Mod: GP | Performed by: PHYSICAL THERAPIST

## 2023-03-26 ENCOUNTER — HEALTH MAINTENANCE LETTER (OUTPATIENT)
Age: 48
End: 2023-03-26

## 2023-03-26 ENCOUNTER — HOSPITAL ENCOUNTER (OUTPATIENT)
Dept: MRI IMAGING | Facility: CLINIC | Age: 48
Discharge: HOME | End: 2023-03-26
Attending: INTERNAL MEDICINE | Admitting: INTERNAL MEDICINE
Payer: OTHER MISCELLANEOUS

## 2023-03-26 DIAGNOSIS — M54.12 CERVICAL RADICULOPATHY: ICD-10-CM

## 2023-03-26 PROCEDURE — 72141 MRI NECK SPINE W/O DYE: CPT

## 2023-03-27 ENCOUNTER — TRANSFERRED RECORDS (OUTPATIENT)
Dept: HEALTH INFORMATION MANAGEMENT | Facility: CLINIC | Age: 48
End: 2023-03-27

## 2023-03-29 ENCOUNTER — THERAPY VISIT (OUTPATIENT)
Dept: PHYSICAL THERAPY | Facility: CLINIC | Age: 48
End: 2023-03-29
Payer: OTHER MISCELLANEOUS

## 2023-03-29 DIAGNOSIS — M54.2 NECK PAIN: Primary | ICD-10-CM

## 2023-03-29 PROCEDURE — 97110 THERAPEUTIC EXERCISES: CPT | Mod: GP | Performed by: PHYSICAL THERAPIST

## 2023-03-29 PROCEDURE — 97140 MANUAL THERAPY 1/> REGIONS: CPT | Mod: GP | Performed by: PHYSICAL THERAPIST

## 2023-04-03 ENCOUNTER — THERAPY VISIT (OUTPATIENT)
Dept: PHYSICAL THERAPY | Facility: CLINIC | Age: 48
End: 2023-04-03
Payer: OTHER MISCELLANEOUS

## 2023-04-03 DIAGNOSIS — M54.2 NECK PAIN: Primary | ICD-10-CM

## 2023-04-03 PROCEDURE — 97110 THERAPEUTIC EXERCISES: CPT | Mod: GP

## 2023-04-04 ENCOUNTER — OFFICE VISIT (OUTPATIENT)
Dept: NEUROSURGERY | Facility: CLINIC | Age: 48
End: 2023-04-04
Payer: OTHER MISCELLANEOUS

## 2023-04-04 VITALS — DIASTOLIC BLOOD PRESSURE: 84 MMHG | OXYGEN SATURATION: 97 % | SYSTOLIC BLOOD PRESSURE: 135 MMHG | HEART RATE: 71 BPM

## 2023-04-04 DIAGNOSIS — M25.512 PAIN IN JOINT OF LEFT SHOULDER: Primary | ICD-10-CM

## 2023-04-04 DIAGNOSIS — M54.12 CERVICAL RADICULOPATHY: ICD-10-CM

## 2023-04-04 PROCEDURE — 99205 OFFICE O/P NEW HI 60 MIN: CPT | Performed by: NEUROLOGICAL SURGERY

## 2023-04-04 RX ORDER — CYCLOBENZAPRINE HCL 10 MG
10 TABLET ORAL 2 TIMES DAILY
Qty: 28 TABLET | Refills: 0 | Status: SHIPPED | OUTPATIENT
Start: 2023-04-04 | End: 2023-04-17

## 2023-04-04 ASSESSMENT — PAIN SCALES - GENERAL: PAINLEVEL: MILD PAIN (3)

## 2023-04-04 NOTE — PROGRESS NOTES
NEUROSURGERY CONSULTATION NOTE      Neurosurgery was asked to see this patient by No att. providers found for evaluation of neck and left shoulder pain.       CONSULTATION ASSESSMENT AND PLAN:    Ms. Morrow is a 47-year-old right-handed female with significant past medical history of bipolar disorder on treatment, generalized anxiety disorder on treatment, hypothyroidism, obesity presented to the clinic today with her son for the evaluation of left-sided neck pain and left shoulder pain.  Her pain started following a fall on the ice in November 2022 while going to the work.  She reports painful abduction of left shoulder especially while dressing herself or combing her hair and requires help of her family members.  She denies any weakness sensory symptoms, bladder or bowel symptoms    Her MRI shows moderate central canal and lateral recess stenosis at C5-6 and C6-7.  On clinical examination she is neurologically intact with no hyperreflexia or Ferrell's or clonus.  Based on her clinical findings and MRI cervical spine, I would recommend continuing physical therapy and conservative management at this point.  I would also prescribe her Flexeril 10 mg twice daily for 14 days,  muscle relaxant to help with some of the neck pain.  I would also recommend to follow-up with Orthopedician to rule out injury to her left shoulder, given that she has left shoulder joint line tenderness.  I discussed the imaging with the patient and explained her the findings.  I also discussed with the patient if her symptoms persist we can do MRI brachial plexus to look for any injury to the brachial plexus if her symptoms related to left shoulder persist.      All questions were answered and she agreed with the plan.  She can contact us if there are any questions or concerns or any new onset neurological deficit.  She can follow-up with us on as needed basis or if there are any new onset neurological symptoms.      I spent more than 60  minutes in this apt, examining the pt, reviewing the scans, reviewing notes from chart, discussing treatment options with risks and benefits and coordinating care.     Rajesh Enriquez MD      HPI:    Ms. Morrow is a 47-year-old right-handed female with significant past medical history of bipolar disorder on treatment, generalized anxiety disorder on treatment, hypothyroidism, obesity presented to the clinic today with her son for the evaluation of left-sided neck pain and left shoulder pain.  Her pain started following a fall on the ice in November 2022 while going to the work she landed on her left wrist which resulted with hyperextension of the wrist and she started feeling pain in her left wrist and left shoulder and the lateral aspect of left neck.  She also landed on her buttocks during the fall.  She denies any new onset weakness or tingling sensation at that time.  She reported that she noticed stiffness and burning pain along her left shoulder since the injury.  She tried physical therapy for the same and reports significant improvement in the left neck pain however her left shoulder pain remain more or less the same.    She reports no pain during her visit to the clinic today.  She is currently on Medrol Dosepak with tapering dose of prednisone.  Due to persistent pain she underwent MRI cervical spine and she is here today to discuss the results of the same.  She also reports that she has painful abduction of her left shoulder and requires help of her son and  during dressing herself and combing her hair.  She describes burning kind of pain whenever she does these maneuvers.    She denies any weakness in her hands in the form of dropping things, denies any tingling in her hands, or any bladder or bowel symptoms.    She denies any significant cardiac or pulmonary history.  She is not on aspirin, Plavix or any other anticoagulants.    She is a non-smoker and occasionally consumes alcohol and denies use  of any recreational medication.    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    Past Medical History:   Diagnosis Date     Bipolar 1 disorder (H)      Exertional asthma      PHIL (generalized anxiety disorder)      Hypothyroidism      Obesity (BMI 30-39.9)        Past Surgical History:   Procedure Laterality Date     CARPAL TUNNEL RELEASE RT/LT Right     + excision right dorsal carpal ganglion cyst & terminal branch, right posterior interosseous nerve.      SECTION       CYSTOSCOPY N/A 2017    Procedure: CYSTOSCOPY;;  Surgeon: Toni Da Silva MD;  Location:  OR     DAVINCI HYSTERECTOMY TOTAL, SALPINGECTOMY BILATERAL N/A 2017    Procedure: DAVINCI HYSTERECTOMY TOTAL, SALPINGECTOMY BILATERAL;  ROBOTIC ASSISTED LAPAROSCOPIC TOTAL HYSTERECTOMY; BILATERAL SALPINGECTOMY; CYSTOSCOPY;  Surgeon: Toni Da Silva MD;  Location:  OR     DILATION AND CURETTAGE N/A 2016    Procedure: DILATION AND CURETTAGE;  Surgeon: Josue Jama MD;  Location:  [facility]     EXTRACORPOREAL SHOCK WAVE LITHOTRIPSY (ESWL) Left 2021    Procedure: LEFT EXTRACORPOREAL SHOCK WAVE LITHOTRIPSY (ESWL);  Surgeon: Devonte Lambert MD;  Location:  OR     HYSTERECTOMY, PAP NO LONGER INDICATED       LAPAROSCOPIC CHOLECYSTECTOMY  2012    Procedure:LAPAROSCOPIC CHOLECYSTECTOMY; LAPAROSCOPIC CHOLECYSTECTOMY ; Surgeon:KARYNA CAMARA; Location:[facility]     LAPAROSCOPY DIAGNOSTIC (GYN) N/A 2016    Procedure: LAPAROSCOPY DIAGNOSTIC (GYN);  Surgeon: Josue Jama MD;  Location: [facility]     LASER HOLMIUM LITHOTRIPSY URETER(S), INSERT STENT, COMBINED Left 2020    Procedure: Cystoscopy, left ureteroscopy with holmium laser lithotripsy and left stent placement;  Surgeon: Devonte Lambert MD;  Location:  OR     TONSILLECTOMY & ADENOIDECTOMY         REVIEW OF SYSTEMS:  Review Of Systems  Skin: negative  Eyes: negative  Ears/Nose/Throat: negative  Respiratory: No  shortness of breath, dyspnea on exertion, cough, or hemoptysis  Cardiovascular: negative  Gastrointestinal: negative  Genitourinary: negative  Musculoskeletal: Neck pain and left shoulder pain   neurologic: negative  Psychiatric: negative  Hematologic/Lymphatic/Immunologic: negative  Endocrine: negative    MEDICATIONS:  Current Outpatient Medications   Medication Sig Dispense Refill     albuterol (PROVENTIL HFA) 108 (90 Base) MCG/ACT inhaler Inhale 2 puffs into the lungs every 6 hours 8.5 g 0     albuterol (PROVENTIL) (2.5 MG/3ML) 0.083% neb solution Take 1 vial (2.5 mg) by nebulization every 4 hours as needed for shortness of breath / dyspnea or wheezing 300 mL 0     buPROPion (WELLBUTRIN XL) 300 MG 24 hr tablet Take 300 mg by mouth every morning       Calcium Carbonate-Vit D-Min (CALCIUM 1200 PO) Take 1 capsule by mouth daily       cholecalciferol (VITAMIN D3) 25 mcg (1000 units) capsule Take 1 capsule by mouth daily       esomeprazole (NEXIUM) 40 MG DR capsule TAKE 1 CAPSULE(40 MG) BY MOUTH EVERY MORNING BEFORE BREAKFAST AND 30 TO 60 MINUTES BEFORE EATING 90 capsule 1     fluconazole (DIFLUCAN) 150 MG tablet Take 1 tablet (150 mg) by mouth once a week One tablet per week 2 tablet 0     gabapentin (NEURONTIN) 100 MG capsule Take 1 capsule (100 mg) by mouth At Bedtime 90 capsule 3     ketoconazole (NIZORAL) 2 % external shampoo Apply topically daily as needed for itching or irritation 120 mL 3     lamoTRIgine (LAMICTAL) 200 MG tablet Take 200 mg by mouth 2 times daily       levothyroxine (SYNTHROID/LEVOTHROID) 75 MCG tablet TAKE 1 TABLET(75 MCG) BY MOUTH DAILY 90 tablet 2     lithium ER (LITHOBID) 300 MG CR tablet Take 1,200 mg by mouth At Bedtime   0     loratadine-pseudoePHEDrine (CLARITIN-D 12-HOUR) 5-120 MG 12 hr tablet Take 1 tablet by mouth 2 times daily        Multiple Vitamins-Minerals (WOMENS MULTIVITAMIN PO) Take 2 capsules by mouth daily       ondansetron (ZOFRAN ODT) 4 MG ODT tab Take 1 tablet (4 mg) by  mouth every 6 hours as needed for nausea 10 tablet 0     potassium 99 MG TABS Take 99 mg by mouth daily        predniSONE (DELTASONE) 20 MG tablet Take 3 tablets (60 mg) by mouth daily for 2 days, THEN 2 tablets (40 mg) daily for 4 days, THEN 1 tablet (20 mg) daily for 4 days, THEN 0.5 tablets (10 mg) daily for 4 days. 20 tablet 0     propranolol ER (INDERAL LA) 80 MG 24 hr capsule Take 1 capsule (80 mg) by mouth daily 90 capsule 1         ALLERGIES/SENSITIVITIES:     Allergies   Allergen Reactions     Aripiprazole Other (See Comments)     increased sadness and moodiness     Augmentin Rash     Azithromycin GI Disturbance     Lexapro Diarrhea            Oxycodone Headache and GI Disturbance     Prochlorperazine Other (See Comments)     agitation     Sertraline Other (See Comments)     sweats     Venlafaxine Other (See Comments)     sweats       PERTINENT SOCIAL HISTORY: Non-smoker  Social History     Socioeconomic History     Marital status:    Occupational History     Occupation: [employer] Service - [employer]   Tobacco Use     Smoking status: Never     Smokeless tobacco: Never   Vaping Use     Vaping status: Never Used   Substance and Sexual Activity     Alcohol use: Yes     Comment: 3-4 beers monthly     Drug use: Never     Sexual activity: Yes     Partners: Male   Social History Narrative    .    Son (14 as of 2021).    Walks when weather permits.          FAMILY HISTORY:  Family History   Problem Relation Age of Onset     Alcoholism Mother      Diabetes Type 2  Mother      Hypertension Mother      Depression Mother      Lung Cancer Father         smoker     Brain Tumor Brother         GBM     Diabetes Type 2  Paternal Grandmother      Myocardial Infarction Paternal Grandmother      Heart Failure Paternal Grandmother      Cancer Paternal Grandfather         unknown     Brain Cancer Paternal Aunt      Cerebrovascular Disease No family hx of      Breast Cancer No family hx of      Colon Cancer No family  hx of      Ovarian Cancer No family hx of         PHYSICAL EXAM:   Constitutional: /84   Pulse 71   LMP 12/10/2015   SpO2 97%      Mental Status: A & O in no acute distress.  Affect is appropriate.  Speech is fluent.  Recent and remote memory are intact.  Attention span and concentration are normal.      Cranial Nerves:   CN1: grossly intact per patient recall.   CN2: No funduscopic exam performed.   CN3,4 & 6: Pupillary light response, lateral and vertical gaze normal.  No nystagmus.  Visual fields are full to confrontation.   CN5: Intact to touch   CN7: No facial weakness, smile, facial symmetry intact.   CN8: Intact to spoken voice.   CN9&10: Gag reflex, uvula midline, palate rises with phonation.   CN11: Shoulder shrug 5/5 intact bilaterally.   CN12: Tongue midline and moves freely from side to side.     Motor: No pronator drift of upper extremity.   Normal bulk and tone all muscle groups of upper and lower extremities.       Delt Bi Tri Hand Flex/  Ext Iliopsoas Quadriceps Tibialis Anterior EHL Gastroc     C5 C6 C7 C8/T1 L2 L3 L4 L5 S1   R 5 5 5 5 5 5 5 5 5   L 5 5 5 5 5 5 5 5 5          Sensory: Sensation intact bilaterally to light touch.     Coordination; finger to nose, heel to shin, rapid alternating movements smooth and rhythmic.   Romberg intact.   Heel/toe/tandem gait intact.    Normal gait and station.     Reflexes;                       Right              Left  Brachioradialis (C5,6)      2+                 2+  Biceps   (C5,6)                 2+                 2+  Triceps  (C7,8)                 2+                2+  Knee (L3,4)                      2+                2+  Ankle jerk (S1,2)              2+                2+        No paraspinal tenderness.    Left shoulder joint line tenderness.    IMAGING:  I personally reviewed all radiographic images and agree with the radiologist report of moderate spinal canal narrowing at C5-6 and C6-7.    03/26/2023: MRI cervical spine:    IMPRESSION:  1.  Moderate severe spinal canal narrowing at C6-C7.  2.  Moderate spinal canal narrowing and moderate narrowing of the right lateral recess at C5-C6.  3.  Mild spinal canal narrowing and mild narrowing of the left lateral recess at C4-C5.  4.  Moderate to severe bilateral neuroforaminal narrowing at C6-C7.  5.  Moderate to severe right and mild to moderate left neuroforaminal narrowing at C5-C6.  6.  Modic type I changes at C5-C6 and C6-C7.         Cc:   Katherine Zamora R

## 2023-04-04 NOTE — LETTER
4/4/2023         RE: Lena Morrow  [address]  [facility] [address]        Dear Colleague,    Thank you for referring your patient, Lena Morrow, to the [facility]. Please see a copy of my visit note below.    NEUROSURGERY CONSULTATION NOTE      Neurosurgery was asked to see this patient by No att. providers found for evaluation of neck and left shoulder pain.       CONSULTATION ASSESSMENT AND PLAN:    Ms. Morrow is a 47-year-old right-handed female with significant past medical history of bipolar disorder on treatment, generalized anxiety disorder on treatment, hypothyroidism, obesity presented to the clinic today with her son for the evaluation of left-sided neck pain and left shoulder pain.  Her pain started following a fall on the ice in November 2022 while going to the work.  She reports painful abduction of left shoulder especially while dressing herself or combing her hair and requires help of her family members.  She denies any weakness sensory symptoms, bladder or bowel symptoms    Her MRI shows moderate central canal and lateral recess stenosis at C5-6 and C6-7.  On clinical examination she is neurologically intact with no hyperreflexia or Ferrell's or clonus.  Based on her clinical findings and MRI cervical spine, I would recommend continuing physical therapy and conservative management at this point.  I would also prescribe her Flexeril 10 mg twice daily for 14 days,  muscle relaxant to help with some of the neck pain.  I would also recommend to follow-up with Orthopedician to rule out injury to her left shoulder, given that she has left shoulder joint line tenderness.  I discussed the imaging with the patient and explained her the findings.  I also discussed with the patient if her symptoms persist we can do MRI brachial plexus to look for any injury to the brachial plexus if her symptoms related to left shoulder persist.      All questions were  answered and she agreed with the plan.  She can contact us if there are any questions or concerns or any new onset neurological deficit.  She can follow-up with us on as needed basis or if there are any new onset neurological symptoms.      I spent more than 60 minutes in this apt, examining the pt, reviewing the scans, reviewing notes from chart, discussing treatment options with risks and benefits and coordinating care.     Rajesh Enriquez MD      HPI:    Ms. Morrow is a 47-year-old right-handed female with significant past medical history of bipolar disorder on treatment, generalized anxiety disorder on treatment, hypothyroidism, obesity presented to the clinic today with her son for the evaluation of left-sided neck pain and left shoulder pain.  Her pain started following a fall on the ice in November 2022 while going to the work she landed on her left wrist which resulted with hyperextension of the wrist and she started feeling pain in her left wrist and left shoulder and the lateral aspect of left neck.  She also landed on her buttocks during the fall.  She denies any new onset weakness or tingling sensation at that time.  She reported that she noticed stiffness and burning pain along her left shoulder since the injury.  She tried physical therapy for the same and reports significant improvement in the left neck pain however her left shoulder pain remain more or less the same.    She reports no pain during her visit to the clinic today.  She is currently on Medrol Dosepak with tapering dose of prednisone.  Due to persistent pain she underwent MRI cervical spine and she is here today to discuss the results of the same.  She also reports that she has painful abduction of her left shoulder and requires help of her son and  during dressing herself and combing her hair.  She describes burning kind of pain whenever she does these maneuvers.    She denies any weakness in her hands in the form of dropping things,  denies any tingling in her hands, or any bladder or bowel symptoms.    She denies any significant cardiac or pulmonary history.  She is not on aspirin, Plavix or any other anticoagulants.    She is a non-smoker and occasionally consumes alcohol and denies use of any recreational medication.    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    Past Medical History:   Diagnosis Date     Bipolar 1 disorder (H)      Exertional asthma      PHIL (generalized anxiety disorder)      Hypothyroidism      Obesity (BMI 30-39.9)        Past Surgical History:   Procedure Laterality Date     CARPAL TUNNEL RELEASE RT/LT Right     + excision right dorsal carpal ganglion cyst & terminal branch, right posterior interosseous nerve.      SECTION       CYSTOSCOPY N/A 2017    Procedure: CYSTOSCOPY;;  Surgeon: Toni Da Silva MD;  Location:  OR     DAVINCI HYSTERECTOMY TOTAL, SALPINGECTOMY BILATERAL N/A 2017    Procedure: DAVINCI HYSTERECTOMY TOTAL, SALPINGECTOMY BILATERAL;  ROBOTIC ASSISTED LAPAROSCOPIC TOTAL HYSTERECTOMY; BILATERAL SALPINGECTOMY; CYSTOSCOPY;  Surgeon: Toni Da Silva MD;  Location:  OR     DILATION AND CURETTAGE N/A 2016    Procedure: DILATION AND CURETTAGE;  Surgeon: Josue Jama MD;  Location: [facility]     EXTRACORPOREAL SHOCK WAVE LITHOTRIPSY (ESWL) Left 2021    Procedure: LEFT EXTRACORPOREAL SHOCK WAVE LITHOTRIPSY (ESWL);  Surgeon: Devonte Lambert MD;  Location:  OR     HYSTERECTOMY, PAP NO LONGER INDICATED       LAPAROSCOPIC CHOLECYSTECTOMY  2012    Procedure:LAPAROSCOPIC CHOLECYSTECTOMY; LAPAROSCOPIC CHOLECYSTECTOMY ; Surgeon:KARYNA CAMARA; Location:[facility]     LAPAROSCOPY DIAGNOSTIC (GYN) N/A 2016    Procedure: LAPAROSCOPY DIAGNOSTIC (GYN);  Surgeon: Josue Jama MD;  Location:  [facility]     LASER HOLMIUM LITHOTRIPSY URETER(S), INSERT STENT, COMBINED Left 2020    Procedure: Cystoscopy, left ureteroscopy with  holmium laser lithotripsy and left stent placement;  Surgeon: Devonte Lambert MD;  Location: [facility]     TONSILLECTOMY & ADENOIDECTOMY         REVIEW OF SYSTEMS:  Review Of Systems  Skin: negative  Eyes: negative  Ears/Nose/Throat: negative  Respiratory: No shortness of breath, dyspnea on exertion, cough, or hemoptysis  Cardiovascular: negative  Gastrointestinal: negative  Genitourinary: negative  Musculoskeletal: Neck pain and left shoulder pain   neurologic: negative  Psychiatric: negative  Hematologic/Lymphatic/Immunologic: negative  Endocrine: negative    MEDICATIONS:  Current Outpatient Medications   Medication Sig Dispense Refill     albuterol (PROVENTIL HFA) 108 (90 Base) MCG/ACT inhaler Inhale 2 puffs into the lungs every 6 hours 8.5 g 0     albuterol (PROVENTIL) (2.5 MG/3ML) 0.083% neb solution Take 1 vial (2.5 mg) by nebulization every 4 hours as needed for shortness of breath / dyspnea or wheezing 300 mL 0     buPROPion (WELLBUTRIN XL) 300 MG 24 hr tablet Take 300 mg by mouth every morning       Calcium Carbonate-Vit D-Min (CALCIUM 1200 PO) Take 1 capsule by mouth daily       cholecalciferol (VITAMIN D3) 25 mcg (1000 units) capsule Take 1 capsule by mouth daily       esomeprazole (NEXIUM) 40 MG DR capsule TAKE 1 CAPSULE(40 MG) BY MOUTH EVERY MORNING BEFORE BREAKFAST AND 30 TO 60 MINUTES BEFORE EATING 90 capsule 1     fluconazole (DIFLUCAN) 150 MG tablet Take 1 tablet (150 mg) by mouth once a week One tablet per week 2 tablet 0     gabapentin (NEURONTIN) 100 MG capsule Take 1 capsule (100 mg) by mouth At Bedtime 90 capsule 3     ketoconazole (NIZORAL) 2 % external shampoo Apply topically daily as needed for itching or irritation 120 mL 3     lamoTRIgine (LAMICTAL) 200 MG tablet Take 200 mg by mouth 2 times daily       levothyroxine (SYNTHROID/LEVOTHROID) 75 MCG tablet TAKE 1 TABLET(75 MCG) BY MOUTH DAILY 90 tablet 2     lithium ER (LITHOBID) 300 MG CR tablet Take 1,200 mg by mouth At Bedtime   0      loratadine-pseudoePHEDrine (CLARITIN-D 12-HOUR) 5-120 MG 12 hr tablet Take 1 tablet by mouth 2 times daily        Multiple Vitamins-Minerals (WOMENS MULTIVITAMIN PO) Take 2 capsules by mouth daily       ondansetron (ZOFRAN ODT) 4 MG ODT tab Take 1 tablet (4 mg) by mouth every 6 hours as needed for nausea 10 tablet 0     potassium 99 MG TABS Take 99 mg by mouth daily        predniSONE (DELTASONE) 20 MG tablet Take 3 tablets (60 mg) by mouth daily for 2 days, THEN 2 tablets (40 mg) daily for 4 days, THEN 1 tablet (20 mg) daily for 4 days, THEN 0.5 tablets (10 mg) daily for 4 days. 20 tablet 0     propranolol ER (INDERAL LA) 80 MG 24 hr capsule Take 1 capsule (80 mg) by mouth daily 90 capsule 1         ALLERGIES/SENSITIVITIES:     Allergies   Allergen Reactions     Aripiprazole Other (See Comments)     increased sadness and moodiness     Augmentin Rash     Azithromycin GI Disturbance     Lexapro Diarrhea            Oxycodone Headache and GI Disturbance     Prochlorperazine Other (See Comments)     agitation     Sertraline Other (See Comments)     sweats     Venlafaxine Other (See Comments)     sweats       PERTINENT SOCIAL HISTORY: Non-smoker  Social History     Socioeconomic History     Marital status:    Occupational History     Occupation: [employer] Service - [employer]   Tobacco Use     Smoking status: Never     Smokeless tobacco: Never   Vaping Use     Vaping status: Never Used   Substance and Sexual Activity     Alcohol use: Yes     Comment: 3-4 beers monthly     Drug use: Never     Sexual activity: Yes     Partners: Male   Social History Narrative    .    Son (14 as of 2021).    Walks when weather permits.          FAMILY HISTORY:  Family History   Problem Relation Age of Onset     Alcoholism Mother      Diabetes Type 2  Mother      Hypertension Mother      Depression Mother      Lung Cancer Father         smoker     Brain Tumor Brother         GBM     Diabetes Type 2  Paternal Grandmother       Myocardial Infarction Paternal Grandmother      Heart Failure Paternal Grandmother      Cancer Paternal Grandfather         unknown     Brain Cancer Paternal Aunt      Cerebrovascular Disease No family hx of      Breast Cancer No family hx of      Colon Cancer No family hx of      Ovarian Cancer No family hx of         PHYSICAL EXAM:   Constitutional: /84   Pulse 71   LMP 12/10/2015   SpO2 97%      Mental Status: A & O in no acute distress.  Affect is appropriate.  Speech is fluent.  Recent and remote memory are intact.  Attention span and concentration are normal.      Cranial Nerves:   CN1: grossly intact per patient recall.   CN2: No funduscopic exam performed.   CN3,4 & 6: Pupillary light response, lateral and vertical gaze normal.  No nystagmus.  Visual fields are full to confrontation.   CN5: Intact to touch   CN7: No facial weakness, smile, facial symmetry intact.   CN8: Intact to spoken voice.   CN9&10: Gag reflex, uvula midline, palate rises with phonation.   CN11: Shoulder shrug 5/5 intact bilaterally.   CN12: Tongue midline and moves freely from side to side.     Motor: No pronator drift of upper extremity.   Normal bulk and tone all muscle groups of upper and lower extremities.       Delt Bi Tri Hand Flex/  Ext Iliopsoas Quadriceps Tibialis Anterior EHL Gastroc     C5 C6 C7 C8/T1 L2 L3 L4 L5 S1   R 5 5 5 5 5 5 5 5 5   L 5 5 5 5 5 5 5 5 5          Sensory: Sensation intact bilaterally to light touch.     Coordination; finger to nose, heel to shin, rapid alternating movements smooth and rhythmic.   Romberg intact.   Heel/toe/tandem gait intact.    Normal gait and station.     Reflexes;                       Right              Left  Brachioradialis (C5,6)      2+                 2+  Biceps   (C5,6)                 2+                 2+  Triceps  (C7,8)                 2+                2+  Knee (L3,4)                      2+                2+  Ankle jerk (S1,2)              2+                 2+        No paraspinal tenderness.    Left shoulder joint line tenderness.    IMAGING:  I personally reviewed all radiographic images and agree with the radiologist report of moderate spinal canal narrowing at C5-6 and C6-7.    03/26/2023: MRI cervical spine:   IMPRESSION:  1.  Moderate severe spinal canal narrowing at C6-C7.  2.  Moderate spinal canal narrowing and moderate narrowing of the right lateral recess at C5-C6.  3.  Mild spinal canal narrowing and mild narrowing of the left lateral recess at C4-C5.  4.  Moderate to severe bilateral neuroforaminal narrowing at C6-C7.  5.  Moderate to severe right and mild to moderate left neuroforaminal narrowing at C5-C6.  6.  Modic type I changes at C5-C6 and C6-C7.         Cc:   Katherine Zamora, thank you for allowing me to participate in the care of your patient.        Sincerely,        Rajesh Enriquez MD

## 2023-04-04 NOTE — NURSING NOTE
"Lena Morrow is a 47 year old female who presents for:  Chief Complaint   Patient presents with     Consult        Initial Vitals:  /84   Pulse 71   LMP 12/10/2015   SpO2 97%  Estimated body mass index is 36.9 kg/m  as calculated from the following:    Height as of 3/17/23: 5' 4\" (1.626 m).    Weight as of 3/17/23: 215 lb (97.5 kg).. There is no height or weight on file to calculate BSA. BP completed using cuff size: large  Mild Pain (3)    Nursing Comments:     José Villatoro    "

## 2023-04-05 ENCOUNTER — TELEPHONE (OUTPATIENT)
Dept: INTERNAL MEDICINE | Facility: CLINIC | Age: 48
End: 2023-04-05
Payer: COMMERCIAL

## 2023-04-05 NOTE — TELEPHONE ENCOUNTER
Forms/Letter Request    Type of form/letter: Workers Compensation request for Medical update    Have you been seen for this request: Yes     Do we have the form/letter: Yes: page 1 of form states what [employer] is asking for.     Who is the form from? Insurance comp    Where did/will the form come from? form was faxed in    When is form/letter needed by: as soon as possible    How would you like the form/letter returned: Fax : [phone]    Patient Notified form requests are processed in 3-5 business days:No    Could we send this information to you in [employer] or would you prefer to receive a phone call?:   n/a   Okay to leave a detailed message?: n/a

## 2023-04-11 ENCOUNTER — THERAPY VISIT (OUTPATIENT)
Dept: PHYSICAL THERAPY | Facility: CLINIC | Age: 48
End: 2023-04-11
Payer: OTHER MISCELLANEOUS

## 2023-04-11 DIAGNOSIS — M54.2 NECK PAIN: Primary | ICD-10-CM

## 2023-04-11 PROCEDURE — 97110 THERAPEUTIC EXERCISES: CPT | Mod: GP | Performed by: PHYSICAL THERAPIST

## 2023-04-18 ENCOUNTER — THERAPY VISIT (OUTPATIENT)
Dept: PHYSICAL THERAPY | Facility: CLINIC | Age: 48
End: 2023-04-18
Payer: OTHER MISCELLANEOUS

## 2023-04-18 DIAGNOSIS — M54.2 NECK PAIN: Primary | ICD-10-CM

## 2023-04-18 DIAGNOSIS — E03.4 HYPOTHYROIDISM DUE TO ACQUIRED ATROPHY OF THYROID: ICD-10-CM

## 2023-04-18 PROCEDURE — 97112 NEUROMUSCULAR REEDUCATION: CPT | Mod: GP | Performed by: PHYSICAL THERAPIST

## 2023-04-18 PROCEDURE — 97530 THERAPEUTIC ACTIVITIES: CPT | Mod: GP | Performed by: PHYSICAL THERAPIST

## 2023-04-18 PROCEDURE — 97110 THERAPEUTIC EXERCISES: CPT | Mod: GP | Performed by: PHYSICAL THERAPIST

## 2023-04-19 RX ORDER — LEVOTHYROXINE SODIUM 75 UG/1
75 TABLET ORAL DAILY
Qty: 90 TABLET | Refills: 0 | Status: SHIPPED | OUTPATIENT
Start: 2023-04-19 | End: 2023-09-28

## 2023-04-19 NOTE — TELEPHONE ENCOUNTER
Prescription approved per [facility] Refill Protocol.  Yaritza Cardenas, RN  [facility] Triage Nurse

## 2023-04-20 ENCOUNTER — OFFICE VISIT (OUTPATIENT)
Dept: ORTHOPEDICS | Facility: CLINIC | Age: 48
End: 2023-04-20
Payer: OTHER MISCELLANEOUS

## 2023-04-20 ENCOUNTER — ANCILLARY PROCEDURE (OUTPATIENT)
Dept: GENERAL RADIOLOGY | Facility: CLINIC | Age: 48
End: 2023-04-20
Attending: FAMILY MEDICINE
Payer: OTHER MISCELLANEOUS

## 2023-04-20 VITALS
HEART RATE: 91 BPM | BODY MASS INDEX: 37.54 KG/M2 | SYSTOLIC BLOOD PRESSURE: 119 MMHG | DIASTOLIC BLOOD PRESSURE: 79 MMHG | WEIGHT: 218.7 LBS

## 2023-04-20 DIAGNOSIS — M50.30 DEGENERATION OF CERVICAL INTERVERTEBRAL DISC: ICD-10-CM

## 2023-04-20 DIAGNOSIS — G89.29 CHRONIC LEFT SHOULDER PAIN: Primary | ICD-10-CM

## 2023-04-20 DIAGNOSIS — M25.512 CHRONIC LEFT SHOULDER PAIN: Primary | ICD-10-CM

## 2023-04-20 PROCEDURE — 99204 OFFICE O/P NEW MOD 45 MIN: CPT | Performed by: FAMILY MEDICINE

## 2023-04-20 PROCEDURE — 73030 X-RAY EXAM OF SHOULDER: CPT | Mod: TC | Performed by: RADIOLOGY

## 2023-04-20 NOTE — LETTER
4/20/2023         RE: Lena Morrow  [address]  [facility] [address]        Dear Colleague,    Thank you for referring your patient, Lena Morrow, to the [facility] [place]. Please see a copy of my visit note below.    CHIEF COMPLAINT:  Pain of the Left Shoulder (Pain ongoing since mid January, fell on ice in November, pain originally started in neck and has since radiated down into the shoulder, 7/10 pain scale)     HISTORY OF PRESENT ILLNESS  Ms. Morrow is a pleasant 47 year old year old female past med history of generalized anxiety disorder, hypothyroidism, obesity lumbar degenerative disc disease who presents to clinic today with left shoulder pain.  Lena explains that she suffered a fall on the ice in Mid January.  Began to experience neck and shoulder pain after that time.    She notes that there was a delayed onset of left shoulder pain however.  She recounts the fall in which she fell onto her buttock as well as her outstretched arm at her side and feels shoulder was jammed upwards towards her neck.    She recalls that her neck pain was very severe and took the focus of her concerns.  This has subsided substantially since PT, 2 courses of prednisone prescribed by Dr. Zamora.  She had modest improvement of her shoulder range of motion with steroid however so short-lived.  She is unable to lift her arm overhead and has not been able to do so since about January 2023.  Arm is very weak.  She denies any radicular symptoms down past the upper arm into the forearm or hand.  No numbness or tingling.    She has been evaluated by neurosurgery for her neck pain.  She completed a Medrol pack which provided relief.  There is no plan for further cervical intervention or epidural steroid injection.    Additional history: as documented    Review of Systems:    Have you recently had a a fever, chills, weight loss? No    Do you have any vision problems? No    Do you have  any chest pain or edema? No    Do you have any shortness of breath or wheezing?  No    Do you have stomach problems? No    Do you have any numbness or focal weakness? Yes left shoulder improved.    Do you have diabetes? No    Do you have problems with bleeding or clotting? No    Do you have an rashes or other skin lesions? No      MEDICAL HISTORY  Patient Active Problem List   Diagnosis     Bipolar 1 disorder (H)     PHIL (generalized anxiety disorder)     Obesity (BMI 30-39.9)     Exertional asthma     Hypothyroidism     Neck pain       Current Outpatient Medications   Medication Sig Dispense Refill     albuterol (PROVENTIL HFA) 108 (90 Base) MCG/ACT inhaler Inhale 2 puffs into the lungs every 6 hours 8.5 g 0     albuterol (PROVENTIL) (2.5 MG/3ML) 0.083% neb solution Take 1 vial (2.5 mg) by nebulization every 4 hours as needed for shortness of breath / dyspnea or wheezing 300 mL 0     buPROPion (WELLBUTRIN XL) 300 MG 24 hr tablet Take 300 mg by mouth every morning       Calcium Carbonate-Vit D-Min (CALCIUM 1200 PO) Take 1 capsule by mouth daily       cholecalciferol (VITAMIN D3) 25 mcg (1000 units) capsule Take 1 capsule by mouth daily       esomeprazole (NEXIUM) 40 MG DR capsule TAKE 1 CAPSULE(40 MG) BY MOUTH EVERY MORNING BEFORE BREAKFAST AND 30 TO 60 MINUTES BEFORE EATING 90 capsule 1     fluconazole (DIFLUCAN) 150 MG tablet Take 1 tablet (150 mg) by mouth once a week One tablet per week 2 tablet 0     gabapentin (NEURONTIN) 100 MG capsule Take 1 capsule (100 mg) by mouth At Bedtime 90 capsule 3     ketoconazole (NIZORAL) 2 % external shampoo Apply topically daily as needed for itching or irritation 120 mL 3     lamoTRIgine (LAMICTAL) 200 MG tablet Take 200 mg by mouth 2 times daily       levothyroxine (SYNTHROID/LEVOTHROID) 75 MCG tablet Take 1 tablet (75 mcg) by mouth daily 90 tablet 0     lithium ER (LITHOBID) 300 MG CR tablet Take 1,200 mg by mouth At Bedtime   0     loratadine-pseudoePHEDrine (CLARITIN-D  12-HOUR) 5-120 MG 12 hr tablet Take 1 tablet by mouth 2 times daily        methocarbamol (ROBAXIN) 500 MG tablet Take 1 tablet (500 mg) by mouth 3 times daily as needed for muscle spasms 30 tablet 1     Multiple Vitamins-Minerals (WOMENS MULTIVITAMIN PO) Take 2 capsules by mouth daily       ondansetron (ZOFRAN ODT) 4 MG ODT tab Take 1 tablet (4 mg) by mouth every 6 hours as needed for nausea 10 tablet 0     potassium 99 MG TABS Take 99 mg by mouth daily        propranolol ER (INDERAL LA) 80 MG 24 hr capsule Take 1 capsule (80 mg) by mouth daily 90 capsule 1       Allergies   Allergen Reactions     Aripiprazole Other (See Comments)     increased sadness and moodiness     Augmentin Rash     Azithromycin GI Disturbance     Lexapro Diarrhea            Oxycodone Headache and GI Disturbance     Prochlorperazine Other (See Comments)     agitation     Sertraline Other (See Comments)     sweats     Venlafaxine Other (See Comments)     sweats       Family History   Problem Relation Age of Onset     Alcoholism Mother      Diabetes Type 2  Mother      Hypertension Mother      Depression Mother      Lung Cancer Father         smoker     Brain Tumor Brother         GBM     Diabetes Type 2  Paternal Grandmother      Myocardial Infarction Paternal Grandmother      Heart Failure Paternal Grandmother      Cancer Paternal Grandfather         unknown     Brain Cancer Paternal Aunt      Cerebrovascular Disease No family hx of      Breast Cancer No family hx of      Colon Cancer No family hx of      Ovarian Cancer No family hx of        Additional medical/Social/Surgical histories reviewed in [facility] and updated as appropriate.       PHYSICAL EXAM  /79 (BP Location: Right arm, Patient Position: Sitting, Cuff Size: Adult Regular)   Pulse 91   Wt 99.2 kg (218 lb 11.2 oz)   LMP 12/10/2015   BMI 37.54 kg/m      General  - normal appearance, in no obvious distress  HEENT  - conjunctivae not injected, moist mucous membranes  CV  -  normal radial pulse  Pulm  - normal respiratory pattern, non-labored  Musculoskeletal - cervical spine  - inspection: normal bone and joint alignment, no obvious kyphosis  - palpation: no paravertebral or bony tenderness  - ROM: Decreased left sidebending and rotation.  - strength: upper extremities 5/5 in al  Decreased left sidebending and rotation with painl planes  - special tests:  (-) Spurling bilaterally  Musculoskeletal - Right shoulder  - inspection: normal bone and joint alignment, no obvious deformity, no scapular winging, no AC step-off  - palpation: Diffuse left shoulder pain.  Tenderness at the anterolateral shoulder at the rotator cuff insertion, tenderness at bicipital groove, posterior tenderness at the supraspinatus and infraspinatus tendon region.  - ROM: Limited in forward elevation passively to 90 and actively to about 60 degrees.  Abduction about 60 degrees actively, external rotation near symmetric at about 60 degrees.  Internal rotation limited to lumbosacral region  - strength: 5/5  strength, 4/5 external rotation with pain.  5/5 internal rotation.  Weakness of supraspinatus.  - special tests:  (+) Neer  (+) Hawkin's  (+) Asad  Neuro  - no sensory or motor deficit, grossly normal coordination, normal muscle tone  Skin  - no ecchymosis, erythema, warmth, or induration, no obvious rash  Psych  - interactive, appropriate, normal mood and affect      IMAGING :   X-ray left shoulder 4 view independently interpreted today.  Evidence for hydroxyapatite deposition at the rotator cuff insertion site.  This could indicate active calcific tendinitis or sequelae of more worrisome pathology.    No glenohumeral osteoarthritis.    Final results in epic per radiology team.    03/26/2023: MRI cervical spine:   IMPRESSION:  1.  Moderate severe spinal canal narrowing at C6-C7.  2.  Moderate spinal canal narrowing and moderate narrowing of the right lateral recess at C5-C6.  3.  Mild spinal canal narrowing  and mild narrowing of the left lateral recess at C4-C5.  4.  Moderate to severe bilateral neuroforaminal narrowing at C6-C7.  5.  Moderate to severe right and mild to moderate left neuroforaminal narrowing at C5-C6.  6.  Modic type I changes at C5-C6 and C6-C7.       CERVICAL SPINE TWO TO THREE VIEWS January 19, 2023 10:49 AM      HISTORY: Neck pain.     COMPARISON: Cervical spine MRI 4/23/2010.                                                                      IMPRESSION: There is normal alignment of the cervical vertebrae;  however, there is straightening of normal cervical lordosis. Vertebral  body heights of the cervical spine are normal. Craniocervical  alignment is normal. There is no evidence for fracture of the cervical  spine. Loss of disc space height and degenerative endplate spurring at  C5-C6 and C6-C7 levels.     CHASIDY VAZQUEZ MD           Neurosurgery note evaluated from 4/4/2023, additional PT notes were reviewed.     ASSESSMENT & PLAN  Ms. Morrow is a 47 year old year old female who presents to clinic today for evaluation of left shoulder pain that is persisted since around time of fall in November 2022.  After fall she experienced cervical pain as well as left shoulder pain.  Cervical pain has resolved and neurosurgery recommended consideration for shoulder work-up today.    Concern at this time for traumatic rotator cuff injury. Remains diffusely tender and limited in motion today on exam.     Diagnosis:   Chronic pain of left shoulder greater than 3 months  Cervical degenerative disc disease    At this time I have recommended we pursue an MRI of her left shoulder.  It has been recalcitrant to oral anti-inflammatories, physical therapy, greater than 5 months since date of injury.  Continue with OTC analgesics, use of heat/ice, performing home exercise program wall crawls as prescribed by PT.    Follow-up in person to discuss MRI results and next steps.      45 minutes on date of the encounter  doing chart review, history and examination, independent imaging review, documentation, and additional activities noted above.      It was a pleasure seeing Lena today.    Soy Barbour DO, [facility]  Primary Care Sports Medicine        Again, thank you for allowing me to participate in the care of your patient.        Sincerely,        Soy Barbour DO

## 2023-04-20 NOTE — PATIENT INSTRUCTIONS
Thank you for choosing [facility]    DR [facility] CLINIC LOCATIONS  [place] [address] [address] [facility], [address]th [place]   [place], [place], [address] [place] [address]   [phone]       APPOINTMENTS: [phone]    CARE QUESTIONS: [phone],    BILLING QUESTIONS: [phone]    FAX NUMBER: [phone]        Follow up: please call [phone] to schedule a follow up appointment to review your MRI with Dr. Barbour.      1. Chronic left shoulder pain        An order for an MRI was placed today. You may call directly to schedule at [phone] at your convenience.

## 2023-04-20 NOTE — PROGRESS NOTES
CHIEF COMPLAINT:  Pain of the Left Shoulder (Pain ongoing since mid January, fell on ice in November, pain originally started in neck and has since radiated down into the shoulder, 7/10 pain scale)     HISTORY OF PRESENT ILLNESS  Ms. Morrow is a pleasant 47 year old year old female past med history of generalized anxiety disorder, hypothyroidism, obesity lumbar degenerative disc disease who presents to clinic today with left shoulder pain.  Lena explains that she suffered a fall on the ice in Mid January.  Began to experience neck and shoulder pain after that time.    She notes that there was a delayed onset of left shoulder pain however.  She recounts the fall in which she fell onto her buttock as well as her outstretched arm at her side and feels shoulder was jammed upwards towards her neck.    She recalls that her neck pain was very severe and took the focus of her concerns.  This has subsided substantially since PT, 2 courses of prednisone prescribed by Dr. Zamora.  She had modest improvement of her shoulder range of motion with steroid however so short-lived.  She is unable to lift her arm overhead and has not been able to do so since about January 2023.  Arm is very weak.  She denies any radicular symptoms down past the upper arm into the forearm or hand.  No numbness or tingling.    She has been evaluated by neurosurgery for her neck pain.  She completed a Medrol pack which provided relief.  There is no plan for further cervical intervention or epidural steroid injection.    Additional history: as documented    Review of Systems:    Have you recently had a a fever, chills, weight loss? No    Do you have any vision problems? No    Do you have any chest pain or edema? No    Do you have any shortness of breath or wheezing?  No    Do you have stomach problems? No    Do you have any numbness or focal weakness? Yes left shoulder improved.    Do you have diabetes? No    Do you have problems with bleeding or clotting?  No    Do you have an rashes or other skin lesions? No      MEDICAL HISTORY  Patient Active Problem List   Diagnosis     Bipolar 1 disorder (H)     PHIL (generalized anxiety disorder)     Obesity (BMI 30-39.9)     Exertional asthma     Hypothyroidism     Neck pain       Current Outpatient Medications   Medication Sig Dispense Refill     albuterol (PROVENTIL HFA) 108 (90 Base) MCG/ACT inhaler Inhale 2 puffs into the lungs every 6 hours 8.5 g 0     albuterol (PROVENTIL) (2.5 MG/3ML) 0.083% neb solution Take 1 vial (2.5 mg) by nebulization every 4 hours as needed for shortness of breath / dyspnea or wheezing 300 mL 0     buPROPion (WELLBUTRIN XL) 300 MG 24 hr tablet Take 300 mg by mouth every morning       Calcium Carbonate-Vit D-Min (CALCIUM 1200 PO) Take 1 capsule by mouth daily       cholecalciferol (VITAMIN D3) 25 mcg (1000 units) capsule Take 1 capsule by mouth daily       esomeprazole (NEXIUM) 40 MG DR capsule TAKE 1 CAPSULE(40 MG) BY MOUTH EVERY MORNING BEFORE BREAKFAST AND 30 TO 60 MINUTES BEFORE EATING 90 capsule 1     fluconazole (DIFLUCAN) 150 MG tablet Take 1 tablet (150 mg) by mouth once a week One tablet per week 2 tablet 0     gabapentin (NEURONTIN) 100 MG capsule Take 1 capsule (100 mg) by mouth At Bedtime 90 capsule 3     ketoconazole (NIZORAL) 2 % external shampoo Apply topically daily as needed for itching or irritation 120 mL 3     lamoTRIgine (LAMICTAL) 200 MG tablet Take 200 mg by mouth 2 times daily       levothyroxine (SYNTHROID/LEVOTHROID) 75 MCG tablet Take 1 tablet (75 mcg) by mouth daily 90 tablet 0     lithium ER (LITHOBID) 300 MG CR tablet Take 1,200 mg by mouth At Bedtime   0     loratadine-pseudoePHEDrine (CLARITIN-D 12-HOUR) 5-120 MG 12 hr tablet Take 1 tablet by mouth 2 times daily        methocarbamol (ROBAXIN) 500 MG tablet Take 1 tablet (500 mg) by mouth 3 times daily as needed for muscle spasms 30 tablet 1     Multiple Vitamins-Minerals (WOMENS MULTIVITAMIN PO) Take 2 capsules by  mouth daily       ondansetron (ZOFRAN ODT) 4 MG ODT tab Take 1 tablet (4 mg) by mouth every 6 hours as needed for nausea 10 tablet 0     potassium 99 MG TABS Take 99 mg by mouth daily        propranolol ER (INDERAL LA) 80 MG 24 hr capsule Take 1 capsule (80 mg) by mouth daily 90 capsule 1       Allergies   Allergen Reactions     Aripiprazole Other (See Comments)     increased sadness and moodiness     Augmentin Rash     Azithromycin GI Disturbance     Lexapro Diarrhea            Oxycodone Headache and GI Disturbance     Prochlorperazine Other (See Comments)     agitation     Sertraline Other (See Comments)     sweats     Venlafaxine Other (See Comments)     sweats       Family History   Problem Relation Age of Onset     Alcoholism Mother      Diabetes Type 2  Mother      Hypertension Mother      Depression Mother      Lung Cancer Father         smoker     Brain Tumor Brother         GBM     Diabetes Type 2  Paternal Grandmother      Myocardial Infarction Paternal Grandmother      Heart Failure Paternal Grandmother      Cancer Paternal Grandfather         unknown     Brain Cancer Paternal Aunt      Cerebrovascular Disease No family hx of      Breast Cancer No family hx of      Colon Cancer No family hx of      Ovarian Cancer No family hx of        Additional medical/Social/Surgical histories reviewed in [facility] and updated as appropriate.       PHYSICAL EXAM  /79 (BP Location: Right arm, Patient Position: Sitting, Cuff Size: Adult Regular)   Pulse 91   Wt 99.2 kg (218 lb 11.2 oz)   LMP 12/10/2015   BMI 37.54 kg/m      General  - normal appearance, in no obvious distress  HEENT  - conjunctivae not injected, moist mucous membranes  CV  - normal radial pulse  Pulm  - normal respiratory pattern, non-labored  Musculoskeletal - cervical spine  - inspection: normal bone and joint alignment, no obvious kyphosis  - palpation: no paravertebral or bony tenderness  - ROM: Decreased left sidebending and rotation.  -  strength: upper extremities 5/5 in al  Decreased left sidebending and rotation with painl planes  - special tests:  (-) Spurling bilaterally  Musculoskeletal - Right shoulder  - inspection: normal bone and joint alignment, no obvious deformity, no scapular winging, no AC step-off  - palpation: Diffuse left shoulder pain.  Tenderness at the anterolateral shoulder at the rotator cuff insertion, tenderness at bicipital groove, posterior tenderness at the supraspinatus and infraspinatus tendon region.  - ROM: Limited in forward elevation passively to 90 and actively to about 60 degrees.  Abduction about 60 degrees actively, external rotation near symmetric at about 60 degrees.  Internal rotation limited to lumbosacral region  - strength: 5/5  strength, 4/5 external rotation with pain.  5/5 internal rotation.  Weakness of supraspinatus.  - special tests:  (+) Neer  (+) Hawkin's  (+) Asad  Neuro  - no sensory or motor deficit, grossly normal coordination, normal muscle tone  Skin  - no ecchymosis, erythema, warmth, or induration, no obvious rash  Psych  - interactive, appropriate, normal mood and affect      IMAGING :   X-ray left shoulder 4 view independently interpreted today.  Evidence for hydroxyapatite deposition at the rotator cuff insertion site.  This could indicate active calcific tendinitis or sequelae of more worrisome pathology.    No glenohumeral osteoarthritis.    Final results in epic per radiology team.    03/26/2023: MRI cervical spine:   IMPRESSION:  1.  Moderate severe spinal canal narrowing at C6-C7.  2.  Moderate spinal canal narrowing and moderate narrowing of the right lateral recess at C5-C6.  3.  Mild spinal canal narrowing and mild narrowing of the left lateral recess at C4-C5.  4.  Moderate to severe bilateral neuroforaminal narrowing at C6-C7.  5.  Moderate to severe right and mild to moderate left neuroforaminal narrowing at C5-C6.  6.  Modic type I changes at C5-C6 and C6-C7.        CERVICAL SPINE TWO TO THREE VIEWS January 19, 2023 10:49 AM      HISTORY: Neck pain.     COMPARISON: Cervical spine MRI 4/23/2010.                                                                      IMPRESSION: There is normal alignment of the cervical vertebrae;  however, there is straightening of normal cervical lordosis. Vertebral  body heights of the cervical spine are normal. Craniocervical  alignment is normal. There is no evidence for fracture of the cervical  spine. Loss of disc space height and degenerative endplate spurring at  C5-C6 and C6-C7 levels.     CHASIDY VAZQUEZ MD           Neurosurgery note evaluated from 4/4/2023, additional PT notes were reviewed.     ASSESSMENT & PLAN  Ms. Morrow is a 47 year old year old female who presents to clinic today for evaluation of left shoulder pain that is persisted since around time of fall in November 2022.  After fall she experienced cervical pain as well as left shoulder pain.  Cervical pain has resolved and neurosurgery recommended consideration for shoulder work-up today.    Concern at this time for traumatic rotator cuff injury. Remains diffusely tender and limited in motion today on exam.     Diagnosis:   Chronic pain of left shoulder greater than 3 months  Cervical degenerative disc disease    At this time I have recommended we pursue an MRI of her left shoulder.  It has been recalcitrant to oral anti-inflammatories, physical therapy, greater than 5 months since date of injury.  Continue with OTC analgesics, use of heat/ice, performing home exercise program wall crawls as prescribed by PT.    Follow-up in person to discuss MRI results and next steps.      45 minutes on date of the encounter doing chart review, history and examination, independent imaging review, documentation, and additional activities noted above.      It was a pleasure seeing Lena today.    Soy Barbour DO, [facility]  Primary Care Sports Medicine

## 2023-04-24 ENCOUNTER — MYC MEDICAL ADVICE (OUTPATIENT)
Dept: INTERNAL MEDICINE | Facility: CLINIC | Age: 48
End: 2023-04-24
Payer: COMMERCIAL

## 2023-04-24 ENCOUNTER — TELEPHONE (OUTPATIENT)
Dept: INTERNAL MEDICINE | Facility: CLINIC | Age: 48
End: 2023-04-24

## 2023-04-24 DIAGNOSIS — E03.4 HYPOTHYROIDISM DUE TO ACQUIRED ATROPHY OF THYROID: Primary | ICD-10-CM

## 2023-04-24 DIAGNOSIS — R25.1 TREMOR: ICD-10-CM

## 2023-04-24 NOTE — TELEPHONE ENCOUNTER
Yes, starting with some labs, including a thyroid check, is a reasonable first step. Orders placed. Patient may schedule non-fasting lab only visit.    If labs are unrevealing, recommend OV for further evaluation.

## 2023-04-26 NOTE — TELEPHONE ENCOUNTER
Attempted to contact pt to relay Dr Tamez message from below. No answer. Left VM to call us back.     Upon call back please relay Dr Tamez message from below.       Patient Contact    Attempt # 1    Was call answered?  No.  Left message on voicemail with information to call me back.

## 2023-04-26 NOTE — TELEPHONE ENCOUNTER
Patient returning missed call. Relayed providers message, patient agrees and lab appt is scheduled.     Future Appointments 4/26/2023 - 10/23/2023      Date Visit Type Length Department Provider     4/27/2023  8:00 AM SPINE TREATMENT 40 min [facility] [place] PT Hannah Ho PT    Location Instructions:     Our clinic is located at:  [facility] [address] [place] [address]  How to find our clinic: Take elevator to 3rd floor, take left off elevator and follow hallway straight, clinic is on the left toward the end of the hallway  Parking:&nbsp; Free parking available in surfact lot.  Questions or to Reschedule: Contact our clinic: [phone].               4/29/2023 12:30 PM LAB 15 min OX LABORATORY [facility]    Location Instructions:     The clinic is located at [address]; in the [facility] in [place].&nbsp; We offer free parking in our on-site lot.              5/1/2023 11:30 AM MR SHOULDER LEFT WO 45 min SH MRI IC [id number]    Location Instructions:     [facility] [address], [address] [place] [address]    Parking Imaging Center customers can park in the lot in front of the [facility]. The parking lot is free and can be accessed from  [address] and  [address]nbsp; If no parking is available in this lot, there is a parking ramp located on the back side of the [facility]. The parking ramp is also free and can be accessed from  [address], [address] and [address].    Entrance and check-in location Entering from the front parking lot: Enter at the front entrance of the building labeled [id number]. Walk towards the elevators and the Imaging Center will be on your right.    Entering from parking ramp: As you enter the lobby of the [address] Building turn to walk past the elevators and go through the walkway to  the [address] [place] [address]. Imaging Center will be on your left near the elevators.              7/20/2023  9:00 AM OFFICE VISIT 30 min  INTERNAL MEDICINE Katherine Zamora MD    Location Instructions:     [facility] is in the [facility] at [address] in [place]. This just east of the [address] exit off of [address]. Free parking is available; access the lot from [address] [place] [address] or [facility].               8/1/2023  5:00 PM MYC PREVENTATIVE ADULT VISIT 30 min  INTERNAL MEDICINE Katherine Zamora MD    Location Instructions:     [facility] is in the [facility] at [address] in [place]. This just east of the [facility] Street exit off of [address]. Free parking is available; access the lot from [address] [place] [address] or [facility].

## 2023-04-27 ENCOUNTER — THERAPY VISIT (OUTPATIENT)
Dept: PHYSICAL THERAPY | Facility: CLINIC | Age: 48
End: 2023-04-27
Payer: OTHER MISCELLANEOUS

## 2023-04-27 DIAGNOSIS — M54.2 NECK PAIN: Primary | ICD-10-CM

## 2023-04-27 PROCEDURE — 97530 THERAPEUTIC ACTIVITIES: CPT | Mod: GP | Performed by: PHYSICAL THERAPIST

## 2023-04-27 PROCEDURE — 97110 THERAPEUTIC EXERCISES: CPT | Mod: GP | Performed by: PHYSICAL THERAPIST

## 2023-04-29 ENCOUNTER — LAB (OUTPATIENT)
Dept: LAB | Facility: CLINIC | Age: 48
End: 2023-04-29
Payer: OTHER MISCELLANEOUS

## 2023-04-29 DIAGNOSIS — E03.4 HYPOTHYROIDISM DUE TO ACQUIRED ATROPHY OF THYROID: ICD-10-CM

## 2023-04-29 DIAGNOSIS — R25.1 TREMOR: ICD-10-CM

## 2023-04-29 LAB
ALBUMIN SERPL BCG-MCNC: 4.4 G/DL (ref 3.5–5.2)
ALP SERPL-CCNC: 82 U/L (ref 35–104)
ALT SERPL W P-5'-P-CCNC: 21 U/L (ref 10–35)
ANION GAP SERPL CALCULATED.3IONS-SCNC: 10 MMOL/L (ref 7–15)
AST SERPL W P-5'-P-CCNC: 26 U/L (ref 10–35)
BILIRUB SERPL-MCNC: 0.3 MG/DL
BUN SERPL-MCNC: 9.3 MG/DL (ref 6–20)
CALCIUM SERPL-MCNC: 10.1 MG/DL (ref 8.6–10)
CHLORIDE SERPL-SCNC: 107 MMOL/L (ref 98–107)
CREAT SERPL-MCNC: 0.99 MG/DL (ref 0.51–0.95)
DEPRECATED HCO3 PLAS-SCNC: 23 MMOL/L (ref 22–29)
ERYTHROCYTE [DISTWIDTH] IN BLOOD BY AUTOMATED COUNT: 13.2 % (ref 10–15)
GFR SERPL CREATININE-BSD FRML MDRD: 70 ML/MIN/1.73M2
GLUCOSE SERPL-MCNC: 96 MG/DL (ref 70–99)
HCT VFR BLD AUTO: 45.4 % (ref 35–47)
HGB BLD-MCNC: 14.2 G/DL (ref 11.7–15.7)
MAGNESIUM SERPL-MCNC: 2 MG/DL (ref 1.7–2.3)
MCH RBC QN AUTO: 29.6 PG (ref 26.5–33)
MCHC RBC AUTO-ENTMCNC: 31.3 G/DL (ref 31.5–36.5)
MCV RBC AUTO: 95 FL (ref 78–100)
PHOSPHATE SERPL-MCNC: 2.7 MG/DL (ref 2.5–4.5)
PLATELET # BLD AUTO: 354 10E3/UL (ref 150–450)
POTASSIUM SERPL-SCNC: 4.1 MMOL/L (ref 3.4–5.3)
PROT SERPL-MCNC: 7.3 G/DL (ref 6.4–8.3)
RBC # BLD AUTO: 4.79 10E6/UL (ref 3.8–5.2)
SODIUM SERPL-SCNC: 140 MMOL/L (ref 136–145)
TSH SERPL DL<=0.005 MIU/L-ACNC: 1.81 UIU/ML (ref 0.3–4.2)
VIT B12 SERPL-MCNC: 538 PG/ML (ref 232–1245)
WBC # BLD AUTO: 8.5 10E3/UL (ref 4–11)

## 2023-04-29 PROCEDURE — 83735 ASSAY OF MAGNESIUM: CPT

## 2023-04-29 PROCEDURE — 82306 VITAMIN D 25 HYDROXY: CPT

## 2023-04-29 PROCEDURE — 82607 VITAMIN B-12: CPT

## 2023-04-29 PROCEDURE — 84100 ASSAY OF PHOSPHORUS: CPT

## 2023-04-29 PROCEDURE — 85027 COMPLETE CBC AUTOMATED: CPT

## 2023-04-29 PROCEDURE — 84443 ASSAY THYROID STIM HORMONE: CPT

## 2023-04-29 PROCEDURE — 80053 COMPREHEN METABOLIC PANEL: CPT

## 2023-04-29 PROCEDURE — 36415 COLL VENOUS BLD VENIPUNCTURE: CPT

## 2023-05-01 ENCOUNTER — ANCILLARY PROCEDURE (OUTPATIENT)
Dept: MRI IMAGING | Facility: CLINIC | Age: 48
End: 2023-05-01
Attending: FAMILY MEDICINE
Payer: OTHER MISCELLANEOUS

## 2023-05-01 DIAGNOSIS — M25.512 CHRONIC LEFT SHOULDER PAIN: ICD-10-CM

## 2023-05-01 DIAGNOSIS — G89.29 CHRONIC LEFT SHOULDER PAIN: ICD-10-CM

## 2023-05-01 LAB — DEPRECATED CALCIDIOL+CALCIFEROL SERPL-MC: 93 UG/L (ref 20–75)

## 2023-05-01 PROCEDURE — 73221 MRI JOINT UPR EXTREM W/O DYE: CPT | Mod: LT

## 2023-05-09 ENCOUNTER — MYC MEDICAL ADVICE (OUTPATIENT)
Dept: ORTHOPEDICS | Facility: CLINIC | Age: 48
End: 2023-05-09

## 2023-05-10 ENCOUNTER — OFFICE VISIT (OUTPATIENT)
Dept: ORTHOPEDICS | Facility: CLINIC | Age: 48
End: 2023-05-10
Payer: OTHER MISCELLANEOUS

## 2023-05-10 VITALS — DIASTOLIC BLOOD PRESSURE: 82 MMHG | WEIGHT: 218.1 LBS | SYSTOLIC BLOOD PRESSURE: 114 MMHG | BODY MASS INDEX: 37.44 KG/M2

## 2023-05-10 DIAGNOSIS — M75.32 CALCIFIC TENDINITIS OF LEFT SHOULDER: Primary | ICD-10-CM

## 2023-05-10 PROCEDURE — 99213 OFFICE O/P EST LOW 20 MIN: CPT | Performed by: FAMILY MEDICINE

## 2023-05-10 NOTE — PROGRESS NOTES
ESTABLISHED PATIENT FOLLOW-UP:  No chief complaint on file.       HISTORY OF PRESENT ILLNESS  Ms. Morrow is a pleasant 47 year old year old female who presents to clinic today for follow-up of left shoulder pain.  Left shoulder pain has been painful since fall in November of 2022 in which she was slipped on the ice outside of her workplace.    Date of injury: chronic  Date last seen: 4/20/23  Following Therapeutic Plan: Yes- prednisone, MRI, ice, heat, PT, HEP, OTCs  Pain: constant, 8/10 today  Function: limited ROM  Interval History: Patient had full relief while on prednisone, but since completing course the pain has returned.      Difficulty with any shoulder range of motion.  Pain continues to reside at the anterolateral aspect of her shoulder.  Completed MRI last week.    Additional medical/Social/Surgical histories reviewed in [facility] and updated as appropriate.    REVIEW OF SYSTEMS (5/10/2023)  CONSTITUTIONAL: Denies fever and weight loss  GASTROINTESTINAL: Denies abdominal pain, nausea, vomiting  MUSCULOSKELETAL: See HPI  SKIN: Denies any recent rash or lesion  NEUROLOGICAL: Denies numbness or focal weakness     PHYSICAL EXAM  LMP 12/10/2015     General  - normal appearance, in no obvious distress  Musculoskeletal -left shoulder  - inspection: normal bone and joint alignment, no obvious deformity, no scapular winging, no AC step-off  - palpation: Diffuse left shoulder pain.  Tenderness at the anterolateral shoulder at the rotator cuff insertion, tenderness at bicipital groove, posterior tenderness at the supraspinatus and infraspinatus tendon region.  - ROM: Limited in forward elevation passively to 90 and actively to about 60 degrees.  Abduction about 60 degrees actively, external rotation near symmetric at about 60 degrees.  Internal rotation limited to lumbosacral region  - strength: 5/5  strength, 4/5 external rotation with pain.  5/5 internal rotation.  Weakness of supraspinatus.  - special tests:  (+)  Neer  (+) Breanakin's  (+) Asad  Neuro  - no sensory or motor deficit, grossly normal coordination, normal muscle tone  Skin  - no ecchymosis, erythema, warmth, or induration, no obvious rash  Psych  - interactive, appropriate, normal mood and affect    IMAGING :     Results for orders placed or performed in visit on 05/01/23   MR Shoulder Left w/o Contrast    Narrative    EXAM: MR SHOULDER LEFT W/O CONTRAST  LOCATION: [facility]  DATE/TIME: 5/1/2023 11:52 AM CDT    INDICATION: Shoulder pain. Burning on the top of the shoulder. Limited range of motion. Trauma in January 2023.  COMPARISON: Radiographs from 04/20/2023.  TECHNIQUE: Unenhanced.    FINDINGS:    ROTATOR CUFF:  -Supraspinatus: Small amount of calcification in the tendon. Mild tendinosis. No tear. No muscle atrophy.  -Infraspinatus: No tendon tear, tendinopathy or fatty atrophy.  -Subscapularis: No tendon tear, tendinopathy or fatty atrophy.  -Teres minor: No tendon tear, tendinopathy or fatty atrophy.    CORACOACROMIAL ARCH:  -Morphology: Type II acromion. No subacromial spur. Subacromial and subcoracoid space are normal.   -Bursa: No subacromial or subcoracoid bursitis.    ACROMIOCLAVICULAR JOINT:   -Normal.     LONG HEAD OF BICEPS TENDON:   -No tendinopathy or tear. No tenosynovitis or subluxation.    GLENOHUMERAL JOINT:   -Labrum: No labral tear. No paralabral cyst.   -Cartilage: Normal.   -Joint space: No effusion or synovitis.  -Glenohumeral ligaments and capsule: No pericapsular inflammation.    BONES:   -No fracture or concerning marrow replacing lesion.    SOFT TISSUES:   -Normal deltoid muscle bulk. Normal visualized chest wall and axilla.      Impression    IMPRESSION:  1.  Calcific tendinosis of the supraspinatus tendon.  2.  Otherwise negative. No rotator cuff tear.                      *Note: Due to a large number of results and/or encounters for the requested time period, some results have not been displayed. A complete  set of results can be found in Results Review.     ASSESSMENT & PLAN  Ms. Morrow is a 47 year old year old female who presents to clinic today with chronic neck and left shoulder pain since November 2022.    Clinical examination as well as MRI consistent with calcific tendinitis of left shoulder.  Fortunately no tearing of her rotator cuff.    Diagnosis: Calcific tendinopathy of left shoulder    Further treatment options discussed at this time given her lack of ability to participate in PT, I have recommended we pursue a ultrasound-guided barbotage and corticosteroid injection to her left supraspinatus tendon region and bursa.  I would like her to reengage in physical therapy 1 week after her procedure.    It was a pleasure seeing Jerrell Barbour DO, CAQSM  Primary Care Sports Medicine

## 2023-05-10 NOTE — LETTER
5/10/2023         RE: Lena Morrow  [address]  [facility] [address]        Dear Colleague,    Thank you for referring your patient, Lena Morrow, to the [facility] [place]. Please see a copy of my visit note below.    ESTABLISHED PATIENT FOLLOW-UP:  No chief complaint on file.       HISTORY OF PRESENT ILLNESS  Ms. Morrow is a pleasant 47 year old year old female who presents to clinic today for follow-up of left shoulder pain.  Left shoulder pain has been painful since fall in November of 2022 in which she was slipped on the ice outside of her workplace.    Date of injury: chronic  Date last seen: 4/20/23  Following Therapeutic Plan: Yes- prednisone, MRI, ice, heat, PT, HEP, OTCs  Pain: constant, 8/10 today  Function: limited ROM  Interval History: Patient had full relief while on prednisone, but since completing course the pain has returned.      Difficulty with any shoulder range of motion.  Pain continues to reside at the anterolateral aspect of her shoulder.  Completed MRI last week.    Additional medical/Social/Surgical histories reviewed in [facility] and updated as appropriate.    REVIEW OF SYSTEMS (5/10/2023)  CONSTITUTIONAL: Denies fever and weight loss  GASTROINTESTINAL: Denies abdominal pain, nausea, vomiting  MUSCULOSKELETAL: See HPI  SKIN: Denies any recent rash or lesion  NEUROLOGICAL: Denies numbness or focal weakness     PHYSICAL EXAM  LMP 12/10/2015     General  - normal appearance, in no obvious distress  Musculoskeletal -left shoulder  - inspection: normal bone and joint alignment, no obvious deformity, no scapular winging, no AC step-off  - palpation: Diffuse left shoulder pain.  Tenderness at the anterolateral shoulder at the rotator cuff insertion, tenderness at bicipital groove, posterior tenderness at the supraspinatus and infraspinatus tendon region.  - ROM: Limited in forward elevation passively to 90 and actively to about 60 degrees.   Abduction about 60 degrees actively, external rotation near symmetric at about 60 degrees.  Internal rotation limited to lumbosacral region  - strength: 5/5  strength, 4/5 external rotation with pain.  5/5 internal rotation.  Weakness of supraspinatus.  - special tests:  (+) Neer  (+) Hawkin's  (+) Asad  Neuro  - no sensory or motor deficit, grossly normal coordination, normal muscle tone  Skin  - no ecchymosis, erythema, warmth, or induration, no obvious rash  Psych  - interactive, appropriate, normal mood and affect    IMAGING :     Results for orders placed or performed in visit on 05/01/23   MR Shoulder Left w/o Contrast    Narrative    EXAM: MR SHOULDER LEFT W/O CONTRAST  LOCATION: [facility]  DATE/TIME: 5/1/2023 11:52 AM CDT    INDICATION: Shoulder pain. Burning on the top of the shoulder. Limited range of motion. Trauma in January 2023.  COMPARISON: Radiographs from 04/20/2023.  TECHNIQUE: Unenhanced.    FINDINGS:    ROTATOR CUFF:  -Supraspinatus: Small amount of calcification in the tendon. Mild tendinosis. No tear. No muscle atrophy.  -Infraspinatus: No tendon tear, tendinopathy or fatty atrophy.  -Subscapularis: No tendon tear, tendinopathy or fatty atrophy.  -Teres minor: No tendon tear, tendinopathy or fatty atrophy.    CORACOACROMIAL ARCH:  -Morphology: Type II acromion. No subacromial spur. Subacromial and subcoracoid space are normal.   -Bursa: No subacromial or subcoracoid bursitis.    ACROMIOCLAVICULAR JOINT:   -Normal.     LONG HEAD OF BICEPS TENDON:   -No tendinopathy or tear. No tenosynovitis or subluxation.    GLENOHUMERAL JOINT:   -Labrum: No labral tear. No paralabral cyst.   -Cartilage: Normal.   -Joint space: No effusion or synovitis.  -Glenohumeral ligaments and capsule: No pericapsular inflammation.    BONES:   -No fracture or concerning marrow replacing lesion.    SOFT TISSUES:   -Normal deltoid muscle bulk. Normal visualized chest wall and axilla.       Impression    IMPRESSION:  1.  Calcific tendinosis of the supraspinatus tendon.  2.  Otherwise negative. No rotator cuff tear.                      *Note: Due to a large number of results and/or encounters for the requested time period, some results have not been displayed. A complete set of results can be found in Results Review.     ASSESSMENT & PLAN  Ms. Morrow is a 47 year old year old female who presents to clinic today with chronic neck and left shoulder pain since November 2022.    Clinical examination as well as MRI consistent with calcific tendinitis of left shoulder.  Fortunately no tearing of her rotator cuff.    Diagnosis: Calcific tendinopathy of left shoulder    Further treatment options discussed at this time given her lack of ability to participate in PT, I have recommended we pursue a ultrasound-guided barbotage and corticosteroid injection to her left supraspinatus tendon region and bursa.  I would like her to reengage in physical therapy 1 week after her procedure.    It was a pleasure seeing Jerrell Barbour DO, [facility]  Primary Care Sports Medicine          Again, thank you for allowing me to participate in the care of your patient.        Sincerely,        Soy Barbour DO

## 2023-06-02 PROBLEM — M54.2 NECK PAIN: Status: RESOLVED | Noted: 2023-02-07 | Resolved: 2023-06-02

## 2023-06-08 ENCOUNTER — OFFICE VISIT (OUTPATIENT)
Dept: ORTHOPEDICS | Facility: CLINIC | Age: 48
End: 2023-06-08
Payer: OTHER MISCELLANEOUS

## 2023-06-08 DIAGNOSIS — M75.32 CALCIFIC TENDINITIS OF LEFT SHOULDER: Primary | ICD-10-CM

## 2023-06-08 PROCEDURE — 20611 DRAIN/INJ JOINT/BURSA W/US: CPT | Mod: LT | Performed by: FAMILY MEDICINE

## 2023-06-08 RX ORDER — TRIAMCINOLONE ACETONIDE 40 MG/ML
40 INJECTION, SUSPENSION INTRA-ARTICULAR; INTRAMUSCULAR
Status: SHIPPED | OUTPATIENT
Start: 2023-06-08

## 2023-06-08 RX ORDER — LIDOCAINE HYDROCHLORIDE 10 MG/ML
4 INJECTION, SOLUTION INFILTRATION; PERINEURAL
Status: SHIPPED | OUTPATIENT
Start: 2023-06-08

## 2023-06-08 RX ORDER — LIDOCAINE HYDROCHLORIDE 10 MG/ML
5 INJECTION, SOLUTION INFILTRATION; PERINEURAL
Status: SHIPPED | OUTPATIENT
Start: 2023-06-08

## 2023-06-08 RX ORDER — LIDOCAINE HYDROCHLORIDE 10 MG/ML
3 INJECTION, SOLUTION INFILTRATION; PERINEURAL
Status: SHIPPED | OUTPATIENT
Start: 2023-06-08

## 2023-06-08 RX ADMIN — LIDOCAINE HYDROCHLORIDE 5 ML: 10 INJECTION, SOLUTION INFILTRATION; PERINEURAL at 17:22

## 2023-06-08 RX ADMIN — LIDOCAINE HYDROCHLORIDE 4 ML: 10 INJECTION, SOLUTION INFILTRATION; PERINEURAL at 17:22

## 2023-06-08 RX ADMIN — TRIAMCINOLONE ACETONIDE 40 MG: 40 INJECTION, SUSPENSION INTRA-ARTICULAR; INTRAMUSCULAR at 17:22

## 2023-06-08 RX ADMIN — LIDOCAINE HYDROCHLORIDE 3 ML: 10 INJECTION, SOLUTION INFILTRATION; PERINEURAL at 17:22

## 2023-06-08 NOTE — LETTER
2023         RE: Lena Morrow  [address]  [place] [address]        Dear Colleague,    Thank you for referring your patient, Lena Morrow, to the [facility] [place]. Please see a copy of my visit note below.    PROCEDURE ENCOUNTER    [facility]  Orthopedics  Cameron. Sammoy, DO  2023     Name: Lena Morrow  MRN: [id number]  Age: 48 year old  : 1975    Referring provider: Dr. Katherine Zamora  Diagnosis: Calcific tendinitis of left shoulder    Ultrasound-guided barbotage and corticosteroid injection left shoulder- Ultrasound Guided  The patient was informed of the risks and the benefits of the procedure and a written consent was signed.    The patient s left shoulder was prepped with chlorhexidine in sterile fashion.     3 cc of 1% lidocaine with epinephrine was drawn up into a syringe on a 27-gauge needle.    40 mg of methylprednisolone suspension was drawn up into a 5 mL syringe with 3 mL of 1% lidocaine.    Initial anesthesia achieved with 3 cc of lidocaine into the rotator cuff tendon region and overlying soft tissue and calcium deposit.  Once adequate anesthesia was achieved, proceeded with fenestration of hydroxyapatite seen on ultrasound within the infraspinatus tendon distally.  An 18-gauge needle on a syringe with 5 cc of lidocaine was used.  Multiple passes, fenestrating deposit as well as small amounts of lidocaine added for Hydro dissection and lavage purposes.      After calcification fragmented, syringes were swapped for the corticosteroid injectate.  Needle was withdrawn  to the subacromial bursal region and 5 cc total injected.  Needle placement was visualized and documented with ultrasound.    Ultrasound visualization was necessary to ensure placement in to the hydroxyapatite deposit, bursa and not the rotator cuff tendon which could potentially cause further tendon damage.  Injection performed long axis to the probe.   Injection solution visualized within the joint space.  Images were permanently stored for the patient's record.    Soy Barbour DO [facility]  Primary Care Sports Medicine  [facility]    Large Joint Injection/Arthocentesis: L subacromial bursa    Date/Time: 6/8/2023 5:22 PM    Performed by: Soy Barbour DO  Authorized by: Soy Barbour DO    Indications:  Pain  Needle Size:  18 G  Guidance: ultrasound    Location:  Shoulder   Location comment:  Barbotage procedure & subacromial injection      Site:  L subacromial bursa  Medications:  5 mL lidocaine 1 %; 4 mL lidocaine 1 %; 3 mL lidocaine 1 %; 40 mg triamcinolone 40 MG/ML  Medications comment:  The 3mL Lidocaine was used as local anethetic.     The 5mL Lidocaine was used for barbotage procedure.  Outcome:  Tolerated well, no immediate complications  Procedure discussed: discussed risks, benefits, and alternatives    Consent Given by:  Patient  Timeout: timeout called immediately prior to procedure    Prep: patient was prepped and draped in usual sterile fashion     Ultrasound was used to ensure safe and accurate needle placement and injection. Ultrasound images of the procedure were permanently stored.                Again, thank you for allowing me to participate in the care of your patient.        Sincerely,        Soy Barbour DO

## 2023-06-08 NOTE — PROGRESS NOTES
PROCEDURE ENCOUNTER    [facility]  Orthopedics  Soy Barbour DO  2023     Name: Lena Morrow  MRN: [id number]  Age: 48 year old  : 1975    Referring provider: Dr. Katherine Zamora  Diagnosis: Calcific tendinitis of left shoulder    Ultrasound-guided barbotage and corticosteroid injection left shoulder- Ultrasound Guided  The patient was informed of the risks and the benefits of the procedure and a written consent was signed.    The patient s left shoulder was prepped with chlorhexidine in sterile fashion.     3 cc of 1% lidocaine with epinephrine was drawn up into a syringe on a 27-gauge needle.    40 mg of methylprednisolone suspension was drawn up into a 5 mL syringe with 3 mL of 1% lidocaine.    Initial anesthesia achieved with 3 cc of lidocaine into the rotator cuff tendon region and overlying soft tissue and calcium deposit.  Once adequate anesthesia was achieved, proceeded with fenestration of hydroxyapatite seen on ultrasound within the infraspinatus tendon distally.  An 18-gauge needle on a syringe with 5 cc of lidocaine was used.  Multiple passes, fenestrating deposit as well as small amounts of lidocaine added for Hydro dissection and lavage purposes.      After calcification fragmented, syringes were swapped for the corticosteroid injectate.  Needle was withdrawn  to the subacromial bursal region and 5 cc total injected.  Needle placement was visualized and documented with ultrasound.    Ultrasound visualization was necessary to ensure placement in to the hydroxyapatite deposit, bursa and not the rotator cuff tendon which could potentially cause further tendon damage.  Injection performed long axis to the probe.  Injection solution visualized within the joint space.  Images were permanently stored for the patient's record.    Soy Barbour DO CAQSM  Primary Care Sports Medicine  [facility]    Large Joint Injection/Arthocentesis: L subacromial bursa    Date/Time:  6/8/2023 5:22 PM    Performed by: Soy Barbour DO  Authorized by: Soy Barbour DO    Indications:  Pain  Needle Size:  18 G  Guidance: ultrasound    Location:  Shoulder   Location comment:  Barbotage procedure & subacromial injection      Site:  L subacromial bursa  Medications:  5 mL lidocaine 1 %; 4 mL lidocaine 1 %; 3 mL lidocaine 1 %; 40 mg triamcinolone 40 MG/ML  Medications comment:  The 3mL Lidocaine was used as local anethetic.     The 5mL Lidocaine was used for barbotage procedure.  Outcome:  Tolerated well, no immediate complications  Procedure discussed: discussed risks, benefits, and alternatives    Consent Given by:  Patient  Timeout: timeout called immediately prior to procedure    Prep: patient was prepped and draped in usual sterile fashion     Ultrasound was used to ensure safe and accurate needle placement and injection. Ultrasound images of the procedure were permanently stored.

## 2023-06-08 NOTE — PATIENT INSTRUCTIONS
Thank you for choosing [facility]    DR [facility]SIF[facility]S CLINIC LOCATIONS  [place] [address] [facility], [address]th [place]   [place], [address] [place] [address]   [phone]       APPOINTMENTS: [phone]    CARE QUESTIONS: [phone],    BILLING QUESTIONS: [phone]    FAX NUMBER: [phone]        Follow up: 6 weeks- please call [phone] to schedule at your convenience.       1. Calcific tendinitis of left shoulder        Physical Therapy orders have been placed with [facility] (formally [facility])  You can call [phone] to schedule at your convenience. You may start/ resume PT of your shoulder in 10-14 days following your injection/ procedure.         Steroid Injection Information:    A corticosteroid injection was administered at your visit today.  The area of injection may be sore, slightly swollen for 1-2 days afterward.  Immediately after injection, you may have an area of numbness, which is caused by the local anesthetic, lidocaine (similar to novacaine) in the shot.  This medicine will wear off in about 4 hours.  In addition to the lidocaine, a steroid medication was injected, called triamcinolone acetate.  This medication is intended to provide long-acting antiinflammatory / pain relief.  It may take 2-5 days for this medication to provide noticeable relief.      After an injection, Dr. Barbour recommends:    Protecting the area wearing a bandage or gauze pad for at least 24 hours.    Using ice; ice bag or frozen vegetables over the injection site every one to two hours when able (for 15 minutes at a time).      Avoid any strenuous activity even if the knee is already feeling better immediately afterward. Light stretching is encouraged but refrain from home exercise program and increasing activity level for about 48 hours.    Avoid soaking in a hot tub, bath,  or pool for 48 hours after injection. Showering is fine!    Watch for signs of infection, including increasing pain, redness and swelling that last more than 48 hours after injection.

## 2023-06-11 ENCOUNTER — HEALTH MAINTENANCE LETTER (OUTPATIENT)
Age: 48
End: 2023-06-11

## 2023-06-16 ENCOUNTER — ANCILLARY PROCEDURE (OUTPATIENT)
Dept: MAMMOGRAPHY | Facility: CLINIC | Age: 48
End: 2023-06-16
Attending: INTERNAL MEDICINE
Payer: COMMERCIAL

## 2023-06-16 DIAGNOSIS — Z12.31 VISIT FOR SCREENING MAMMOGRAM: ICD-10-CM

## 2023-06-16 PROCEDURE — 77067 SCR MAMMO BI INCL CAD: CPT | Mod: TC | Performed by: RADIOLOGY

## 2023-06-16 PROCEDURE — 77063 BREAST TOMOSYNTHESIS BI: CPT | Mod: TC | Performed by: RADIOLOGY

## 2023-06-19 ENCOUNTER — OFFICE VISIT (OUTPATIENT)
Dept: URGENT CARE | Facility: URGENT CARE | Age: 48
End: 2023-06-19
Payer: COMMERCIAL

## 2023-06-19 ENCOUNTER — ANCILLARY PROCEDURE (OUTPATIENT)
Dept: GENERAL RADIOLOGY | Facility: CLINIC | Age: 48
End: 2023-06-19
Attending: FAMILY MEDICINE
Payer: COMMERCIAL

## 2023-06-19 VITALS
HEART RATE: 70 BPM | BODY MASS INDEX: 37.42 KG/M2 | SYSTOLIC BLOOD PRESSURE: 124 MMHG | DIASTOLIC BLOOD PRESSURE: 80 MMHG | WEIGHT: 218 LBS | OXYGEN SATURATION: 100 % | RESPIRATION RATE: 16 BRPM

## 2023-06-19 DIAGNOSIS — M79.672 PAIN OF LEFT HEEL: Primary | ICD-10-CM

## 2023-06-19 PROCEDURE — 99214 OFFICE O/P EST MOD 30 MIN: CPT | Performed by: FAMILY MEDICINE

## 2023-06-19 PROCEDURE — 73650 X-RAY EXAM OF HEEL: CPT | Mod: TC | Performed by: RADIOLOGY

## 2023-06-19 RX ORDER — OXYCODONE HYDROCHLORIDE 5 MG/1
TABLET ORAL
COMMUNITY
Start: 2023-05-23

## 2023-06-19 RX ORDER — NAPROXEN 500 MG/1
500 TABLET ORAL 2 TIMES DAILY WITH MEALS
Qty: 14 TABLET | Refills: 0 | Status: SHIPPED | OUTPATIENT
Start: 2023-06-19 | End: 2023-06-26

## 2023-06-19 NOTE — PROGRESS NOTES
SUBJECTIVE:  Chief Complaint   Patient presents with     Foot Pain     Pt has pain in left heel and side of foot-Pt states the pain is really bad    .ident who presents with a chief complaint of  left heel pain.  Symptoms began 1 month(s) ago , are moderate andstill present and worsening.  Context:Injury: no    Past Medical History:   Diagnosis Date     Bipolar 1 disorder (H)      Exertional asthma      PHIL (generalized anxiety disorder)      Hypothyroidism      Obesity (BMI 30-39.9)        Past Surgical History:   Procedure Laterality Date     CARPAL TUNNEL RELEASE RT/LT Right     + excision right dorsal carpal ganglion cyst & terminal branch, right posterior interosseous nerve.      SECTION       CYSTOSCOPY N/A 2017    Procedure: CYSTOSCOPY;;  Surgeon: Toni Da Silva MD;  Location:  OR     DAVINCI HYSTERECTOMY TOTAL, SALPINGECTOMY BILATERAL N/A 2017    Procedure: DAVINCI HYSTERECTOMY TOTAL, SALPINGECTOMY BILATERAL;  ROBOTIC ASSISTED LAPAROSCOPIC TOTAL HYSTERECTOMY; BILATERAL SALPINGECTOMY; CYSTOSCOPY;  Surgeon: Toni D aSilva MD;  Location:  OR     DILATION AND CURETTAGE N/A 2016    Procedure: DILATION AND CURETTAGE;  Surgeon: Josue Jama MD;  [facility]     EXTRACORPOREAL SHOCK WAVE LITHOTRIPSY (ESWL) Left 2021    Procedure: LEFT EXTRACORPOREAL SHOCK WAVE LITHOTRIPSY (ESWL);  Surgeon: Devonte Lambert MD;  Location:  OR     HYSTERECTOMY, PAP NO LONGER INDICATED       LAPAROSCOPIC CHOLECYSTECTOMY  2012    Procedure:LAPAROSCOPIC CHOLECYSTECTOMY; LAPAROSCOPIC CHOLECYSTECTOMY ; Surgeon:KARYNA CAMARA; Location: [facility]     LAPAROSCOPY DIAGNOSTIC (GYN) N/A 2016    Procedure: LAPAROSCOPY DIAGNOSTIC (GYN);  Surgeon: Josue Jama MD;  Location:  [facility]     LASER HOLMIUM LITHOTRIPSY URETER(S), INSERT STENT, COMBINED Left 2020    Procedure: Cystoscopy, left ureteroscopy with holmium laser lithotripsy and left stent placement;   Surgeon: Devonte Lambert MD;  Location: [facility]     TONSILLECTOMY & ADENOIDECTOMY         Family History   Problem Relation Age of Onset     Alcoholism Mother      Diabetes Type 2  Mother      Hypertension Mother      Depression Mother      Lung Cancer Father         smoker     Brain Tumor Brother         GBM     Diabetes Type 2  Paternal Grandmother      Myocardial Infarction Paternal Grandmother      Heart Failure Paternal Grandmother      Cancer Paternal Grandfather         unknown     Brain Cancer Paternal Aunt      Cerebrovascular Disease No family hx of      Breast Cancer No family hx of      Colon Cancer No family hx of      Ovarian Cancer No family hx of        Social History     Tobacco Use     Smoking status: Never     Smokeless tobacco: Never   Vaping Use     Vaping status: Never Used   Substance Use Topics     Alcohol use: Yes     Comment: 3-4 beers monthly       ROS:Review of systems negative except as stated below    EXAM:/80   Pulse 70   Resp 16   Wt 98.9 kg (218 lb)   LMP 12/10/2015   SpO2 100%   BMI 37.42 kg/m     Exam:left heel pain  tenderness to palpation  GENERAL APPEARANCE: healthy, alert and no distress  EXTREMITIES: peripheral pulses normal  SKIN: no suspicious lesions or rashes  NEURO: Normal strength and tone, sensory exam grossly normal, mentation intact and speech normal    Xray without acute findings, no fx read by Freddy Andino D.O.    ASSESSMENT:     ICD-10-CM    1. Pain of left heel  M79.672 XR Calcaneus Left G/E 2 Views     naproxen (NAPROSYN) 500 MG tablet        Keep appointment with Ortho specialty Friday  Ice

## 2023-06-26 ENCOUNTER — TRANSFERRED RECORDS (OUTPATIENT)
Dept: HEALTH INFORMATION MANAGEMENT | Facility: CLINIC | Age: 48
End: 2023-06-26
Payer: COMMERCIAL

## 2023-07-03 ENCOUNTER — THERAPY VISIT (OUTPATIENT)
Dept: PHYSICAL THERAPY | Facility: CLINIC | Age: 48
End: 2023-07-03
Attending: FAMILY MEDICINE
Payer: OTHER MISCELLANEOUS

## 2023-07-03 DIAGNOSIS — M75.32 CALCIFIC TENDINITIS OF LEFT SHOULDER: ICD-10-CM

## 2023-07-03 PROCEDURE — 97161 PT EVAL LOW COMPLEX 20 MIN: CPT | Mod: GP | Performed by: SPECIALIST/TECHNOLOGIST

## 2023-07-03 PROCEDURE — 97110 THERAPEUTIC EXERCISES: CPT | Mod: GP | Performed by: SPECIALIST/TECHNOLOGIST

## 2023-07-03 NOTE — PROGRESS NOTES
PHYSICAL THERAPY EVALUATION  Type of Visit: Evaluation    See electronic medical record for Abuse and Falls Screening details.    Subjective      Presenting condition or subjective complaint: Shoulder pain from a fall on the ice in November  Date of onset: 06/08/23    Relevant medical history:     Dates & types of surgery:      Prior diagnostic imaging/testing results: MRI     Prior therapy history for the same diagnosis, illness or injury: Yes      Prior Level of Function   Transfers: Independent  Ambulation: Independent  ADL: Independent      Living Environment  Social support: With family members   Type of home: Apartment/condo   Stairs to enter the home: Yes 16 Is there a railing: Yes   Ramp: No   Stairs inside the home: No       Help at home: Self Cares (home health aide/personal care attendant, family, etc)  Equipment owned:       Employment: Yes    Hobbies/Interests:      Patient goals for therapy: Put my bra on and off by myself    Pain assessment: Pain present     Objective   SHOULDER EVALUATION  PAIN: Pain is Exacerbated By: Activity: reaching overhead and behind back, carrying, lifting  INTEGUMENTARY (edema, incisions): WNL  POSTURE: Standing Posture: Rounded shoulders, Forward head, Thoracic kyphosis increased  Sitting Posture: Rounded shoulders, Forward head, Thoracic kyphosis increased  BALANCE/PROPRIOCEPTION: WNL  WEIGHTBEARING ALIGNMENT:   ROM: AROM: flexion lacking 10deg with pain at end range, abduction is painful so did not dest, IR/ER at 0 normal; PROM: flexion lacking ~5deg but no pain, abudction lacking 10-15deg with no pain, ER at 90 WNL no pain, IR Lacking 20-30deg with significant pain   STRENGTH: Abduction: pain against gravity, IR decreased with pain, ER WNL, Elbow flex/ext WNL   PALPATION: Significantly tender over proximal bicep tendon and greater tubercle  CERVICAL SCREEN: WNL    Assessment & Plan   CLINICAL IMPRESSIONS   Medical Diagnosis: Calcific Tendonitis of Left Shoulder     Treatment Diagnosis: Left shoulder pain, decreased ROM, Decreased strength   Impression/Assessment: Patient is a 48 year old female with Left shoulder pain complaints.  The following significant findings have been identified: Pain, Decreased ROM/flexibility, Decreased strength, Impaired muscle performance and Decreased activity tolerance. These impairments interfere with their ability to perform self care tasks, work tasks, recreational activities, household chores, driving , household mobility and community mobility as compared to previous level of function.     Clinical Decision Making (Complexity):   Clinical Presentation: Stable/Uncomplicated  Clinical Presentation Rationale: based on medical and personal factors listed in PT evaluation  Clinical Decision Making (Complexity): Low complexity    PLAN OF CARE  Treatment Interventions:  Interventions: Manual Therapy, Neuromuscular Re-education, Therapeutic Activity, Therapeutic Exercise, Self-Care/Home Management    Long Term Goals     PT Goal 1  Goal Description: Reach behind behind back painfree  Rationale: to maximize safety and independence with performance of ADLs and functional tasks;to maximize safety and independence within the home;to maximize safety and independence with self cares  Target Date: 09/25/23      Frequency of Treatment: 1x/wk  Duration of Treatment: 12 weeks    Recommended Referrals to Other Professionals:   Education Assessment:   Learner/Method: Patient    Risks and benefits of evaluation/treatment have been explained.   Patient/Family/caregiver agrees with Plan of Care.     Evaluation Time:     PT Eval, Low Complexity Minutes (15575): 20      Signing Clinician: Som Newsome PT

## 2023-07-05 ASSESSMENT — ASTHMA QUESTIONNAIRES: ACT_TOTALSCORE: 24

## 2023-07-07 ENCOUNTER — VIRTUAL VISIT (OUTPATIENT)
Dept: INTERNAL MEDICINE | Facility: CLINIC | Age: 48
End: 2023-07-07
Payer: COMMERCIAL

## 2023-07-07 DIAGNOSIS — R61 CRANIOFACIAL HYPERHIDROSIS: Primary | ICD-10-CM

## 2023-07-07 PROCEDURE — 99213 OFFICE O/P EST LOW 20 MIN: CPT | Mod: VID | Performed by: INTERNAL MEDICINE

## 2023-07-07 RX ORDER — OXYBUTYNIN CHLORIDE 5 MG/1
5 TABLET, EXTENDED RELEASE ORAL DAILY
Qty: 90 TABLET | Refills: 0 | Status: SHIPPED | OUTPATIENT
Start: 2023-07-07 | End: 2023-07-20

## 2023-07-07 NOTE — PROGRESS NOTES
VIDEO VISIT                                                       ASSESSMENT/PLAN                                                      (R61) Craniofacial hyperhidrosis  (primary encounter diagnosis)  Plan: TRIAL of Ditropan XL 5 mg daily; can increase dose as needed/tolerated; topical or oral glycopyrrolate can also be considered if no improvement with Ditropan.    Total time of video call between patient and provider was 10 minutes (11:50am-12:00pm). Provider location: office. Patient location: home. Platform: ideaTree - innovate | mentor | invest.     Katherine Zamora MD   [employer] [address] [place] [id number]  T: [phone], F: [phone]    SUBJECTIVE                                                      Lena Morrow is a very pleasant 48 year old female who requested a video visit to discuss excess sweating:    Patient was made aware that this visit will be billed the same as an in-person visit and has given verbal consent to proceed with this video visit.     Ongoing for years. Involves her face and neck. Profuse sweating with minimal exertion or any heat.  Has tried multiple OTC topical antiperspirants to no avail.    Sweats elsewhere, but not profusely like face and neck.    OBJECTIVE                                                       Vitals: No vitals were obtained today due to virtual visit.    General: appears healthy, alert and no distress  Psychiatric: mentation normal, affect normal/bright, judgement and insight intact, normal speech and appearance well-groomed    ---    (Note was completed, in part, with [employer] voice-recognition software. Documentation reviewed, but some grammatical, spelling, and word errors may remain.)

## 2023-07-13 ASSESSMENT — ASTHMA QUESTIONNAIRES: ACT_TOTALSCORE: 24

## 2023-07-16 NOTE — TELEPHONE ENCOUNTER
Reason for Call:  Other call back    Detailed comments: Per patient, she broke out, (won't tell me more) since yesterday. Would like a call back from nurse.    Phone Number Patient can be reached at: Home number on file [phone] (home)    Best Time: Anytime    Can we leave a detailed message on this number? YES    Call taken on 4/20/2017 at 9:03 AM by BLADIMIR DALEY       ROSE MARY Baca

## 2023-07-20 ENCOUNTER — OFFICE VISIT (OUTPATIENT)
Dept: ORTHOPEDICS | Facility: CLINIC | Age: 48
End: 2023-07-20
Payer: COMMERCIAL

## 2023-07-20 ENCOUNTER — OFFICE VISIT (OUTPATIENT)
Dept: INTERNAL MEDICINE | Facility: CLINIC | Age: 48
End: 2023-07-20
Payer: OTHER MISCELLANEOUS

## 2023-07-20 VITALS
SYSTOLIC BLOOD PRESSURE: 124 MMHG | HEART RATE: 67 BPM | OXYGEN SATURATION: 98 % | BODY MASS INDEX: 36.42 KG/M2 | WEIGHT: 212.2 LBS | TEMPERATURE: 97.2 F | DIASTOLIC BLOOD PRESSURE: 75 MMHG

## 2023-07-20 VITALS — WEIGHT: 212.5 LBS | BODY MASS INDEX: 36.48 KG/M2

## 2023-07-20 DIAGNOSIS — R61 CRANIOFACIAL HYPERHIDROSIS: Primary | ICD-10-CM

## 2023-07-20 DIAGNOSIS — Z02.6 ENCOUNTER RELATED TO WORKER'S COMPENSATION CLAIM: Primary | ICD-10-CM

## 2023-07-20 DIAGNOSIS — M75.32 CALCIFIC TENDINITIS OF LEFT SHOULDER: Primary | ICD-10-CM

## 2023-07-20 DIAGNOSIS — M75.32 CALCIFIC TENDONITIS OF LEFT SHOULDER: ICD-10-CM

## 2023-07-20 DIAGNOSIS — M54.12 CERVICAL RADICULOPATHY: ICD-10-CM

## 2023-07-20 DIAGNOSIS — G89.29 CHRONIC LEFT SHOULDER PAIN: ICD-10-CM

## 2023-07-20 DIAGNOSIS — M25.512 CHRONIC LEFT SHOULDER PAIN: ICD-10-CM

## 2023-07-20 PROCEDURE — 99213 OFFICE O/P EST LOW 20 MIN: CPT | Performed by: INTERNAL MEDICINE

## 2023-07-20 PROCEDURE — 99213 OFFICE O/P EST LOW 20 MIN: CPT | Performed by: FAMILY MEDICINE

## 2023-07-20 RX ORDER — OXYBUTYNIN CHLORIDE 10 MG/1
10 TABLET, EXTENDED RELEASE ORAL DAILY
Qty: 90 TABLET | Refills: 3 | Status: SHIPPED | OUTPATIENT
Start: 2023-07-20 | End: 2023-08-01

## 2023-07-20 NOTE — PROGRESS NOTES
ESTABLISHED PATIENT FOLLOW-UP:  No chief complaint on file.       HISTORY OF PRESENT ILLNESS  Ms. Morrow is a pleasant 48 year old year old female who presents to clinic today for follow-up of calcific tendinopathy of left shoulder.    Date of procedure: 6/8/23 barbotage and corticosteroid injection  Date last seen: 6/8/23  Following Therapeutic Plan: PT, HEP  Pain: intermittent, pain primarily when reaching behind body and with overhead motions  Function: increased ROM, stiffness, still has difficulty reaching behind back  Interval History: Patient received left shoulder subacromial injection and barbotage procedure at last visit on 6/8/23. Since that time patient reports that she had an increase in pain for 1.5 weeks following the procedure. Since then she has noticed gradually improvement in symptoms. She has not been to physical therapy in a few weeks due to issues with correct coverage between work comp and personal insurance. This has been sorted out so she does have future PT visits scheduled. Although she hasn't been to PT she has continued HEP.     Additional medical/Social/Surgical histories reviewed in [facility] and updated as appropriate.    REVIEW OF SYSTEMS (7/20/2023)  CONSTITUTIONAL: Denies fever and weight loss  GASTROINTESTINAL: Denies abdominal pain, nausea, vomiting  MUSCULOSKELETAL: See HPI  SKIN: Denies any recent rash or lesion  NEUROLOGICAL: Denies numbness or focal weakness     PHYSICAL EXAM  Wt 96.4 kg (212 lb 8 oz)   LMP 12/10/2015   BMI 36.48 kg/m      General  - normal appearance, in no obvious distress  Musculoskeletal - Left shoulder  - inspection: normal bone and joint alignment, no obvious deformity, no scapular winging, no AC step-off  - palpation: minimally tender RC insertion, normal clavicle, non-tender AC  - ROM: Excellent improvement in ROM, FE and abduction near full and painless, IR to mid lumbar region L3 with pain.  ER near full 65 degrees with pain terminally only.  -  strength: 5/5  strength, 5/5 in ER, IR, 4+ supraspinatus.   Neuro  - no sensory or motor deficit, grossly normal coordination, normal muscle tone  Skin  - no ecchymosis, erythema, warmth, or induration, no obvious rash  Psych  - interactive, appropriate, normal mood and affect      ASSESSMENT & PLAN  Ms. Morrow is a 48 year old year old female who presents to clinic today for follow up regarding left shoulder calcific tendinopathy s/p barbotage 6 weeks ago.    Diagnosis: Calcific tendinopathy left shoulder    Doing remarkably well now 6 weeks after procedure with improved pain and motion.  IR remains mildly limited and encouraged PT.     Continue PT  Encouraged physical activity, reviewed strength activities to hold off from in gym until cleared by PT  Follow up PRN    It was a pleasure seeing Jerrell Barbour DO, CAQSM  Primary Care Sports Medicine

## 2023-07-20 NOTE — LETTER
7/20/2023         RE: Lena Morrow  [address]  [facility] [address]        Dear Colleague,    Thank you for referring your patient, Lena Morrow, to the [facility] [place]. Please see a copy of my visit note below.    ESTABLISHED PATIENT FOLLOW-UP:  No chief complaint on file.       HISTORY OF PRESENT ILLNESS  Ms. Morrow is a pleasant 48 year old year old female who presents to clinic today for follow-up of calcific tendinopathy of left shoulder.    Date of procedure: 6/8/23 barbotage and corticosteroid injection  Date last seen: 6/8/23  Following Therapeutic Plan: PT, HEP  Pain: intermittent, pain primarily when reaching behind body and with overhead motions  Function: increased ROM, stiffness, still has difficulty reaching behind back  Interval History: Patient received left shoulder subacromial injection and barbotage procedure at last visit on 6/8/23. Since that time patient reports that she had an increase in pain for 1.5 weeks following the procedure. Since then she has noticed gradually improvement in symptoms. She has not been to physical therapy in a few weeks due to issues with correct coverage between work comp and personal insurance. This has been sorted out so she does have future PT visits scheduled. Although she hasn't been to PT she has continued HEP.     Additional medical/Social/Surgical histories reviewed in [facility] and updated as appropriate.    REVIEW OF SYSTEMS (7/20/2023)  CONSTITUTIONAL: Denies fever and weight loss  GASTROINTESTINAL: Denies abdominal pain, nausea, vomiting  MUSCULOSKELETAL: See HPI  SKIN: Denies any recent rash or lesion  NEUROLOGICAL: Denies numbness or focal weakness     PHYSICAL EXAM  Wt 96.4 kg (212 lb 8 oz)   LMP 12/10/2015   BMI 36.48 kg/m      General  - normal appearance, in no obvious distress  Musculoskeletal - Left shoulder  - inspection: normal bone and joint alignment, no obvious deformity, no scapular  winging, no AC step-off  - palpation: minimally tender RC insertion, normal clavicle, non-tender AC  - ROM: Excellent improvement in ROM, FE and abduction near full and painless, IR to mid lumbar region L3 with pain.  ER near full 65 degrees with pain terminally only.  - strength: 5/5  strength, 5/5 in ER, IR, 4+ supraspinatus.   Neuro  - no sensory or motor deficit, grossly normal coordination, normal muscle tone  Skin  - no ecchymosis, erythema, warmth, or induration, no obvious rash  Psych  - interactive, appropriate, normal mood and affect      ASSESSMENT & PLAN  Ms. Morrow is a 48 year old year old female who presents to clinic today for follow up regarding left shoulder calcific tendinopathy s/p barbotage 6 weeks ago.    Diagnosis: Calcific tendinopathy left shoulder    Doing remarkably well now 6 weeks after procedure with improved pain and motion.  IR remains mildly limited and encouraged PT.     Continue PT  Encouraged physical activity, reviewed strength activities to hold off from in gym until cleared by PT  Follow up PRN    It was a pleasure seeing Jerrell Barbour DO, [facility]  Primary Care Sports Medicine          Again, thank you for allowing me to participate in the care of your patient.        Sincerely,        Soy Barbour DO

## 2023-07-31 PROBLEM — M75.32 CALCIFIC TENDINITIS OF LEFT SHOULDER: Status: RESOLVED | Noted: 2023-07-03 | Resolved: 2023-07-31

## 2023-07-31 ASSESSMENT — ASTHMA QUESTIONNAIRES
QUESTION_1 LAST FOUR WEEKS HOW MUCH OF THE TIME DID YOUR ASTHMA KEEP YOU FROM GETTING AS MUCH DONE AT WORK, SCHOOL OR AT HOME: NONE OF THE TIME
QUESTION_2 LAST FOUR WEEKS HOW OFTEN HAVE YOU HAD SHORTNESS OF BREATH: NOT AT ALL
QUESTION_4 LAST FOUR WEEKS HOW OFTEN HAVE YOU USED YOUR RESCUE INHALER OR NEBULIZER MEDICATION (SUCH AS ALBUTEROL): NOT AT ALL
QUESTION_5 LAST FOUR WEEKS HOW WOULD YOU RATE YOUR ASTHMA CONTROL: COMPLETELY CONTROLLED
ACT_TOTALSCORE: 25
QUESTION_3 LAST FOUR WEEKS HOW OFTEN DID YOUR ASTHMA SYMPTOMS (WHEEZING, COUGHING, SHORTNESS OF BREATH, CHEST TIGHTNESS OR PAIN) WAKE YOU UP AT NIGHT OR EARLIER THAN USUAL IN THE MORNING: NOT AT ALL
ACT_TOTALSCORE: 25

## 2023-07-31 ASSESSMENT — ENCOUNTER SYMPTOMS
COUGH: 0
FREQUENCY: 0
JOINT SWELLING: 0
BREAST MASS: 0
ABDOMINAL PAIN: 0
NERVOUS/ANXIOUS: 1
EYE PAIN: 0
ARTHRALGIAS: 1
SHORTNESS OF BREATH: 0
CONSTIPATION: 0
PALPITATIONS: 0
FEVER: 0
NAUSEA: 0
PARESTHESIAS: 0
WEAKNESS: 0
DYSURIA: 0
MYALGIAS: 1
DIARRHEA: 0
CHILLS: 0
HEARTBURN: 0
SORE THROAT: 0
HEADACHES: 1
HEMATURIA: 0
HEMATOCHEZIA: 0
DIZZINESS: 0

## 2023-08-01 ENCOUNTER — OFFICE VISIT (OUTPATIENT)
Dept: INTERNAL MEDICINE | Facility: CLINIC | Age: 48
End: 2023-08-01
Payer: COMMERCIAL

## 2023-08-01 VITALS
HEART RATE: 61 BPM | WEIGHT: 211 LBS | HEIGHT: 64 IN | BODY MASS INDEX: 36.02 KG/M2 | SYSTOLIC BLOOD PRESSURE: 122 MMHG | DIASTOLIC BLOOD PRESSURE: 58 MMHG | OXYGEN SATURATION: 98 % | RESPIRATION RATE: 16 BRPM

## 2023-08-01 DIAGNOSIS — Z00.00 ROUTINE HISTORY AND PHYSICAL EXAMINATION OF ADULT: Primary | ICD-10-CM

## 2023-08-01 DIAGNOSIS — Z13.220 SCREENING FOR CHOLESTEROL LEVEL: ICD-10-CM

## 2023-08-01 DIAGNOSIS — Z23 NEED FOR VACCINATION: ICD-10-CM

## 2023-08-01 PROCEDURE — 90714 TD VACC NO PRESV 7 YRS+ IM: CPT | Performed by: INTERNAL MEDICINE

## 2023-08-01 PROCEDURE — 99396 PREV VISIT EST AGE 40-64: CPT | Mod: 25 | Performed by: INTERNAL MEDICINE

## 2023-08-01 PROCEDURE — 90471 IMMUNIZATION ADMIN: CPT | Performed by: INTERNAL MEDICINE

## 2023-08-01 NOTE — PROGRESS NOTES
ASSESSMENT/PLAN                                                       (Z00.00) Routine history and physical examination of adult  (primary encounter diagnosis)  Comment: PMH, PSH, FH, SH, medications, allergies, immunizations, and preventative health measures reviewed and updated as appropriate.  Plan: see below for plans.      (Z13.220) Screening for cholesterol level  Plan: fasting lipid profile ordered - patient to schedule.     (Z23) Need for vaccination  Plan: TD given today.     Katherine Zamora MD   [address] [address]  T: [phone], F: [phone]    SUBJECTIVE                                                      Lena Morrow is a very pleasant 48 year old female who presents for a physical.    ROS:  Constitutional: no unintentional weight loss or gain reported; no fevers, chills, or sweats reported  Cardiovascular: no chest pain, palpitations, or edema reported  Respiratory: no cough, wheezing, shortness of breath, or dyspnea on exertion reported  Gastrointestinal: no nausea, vomiting, constipation, diarrhea, or abdominal pain reported  Genitourinary: no urinary frequency, urgency, dysuria, or hematuria reported  Integumentary: no rash or pruritus reported  Musculoskeletal: no back pain, muscle pain, joint pain, or joint swelling reported  Neurologic: no focal weakness, numbness, or tingling reported  Hematologic: no easy bruising or bleeding reported  Endocrine: no heat or cold intolerance reported; no polyuria or polydipsia reported  Psychiatric: see PMH below    Past Medical History:   Diagnosis Date    Bipolar 1 disorder (H)     Exertional asthma     PHIL (generalized anxiety disorder)     Hypothyroidism     Obesity (BMI 30-39.9)      Past Surgical History:   Procedure Laterality Date    CARPAL TUNNEL RELEASE RT/LT Right     + excision right dorsal carpal ganglion cyst & terminal branch, right posterior interosseous nerve.     SECTION       CYSTOSCOPY N/A 05/26/2017    Procedure: CYSTOSCOPY;;  Surgeon: Toni Da Silva MD;  Location:  OR    DAVINCI HYSTERECTOMY TOTAL, SALPINGECTOMY BILATERAL N/A 05/26/2017    Procedure: DAVINCI HYSTERECTOMY TOTAL, SALPINGECTOMY BILATERAL;  ROBOTIC ASSISTED LAPAROSCOPIC TOTAL HYSTERECTOMY; BILATERAL SALPINGECTOMY; CYSTOSCOPY;  Surgeon: Toni Da Silva MD;  Location:  OR    DILATION AND CURETTAGE N/A 01/05/2016    Procedure: DILATION AND CURETTAGE;  Surgeon: Josue Jama MD;  [facility]:  [facility]    EXTRACORPOREAL SHOCK WAVE LITHOTRIPSY (ESWL) Left 08/05/2021    Procedure: LEFT EXTRACORPOREAL SHOCK WAVE LITHOTRIPSY (ESWL);  Surgeon: Devonte Lambert MD;  Location:  OR    HYSTERECTOMY, PAP NO LONGER INDICATED      LAPAROSCOPIC CHOLECYSTECTOMY  04/03/2012    Procedure:LAPAROSCOPIC CHOLECYSTECTOMY; LAPAROSCOPIC CHOLECYSTECTOMY ; Surgeon:KARYNA CAMARA; Location: [facility]    LAPAROSCOPY DIAGNOSTIC (GYN) N/A 01/05/2016    Procedure: LAPAROSCOPY DIAGNOSTIC (GYN);  Surgeon: Josue Jama MD;  Location:  [facility]    LASER HOLMIUM LITHOTRIPSY URETER(S), INSERT STENT, COMBINED Left 07/04/2020    Procedure: Cystoscopy, left ureteroscopy with holmium laser lithotripsy and left stent placement;  Surgeon: Devonte Lambert MD;  Location:  OR    TONSILLECTOMY & ADENOIDECTOMY       Family History   Problem Relation Age of Onset    Alcoholism Mother     Diabetes Type 2  Mother     Hypertension Mother     Depression Mother     Lung Cancer Father         smoker    Brain Tumor Brother         GBM    Diabetes Type 2  Paternal Grandmother     Myocardial Infarction Paternal Grandmother     Heart Failure Paternal Grandmother     Cancer Paternal Grandfather         unknown    Brain Cancer Paternal Aunt     Cerebrovascular Disease No family hx of     Breast Cancer No family hx of     Colon Cancer No family hx of     Ovarian Cancer No family hx of      Social History     Occupational History    Occupation:  [employer] Service [employer]   Tobacco Use    Smoking status: Never    Smokeless tobacco: Never   Vaping Use    Vaping Use: Never used   Substance and Sexual Activity    Alcohol use: Yes     Comment: 3-4 beers monthly    Drug use: Never    Sexual activity: Yes     Partners: Male   Social History Narrative    .    Son (16 as of 2023).    Walks when weather permits.      Allergies   Allergen Reactions    Amoxicillin-Pot Clavulanate Rash    Aripiprazole Other (See Comments)     increased sadness and moodiness    Azithromycin GI Disturbance    Escitalopram Oxalate Diarrhea    Oxycodone Headache and GI Disturbance    Prochlorperazine Other (See Comments)     agitation    Sertraline Other (See Comments)     sweats    Venlafaxine Other (See Comments)     sweats     Current Outpatient Medications   Medication Sig    albuterol (PROVENTIL HFA) 108 (90 Base) MCG/ACT inhaler Inhale 2 puffs into the lungs every 6 hours    albuterol (PROVENTIL) (2.5 MG/3ML) 0.083% neb solution Take 1 vial (2.5 mg) by nebulization every 4 hours as needed for shortness of breath / dyspnea or wheezing    buPROPion (WELLBUTRIN XL) 300 MG 24 hr tablet Take 300 mg by mouth every morning    Calcium Carbonate-Vit D-Min (CALCIUM 1200 PO) Take 1 capsule by mouth daily    cholecalciferol (VITAMIN D3) 25 mcg (1000 units) capsule Take 1 capsule by mouth daily    esomeprazole (NEXIUM) 40 MG DR capsule TAKE 1 CAPSULE(40 MG) BY MOUTH EVERY MORNING BEFORE BREAKFAST AND 30 TO 60 MINUTES BEFORE EATING    ketoconazole (NIZORAL) 2 % external shampoo Apply topically daily as needed for itching or irritation    lamoTRIgine (LAMICTAL) 200 MG tablet Take 200 mg by mouth 2 times daily    levothyroxine (SYNTHROID/LEVOTHROID) 75 MCG tablet Take 1 tablet (75 mcg) by mouth daily    lithium ER (LITHOBID) 300 MG CR tablet Take 1,200 mg by mouth At Bedtime     loratadine-pseudoePHEDrine (CLARITIN-D 12-HOUR) 5-120 MG 12 hr tablet Take 1 tablet by mouth 2 times daily      "Multiple Vitamins-Minerals (WOMENS MULTIVITAMIN PO) Take 2 capsules by mouth daily    oxybutynin ER (DITROPAN XL) 10 MG 24 hr tablet Take 1 tablet (10 mg) by mouth daily    oxyCODONE (ROXICODONE) 5 MG tablet TAKE 1 TABLET BY MOUTH EVERY DAY AS NEEDED FOR MODERATE TO SEVERE PAIN    potassium 99 MG TABS Take 99 mg by mouth daily     propranolol ER (INDERAL LA) 80 MG 24 hr capsule Take 1 capsule (80 mg) by mouth daily     Immunization History   Administered Date(s) Administered    COVID-19 MONOVALENT 12+ (Pfizer) 04/25/2021, 05/16/2021, 11/19/2021    DT (PEDS <7y) 06/12/1995    Influenza (IIV3) PF 01/15/2013, 10/01/2013, 10/15/2014    Influenza Vaccine >6 months (Alfuria,Fluzone) 12/31/2016, 11/20/2017, 12/08/2018, 10/21/2019, 09/18/2020, 11/19/2021, 12/10/2022    TD,PF 7+ (Tenivac) 06/12/1995, 09/13/2005    TDAP Vaccine (Adacel) 04/29/2013     PREVENTATIVE HEALTH                                                      BMI: obese  Blood pressure: within normal limits   Breast CA screening: up to date   Cervical CA screening: n/a   Colon CA screening: DUE  Lung CA screening: n/a   Dexa: not medically indicated at this time   Screening cholesterol: DUE  Screening diabetes: up to date   STD testing: no risk factors present  Alcohol misuse screening: alcohol use reviewed - no intervention indicated at this time  Immunizations: reviewed;  TD DUE    OBJECTIVE                                                      /58   Pulse 61   Resp 16   Ht 1.626 m (5' 4\")   Wt 95.7 kg (211 lb)   LMP 12/10/2015   SpO2 98%   BMI 36.22 kg/m    Constitutional: well-appearing  Head, Ears, and Eyes: normocephalic; normal external auditory canal and pinna; tympanic membranes visualized and normal; normal lids and conjunctivae  Neck: supple, symmetric, no thyromegaly or lymphadenopathy  Respiratory: normal respiratory effort; clear to auscultation bilaterally  Cardiovascular: regular rate and rhythm; no edema  Gastrointestinal: soft, " non-tender, and non-distended; no organomegaly or masses  Musculoskeletal: normal gait and station  Psych: normal judgment and insight; normal mood and affect; recent and remote memory intact    ---  (Note was completed, in part, with [employer] voice-recognition software. Documentation was reviewed, but some grammatical, spelling, and word errors may remain.)

## 2023-08-01 NOTE — PATIENT INSTRUCTIONS
Please check and make sure stool kit (for colon cancer screening) is not . If it is , come back and get a new one. If it's not , please complete ASAP.    Tetanus booster today.    Fasting lipid profile when able.    Fasting = no food/calories for 6-8 hours; water, black coffee, and medications okay.     Stop oxybutynin - not helping.

## 2023-08-01 NOTE — PROGRESS NOTES
DISCHARGE  Reason for Discharge: Patient has failed to schedule further appointments.       07/03/23 0500   Appointment Info   Signing clinician's name / credentials Leonardo Newsome PT, DPT   Total/Authorized Visits 12 EAT   Visits Used 1   Medical Diagnosis Calcific Tendonitis of Left Shoulder   PT Tx Diagnosis Left shoulder pain, decreased ROM, Decreased strength   Progress Note/Certification   Onset of illness/injury or Date of Surgery 06/08/23   Therapy Frequency 1x/wk   Predicted Duration 12 weeks   PT Goal 1   Goal Description Reach behind behind back painfree   Rationale to maximize safety and independence with performance of ADLs and functional tasks;to maximize safety and independence within the home;to maximize safety and independence with self cares   Target Date 09/25/23   Treatment Interventions (PT)   Interventions Therapeutic Procedure/Exercise;Therapeutic Activity;Neuromuscular Re-education   Therapeutic Procedure/Exercise   PTRx Ther Proc 1 Pendulum/Codmans   PTRx Ther Proc 1 - Details 1x2 min   PTRx Ther Proc 2 Passive Shoulder Flexion Sitting   PTRx Ther Proc 2 - Details 1x2 min   PTRx Ther Proc 3 Shoulder Flexion Stretch with Stick   PTRx Ther Proc 3 - Details 1x2 min   PTRx Ther Proc 4 Supported Shoulder External Rotation   PTRx Ther Proc 4 - Details 1x2 min   PTRx Ther Proc 5 Shoulder abduction isometric   PTRx Ther Proc 5 - Details 1x5 5 second hold   PTRx Ther Proc 6 Wall Climb   PTRx Ther Proc 6 - Details 1x2 min   PTRx Ther Proc 7 Shoulder IR isometric   PTRx Ther Proc 7 - Details 1x5 5 second hold   PTRx Ther Proc 8 Shoulder Theraband Extension   PTRx Ther Proc 8 - Details Isometric walkouts x5   PTRx Ther Proc 9 Pec Stretch Doorway   PTRx Ther Proc 9 - Details 1x2 min   Therapeutic Procedures: strength, endurance, ROM, flexibillity minutes (22368) 20   Ther Proc 1 Education   Ther Proc 1 - Details victorina/phys, HEP, form cues/corrections, activity modifications   Eval/Assessments   PT Eval,  Low Complexity Minutes (92446) 20   Education   Learner/Method Patient   Plan   Home program PTrx   Plan for next session Reassess and progress as tolerated   Total Session Time   Timed Code Treatment Minutes 20   Total Treatment Time (sum of timed and untimed services) 40         Discharge Plan: Patient to continue home program.    Referring Provider:  Soy Barbour

## 2023-08-09 DIAGNOSIS — N20.0 NEPHROLITHIASIS: Primary | ICD-10-CM

## 2023-08-11 ENCOUNTER — OFFICE VISIT (OUTPATIENT)
Dept: FAMILY MEDICINE | Facility: CLINIC | Age: 48
End: 2023-08-11
Payer: COMMERCIAL

## 2023-08-11 ENCOUNTER — MYC MEDICAL ADVICE (OUTPATIENT)
Dept: ORTHOPEDICS | Facility: CLINIC | Age: 48
End: 2023-08-11

## 2023-08-11 VITALS
OXYGEN SATURATION: 99 % | SYSTOLIC BLOOD PRESSURE: 107 MMHG | TEMPERATURE: 98 F | DIASTOLIC BLOOD PRESSURE: 57 MMHG | HEART RATE: 60 BPM

## 2023-08-11 DIAGNOSIS — M75.32 CALCIFIC TENDINITIS OF LEFT SHOULDER: Primary | ICD-10-CM

## 2023-08-11 DIAGNOSIS — L60.0 INGROWING NAIL WITH INFECTION: Primary | ICD-10-CM

## 2023-08-11 PROCEDURE — 99213 OFFICE O/P EST LOW 20 MIN: CPT

## 2023-08-11 RX ORDER — DOXYCYCLINE HYCLATE 100 MG
100 TABLET ORAL 2 TIMES DAILY
Qty: 14 TABLET | Refills: 0 | Status: SHIPPED | OUTPATIENT
Start: 2023-08-11 | End: 2023-08-18

## 2023-08-11 NOTE — PROGRESS NOTES
Assessment & Plan     Ingrowing nail with infection    - doxycycline hyclate (VIBRA-TABS) 100 MG tablet; Take 1 tablet (100 mg) by mouth 2 times daily for 7 days  - Orthopedic  Referral; Future      12 minutes spent by me on the date of the encounter doing chart review, patient visit, and documentation        See Patient Instructions    Return for Follow up podiatry .    [facility]    Niurka Paredes is a 48 year old, presenting for the following health issues:  Urgent Care and Ingrown Toenail (Left big toe- hx of ingrown toenail. In the last 2 weeks pt has been dealing with pain on her left toe.)      HPI     Got a pedicure 2 weeks ago and is now having pain, swelling and drainage to the left great toenail bed.  States she has had issues with ingrown toenails in the past but has been too afraid to do anything about it.      Review of Systems   INTEGUMENTARY/SKIN: POSITIVE for in grown toenail      Objective    /57   Pulse 60   Temp 98  F (36.7  C) (Oral)   LMP 12/10/2015   SpO2 99%   There is no height or weight on file to calculate BMI.  Physical Exam   GENERAL: healthy, alert and no distress  SKIN: Left great toenail is slightly curved inward with swelling, erythema and cracked skin at the nail bed.  No drainage seen at this time.  Tender to touch

## 2023-08-11 NOTE — TELEPHONE ENCOUNTER
See MyChart message. Okay to send another PT referral? Per patient's last visit on 7/20/23, PT was encouraged.    PT referral pending.     Noris Sanchez, ATC

## 2023-08-25 ENCOUNTER — HOSPITAL ENCOUNTER (OUTPATIENT)
Dept: GENERAL RADIOLOGY | Facility: CLINIC | Age: 48
Discharge: HOME | End: 2023-08-25
Attending: PHYSICIAN ASSISTANT
Payer: COMMERCIAL

## 2023-08-25 ENCOUNTER — HOSPITAL ENCOUNTER (OUTPATIENT)
Dept: CT IMAGING | Facility: CLINIC | Age: 48
Discharge: HOME | End: 2023-08-25
Attending: PHYSICIAN ASSISTANT
Payer: COMMERCIAL

## 2023-08-25 DIAGNOSIS — N20.0 NEPHROLITHIASIS: ICD-10-CM

## 2023-08-25 PROCEDURE — 74176 CT ABD & PELVIS W/O CONTRAST: CPT

## 2023-08-25 PROCEDURE — 74018 RADEX ABDOMEN 1 VIEW: CPT

## 2023-08-28 DIAGNOSIS — R10.13 EPIGASTRIC PAIN: ICD-10-CM

## 2023-08-29 ENCOUNTER — OFFICE VISIT (OUTPATIENT)
Dept: PODIATRY | Facility: CLINIC | Age: 48
End: 2023-08-29
Payer: COMMERCIAL

## 2023-08-29 VITALS
BODY MASS INDEX: 35.34 KG/M2 | DIASTOLIC BLOOD PRESSURE: 80 MMHG | SYSTOLIC BLOOD PRESSURE: 108 MMHG | HEIGHT: 64 IN | WEIGHT: 207 LBS

## 2023-08-29 DIAGNOSIS — M76.62 ACHILLES TENDINITIS OF LEFT LOWER EXTREMITY: ICD-10-CM

## 2023-08-29 DIAGNOSIS — L60.0 ONYCHOCRYPTOSIS: Primary | ICD-10-CM

## 2023-08-29 PROCEDURE — 11750 EXCISION NAIL&NAIL MATRIX: CPT | Mod: TA | Performed by: PODIATRIST

## 2023-08-29 PROCEDURE — 99213 OFFICE O/P EST LOW 20 MIN: CPT | Mod: 25 | Performed by: PODIATRIST

## 2023-08-29 RX ORDER — ESOMEPRAZOLE MAGNESIUM 40 MG/1
CAPSULE, DELAYED RELEASE ORAL
Qty: 90 CAPSULE | Refills: 1 | Status: SHIPPED | OUTPATIENT
Start: 2023-08-29 | End: 2023-12-14

## 2023-08-29 NOTE — Clinical Note
2023         RE: Lena Morrow  [address]  [place] [facility] [address]        Dear Colleague,    Thank you for referring your patient, Lena Morrow, to the [facility]. Please see a copy of my visit note below.      Assessment:      ICD-10-CM    1. Onychocryptosis  L60.0              Plan:    For foot or ankle pain - return for standing x-rays    Orders Placed This Encounter   Procedures    REMOVAL NAIL/NAIL BED, PARTIAL OR COMPLETE         Discussed treatment options.  Procedure:  partial removal of nail plate with chemical matrixectomy.  Procedure:  Nail avulsion  PARQ session was held with the patient, verbal consent obtained.  Local anesthesia obtained to the digit using lidocaine plain.  Skin was prepped.  Offending nail border hallux  was removed in total.  Curettage of matrix performed.  Chemical matricectomy was performed using phenol.  Irrigation was carried out with saline.  Ointment and band-aid applied.    Patient tolerated the procedure well.    -Post-procedural instructions dispensed to patient.     Return:  No follow-ups on file.    Leeanna Calvillo DPM                Chief Complaint:     Patient presents with:  Left Great Toe - Pain  Left Foot - Pain           HPI:  Lena Morrow is a 48 year old year old female who presents for evaluation of foot concern        Past Medical & Surgical History:  Past Medical History:   Diagnosis Date    Bipolar 1 disorder (H)     Exertional asthma     PHIL (generalized anxiety disorder)     Hypothyroidism     Obesity (BMI 30-39.9)       Past Surgical History:   Procedure Laterality Date    CARPAL TUNNEL RELEASE RT/LT Right     + excision right dorsal carpal ganglion cyst & terminal branch, right posterior interosseous nerve.     SECTION      CYSTOSCOPY N/A 2017    Procedure: CYSTOSCOPY;;  Surgeon: Toni Da Silva MD;  Location: [facility]    DAVINCI HYSTERECTOMY TOTAL, SALPINGECTOMY BILATERAL N/A  05/26/2017    Procedure: DAVINCI HYSTERECTOMY TOTAL, SALPINGECTOMY BILATERAL;  ROBOTIC ASSISTED LAPAROSCOPIC TOTAL HYSTERECTOMY; BILATERAL SALPINGECTOMY; CYSTOSCOPY;  Surgeon: Toni Da Silva MD;  Location:  OR    DILATION AND CURETTAGE N/A 01/05/2016    Procedure: DILATION AND CURETTAGE;  Surgeon: Josue Jama MD;  [facility]    EXTRACORPOREAL SHOCK WAVE LITHOTRIPSY (ESWL) Left 08/05/2021    Procedure: LEFT EXTRACORPOREAL SHOCK WAVE LITHOTRIPSY (ESWL);  Surgeon: Devonte Lambert MD;  Location:  OR    HYSTERECTOMY, PAP NO LONGER INDICATED      LAPAROSCOPIC CHOLECYSTECTOMY  04/03/2012    Procedure:LAPAROSCOPIC CHOLECYSTECTOMY; LAPAROSCOPIC CHOLECYSTECTOMY ; Surgeon:KARYNA CAMARA; Location:[facility]    LAPAROSCOPY DIAGNOSTIC (GYN) N/A 01/05/2016    Procedure: LAPAROSCOPY DIAGNOSTIC (GYN);  Surgeon: Josue Jama MD;  Location: [facility]    LASER HOLMIUM LITHOTRIPSY URETER(S), INSERT STENT, COMBINED Left 07/04/2020    Procedure: Cystoscopy, left ureteroscopy with holmium laser lithotripsy and left stent placement;  Surgeon: Devonte Lambert MD;  Location:  OR    TONSILLECTOMY & ADENOIDECTOMY        Family History   Problem Relation Age of Onset    Alcoholism Mother     Diabetes Type 2  Mother     Hypertension Mother     Depression Mother     Lung Cancer Father         smoker    Brain Tumor Brother         GBM    Diabetes Type 2  Paternal Grandmother     Myocardial Infarction Paternal Grandmother     Heart Failure Paternal Grandmother     Cancer Paternal Grandfather         unknown    Brain Cancer Paternal Aunt     Cerebrovascular Disease No family hx of     Breast Cancer No family hx of     Colon Cancer No family hx of     Ovarian Cancer No family hx of         Social History:  ?  History   Smoking Status    Never   Smokeless Tobacco    Never     History   Drug Use Unknown     Social History    Substance and Sexual Activity      Alcohol use: Yes        Comment: 3-4 beers  "monthly      Allergies:  ?   Allergies   Allergen Reactions    Amoxicillin-Pot Clavulanate Rash    Aripiprazole Other (See Comments)     increased sadness and moodiness    Azithromycin GI Disturbance    Escitalopram Oxalate Diarrhea           Oxycodone Headache and GI Disturbance    Prochlorperazine Other (See Comments)     agitation    Sertraline Other (See Comments)     sweats    Venlafaxine Other (See Comments)     sweats        Medications:    Current Outpatient Medications   Medication    albuterol (PROVENTIL HFA) 108 (90 Base) MCG/ACT inhaler    albuterol (PROVENTIL) (2.5 MG/3ML) 0.083% neb solution    buPROPion (WELLBUTRIN XL) 300 MG 24 hr tablet    Calcium Carbonate-Vit D-Min (CALCIUM 1200 PO)    cholecalciferol (VITAMIN D3) 25 mcg (1000 units) capsule    esomeprazole (NEXIUM) 40 MG DR capsule    ketoconazole (NIZORAL) 2 % external shampoo    lamoTRIgine (LAMICTAL) 200 MG tablet    levothyroxine (SYNTHROID/LEVOTHROID) 75 MCG tablet    lithium ER (LITHOBID) 300 MG CR tablet    loratadine-pseudoePHEDrine (CLARITIN-D 12-HOUR) 5-120 MG 12 hr tablet    Multiple Vitamins-Minerals (WOMENS MULTIVITAMIN PO)    oxyCODONE (ROXICODONE) 5 MG tablet    potassium 99 MG TABS    propranolol ER (INDERAL LA) 80 MG 24 hr capsule     Current Facility-Administered Medications   Medication    lidocaine 1 % injection 3 mL    lidocaine 1 % injection 4 mL    lidocaine 1 % injection 5 mL    triamcinolone (KENALOG-40) injection 40 mg       Physical Exam:  ?  Vitals:  /80   Ht 1.626 m (5' 4\")   Wt 93.9 kg (207 lb)   LMP 12/10/2015   BMI 35.53 kg/m     General:  WD/WN, in NAD.  A&O x3.  Dermatologic:    Onychocryptosis present Hlalu xL  Paronychia present.  Skin texture, turgor is normal.  Vascular:  Pulses palpable.  Digital capillary refill time normal.  Skin temperature is normal to affected foot.  Neurologic:    Gross sensation normal.  Gait and balance normal.  Musculoskeletal:  Maximal pain to palpation of affected nail " border(s).  Gross deformity absent.                Again, thank you for allowing me to participate in the care of your patient.        Sincerely,        Leeanna Calvillo DPM

## 2023-08-29 NOTE — PROGRESS NOTES
Assessment:      ICD-10-CM    1. Onychocryptosis  L60.0       2. Achilles tendinitis of left lower extremity  M76.62              Plan:    For foot or ankle pain - return for standing x-rays    Orders Placed This Encounter   Procedures    REMOVAL NAIL/NAIL BED, PARTIAL OR COMPLETE         Discussed treatment options.  Procedure:  partial removal of nail plate with chemical matrixectomy.  Procedure:  Nail avulsion  PARQ session was held with the patient, verbal consent obtained.  Local anesthesia obtained to the digit using lidocaine plain.  Skin was prepped.  Offending nail border hallux  was removed in total.  Curettage of matrix performed.  Chemical matricectomy was performed using phenol.  Irrigation was carried out with saline.  Ointment and band-aid applied.    Patient tolerated the procedure well.    -Post-procedural instructions dispensed to patient.     Return:  No follow-ups on file.    Leeanna Calvillo DPM                Chief Complaint:     Patient presents with:  Left Great Toe - Pain  Left Foot - Pain           HPI:  Lena Morrow is a 48 year old year old female who presents for evaluation of foot concern        Past Medical & Surgical History:  Past Medical History:   Diagnosis Date    Bipolar 1 disorder (H)     Exertional asthma     PHIL (generalized anxiety disorder)     Hypothyroidism     Obesity (BMI 30-39.9)       Past Surgical History:   Procedure Laterality Date    CARPAL TUNNEL RELEASE RT/LT Right     + excision right dorsal carpal ganglion cyst & terminal branch, right posterior interosseous nerve.     SECTION      CYSTOSCOPY N/A 2017    Procedure: CYSTOSCOPY;;  Surgeon: Toni Da Silva MD;  Location:  OR    DAVINCI HYSTERECTOMY TOTAL, SALPINGECTOMY BILATERAL N/A 2017    Procedure: DAVINCI HYSTERECTOMY TOTAL, SALPINGECTOMY BILATERAL;  ROBOTIC ASSISTED LAPAROSCOPIC TOTAL HYSTERECTOMY; BILATERAL SALPINGECTOMY; CYSTOSCOPY;  Surgeon: Toni Da Silva MD;  Location:  [facility]    DILATION AND CURETTAGE N/A 01/05/2016    Procedure: DILATION AND CURETTAGE;  Surgeon: Josue Jama MD;  [facility]    EXTRACORPOREAL SHOCK WAVE LITHOTRIPSY (ESWL) Left 08/05/2021    Procedure: LEFT EXTRACORPOREAL SHOCK WAVE LITHOTRIPSY (ESWL);  Surgeon: Devonte Lambert MD;  Location:  OR    HYSTERECTOMY, PAP NO LONGER INDICATED      LAPAROSCOPIC CHOLECYSTECTOMY  04/03/2012    Procedure:LAPAROSCOPIC CHOLECYSTECTOMY; LAPAROSCOPIC CHOLECYSTECTOMY ; Surgeon:KARYNA CAMARA; [facility]: [facility]    LAPAROSCOPY DIAGNOSTIC (GYN) N/A 01/05/2016    Procedure: LAPAROSCOPY DIAGNOSTIC (GYN);  Surgeon: Josue Jama MD;  [facility]:  [facility]    LASER HOLMIUM LITHOTRIPSY URETER(S), INSERT STENT, COMBINED Left 07/04/2020    Procedure: Cystoscopy, left ureteroscopy with holmium laser lithotripsy and left stent placement;  Surgeon: Devonte Lambert MD;  Location:  OR    TONSILLECTOMY & ADENOIDECTOMY        Family History   Problem Relation Age of Onset    Alcoholism Mother     Diabetes Type 2  Mother     Hypertension Mother     Depression Mother     Lung Cancer Father         smoker    Brain Tumor Brother         GBM    Diabetes Type 2  Paternal Grandmother     Myocardial Infarction Paternal Grandmother     Heart Failure Paternal Grandmother     Cancer Paternal Grandfather         unknown    Brain Cancer Paternal Aunt     Cerebrovascular Disease No family hx of     Breast Cancer No family hx of     Colon Cancer No family hx of     Ovarian Cancer No family hx of         Social History:  ?  History   Smoking Status    Never   Smokeless Tobacco    Never     History   Drug Use Unknown     Social History    Substance and Sexual Activity      Alcohol use: Yes        Comment: 3-4 beers monthly      Allergies:  ?   Allergies   Allergen Reactions    Amoxicillin-Pot Clavulanate Rash    Aripiprazole Other (See Comments)     increased sadness and moodiness    Azithromycin GI Disturbance     "Escitalopram Oxalate Diarrhea           Oxycodone Headache and GI Disturbance    Prochlorperazine Other (See Comments)     agitation    Sertraline Other (See Comments)     sweats    Venlafaxine Other (See Comments)     sweats        Medications:    Current Outpatient Medications   Medication    albuterol (PROVENTIL HFA) 108 (90 Base) MCG/ACT inhaler    albuterol (PROVENTIL) (2.5 MG/3ML) 0.083% neb solution    buPROPion (WELLBUTRIN XL) 300 MG 24 hr tablet    Calcium Carbonate-Vit D-Min (CALCIUM 1200 PO)    cholecalciferol (VITAMIN D3) 25 mcg (1000 units) capsule    ketoconazole (NIZORAL) 2 % external shampoo    lamoTRIgine (LAMICTAL) 200 MG tablet    levothyroxine (SYNTHROID/LEVOTHROID) 75 MCG tablet    lithium ER (LITHOBID) 300 MG CR tablet    loratadine-pseudoePHEDrine (CLARITIN-D 12-HOUR) 5-120 MG 12 hr tablet    Multiple Vitamins-Minerals (WOMENS MULTIVITAMIN PO)    oxyCODONE (ROXICODONE) 5 MG tablet    potassium 99 MG TABS    propranolol ER (INDERAL LA) 80 MG 24 hr capsule    esomeprazole (NEXIUM) 40 MG DR capsule     Current Facility-Administered Medications   Medication    lidocaine 1 % injection 3 mL    lidocaine 1 % injection 4 mL    lidocaine 1 % injection 5 mL    triamcinolone (KENALOG-40) injection 40 mg       Physical Exam:  ?  Vitals:  /80   Ht 1.626 m (5' 4\")   Wt 93.9 kg (207 lb)   LMP 12/10/2015   BMI 35.53 kg/m     General:  WD/WN, in NAD.  A&O x3.  Dermatologic:    Onychocryptosis present Hlalu xL  Paronychia present.  Skin texture, turgor is normal.  Vascular:  Pulses palpable.  Digital capillary refill time normal.  Skin temperature is normal to affected foot.  Neurologic:    Gross sensation normal.  Gait and balance normal.  Musculoskeletal:  Maximal pain to palpation of affected nail border(s).  Gross deformity absent.            "

## 2023-08-29 NOTE — PATIENT INSTRUCTIONS
"PATIENT INSTRUCTIONS - Podiatry / Foot & Ankle Surgery    Aftercare instructions  -Remove the bandage tomorrow to begin soaking  -You may remove the band-aid and let clean shower water run over the toe.  Alternatively, you may soak 2x daily in warm Epsom salts or warm soapy water for 5-10 minutes.  -Cover with an antibiotic ointment and a band-aid  -If the skin retains too much moisture, stop the antibiotic ointment and use Alcohol or Betadine (Iodine) to clean the toe daily.  -All supplies are available over the counter, you do not need a prescription  -Redness and drainage (straw colored or bloody) are normal reactions to the chemical    -Continue soaking until the drainage stops  -Call the clinic if you experience:   -Redness extending beyond the 1st joint (\"knuckle\") of the toe   -Severe blistering   -Purulent (pus) drainage        Diclofenac / Voltaren Gel- Apply to affected area only.  Apply 3-4 times daily for the first 3-4 days, then 1-2 times daily as needed.  Over the counter (OTC), a prescription is not needed.  Available at most pharmacies, [employer].      Physical Therapy  You have been referred to [facility].  Locations can be found online.  Please call to schedule an appointment:  [phone]        Calf muscle stretching 2-3x daily as instructed, both with the knees straight and the knees bent. Hold for 30-60 seconds.  For personal instruction on this,  please request a Physical Therapy referral from your doctor.      If very significant - boot, po steroid      "

## 2023-08-30 ENCOUNTER — MYC MEDICAL ADVICE (OUTPATIENT)
Dept: PODIATRY | Facility: CLINIC | Age: 48
End: 2023-08-30
Payer: COMMERCIAL

## 2023-09-14 ENCOUNTER — THERAPY VISIT (OUTPATIENT)
Dept: PHYSICAL THERAPY | Facility: CLINIC | Age: 48
End: 2023-09-14
Attending: FAMILY MEDICINE
Payer: OTHER MISCELLANEOUS

## 2023-09-14 DIAGNOSIS — G89.29 CHRONIC LEFT SHOULDER PAIN: Primary | ICD-10-CM

## 2023-09-14 DIAGNOSIS — M25.512 CHRONIC LEFT SHOULDER PAIN: Primary | ICD-10-CM

## 2023-09-14 PROCEDURE — 97110 THERAPEUTIC EXERCISES: CPT | Mod: GP | Performed by: PHYSICAL THERAPIST

## 2023-09-14 PROCEDURE — 97161 PT EVAL LOW COMPLEX 20 MIN: CPT | Mod: GP | Performed by: PHYSICAL THERAPIST

## 2023-09-14 NOTE — PROGRESS NOTES
PHYSICAL THERAPY EVALUATION  Type of Visit: Evaluation    See electronic medical record for Abuse and Falls Screening details.    Subjective       Presenting condition or subjective complaint: Pt presents with complaints of L shoulder pain. Pt relayed onset of sx's to a fall at work on 11-17-22. Pt reported she was leaving work and as she approached her car, she slipped on ice causing her to fall. Pt reported undergoing a cortisone injection/Barbotage procedure to the L shoulder on 6-8-23. Pt reported significant pain for approximately 1.5 weeks and then significant improvement (decreased pain, improved ROM). Pt reported improved status for upwards of 1 month; significant increase in pain since that time.  Date of onset: 11/17/22    Relevant medical history: Depression; Overweight; Pain at night or rest; Thyroid problems   Dates & types of surgery:      Prior diagnostic imaging/testing results: MRI; CT scan; X-ray     Prior therapy history for the same diagnosis, illness or injury: Yes Physical    Prior Level of Function  Transfers: Independent  Ambulation: Independent  ADL: Independent      Living Environment  Social support: With a significant other or spouse   Type of home: Apartment/condo   Stairs to enter the home: No       Ramp: No   Stairs inside the home: No       Help at home: None  Equipment owned:       Employment: Yes    Hobbies/Interests:      Patient goals for therapy: Raise my arm again, fasten my bra by myself       Objective   SHOULDER EVALUATION  PAIN: Pain Level at Rest: 0/10  Pain Level with Use: 10/10  Pain Location: lateral shoulder  Pain Frequency: intermittent  Pain is Exacerbated By: reaching, lying on the shoulder, lifting  ROM:  AROM: flexion: WFL's; end range loss; significant discomfort reported following the movement (became tearful); scaption (in scapular plane) 95 degrees. Ext/IR: ~L3.  STRENGTH:  IR: pain limited; ER: pain limited; ABD: pain limited.      Assessment & Plan   CLINICAL  IMPRESSIONS  Medical Diagnosis: Calcific tendinitis of left shoulder    Treatment Diagnosis: Chronic L shoulder pain   Impression/Assessment: Patient is a 48 year old female with L shoulder complaints.  The following significant findings have been identified: Pain, Decreased ROM/flexibility, Decreased strength, Impaired muscle performance, and Decreased activity tolerance. These impairments interfere with their ability to perform self care tasks, work tasks, household chores, and sleeping  as compared to previous level of function.     Clinical Decision Making (Complexity):  Clinical Presentation: Stable/Uncomplicated  Clinical Presentation Rationale: based on medical and personal factors listed in PT evaluation  Clinical Decision Making (Complexity): Low complexity    PLAN OF CARE  Treatment Interventions:  Interventions: Neuromuscular Re-education, Therapeutic Activity, Therapeutic Exercise, Self-Care/Home Management    Long Term Goals     PT Goal 1  Goal Identifier: Reaching  Goal Description: Reach overhead/to the side with pain level <2/10  Rationale: to maximize safety and independence with performance of ADLs and functional tasks;to maximize safety and independence within the home;to maximize safety and independence with self cares  Target Date: 11/23/23      Frequency of Treatment: 1x/week for 4 weeks, tapering to every other week for 6 weeks  Duration of Treatment: 10 weeks    Education Assessment:        Risks and benefits of evaluation/treatment have been explained.   Patient/Family/caregiver agrees with Plan of Care.     Evaluation Time:     PT Eval, Low Complexity Minutes (21415): 25       Signing Clinician: Re Vergara PT

## 2023-09-19 ENCOUNTER — VIRTUAL VISIT (OUTPATIENT)
Dept: UROLOGY | Facility: CLINIC | Age: 48
End: 2023-09-19
Payer: COMMERCIAL

## 2023-09-19 VITALS — BODY MASS INDEX: 35.85 KG/M2 | WEIGHT: 210 LBS | HEIGHT: 64 IN

## 2023-09-19 DIAGNOSIS — N20.0 NEPHROLITHIASIS: Primary | ICD-10-CM

## 2023-09-19 PROCEDURE — 99214 OFFICE O/P EST MOD 30 MIN: CPT | Mod: VID | Performed by: PHYSICIAN ASSISTANT

## 2023-09-19 ASSESSMENT — PAIN SCALES - GENERAL: PAINLEVEL: NO PAIN (0)

## 2023-09-19 NOTE — PROGRESS NOTES
Virtual Visit Details    Type of service:  Video Visit     Originating Location (pt. Location): Home    Distant Location (provider location):  On-site  Platform used for Video Visit: [employer]  Start time: 11:05am  End time: 11:20am    *Send link to cell phone*      Lena is a 46 year old who is being evaluated via a billable video visit.      How would you like to obtain your AVS? MyChart  If the video visit is dropped, the invitation should be resent by: Text to cell phone: [phone]  Will anyone else be joining your video visit? No         Office Visit Note  [facility]  [phone]    Sep 19, 2023    1975    UROLOGIC DIAGNOSES:    Left kidney stone    CURRENT INTERVENTIONS:    ESWL in August 2021    History:    Lena is a set up for virtual visit today for kidney stone follow-up.  She did well after her left extracorporeal shockwave lithotripsy in August 2021.  She had no pain or symptoms afterwards.    Two year CT follow-up today and noted to have 5mm stone in the left kidney. Very nervous about another stone episode.       Imaging:  Narrative & Impression   CT ABDOMEN PELVIS W/O CONTRAST 8/25/2023 7:06 AM     CLINICAL HISTORY: Nephrolithiasis  TECHNIQUE: CT scan of the abdomen and pelvis was performed without IV  contrast. Multiplanar reformats were obtained. Dose reduction  techniques were used.  CONTRAST: None.     COMPARISON: 11/2/2021 and 7/3/2020     FINDINGS:   LOWER CHEST: Linear atelectasis in the lateral left lower lobe at the  lung base. Small calcified granuloma in the lingula.     HEPATOBILIARY: No focal lesions of the liver. Cholecystectomy. No  biliary ductal dilatation.     PANCREAS: Normal.     SPLEEN: Normal.     ADRENAL GLANDS: Normal.     KIDNEYS/BLADDER: A 5 mm residual calculus in the left kidney (series  4, image 98) and a few additional punctate calyceal calcifications. No  right renal calculi. No hydronephrosis or contour deforming masses in  either kidney.      BOWEL: A small fat density nodule with a rim of soft tissue and  central soft tissue dot in the left upper quadrant abutting the mid  descending colon (series 4, image 103) is likely a prominent epiploic  appendage. No surrounding inflammatory changes. No small bowel or  colonic obstruction or inflammatory changes. Normal appendix.     LYMPH NODES: No lymphadenopathy.     VASCULATURE: No abdominal aortic aneurysm.     PELVIC ORGANS: Hysterectomy.     OTHER: No free fluid or fluid collections. Small right-sided spigelian  hernia containing fat or an epiploic appendage in the right lower  quadrant, abutting the cecum (series 4, image 136-143 and coronal  series 5, image 25-26)     MUSCULOSKELETAL: No suspicious lesions in the bones.                                                                      IMPRESSION:   1.  Small burden of residual calculi in the left kidney measuring up  to 5 mm.  2.  A prominent epiploic appendage in the LEFT lateral abdomen  abutting the descending colon. A small RIGHT-SIDED spigelian hernia  containing fat and an epiploic appendage. No inflammatory changes  surrounding these. Correlate for tenderness in the left and right  lower quadrants.       Labs:      MEDICATIONS:    Current Outpatient Medications:     albuterol (PROVENTIL HFA) 108 (90 Base) MCG/ACT inhaler, Inhale 2 puffs into the lungs every 6 hours, Disp: 8.5 g, Rfl: 0    albuterol (PROVENTIL) (2.5 MG/3ML) 0.083% neb solution, Take 1 vial (2.5 mg) by nebulization every 4 hours as needed for shortness of breath / dyspnea or wheezing, Disp: 300 mL, Rfl: 0    buPROPion (WELLBUTRIN XL) 300 MG 24 hr tablet, Take 300 mg by mouth every morning, Disp: , Rfl:     Calcium Carbonate-Vit D-Min (CALCIUM 1200 PO), Take 1 capsule by mouth daily, Disp: , Rfl:     cholecalciferol (VITAMIN D3) 25 mcg (1000 units) capsule, Take 1 capsule by mouth daily, Disp: , Rfl:     esomeprazole (NEXIUM) 40 MG DR capsule, TAKE 1 CAPSULE(40 MG) BY MOUTH EVERY  MORNING BEFORE BREAKFAST, 30 TO 60 MINUTES BEFORE EATING, Disp: 90 capsule, Rfl: 1    ketoconazole (NIZORAL) 2 % external shampoo, Apply topically daily as needed for itching or irritation, Disp: 120 mL, Rfl: 3    lamoTRIgine (LAMICTAL) 200 MG tablet, Take 200 mg by mouth 2 times daily, Disp: , Rfl:     levothyroxine (SYNTHROID/LEVOTHROID) 75 MCG tablet, Take 1 tablet (75 mcg) by mouth daily, Disp: 90 tablet, Rfl: 0    lithium ER (LITHOBID) 300 MG CR tablet, Take 1,200 mg by mouth At Bedtime , Disp: , Rfl: 0    loratadine-pseudoePHEDrine (CLARITIN-D 12-HOUR) 5-120 MG 12 hr tablet, Take 1 tablet by mouth 2 times daily , Disp: , Rfl:     Multiple Vitamins-Minerals (WOMENS MULTIVITAMIN PO), Take 2 capsules by mouth daily, Disp: , Rfl:     oxyCODONE (ROXICODONE) 5 MG tablet, TAKE 1 TABLET BY MOUTH EVERY DAY AS NEEDED FOR MODERATE TO SEVERE PAIN, Disp: , Rfl:     potassium 99 MG TABS, Take 99 mg by mouth daily , Disp: , Rfl:     propranolol ER (INDERAL LA) 80 MG 24 hr capsule, Take 1 capsule (80 mg) by mouth daily, Disp: 90 capsule, Rfl: 1    Current Facility-Administered Medications:     lidocaine 1 % injection 3 mL, 3 mL, , , Soy Barbour, DO, 3 mL at 06/08/23 1722    lidocaine 1 % injection 4 mL, 4 mL, , , Soy Barbour, DO, 4 mL at 06/08/23 1722    lidocaine 1 % injection 5 mL, 5 mL, , , Soy Barbour, DO, 5 mL at 06/08/23 1722    triamcinolone (KENALOG-40) injection 40 mg, 40 mg, , , Soy Barbour, DO, 40 mg at 06/08/23 1722    ALLERGIES:     Allergies   Allergen Reactions    Amoxicillin-Pot Clavulanate Rash    Aripiprazole Other (See Comments)     increased sadness and moodiness    Azithromycin GI Disturbance    Escitalopram Oxalate Diarrhea           Oxycodone Headache and GI Disturbance    Prochlorperazine Other (See Comments)     agitation    Sertraline Other (See Comments)     sweats    Venlafaxine Other (See Comments)     sweats       REVIEW OF SYSTEMS:   Skin: No rash, pruritis, or skin  pigmentation  Eyes: No changes in vision  Ears/Nose/Throat: No changes in hearing, no nosebleeds  Respiratory: No shortness of breath, dyspnea on exertion, cough, or hemoptysis  Cardiovascular: No chest pain or palpitations  Gastrointestinal: No diarrhea or constipation. No abdominal pain. No hematochezia  Genitourinary: see HPI  Musculoskeletal: No pain or swelling of joints, normal range of motion  Neurologic: No weakness or tremors  Psychiatric: No recent changes in memory or mood  Hematologic/Lymphatic/Immunologic: No easy bruising or enlarged lymph nodes  Endocrine: No weight gain or loss    SURGICAL HISTORY:    Past Surgical History:   Procedure Laterality Date    CARPAL TUNNEL RELEASE RT/LT Right     + excision right dorsal carpal ganglion cyst & terminal branch, right posterior interosseous nerve.     SECTION      CYSTOSCOPY N/A 2017    Procedure: CYSTOSCOPY;;  Surgeon: Toni Da Silva MD;  Location:  OR    DAVINCI HYSTERECTOMY TOTAL, SALPINGECTOMY BILATERAL N/A 2017    Procedure: DAVINCI HYSTERECTOMY TOTAL, SALPINGECTOMY BILATERAL;  ROBOTIC ASSISTED LAPAROSCOPIC TOTAL HYSTERECTOMY; BILATERAL SALPINGECTOMY; CYSTOSCOPY;  Surgeon: Toni Da Silva MD;  Location:  OR    DILATION AND CURETTAGE N/A 2016    Procedure: DILATION AND CURETTAGE;  Surgeon: Josue Jama MD;  Location: [facility]    EXTRACORPOREAL SHOCK WAVE LITHOTRIPSY (ESWL) Left 2021    Procedure: LEFT EXTRACORPOREAL SHOCK WAVE LITHOTRIPSY (ESWL);  Surgeon: Devonte Lambert MD;  Location:  OR    HYSTERECTOMY, PAP NO LONGER INDICATED      LAPAROSCOPIC CHOLECYSTECTOMY  2012    Procedure:LAPAROSCOPIC CHOLECYSTECTOMY; LAPAROSCOPIC CHOLECYSTECTOMY ; Surgeon:KARYNA CAMARA; Location:[facility]    LAPAROSCOPY DIAGNOSTIC (GYN) N/A 2016    Procedure: LAPAROSCOPY DIAGNOSTIC (GYN);  Surgeon: Josue Jama MD;  Location: [facility]    LASER HOLMIUM LITHOTRIPSY URETER(S), INSERT STENT, COMBINED  Left 07/04/2020    Procedure: Cystoscopy, left ureteroscopy with holmium laser lithotripsy and left stent placement;  Surgeon: Devonte Lambert MD;  Location:  OR    TONSILLECTOMY & ADENOIDECTOMY           PHYSICAL EXAM:    General: Alert and oriented to time, place, and self. In NAD   HEENT: Head AT/NC, EOMI, CN Grossly intact   Lungs: no respiratory distress, or pursed lip breathing   Heart: No obvious jugular venous distension present   Musculoskeltal: Normal movements. Normal appearing musculature  Skin: no suspicious lesions or rashes   Neuro: Alert, oriented, speech and mentation normal; moving all 4 extremities equally.   Psych: affect and mood normal        Urinalysis:  UA RESULTS:  Recent Labs   Lab Test 07/03/20 2010 08/11/17  1435   COLOR Yellow Yellow   APPEARANCE Slightly Cloudy Clear   URINEGLC Negative Negative   URINEBILI Negative Negative   URINEKETONE Negative Negative   SG 1.026 1.020   UBLD Small* Trace*   URINEPH 6.5 6.0   PROTEIN 10* Negative   UROBILINOGEN  --  0.2   NITRITE Negative Negative   LEUKEST Negative Small*   RBCU 115*  --    WBCU 0  --          IMPRESSION:    Left renal stone    PLAN:    She is interested in treatment of her left renal stone. Will review imagine with Dr. Lambert. We discussed this must be visible on KUB.   Litholink x 2 and video to review.     JOSEPH Ludwig [facility]

## 2023-09-19 NOTE — LETTER
9/19/2023       RE: Lena Morrow  [address]  [place] [place] [address]     Dear Colleague,    Thank you for referring your patient, Lena Morrow, to the [facility] at [facility]. Please see a copy of my visit note below.    Virtual Visit Details    Type of service:  Video Visit     Originating Location (pt. Location): Home    Distant Location (provider location):  On-site  Platform used for Video Visit: [employer]  Start time: 11:05am  End time: 11:20am    *Send link to cell phone*      Lena is a 46 year old who is being evaluated via a billable video visit.      How would you like to obtain your AVS? MyChart  If the video visit is dropped, the invitation should be resent by: Text to cell phone: [phone]  Will anyone else be joining your video visit? No         Office Visit Note  [facility]  [phone]    Sep 19, 2023    1975    UROLOGIC DIAGNOSES:    Left kidney stone    CURRENT INTERVENTIONS:    ESWL in August 2021    History:    Lena is a set up for virtual visit today for kidney stone follow-up.  She did well after her left extracorporeal shockwave lithotripsy in August 2021.  She had no pain or symptoms afterwards.    Two year CT follow-up today and noted to have 5mm stone in the left kidney. Very nervous about another stone episode.       Imaging:  Narrative & Impression   CT ABDOMEN PELVIS W/O CONTRAST 8/25/2023 7:06 AM     CLINICAL HISTORY: Nephrolithiasis  TECHNIQUE: CT scan of the abdomen and pelvis was performed without IV  contrast. Multiplanar reformats were obtained. Dose reduction  techniques were used.  CONTRAST: None.     COMPARISON: 11/2/2021 and 7/3/2020     FINDINGS:   LOWER CHEST: Linear atelectasis in the lateral left lower lobe at the  lung base. Small calcified granuloma in the lingula.     HEPATOBILIARY: No focal lesions of the liver. Cholecystectomy. No  biliary ductal  dilatation.     PANCREAS: Normal.     SPLEEN: Normal.     ADRENAL GLANDS: Normal.     KIDNEYS/BLADDER: A 5 mm residual calculus in the left kidney (series  4, image 98) and a few additional punctate calyceal calcifications. No  right renal calculi. No hydronephrosis or contour deforming masses in  either kidney.     BOWEL: A small fat density nodule with a rim of soft tissue and  central soft tissue dot in the left upper quadrant abutting the mid  descending colon (series 4, image 103) is likely a prominent epiploic  appendage. No surrounding inflammatory changes. No small bowel or  colonic obstruction or inflammatory changes. Normal appendix.     LYMPH NODES: No lymphadenopathy.     VASCULATURE: No abdominal aortic aneurysm.     PELVIC ORGANS: Hysterectomy.     OTHER: No free fluid or fluid collections. Small right-sided spigelian  hernia containing fat or an epiploic appendage in the right lower  quadrant, abutting the cecum (series 4, image 136-143 and coronal  series 5, image 25-26)     MUSCULOSKELETAL: No suspicious lesions in the bones.                                                                      IMPRESSION:   1.  Small burden of residual calculi in the left kidney measuring up  to 5 mm.  2.  A prominent epiploic appendage in the LEFT lateral abdomen  abutting the descending colon. A small RIGHT-SIDED spigelian hernia  containing fat and an epiploic appendage. No inflammatory changes  surrounding these. Correlate for tenderness in the left and right  lower quadrants.       Labs:      MEDICATIONS:    Current Outpatient Medications:     albuterol (PROVENTIL HFA) 108 (90 Base) MCG/ACT inhaler, Inhale 2 puffs into the lungs every 6 hours, Disp: 8.5 g, Rfl: 0    albuterol (PROVENTIL) (2.5 MG/3ML) 0.083% neb solution, Take 1 vial (2.5 mg) by nebulization every 4 hours as needed for shortness of breath / dyspnea or wheezing, Disp: 300 mL, Rfl: 0    buPROPion (WELLBUTRIN XL) 300 MG 24 hr tablet, Take 300 mg by  mouth every morning, Disp: , Rfl:     Calcium Carbonate-Vit D-Min (CALCIUM 1200 PO), Take 1 capsule by mouth daily, Disp: , Rfl:     cholecalciferol (VITAMIN D3) 25 mcg (1000 units) capsule, Take 1 capsule by mouth daily, Disp: , Rfl:     esomeprazole (NEXIUM) 40 MG DR capsule, TAKE 1 CAPSULE(40 MG) BY MOUTH EVERY MORNING BEFORE BREAKFAST, 30 TO 60 MINUTES BEFORE EATING, Disp: 90 capsule, Rfl: 1    ketoconazole (NIZORAL) 2 % external shampoo, Apply topically daily as needed for itching or irritation, Disp: 120 mL, Rfl: 3    lamoTRIgine (LAMICTAL) 200 MG tablet, Take 200 mg by mouth 2 times daily, Disp: , Rfl:     levothyroxine (SYNTHROID/LEVOTHROID) 75 MCG tablet, Take 1 tablet (75 mcg) by mouth daily, Disp: 90 tablet, Rfl: 0    lithium ER (LITHOBID) 300 MG CR tablet, Take 1,200 mg by mouth At Bedtime , Disp: , Rfl: 0    loratadine-pseudoePHEDrine (CLARITIN-D 12-HOUR) 5-120 MG 12 hr tablet, Take 1 tablet by mouth 2 times daily , Disp: , Rfl:     Multiple Vitamins-Minerals (WOMENS MULTIVITAMIN PO), Take 2 capsules by mouth daily, Disp: , Rfl:     oxyCODONE (ROXICODONE) 5 MG tablet, TAKE 1 TABLET BY MOUTH EVERY DAY AS NEEDED FOR MODERATE TO SEVERE PAIN, Disp: , Rfl:     potassium 99 MG TABS, Take 99 mg by mouth daily , Disp: , Rfl:     propranolol ER (INDERAL LA) 80 MG 24 hr capsule, Take 1 capsule (80 mg) by mouth daily, Disp: 90 capsule, Rfl: 1    Current Facility-Administered Medications:     lidocaine 1 % injection 3 mL, 3 mL, , , Soy Barboru, DO, 3 mL at 06/08/23 1722    lidocaine 1 % injection 4 mL, 4 mL, , , Soy Barbour, DO, 4 mL at 06/08/23 1722    lidocaine 1 % injection 5 mL, 5 mL, , , Soy Barbour, DO, 5 mL at 06/08/23 1722    triamcinolone (KENALOG-40) injection 40 mg, 40 mg, , , Soy Barbour DO, 40 mg at 06/08/23 1420    ALLERGIES:     Allergies   Allergen Reactions    Amoxicillin-Pot Clavulanate Rash    Aripiprazole Other (See Comments)     increased sadness and moodiness    Azithromycin GI  Disturbance    Escitalopram Oxalate Diarrhea           Oxycodone Headache and GI Disturbance    Prochlorperazine Other (See Comments)     agitation    Sertraline Other (See Comments)     sweats    Venlafaxine Other (See Comments)     sweats       REVIEW OF SYSTEMS:   Skin: No rash, pruritis, or skin pigmentation  Eyes: No changes in vision  Ears/Nose/Throat: No changes in hearing, no nosebleeds  Respiratory: No shortness of breath, dyspnea on exertion, cough, or hemoptysis  Cardiovascular: No chest pain or palpitations  Gastrointestinal: No diarrhea or constipation. No abdominal pain. No hematochezia  Genitourinary: see HPI  Musculoskeletal: No pain or swelling of joints, normal range of motion  Neurologic: No weakness or tremors  Psychiatric: No recent changes in memory or mood  Hematologic/Lymphatic/Immunologic: No easy bruising or enlarged lymph nodes  Endocrine: No weight gain or loss    SURGICAL HISTORY:    Past Surgical History:   Procedure Laterality Date    CARPAL TUNNEL RELEASE RT/LT Right     + excision right dorsal carpal ganglion cyst & terminal branch, right posterior interosseous nerve.     SECTION      CYSTOSCOPY N/A 2017    Procedure: CYSTOSCOPY;;  Surgeon: Toni Da Silva MD;  Location:  OR    DAVINCI HYSTERECTOMY TOTAL, SALPINGECTOMY BILATERAL N/A 2017    Procedure: DAVINCI HYSTERECTOMY TOTAL, SALPINGECTOMY BILATERAL;  ROBOTIC ASSISTED LAPAROSCOPIC TOTAL HYSTERECTOMY; BILATERAL SALPINGECTOMY; CYSTOSCOPY;  Surgeon: Toni Da Silva MD;  Location:  OR    DILATION AND CURETTAGE N/A 2016    Procedure: DILATION AND CURETTAGE;  Surgeon: Josue Jama MD;  Location:  SD    EXTRACORPOREAL SHOCK WAVE LITHOTRIPSY (ESWL) Left 2021    Procedure: LEFT EXTRACORPOREAL SHOCK WAVE LITHOTRIPSY (ESWL);  Surgeon: Devonte Lambert MD;  Location:  OR    HYSTERECTOMY, PAP NO LONGER INDICATED      LAPAROSCOPIC CHOLECYSTECTOMY  2012     Procedure:LAPAROSCOPIC CHOLECYSTECTOMY; LAPAROSCOPIC CHOLECYSTECTOMY ; Surgeon:KARYNA CAMARA; Location:[facility]    LAPAROSCOPY DIAGNOSTIC (GYN) N/A 01/05/2016    Procedure: LAPAROSCOPY DIAGNOSTIC (GYN);  Surgeon: Josue Jama MD;  Location: [facility]    LASER HOLMIUM LITHOTRIPSY URETER(S), INSERT STENT, COMBINED Left 07/04/2020    Procedure: Cystoscopy, left ureteroscopy with holmium laser lithotripsy and left stent placement;  Surgeon: Devonte Lambert MD;  Location:  OR    TONSILLECTOMY & ADENOIDECTOMY           PHYSICAL EXAM:    General: Alert and oriented to time, place, and self. In NAD   HEENT: Head AT/NC, EOMI, CN Grossly intact   Lungs: no respiratory distress, or pursed lip breathing   Heart: No obvious jugular venous distension present   Musculoskeltal: Normal movements. Normal appearing musculature  Skin: no suspicious lesions or rashes   Neuro: Alert, oriented, speech and mentation normal; moving all 4 extremities equally.   Psych: affect and mood normal        Urinalysis:  UA RESULTS:  Recent Labs   Lab Test 07/03/20 2010 08/11/17  1435   COLOR Yellow Yellow   APPEARANCE Slightly Cloudy Clear   URINEGLC Negative Negative   URINEBILI Negative Negative   URINEKETONE Negative Negative   SG 1.026 1.020   UBLD Small* Trace*   URINEPH 6.5 6.0   PROTEIN 10* Negative   UROBILINOGEN  --  0.2   NITRITE Negative Negative   LEUKEST Negative Small*   RBCU 115*  --    WBCU 0  --          IMPRESSION:    Left renal stone    PLAN:    She is interested in treatment of her left renal stone. Will review imagine with Dr. Lambert. We discussed this must be visible on KUB.   Litholink x 2 and video to review.     Yesika Templeton PA-C  [facility]

## 2023-09-19 NOTE — NURSING NOTE
Is the patient currently in the state of [place]? YES    Visit mode:VIDEO    If the visit is dropped, the patient can be reconnected by: VIDEO VISIT: Text to cell phone:   Telephone Information:   Mobile [phone]       Will anyone else be joining the visit? NO  (If patient encounters technical issues they should call [phone])    How would you like to obtain your AVS? MyChart    Are changes needed to the allergy or medication list? Pt stated no changes to allergies and Pt stated no med changes    Reason for visit: MARVIN JARVISF

## 2023-09-22 NOTE — TELEPHONE ENCOUNTER
UROLOGY    CT images and KUB reviewed with Dr. Lambert. Stone only ~1mm vs reported 5mm (Msg left with CT to re-measure). Unable to see on KUB and will continue to monitor.     Yesika Templeton PA-C  [facility]

## 2023-09-25 ENCOUNTER — TRANSFERRED RECORDS (OUTPATIENT)
Dept: HEALTH INFORMATION MANAGEMENT | Facility: CLINIC | Age: 48
End: 2023-09-25
Payer: COMMERCIAL

## 2023-09-28 ENCOUNTER — MEDICAL CORRESPONDENCE (OUTPATIENT)
Dept: HEALTH INFORMATION MANAGEMENT | Facility: CLINIC | Age: 48
End: 2023-09-28
Payer: COMMERCIAL

## 2023-09-28 ENCOUNTER — THERAPY VISIT (OUTPATIENT)
Dept: PHYSICAL THERAPY | Facility: CLINIC | Age: 48
End: 2023-09-28
Attending: FAMILY MEDICINE
Payer: OTHER MISCELLANEOUS

## 2023-09-28 DIAGNOSIS — G89.29 CHRONIC LEFT SHOULDER PAIN: Primary | ICD-10-CM

## 2023-09-28 DIAGNOSIS — E03.4 HYPOTHYROIDISM DUE TO ACQUIRED ATROPHY OF THYROID: ICD-10-CM

## 2023-09-28 DIAGNOSIS — M25.512 CHRONIC LEFT SHOULDER PAIN: Primary | ICD-10-CM

## 2023-09-28 PROCEDURE — 97110 THERAPEUTIC EXERCISES: CPT | Mod: GP | Performed by: PHYSICAL THERAPIST

## 2023-09-28 RX ORDER — LEVOTHYROXINE SODIUM 75 UG/1
75 TABLET ORAL DAILY
Qty: 90 TABLET | Refills: 0 | Status: SHIPPED | OUTPATIENT
Start: 2023-09-28 | End: 2023-12-14

## 2023-09-28 NOTE — TELEPHONE ENCOUNTER
Prescription approved per [facility] Refill Protocol.  Isaura MAGANA RN  [facility]

## 2023-09-30 DIAGNOSIS — F41.1 GENERALIZED ANXIETY DISORDER: ICD-10-CM

## 2023-10-02 DIAGNOSIS — F31.60 MIXED BIPOLAR DISORDER (H): Primary | ICD-10-CM

## 2023-10-02 DIAGNOSIS — Z79.899 HIGH RISK MEDICATION USE: ICD-10-CM

## 2023-10-02 RX ORDER — PROPRANOLOL HYDROCHLORIDE 80 MG/1
80 CAPSULE, EXTENDED RELEASE ORAL DAILY
Qty: 90 CAPSULE | Refills: 2 | Status: SHIPPED | OUTPATIENT
Start: 2023-10-02 | End: 2024-07-18

## 2023-10-03 ENCOUNTER — LAB (OUTPATIENT)
Dept: LAB | Facility: CLINIC | Age: 48
End: 2023-10-03
Payer: COMMERCIAL

## 2023-10-03 DIAGNOSIS — Z79.899 HIGH RISK MEDICATION USE: ICD-10-CM

## 2023-10-03 DIAGNOSIS — F31.60 MIXED BIPOLAR DISORDER (H): ICD-10-CM

## 2023-10-03 LAB — LITHIUM SERPL-SCNC: 0.59 MMOL/L (ref 0.6–1.2)

## 2023-10-03 PROCEDURE — 84439 ASSAY OF FREE THYROXINE: CPT

## 2023-10-03 PROCEDURE — 36415 COLL VENOUS BLD VENIPUNCTURE: CPT

## 2023-10-03 PROCEDURE — 80178 ASSAY OF LITHIUM: CPT

## 2023-10-03 PROCEDURE — 84443 ASSAY THYROID STIM HORMONE: CPT

## 2023-10-03 PROCEDURE — 80048 BASIC METABOLIC PNL TOTAL CA: CPT

## 2023-10-04 LAB
ANION GAP SERPL CALCULATED.3IONS-SCNC: 7 MMOL/L (ref 7–15)
BUN SERPL-MCNC: 14 MG/DL (ref 6–20)
CALCIUM SERPL-MCNC: 10 MG/DL (ref 8.6–10)
CHLORIDE SERPL-SCNC: 107 MMOL/L (ref 98–107)
CREAT SERPL-MCNC: 0.87 MG/DL (ref 0.51–0.95)
DEPRECATED HCO3 PLAS-SCNC: 27 MMOL/L (ref 22–29)
EGFRCR SERPLBLD CKD-EPI 2021: 82 ML/MIN/1.73M2
GLUCOSE SERPL-MCNC: 97 MG/DL (ref 70–99)
POTASSIUM SERPL-SCNC: 4.9 MMOL/L (ref 3.4–5.3)
SODIUM SERPL-SCNC: 141 MMOL/L (ref 135–145)
T4 FREE SERPL-MCNC: 1.43 NG/DL (ref 0.9–1.7)
TSH SERPL DL<=0.005 MIU/L-ACNC: 2.08 UIU/ML (ref 0.3–4.2)

## 2023-10-05 ENCOUNTER — THERAPY VISIT (OUTPATIENT)
Dept: PHYSICAL THERAPY | Facility: CLINIC | Age: 48
End: 2023-10-05
Attending: FAMILY MEDICINE
Payer: OTHER MISCELLANEOUS

## 2023-10-05 DIAGNOSIS — G89.29 CHRONIC LEFT SHOULDER PAIN: Primary | ICD-10-CM

## 2023-10-05 DIAGNOSIS — M25.512 CHRONIC LEFT SHOULDER PAIN: Primary | ICD-10-CM

## 2023-10-05 PROCEDURE — 97110 THERAPEUTIC EXERCISES: CPT | Mod: GP | Performed by: PHYSICAL THERAPIST

## 2023-10-13 ENCOUNTER — THERAPY VISIT (OUTPATIENT)
Dept: PHYSICAL THERAPY | Facility: CLINIC | Age: 48
End: 2023-10-13
Payer: OTHER MISCELLANEOUS

## 2023-10-13 DIAGNOSIS — M25.512 CHRONIC LEFT SHOULDER PAIN: Primary | ICD-10-CM

## 2023-10-13 DIAGNOSIS — G89.29 CHRONIC LEFT SHOULDER PAIN: Primary | ICD-10-CM

## 2023-10-13 PROCEDURE — 97110 THERAPEUTIC EXERCISES: CPT | Mod: GP | Performed by: PHYSICAL THERAPIST

## 2023-10-19 ENCOUNTER — THERAPY VISIT (OUTPATIENT)
Dept: PHYSICAL THERAPY | Facility: CLINIC | Age: 48
End: 2023-10-19
Payer: OTHER MISCELLANEOUS

## 2023-10-19 DIAGNOSIS — M25.512 CHRONIC LEFT SHOULDER PAIN: Primary | ICD-10-CM

## 2023-10-19 DIAGNOSIS — G89.29 CHRONIC LEFT SHOULDER PAIN: Primary | ICD-10-CM

## 2023-10-19 PROCEDURE — 97110 THERAPEUTIC EXERCISES: CPT | Mod: GP | Performed by: PHYSICAL THERAPIST

## 2023-10-19 NOTE — PROGRESS NOTES
PLAN  Discussed lack of progress with HEP. Suggested pt return to Dr. Barbour for follow-up secondary to pain and difficulty with performance of HEP.   10/19/23 0500   Appointment Info   Signing clinician's name / credentials Re [facility]ergara PT   Total/Authorized Visits 8   Visits Used 5   Medical Diagnosis Calcific tendinitis of left shoulder   PT Tx Diagnosis Chronic L shoulder pain   Progress Note/Certification   Onset of illness/injury or Date of Surgery 11/17/22   Therapy Frequency every other week for 10 weeks; if no change in treatment recommendations following return to referring provider   Predicted Duration 10 weeks   PT Goal 1   Goal Identifier Reaching   Goal Description Reach overhead/to the side with pain level <2/10   Rationale to maximize safety and independence with performance of ADLs and functional tasks;to maximize safety and independence within the home;to maximize safety and independence with self cares   Goal Progress Ongoing; reports pain level up to 10/10.   Target Date 11/23/23   Subjective Report   Subjective Report Reported shoulder pain has steadily increased since start of therapy.   Objective Measures   Objective Measures Objective Measure 1;Objective Measure 2   Objective Measure 1   Objective Measure AROM/PROM   Details Flexion: 110 degrees; scaption: unable secondary to pain. PROM: flexion: ~120 degrees; scaption: ~120 degrees; ER: 80 degrees; pain provocation with all end ranges of movement; empty end feel with flexion/scaption.   Objective Measure 2   Objective Measure MMT   Details ER/IR/Flex/Ext: able to provide mild resistance; increasing pain level with increasing intensity.   Treatment Interventions (PT)   Interventions Therapeutic Procedure/Exercise   Therapeutic Procedure/Exercise   Therapeutic Procedures: strength, endurance, ROM, flexibillity minutes (85935) 30  (10 minutes late)   Therapeutic Procedures Ther Proc 2;Ther Proc 3;Ther Proc 4;Ther Proc 5   Ther Proc 1  "Shoulder flexion   Ther Proc 1 - Details Reviewed passive shoulder flexion-in standing, 10 reps, 10 second hold, 2x/day.  Encouraged to continue to avoid developing \"frozen shoulder.\"   Ther Proc 2 Shoulder ER  (hold)   Ther Proc 2 - Details Reviewed with wand, in standing, 10x10\" hold, 2x/day   Ther Proc 3 Shoulder extension   Ther Proc 3 - Details Reviewed with use of wand, 10x10\" hold, 2x/day   Ther Proc 4 UBE   Ther Proc 4 - Details 3' CCW-gentle ROM   Ther Proc 5 Shoulder isometrics   Ther Proc 5 - Details Reviewed IR/ER/Ext; encouraged to continue with intensity as tolerated.   PTRx Ther Proc 1 rev pendulum ex if shoulder is sore   PTRx Ther Proc 1 - Details 10x each direction   PTRx Ther Proc 7 Shoulder Theraband Rows   PTRx Ther Proc 7 - Details red tubing 10, work up to 20x   PTRx Ther Proc 8 Shoulder Theraband Extension   PTRx Ther Proc 8 - Details red tubing, feels better than isometric ex, 10x   Skilled Intervention Instructed in ROM ex's/strengthening ex's to assist with mobility/function of L UE.         Beginning/End Dates of Progress Note Reporting Period:  10/19/239-14-23 to 10/19/2023     PLAN: No further PT currently planned. Recommended pt return to referring provider (Dr. Barbour) secondary to lack of progress.     Referring Provider:  Soy Barbour    "

## 2023-10-25 DIAGNOSIS — L21.9 SEBORRHEIC DERMATITIS: ICD-10-CM

## 2023-10-25 RX ORDER — KETOCONAZOLE 20 MG/ML
SHAMPOO TOPICAL DAILY PRN
Qty: 120 ML | Refills: 2 | Status: SHIPPED | OUTPATIENT
Start: 2023-10-25

## 2023-10-25 NOTE — TELEPHONE ENCOUNTER
Prescription approved per [facility] Refill Protocol.  Yaritza Cardenas, RN  [facility] Triage Nurse

## 2023-10-30 ENCOUNTER — MYC MEDICAL ADVICE (OUTPATIENT)
Dept: ORTHOPEDICS | Facility: CLINIC | Age: 48
End: 2023-10-30
Payer: COMMERCIAL

## 2023-11-01 NOTE — PROGRESS NOTES
ESTABLISHED PATIENT FOLLOW-UP:  Pain of the Left Shoulder       HISTORY OF PRESENT ILLNESS  Ms. Morrow is a pleasant 48 year old year old female who presents to clinic today for follow-up of L shoulder pain.    Date of injury: Nov 2022  Date last seen: 7/20/23  Following Therapeutic Plan: PT, HEP   Pain: Constant 10/10 and is struggling sleeping at night even with ice packs a nd Oxycodone   Function: difficulty reaching behind back and is now struggling with ABD and horiz ADD; any motion there is shooting pain down the arm; all ADLs are now challenging  Interval History: Since my procedure in June, the pain was pretty much gone, and she started PT again. It started out great, but now it has slowly started to hurt again,  and it's even radiated down her arm towards the elbow. It actually hurts more now than it did initially. She just started a new job in September that's close to home that she is scared that she'll lose.     Additional medical/Social/Surgical histories reviewed in [facility] and updated as appropriate.    REVIEW OF SYSTEMS (11/1/2023)  CONSTITUTIONAL: Denies fever and weight loss  GASTROINTESTINAL: Denies abdominal pain, nausea, vomiting  MUSCULOSKELETAL: See HPI  SKIN: Denies any recent rash or lesion  NEUROLOGICAL: Denies numbness or focal weakness     PHYSICAL EXAM  LMP 12/10/2015     General  - normal appearance, in no obvious distress  Musculoskeletal -left shoulder  - inspection: normal bone and joint alignment, no obvious deformity, no scapular winging, no AC step-off  - palpation: Diffuse left shoulder pain.  Tenderness at the anterolateral shoulder at the rotator cuff insertion, tenderness at bicipital groove, posterior tenderness at the supraspinatus and infraspinatus tendon region.  - ROM: Limited in forward elevation passively to 90 and actively to about 60 degrees.  Abduction about 60 degrees actively, external rotation near symmetric at about 60 degrees.  Internal rotation limited to  lumbosacral region  - strength: 5/5  strength, 4/5 external rotation with pain.  5/5 internal rotation.  Weakness of supraspinatus.  - special tests:  (+) Neer  (+) Hawkin's  (+) Asad  Neuro  - no sensory or motor deficit, grossly normal coordination, normal muscle tone  Skin  - no ecchymosis, erythema, warmth, or induration, no obvious rash  Psych  - interactive, appropriate, normal mood and affect    IMAGING :     Results for orders placed or performed in visit on 05/01/23   MR Shoulder Left w/o Contrast    Narrative    EXAM: MR SHOULDER LEFT W/O CONTRAST  LOCATION: [facility]  DATE/TIME: 5/1/2023 11:52 AM CDT    INDICATION: Shoulder pain. Burning on the top of the shoulder. Limited range of motion. Trauma in January 2023.  COMPARISON: Radiographs from 04/20/2023.  TECHNIQUE: Unenhanced.    FINDINGS:    ROTATOR CUFF:  -Supraspinatus: Small amount of calcification in the tendon. Mild tendinosis. No tear. No muscle atrophy.  -Infraspinatus: No tendon tear, tendinopathy or fatty atrophy.  -Subscapularis: No tendon tear, tendinopathy or fatty atrophy.  -Teres minor: No tendon tear, tendinopathy or fatty atrophy.    CORACOACROMIAL ARCH:  -Morphology: Type II acromion. No subacromial spur. Subacromial and subcoracoid space are normal.   -Bursa: No subacromial or subcoracoid bursitis.    ACROMIOCLAVICULAR JOINT:   -Normal.     LONG HEAD OF BICEPS TENDON:   -No tendinopathy or tear. No tenosynovitis or subluxation.    GLENOHUMERAL JOINT:   -Labrum: No labral tear. No paralabral cyst.   -Cartilage: Normal.   -Joint space: No effusion or synovitis.  -Glenohumeral ligaments and capsule: No pericapsular inflammation.    BONES:   -No fracture or concerning marrow replacing lesion.    SOFT TISSUES:   -Normal deltoid muscle bulk. Normal visualized chest wall and axilla.      Impression    IMPRESSION:  1.  Calcific tendinosis of the supraspinatus tendon.  2.  Otherwise negative. No rotator cuff tear.                          ASSESSMENT & PLAN  Ms. Morrow is a 48 year old year old female who presents to clinic today for follow up of left shoulder pain.    MRI has confirmed calcific tendinitis of supraspinatus without tear.  Barbotage procedure and CSI performed on 6/8/23 with 2.5 months of relief since returned. In-office ultrasound today confirms pain at region of calcium deposits which do persist.    Diagnosis: Calcific tendinitis of left shoulder    -Repeat corticosteroid today for more immediate relief as she had exceptional relief after CSI until September.  -Discussed long-term strategies to improve her left shoulder; tenex risks, benefits, and alternatives reviewed.  She would like to proceed with tenex in January 2023.  -Case request placed for tenex at [facility] for Jan 12, 2024.  -Follow up: Surgery scheduling and will provide pre-procedure anxiolytic.    It was a pleasure seeing Lena.    Left Subacromial Bursa - Ultrasound Guided  The patient was informed of the risks and the benefits of the procedure and a written consent was signed.  The patient s left shoulder was prepped with chlorhexidine in sterile fashion.   40 mg of triamcinolone suspension was drawn up into a 5 mL syringe with 4 mL of 1% lidocaine.  Injection was performed using sterile technique.  Under ultrasound guidance a 1.5-inch 22-gauge needle was used to enter the subacromial bursa.  Anterolateral approach was used with arm held in Crass position.  Needle placement was visualized and documented with ultrasound.  Ultrasound visualization was necessary to ensure placement in to the bursa and not the rotator cuff tendon which could potentially cause further tendon damage.  Injection performed long axis to the probe.  Injection solution visualized within the bursal space.  Images were permanently stored for the patient's record.  There were no complications. The patient tolerated the procedure well. There was negligible bleeding. Handout  provided detailing post-injection instructions.  DO JULIA Hunt  Primary Care Sports Medicine  [facility]      Large Joint Injection: L subacromial bursa    Date/Time: 11/2/2023 12:33 PM    Performed by: Soy Barbour DO  Authorized by: Soy Barbour DO    Needle Size:  22 G  Guidance: ultrasound    Location:  Shoulder      Site:  L subacromial bursa  Medications:  40 mg triamcinolone 40 MG/ML; 4 mL lidocaine 1 %  Outcome:  Tolerated well, no immediate complications  Procedure discussed: discussed risks, benefits, and alternatives    Timeout: timeout called immediately prior to procedure    Prep: patient was prepped and draped in usual sterile fashion     Ultrasound was used to ensure safe and accurate needle placement and injection. Ultrasound images of the procedure were permanently stored.      25 minutes on date of the encounter doing chart review, history and examination, independent imaging review, documentation, and additional activities noted above. Extensive discussion of tenex procedure, rehab and options.  Additional 15 min separate from visit for procedure performed with US interpretation.      Soy Barbour DO, CAQSM  Primary Care Sports Medicine

## 2023-11-02 ENCOUNTER — OFFICE VISIT (OUTPATIENT)
Dept: ORTHOPEDICS | Facility: CLINIC | Age: 48
End: 2023-11-02

## 2023-11-02 DIAGNOSIS — M75.32 CALCIFIC TENDINITIS OF LEFT SHOULDER: Primary | ICD-10-CM

## 2023-11-02 PROCEDURE — 20611 DRAIN/INJ JOINT/BURSA W/US: CPT | Mod: LT | Performed by: FAMILY MEDICINE

## 2023-11-02 RX ORDER — TRIAMCINOLONE ACETONIDE 40 MG/ML
40 INJECTION, SUSPENSION INTRA-ARTICULAR; INTRAMUSCULAR
Status: SHIPPED | OUTPATIENT
Start: 2023-11-02

## 2023-11-02 RX ORDER — LIDOCAINE HYDROCHLORIDE 10 MG/ML
4 INJECTION, SOLUTION INFILTRATION; PERINEURAL
Status: SHIPPED | OUTPATIENT
Start: 2023-11-02

## 2023-11-02 RX ADMIN — LIDOCAINE HYDROCHLORIDE 4 ML: 10 INJECTION, SOLUTION INFILTRATION; PERINEURAL at 12:33

## 2023-11-02 RX ADMIN — TRIAMCINOLONE ACETONIDE 40 MG: 40 INJECTION, SUSPENSION INTRA-ARTICULAR; INTRAMUSCULAR at 12:33

## 2023-11-02 ASSESSMENT — PAIN SCALES - GENERAL: PAINLEVEL: EXTREME PAIN (8)

## 2023-11-02 NOTE — LETTER
11/2/2023         RE: Lena Morrow  [address]  [place] [address]        Dear Colleague,    Thank you for referring your patient, Lena Morrow, to the [facility] [place]. Please see a copy of my visit note below.    ESTABLISHED PATIENT FOLLOW-UP:  Pain of the Left Shoulder       HISTORY OF PRESENT ILLNESS  Ms. Morrow is a pleasant 48 year old year old female who presents to clinic today for follow-up of L shoulder pain.    Date of injury: Nov 2022  Date last seen: 7/20/23  Following Therapeutic Plan: PT, HEP   Pain: Constant 10/10 and is struggling sleeping at night even with ice packs a nd Oxycodone   Function: difficulty reaching behind back and is now struggling with ABD and horiz ADD; any motion there is shooting pain down the arm; all ADLs are now challenging  Interval History: Since my procedure in June, the pain was pretty much gone, and she started PT again. It started out great, but now it has slowly started to hurt again,  and it's even radiated down her arm towards the elbow. It actually hurts more now than it did initially. She just started a new job in September that's close to home that she is scared that she'll lose.     Additional medical/Social/Surgical histories reviewed in [facility] and updated as appropriate.    REVIEW OF SYSTEMS (11/1/2023)  CONSTITUTIONAL: Denies fever and weight loss  GASTROINTESTINAL: Denies abdominal pain, nausea, vomiting  MUSCULOSKELETAL: See HPI  SKIN: Denies any recent rash or lesion  NEUROLOGICAL: Denies numbness or focal weakness     PHYSICAL EXAM  LMP 12/10/2015     General  - normal appearance, in no obvious distress  Musculoskeletal -left shoulder  - inspection: normal bone and joint alignment, no obvious deformity, no scapular winging, no AC step-off  - palpation: Diffuse left shoulder pain.  Tenderness at the anterolateral shoulder at the rotator cuff insertion, tenderness at bicipital groove, posterior  tenderness at the supraspinatus and infraspinatus tendon region.  - ROM: Limited in forward elevation passively to 90 and actively to about 60 degrees.  Abduction about 60 degrees actively, external rotation near symmetric at about 60 degrees.  Internal rotation limited to lumbosacral region  - strength: 5/5  strength, 4/5 external rotation with pain.  5/5 internal rotation.  Weakness of supraspinatus.  - special tests:  (+) Neer  (+) Hawkin's  (+) Asad  Neuro  - no sensory or motor deficit, grossly normal coordination, normal muscle tone  Skin  - no ecchymosis, erythema, warmth, or induration, no obvious rash  Psych  - interactive, appropriate, normal mood and affect    IMAGING :     Results for orders placed or performed in visit on 05/01/23   MR Shoulder Left w/o Contrast    Narrative    EXAM: MR SHOULDER LEFT W/O CONTRAST  LOCATION: [facility]  DATE/TIME: 5/1/2023 11:52 AM CDT    INDICATION: Shoulder pain. Burning on the top of the shoulder. Limited range of motion. Trauma in January 2023.  COMPARISON: Radiographs from 04/20/2023.  TECHNIQUE: Unenhanced.    FINDINGS:    ROTATOR CUFF:  -Supraspinatus: Small amount of calcification in the tendon. Mild tendinosis. No tear. No muscle atrophy.  -Infraspinatus: No tendon tear, tendinopathy or fatty atrophy.  -Subscapularis: No tendon tear, tendinopathy or fatty atrophy.  -Teres minor: No tendon tear, tendinopathy or fatty atrophy.    CORACOACROMIAL ARCH:  -Morphology: Type II acromion. No subacromial spur. Subacromial and subcoracoid space are normal.   -Bursa: No subacromial or subcoracoid bursitis.    ACROMIOCLAVICULAR JOINT:   -Normal.     LONG HEAD OF BICEPS TENDON:   -No tendinopathy or tear. No tenosynovitis or subluxation.    GLENOHUMERAL JOINT:   -Labrum: No labral tear. No paralabral cyst.   -Cartilage: Normal.   -Joint space: No effusion or synovitis.  -Glenohumeral ligaments and capsule: No pericapsular inflammation.    BONES:    -No fracture or concerning marrow replacing lesion.    SOFT TISSUES:   -Normal deltoid muscle bulk. Normal visualized chest wall and axilla.      Impression    IMPRESSION:  1.  Calcific tendinosis of the supraspinatus tendon.  2.  Otherwise negative. No rotator cuff tear.                         ASSESSMENT & PLAN  Ms. Morrow is a 48 year old year old female who presents to clinic today for follow up of left shoulder pain.    MRI has confirmed calcific tendinitis of supraspinatus without tear.  Barbotage procedure and CSI performed on 6/8/23 with 2.5 months of relief since returned. In-office ultrasound today confirms pain at region of calcium deposits which do persist.    Diagnosis: Calcific tendinitis of left shoulder    -Repeat corticosteroid today for more immediate relief as she had exceptional relief after CSI until September.  -Discussed long-term strategies to improve her left shoulder; tenex risks, benefits, and alternatives reviewed.  She would like to proceed with tenex in January 2023.  -Follow up: Surgery scheduling and will provide pre-procedure anxiolytic.    It was a pleasure seeing Lena.    *** Subacromial Bursa - Ultrasound Guided  The patient was informed of the risks and the benefits of the procedure and a written consent was signed.  The patient s *** shoulder was prepped with chlorhexidine in sterile fashion.   40 mg of triamcinolone suspension was drawn up into a 5 mL syringe with 4 mL of 1% lidocaine.  Injection was performed using sterile technique.  Under ultrasound guidance a 1.5-inch 22-gauge needle was used to enter the subacromial bursa.  Anterolateral approach was used with arm held in Crass position.  Needle placement was visualized and documented with ultrasound.  Ultrasound visualization was necessary to ensure placement in to the bursa and not the rotator cuff tendon which could potentially cause further tendon damage.  Injection performed long axis to the probe.  Injection  solution visualized within the bursal space.  Images were permanently stored for the patient's record.  There were no complications. The patient tolerated the procedure well. There was negligible bleeding. Handout provided detailing post-injection instructions.  DO JULIA Hunt  Primary Care Sports Medicine  [facility]      Large Joint Injection: L subacromial bursa    Date/Time: 11/2/2023 12:33 PM    Performed by: Soy Barbour DO  Authorized by: Soy Barbour DO    Needle Size:  22 G  Guidance: ultrasound    Location:  Shoulder      Site:  L subacromial bursa  Medications:  40 mg triamcinolone 40 MG/ML; 4 mL lidocaine 1 %  Outcome:  Tolerated well, no immediate complications  Procedure discussed: discussed risks, benefits, and alternatives    Timeout: timeout called immediately prior to procedure    Prep: patient was prepped and draped in usual sterile fashion     Ultrasound was used to ensure safe and accurate needle placement and injection. Ultrasound images of the procedure were permanently stored.      25 minutes on date of the encounter doing chart review, history and examination, independent imaging review, documentation, and additional activities noted above. Extensive discussion of tenex procedure, rehab and options.  Additional 15 min separate from visit for procedure performed with US interpretation.      Soy Barbour DO, JULIA  Primary Care Sports Medicine         Again, thank you for allowing me to participate in the care of your patient.        Sincerely,        Soy Barbour DO

## 2023-11-06 ENCOUNTER — TELEPHONE (OUTPATIENT)
Dept: INTERNAL MEDICINE | Facility: CLINIC | Age: 48
End: 2023-11-06
Payer: COMMERCIAL

## 2023-11-06 NOTE — TELEPHONE ENCOUNTER
Patient reports Neurologist is planning to discontinue Lyrica and replace with Amitriptyline for her pain in groin/hips. Patient says she was too drowsy, couldn't wake up and will not take this medication anymore.     Patient was on Amitriptyline in the past and is wondering if there's record of reason amitriptyline was stopped.  Per chart review, medication was discontinued on 9/27/2012 'Stopped by Patient'.     Writer was able to find the notes below and relay to patient. Patient will relay this information to her current psychiatrist and see if there is a different medication she could try instead.       Patient requests medication update regarding discontinuing Lyrica to be sent to PCP as FYI.         Per 2/15/2013 Psych Notes:  Needs to avoid tricyclics such as amitriptyline (make bipolar worse).    Treatment Plan:  - Drop Zoloft to 100 mg for a week, then 50 mg for 2 weeks, then stop. If anxiety increases markedly, the best option is Lexapro 10-20 mg  - When down to 50 mg Zoloft may increase Lamictal to 150 mg for two weeks, then up to 200 mg as needed. Don't increase if any rash is present.  - no more amitriptyline

## 2023-11-09 ENCOUNTER — HOSPITAL ENCOUNTER (OUTPATIENT)
Facility: AMBULATORY SURGERY CENTER | Age: 48
End: 2023-11-09
Attending: FAMILY MEDICINE
Payer: COMMERCIAL

## 2023-12-13 ENCOUNTER — TELEPHONE (OUTPATIENT)
Dept: ORTHOPEDICS | Facility: CLINIC | Age: 48
End: 2023-12-13
Payer: COMMERCIAL

## 2023-12-13 NOTE — TELEPHONE ENCOUNTER
Pt's WC insurance notified pt that they are closing pt's case/claim, but pt is supposed to have a procedure with DO Km in January 2024.     Pt is wondering if there is something that DO Km can do, so that  will keep the case/claim open to cover the pt's procedure in January 2024.    Please review and CALL pt to discuss. Thank you.

## 2023-12-13 NOTE — TELEPHONE ENCOUNTER
Patient last seen on on 11/2/23. Per note:    ASSESSMENT & PLAN  Ms. Morrow is a 48 year old year old female who presents to clinic today for follow up of left shoulder pain.     MRI has confirmed calcific tendinitis of supraspinatus without tear.  Barbotage procedure and CSI performed on 6/8/23 with 2.5 months of relief since returned. In-office ultrasound today confirms pain at region of calcium deposits which do persist.     Diagnosis: Calcific tendinitis of left shoulder     -Repeat corticosteroid today for more immediate relief as she had exceptional relief after CSI until September.  -Discussed long-term strategies to improve her left shoulder; tenex risks, benefits, and alternatives reviewed.  She would like to proceed with tenex in January 2023.  -Case request placed for tenex at [facility] for Jan 12, 2024.  -Follow up: Surgery scheduling and will provide pre-procedure anxiolytic.    Patient states her work comp case is closing, she is wondering if there is anything you can do to help them cover her procedure in January 2024. DOS 1/12/23.     Please advise.    Florentino Foy ATC

## 2023-12-14 ENCOUNTER — MYC REFILL (OUTPATIENT)
Dept: INTERNAL MEDICINE | Facility: CLINIC | Age: 48
End: 2023-12-14
Payer: COMMERCIAL

## 2023-12-14 DIAGNOSIS — E03.4 HYPOTHYROIDISM DUE TO ACQUIRED ATROPHY OF THYROID: ICD-10-CM

## 2023-12-14 DIAGNOSIS — R10.13 EPIGASTRIC PAIN: ICD-10-CM

## 2023-12-14 RX ORDER — ESOMEPRAZOLE MAGNESIUM 40 MG/1
CAPSULE, DELAYED RELEASE ORAL
Qty: 90 CAPSULE | Refills: 1 | Status: SHIPPED | OUTPATIENT
Start: 2023-12-14 | End: 2024-06-11

## 2023-12-14 RX ORDER — LEVOTHYROXINE SODIUM 75 UG/1
75 TABLET ORAL DAILY
Qty: 90 TABLET | Refills: 1 | Status: SHIPPED | OUTPATIENT
Start: 2023-12-14 | End: 2024-04-07

## 2023-12-18 ENCOUNTER — TRANSFERRED RECORDS (OUTPATIENT)
Dept: HEALTH INFORMATION MANAGEMENT | Facility: CLINIC | Age: 48
End: 2023-12-18
Payer: COMMERCIAL

## 2023-12-19 ENCOUNTER — OFFICE VISIT (OUTPATIENT)
Dept: URGENT CARE | Facility: URGENT CARE | Age: 48
End: 2023-12-19
Payer: COMMERCIAL

## 2023-12-19 ENCOUNTER — TELEPHONE (OUTPATIENT)
Dept: ORTHOPEDICS | Facility: CLINIC | Age: 48
End: 2023-12-19

## 2023-12-19 ENCOUNTER — OFFICE VISIT (OUTPATIENT)
Dept: PEDIATRICS | Facility: CLINIC | Age: 48
End: 2023-12-19
Payer: COMMERCIAL

## 2023-12-19 ENCOUNTER — ANCILLARY PROCEDURE (OUTPATIENT)
Dept: CT IMAGING | Facility: CLINIC | Age: 48
End: 2023-12-19
Attending: FAMILY MEDICINE
Payer: COMMERCIAL

## 2023-12-19 VITALS
BODY MASS INDEX: 36.73 KG/M2 | HEART RATE: 70 BPM | OXYGEN SATURATION: 97 % | DIASTOLIC BLOOD PRESSURE: 82 MMHG | TEMPERATURE: 98.8 F | WEIGHT: 214 LBS | SYSTOLIC BLOOD PRESSURE: 120 MMHG

## 2023-12-19 VITALS
TEMPERATURE: 99.2 F | HEART RATE: 82 BPM | RESPIRATION RATE: 20 BRPM | DIASTOLIC BLOOD PRESSURE: 82 MMHG | OXYGEN SATURATION: 97 % | SYSTOLIC BLOOD PRESSURE: 120 MMHG

## 2023-12-19 DIAGNOSIS — R10.32 LLQ ABDOMINAL PAIN: Primary | ICD-10-CM

## 2023-12-19 DIAGNOSIS — Z87.442 HISTORY OF KIDNEY STONES: ICD-10-CM

## 2023-12-19 PROBLEM — M25.512 CHRONIC LEFT SHOULDER PAIN: Status: RESOLVED | Noted: 2023-09-14 | Resolved: 2023-12-19

## 2023-12-19 PROBLEM — G89.29 CHRONIC LEFT SHOULDER PAIN: Status: RESOLVED | Noted: 2023-09-14 | Resolved: 2023-12-19

## 2023-12-19 LAB
ALBUMIN UR-MCNC: NEGATIVE MG/DL
ALBUMIN UR-MCNC: NEGATIVE MG/DL
ANION GAP SERPL CALCULATED.3IONS-SCNC: 5 MMOL/L (ref 3–14)
APPEARANCE UR: CLEAR
APPEARANCE UR: CLEAR
BACTERIA #/AREA URNS HPF: ABNORMAL /HPF
BACTERIA #/AREA URNS HPF: ABNORMAL /HPF
BASOPHILS # BLD AUTO: 0 10E3/UL (ref 0–0.2)
BASOPHILS NFR BLD AUTO: 0 %
BILIRUB UR QL STRIP: NEGATIVE
BILIRUB UR QL STRIP: NEGATIVE
BUN SERPL-MCNC: 12 MG/DL (ref 7–30)
CALCIUM SERPL-MCNC: 9.9 MG/DL (ref 8.5–10.1)
CHLORIDE BLD-SCNC: 111 MMOL/L (ref 94–109)
CO2 SERPL-SCNC: 26 MMOL/L (ref 20–32)
COLOR UR AUTO: YELLOW
COLOR UR AUTO: YELLOW
CREAT SERPL-MCNC: 0.8 MG/DL (ref 0.52–1.04)
EGFRCR SERPLBLD CKD-EPI 2021: 90 ML/MIN/1.73M2
EOSINOPHIL # BLD AUTO: 0.1 10E3/UL (ref 0–0.7)
EOSINOPHIL NFR BLD AUTO: 2 %
ERYTHROCYTE [DISTWIDTH] IN BLOOD BY AUTOMATED COUNT: 12.3 % (ref 10–15)
GLUCOSE BLD-MCNC: 103 MG/DL (ref 70–99)
GLUCOSE UR STRIP-MCNC: NEGATIVE MG/DL
GLUCOSE UR STRIP-MCNC: NEGATIVE MG/DL
HCG UR QL: NEGATIVE
HCT VFR BLD AUTO: 45 % (ref 35–47)
HGB BLD-MCNC: 14.3 G/DL (ref 11.7–15.7)
HGB UR QL STRIP: NEGATIVE
HGB UR QL STRIP: NEGATIVE
IMM GRANULOCYTES # BLD: 0 10E3/UL
IMM GRANULOCYTES NFR BLD: 0 %
KETONES UR STRIP-MCNC: NEGATIVE MG/DL
KETONES UR STRIP-MCNC: NEGATIVE MG/DL
LEUKOCYTE ESTERASE UR QL STRIP: NEGATIVE
LEUKOCYTE ESTERASE UR QL STRIP: NEGATIVE
LYMPHOCYTES # BLD AUTO: 0.5 10E3/UL (ref 0.8–5.3)
LYMPHOCYTES NFR BLD AUTO: 6 %
MCH RBC QN AUTO: 29.1 PG (ref 26.5–33)
MCHC RBC AUTO-ENTMCNC: 31.8 G/DL (ref 31.5–36.5)
MCV RBC AUTO: 92 FL (ref 78–100)
MONOCYTES # BLD AUTO: 0.4 10E3/UL (ref 0–1.3)
MONOCYTES NFR BLD AUTO: 5 %
NEUTROPHILS # BLD AUTO: 7.4 10E3/UL (ref 1.6–8.3)
NEUTROPHILS NFR BLD AUTO: 87 %
NITRATE UR QL: NEGATIVE
NITRATE UR QL: NEGATIVE
PH UR STRIP: 6 [PH] (ref 5–7)
PH UR STRIP: 6.5 [PH] (ref 5–7)
PLATELET # BLD AUTO: 269 10E3/UL (ref 150–450)
POTASSIUM BLD-SCNC: 4.1 MMOL/L (ref 3.4–5.3)
RBC # BLD AUTO: 4.91 10E6/UL (ref 3.8–5.2)
RBC #/AREA URNS AUTO: ABNORMAL /HPF
RBC #/AREA URNS AUTO: ABNORMAL /HPF
SODIUM SERPL-SCNC: 142 MMOL/L (ref 133–144)
SP GR UR STRIP: 1.02 (ref 1–1.03)
SP GR UR STRIP: 1.02 (ref 1–1.03)
SQUAMOUS #/AREA URNS AUTO: ABNORMAL /LPF
SQUAMOUS #/AREA URNS AUTO: ABNORMAL /LPF
UROBILINOGEN UR STRIP-ACNC: 0.2 E.U./DL
UROBILINOGEN UR STRIP-ACNC: 0.2 E.U./DL
WBC # BLD AUTO: 8.6 10E3/UL (ref 4–11)
WBC #/AREA URNS AUTO: ABNORMAL /HPF
WBC #/AREA URNS AUTO: ABNORMAL /HPF

## 2023-12-19 PROCEDURE — 96374 THER/PROPH/DIAG INJ IV PUSH: CPT | Performed by: FAMILY MEDICINE

## 2023-12-19 PROCEDURE — 81025 URINE PREGNANCY TEST: CPT | Performed by: FAMILY MEDICINE

## 2023-12-19 PROCEDURE — 250N000011 HC RX IP 250 OP 636: Mod: JZ | Performed by: FAMILY MEDICINE

## 2023-12-19 PROCEDURE — 99214 OFFICE O/P EST MOD 30 MIN: CPT | Mod: 25 | Performed by: FAMILY MEDICINE

## 2023-12-19 PROCEDURE — 99207 REFERRAL TO ACUTE AND DIAGNOSTIC SERVICES: CPT | Performed by: FAMILY MEDICINE

## 2023-12-19 PROCEDURE — 81001 URINALYSIS AUTO W/SCOPE: CPT | Performed by: FAMILY MEDICINE

## 2023-12-19 PROCEDURE — 80048 BASIC METABOLIC PNL TOTAL CA: CPT | Performed by: FAMILY MEDICINE

## 2023-12-19 PROCEDURE — 36415 COLL VENOUS BLD VENIPUNCTURE: CPT | Performed by: FAMILY MEDICINE

## 2023-12-19 PROCEDURE — 74177 CT ABD & PELVIS W/CONTRAST: CPT

## 2023-12-19 PROCEDURE — 250N000009 HC RX 250: Performed by: FAMILY MEDICINE

## 2023-12-19 PROCEDURE — 85025 COMPLETE CBC W/AUTO DIFF WBC: CPT | Performed by: FAMILY MEDICINE

## 2023-12-19 RX ORDER — KETOROLAC TROMETHAMINE 30 MG/ML
15 INJECTION, SOLUTION INTRAMUSCULAR; INTRAVENOUS ONCE
Status: COMPLETED | OUTPATIENT
Start: 2023-12-19 | End: 2023-12-19

## 2023-12-19 RX ORDER — IOPAMIDOL 755 MG/ML
90 INJECTION, SOLUTION INTRAVASCULAR ONCE
Status: COMPLETED | OUTPATIENT
Start: 2023-12-19 | End: 2023-12-19

## 2023-12-19 RX ORDER — OXYCODONE HYDROCHLORIDE 5 MG/1
5 TABLET ORAL EVERY 8 HOURS PRN
Qty: 15 TABLET | Refills: 0 | Status: SHIPPED | OUTPATIENT
Start: 2023-12-19 | End: 2023-12-24

## 2023-12-19 RX ORDER — DULOXETIN HYDROCHLORIDE 30 MG/1
30 CAPSULE, DELAYED RELEASE ORAL DAILY
COMMUNITY
Start: 2023-12-18 | End: 2024-05-28

## 2023-12-19 RX ADMIN — IOPAMIDOL 90 ML: 755 INJECTION, SOLUTION INTRAVENOUS at 16:11

## 2023-12-19 RX ADMIN — KETOROLAC TROMETHAMINE 15 MG: 30 INJECTION, SOLUTION INTRAMUSCULAR; INTRAVENOUS at 15:29

## 2023-12-19 RX ADMIN — SODIUM CHLORIDE 40 ML: 9 INJECTION, SOLUTION INTRAVENOUS at 16:11

## 2023-12-19 NOTE — TELEPHONE ENCOUNTER
Patient has Tenex scheduled for 1/12/24. Please see below.     Liana Nelson MSA, ATC  Certified Athletic Trainer

## 2023-12-19 NOTE — TELEPHONE ENCOUNTER
Please see telephone encounter from today, 12/19, that states the patient needs to reschedule this procedure anyway.    Ban Jhaveri, FARIBA, LAT, ATC  Certified Athletic Trainer

## 2023-12-19 NOTE — TELEPHONE ENCOUNTER
Returned call to patient to discuss rescheduling her procedure. Patient stated the reason for cancellation is that her son has a hockey tournament out of town and they are not able to get hotel and other fees refunded. Patient understands Dr Barbour does these once a month and is open to any day in February.     Patient also reports that the pain in her arm has returned and is requesting another cortisone shot.

## 2023-12-19 NOTE — PROGRESS NOTES
Assessment & Plan   Problem List Items Addressed This Visit    None  Visit Diagnoses       LLQ abdominal pain    -  Primary    Relevant Medications    sodium chloride (PF) 0.9% PF flush 3 mL    ketorolac (TORADOL) injection 15 mg (Start on 2023  3:30 PM)    Other Relevant Orders    UA with Microscopic reflex to Culture (Completed)    CT Abdomen Pelvis w/o & w Contrast    UA Microscopic with Reflex to Culture             Niurka Paredes is a 48 year old, presenting for the following health issues:  Abdominal Pain      HPI     Abdominal/Flank Pain  Onset/Duration: saturday  Description:   Character: Sharp  Location: LLQ abdomen   Radiation: hip and belly buttom   Intensity: severe  Progression of Symptoms:  worsening  Accompanying Signs & Symptoms:  Fever/chills: no   Gas/Bloating: YES- gas  Nausea: YES  Vomitting: no   Diarrhea: no   Constipation:no   Dysuria: no            Hematuria: no            Frequency: no            Incontinence of urine: no   History:            Last bowel movement: yesterday  Trauma: no   Previous similar pain: YES- kidney stones a couple years ago  Previous tests done: none           Previous Abdominal surgery: YES- c section, partial hysterectomy   Precipitating factors:   Does the pain change with:     Food: no      Bowel Movement: no     Urination: no              Other factors: YES- movement   Therapies tried and outcome:  ice, heat, advil, oxy     When food last eaten: this AM    49 yo female with sudden onset LLQ abdominal pain x 3 to 4 days; hx of kidney stone but no hematuria on urinalysis at urgent care at [place] today; Hx of  and partial hysterectomy due to endometriosis.  Patient reports soft BM, 2-3 times a day without any melena or hematochezia.  Some nausea this a.m. but no vomiting, low-grade fever 99 Fahrenheit.  Left lower quadrant abdominal pain is worsening with increase gaseousness and bowel gas.  Also has back pain.  No urine frequency or  urgency, no hematuria or pyuria    Review of Systems   See HPI.  No vomiting.  No fever/chills.  No chest pain/SOB.  No dizziness or syncope.        Objective    /82   Pulse 70   Temp 98.8  F (37.1  C) (Oral)   Wt 97.1 kg (214 lb)   LMP 12/10/2015   SpO2 97%   BMI 36.73 kg/m    Body mass index is 36.73 kg/m .  Physical Exam   GENERAL: alert and moderate distress, afebrile, not septic, no cyanosis or accessory muscle use, moist mucous membrane  HEENT: normal, no adenopathy, no thyromegaly, neck supple  RESP: lungs clear to auscultation - no rales, rhonchi or wheezes  CV: regular rate and rhythm, normal S1 S2, no S3 or S4, no murmur, click or rub, no peripheral edema and peripheral pulses strong  ABDOMEN: soft, tender left lower quadrant with deep palpation, no rebound, no hepatosplenomegaly, no masses and bowel sounds normal; no CVA tenderness.  Negative McBurney and negative Meadows.  MS: no gross musculoskeletal defects noted, no edema  SKIN: no suspicious lesions or rashes, not jaundiced  NEURO: Normal strength and tone, mentation intact and speech normal  BACK: no CVA tenderness, no paralumbar tenderness INR    Results for orders placed or performed in visit on 12/19/23 (from the past 24 hour(s))   UA with Microscopic reflex to Culture    Specimen: Urine, Midstream   Result Value Ref Range    Color Urine Yellow Colorless, Straw, Light Yellow, Yellow    Appearance Urine Clear Clear    Glucose Urine Negative Negative mg/dL    Bilirubin Urine Negative Negative    Ketones Urine Negative Negative mg/dL    Specific Gravity Urine 1.025 1.003 - 1.035    Blood Urine Negative Negative    pH Urine 6.5 5.0 - 7.0    Protein Albumin Urine Negative Negative mg/dL    Urobilinogen Urine 0.2 0.2, 1.0 E.U./dL    Nitrite Urine Negative Negative    Leukocyte Esterase Urine Negative Negative   UA Microscopic with Reflex to Culture   Result Value Ref Range    Bacteria Urine Moderate (A) None Seen /HPF    RBC Urine 0-2 0-2  /HPF /HPF    WBC Urine 0-5 0-5 /HPF /HPF    Squamous Epithelials Urine Moderate (A) None Seen /LPF    Narrative    Urine Culture not indicated   CT Abdomen Pelvis w Contrast    Narrative    EXAM: CT ABDOMEN PELVIS W CONTRAST  LOCATION: [facility]  DATE: 12/19/2023    INDICATION: 47 yo female with sudden onset LLQ abdominal pain; hx of kidney stone but no hematuria on urinalysis; Hx of C section and partial hysterectomy; r o diverticulitis colitis and stones  COMPARISON: 8/25/2023  TECHNIQUE: CT scan of the abdomen and pelvis was performed following injection of IV contrast. Multiplanar reformats were obtained. Dose reduction techniques were used.  CONTRAST: 90ml isovue 370    FINDINGS:   LOWER CHEST: Normal.    HEPATOBILIARY: Cholecystectomy. Normal liver and bile ducts.    PANCREAS: Normal.    SPLEEN: Normal.    ADRENAL GLANDS: Normal.    KIDNEYS/BLADDER: No significant mass, stones, or hydronephrosis. There are simple or benign cysts. No follow up is needed.    BOWEL: No obstruction or inflammatory change.    PERITONEUM: Mild fat stranding associated with a fat attenuation lesion in the left midabdomen omental fat. 3/128).    LYMPH NODES: Normal.    VASCULATURE: Unremarkable.    PELVIC ORGANS: Hysterectomy.    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     *Note: Due to a large number of results and/or encounters for the requested time period, some results have not been displayed. A complete set of results can be found in Results Review.     Previous CBC+diff, urinalysis, BMP done today results reviewed; urine was repeated at [facility] due to the fact that only a tiny amount was collected at the urgent care earlier--- urinalysis again was negative for infection or hematuria.    Impression: Left lower quadrant  "abdominal pain--- could be related to the localized omental infarct, also could be MSK in origin (pt's pain is related to certain movements).  I discussed patient's \" omental infarct\" with radiologist who advised that it is a trivial finding with only 1.3 cm area involved that is inconsequential for her bowels/ intestines---NO mesenteric ischemia or need to consult surgery, conservative treatment with analgesia and observation only advised.    PLAN: IV Toradol 15mg given for pain which was partially helpful.  Oxycodone 5 mg p.o. Q8 hourly as needed prescribed for 5 days.  Patient to follow-up with primary care physician within 5 days for reassessment, sooner if no improvement or new problems arise.  Warning signs and symptoms explained, to be seen ASAP if worsening.              "

## 2023-12-19 NOTE — TELEPHONE ENCOUNTER
[facility] Call Center    Phone Message    May a detailed message be left on voicemail: yes     Reason for Call: PT CALLING TODAY SHE NEED TO RESCHEDULE HER PROCEDURE ON 1/12/24. PLEASE CALL AND RESCHEDULE WITH PT.     Action Taken: [facility] [[id number]]    Travel Screening: Not Applicable

## 2023-12-19 NOTE — PROGRESS NOTES
"SUBJECTIVE  HPI: Lena Morrow is a 48 year old female  who presents with the CC of abdominal/pelvic pain.   Pain is located in the LMQ abd pain area, with radiation to None    The pain is characterized as \"pain\".    Pain has been present for 3 day(s) and is slowly progressive.       Past Medical History:   Diagnosis Date    Bipolar 1 disorder (H)     Exertional asthma     PHIL (generalized anxiety disorder)     Hypothyroidism     Obesity (BMI 30-39.9)      Allergies   Allergen Reactions    Amoxicillin-Pot Clavulanate Rash    Aripiprazole Other (See Comments)     increased sadness and moodiness    Azithromycin GI Disturbance    Escitalopram Oxalate Diarrhea           Oxycodone Headache and GI Disturbance    Prochlorperazine Other (See Comments)     agitation    Sertraline Other (See Comments)     sweats    Venlafaxine Other (See Comments)     sweats     Social History     Tobacco Use    Smoking status: Never     Passive exposure: Never    Smokeless tobacco: Never   Substance Use Topics    Alcohol use: Yes     Comment: 3-4 beers monthly       ROS:CONSTITUTIONAL:NEGATIVE for fever, chills, change in weight    EXAMINATION:  /82   Pulse 82   Temp 99.2  F (37.3  C)   Resp 20   LMP 12/10/2015   SpO2 97% GENERAL APPEARANCE: healthy, alert and no distress  ABDOMEN: tenderness moderate LMQ abd pain      ICD-10-CM    1. LLQ abdominal pain  R10.32 UA with Microscopic reflex to Culture     CBC with Platelets & Differential     Basic metabolic panel     UA Microscopic with Reflex to Culture     HCG Qual, Urine ([id number])     Referral to Acute and Diagnostic Services (Day of diagnostic / First order acute)      2. History of kidney stones  Z87.442 UA with Microscopic reflex to Culture     CBC with Platelets & Differential     Basic metabolic panel     UA Microscopic with Reflex to Culture     Referral to Acute and Diagnostic Services (Day of diagnostic / First order acute)            "

## 2023-12-19 NOTE — TELEPHONE ENCOUNTER
Thanks Lorene.   I have my staff discussing follow up time in clinic with her and we can address injection and next steps at this visit.    Thanks!

## 2023-12-22 NOTE — TELEPHONE ENCOUNTER
PALMER LVM for patient explaining that she can repeat her left subacromial bursa injection after 2/2/2024 (3 months from her last injection on 11/2/2023). At the time of that appointment, they can further discuss and arrange her tenex procedure.    [facility] provided scheduling number of [phone].    PALMER Delaney

## 2023-12-25 ENCOUNTER — MYC REFILL (OUTPATIENT)
Dept: INTERNAL MEDICINE | Facility: CLINIC | Age: 48
End: 2023-12-25
Payer: COMMERCIAL

## 2023-12-25 DIAGNOSIS — F41.1 GENERALIZED ANXIETY DISORDER: ICD-10-CM

## 2023-12-25 DIAGNOSIS — R10.13 EPIGASTRIC PAIN: ICD-10-CM

## 2023-12-26 RX ORDER — PROPRANOLOL HYDROCHLORIDE 80 MG/1
80 CAPSULE, EXTENDED RELEASE ORAL DAILY
Qty: 90 CAPSULE | Refills: 2 | OUTPATIENT
Start: 2023-12-26

## 2023-12-26 RX ORDER — ESOMEPRAZOLE MAGNESIUM 40 MG/1
CAPSULE, DELAYED RELEASE ORAL
Qty: 90 CAPSULE | Refills: 1 | OUTPATIENT
Start: 2023-12-26

## 2024-01-05 ENCOUNTER — TELEPHONE (OUTPATIENT)
Dept: ORTHOPEDICS | Facility: CLINIC | Age: 49
End: 2024-01-05
Payer: COMMERCIAL

## 2024-01-05 NOTE — TELEPHONE ENCOUNTER
PALMER LVM for patient informing her that Dr. Barbour would like to see her for a follow up appointment about her left shoulder prior to rescheduling her tenex procedure with Dr. Barbour.    ATC explained that if she is wanting to have her left shoulder injected prior to her tenex procedure, she would not be able to do that until after 2/2/2024.     ATC recommended for patient to call [phone] to schedule a follow up appointment with Dr. Barbour.    PALMER Delaney

## 2024-01-05 NOTE — TELEPHONE ENCOUNTER
[facility] Call Center     Phone Message     May a detailed message be left on voicemail: Yes     Reason for Call:  Patient called and is trying to schedule a procedure with Dr. Barbour, patient is its a procedure where Dr. Barbour goes in with some sort of probe and breaks up the calcium in her shoulder, then cleans it with a suction and water.

## 2024-01-23 ENCOUNTER — MYC MEDICAL ADVICE (OUTPATIENT)
Dept: ORTHOPEDICS | Facility: CLINIC | Age: 49
End: 2024-01-23
Payer: COMMERCIAL

## 2024-01-23 DIAGNOSIS — M75.32 CALCIFIC TENDINITIS OF LEFT SHOULDER: Primary | ICD-10-CM

## 2024-01-25 ENCOUNTER — OFFICE VISIT (OUTPATIENT)
Dept: ORTHOPEDICS | Facility: CLINIC | Age: 49
End: 2024-01-25
Payer: COMMERCIAL

## 2024-01-25 VITALS — HEART RATE: 71 BPM | SYSTOLIC BLOOD PRESSURE: 116 MMHG | OXYGEN SATURATION: 97 % | DIASTOLIC BLOOD PRESSURE: 74 MMHG

## 2024-01-25 DIAGNOSIS — M75.32 CALCIFIC TENDINITIS OF LEFT SHOULDER: Primary | ICD-10-CM

## 2024-01-25 PROCEDURE — 20611 DRAIN/INJ JOINT/BURSA W/US: CPT | Mod: LT | Performed by: FAMILY MEDICINE

## 2024-01-25 RX ORDER — LIDOCAINE HYDROCHLORIDE 10 MG/ML
4 INJECTION, SOLUTION INFILTRATION; PERINEURAL
Status: SHIPPED | OUTPATIENT
Start: 2024-01-25

## 2024-01-25 RX ORDER — TRIAMCINOLONE ACETONIDE 40 MG/ML
40 INJECTION, SUSPENSION INTRA-ARTICULAR; INTRAMUSCULAR
Status: SHIPPED | OUTPATIENT
Start: 2024-01-25

## 2024-01-25 RX ADMIN — TRIAMCINOLONE ACETONIDE 40 MG: 40 INJECTION, SUSPENSION INTRA-ARTICULAR; INTRAMUSCULAR at 12:20

## 2024-01-25 RX ADMIN — LIDOCAINE HYDROCHLORIDE 4 ML: 10 INJECTION, SOLUTION INFILTRATION; PERINEURAL at 12:20

## 2024-01-25 NOTE — PROGRESS NOTES
ESTABLISHED PATIENT FOLLOW-UP:  Follow Up of the Left Shoulder     HISTORY OF PRESENT ILLNESS  Ms. Morrow is a pleasant 48 year old year old female who presents to clinic today for follow-up of left shoulder pain.     Date of injury: November 2022  Date last seen: 11/02/2023  Following Therapeutic Plan: Yes  Pain: Pain has increased, sharp pain  Function: Limited range of motion and decreased strength  Interval History: left shoulder CSI on 11/02/23 that provided a month of relief    Additional medical/Social/Surgical histories reviewed in [facility] and updated as appropriate.    Left Subacromial Bursa - Ultrasound Guided  The patient was informed of the risks and the benefits of the procedure and a written consent was signed.  The patient s left shoulder was prepped with chlorhexidine in sterile fashion.   40 mg of triamcinolone suspension was drawn up into a 5 mL syringe with 4 mL of 1% lidocaine.  Injection was performed using sterile technique.  Under ultrasound guidance a 1.5-inch 22-gauge needle was used to enter the subacromial bursa.  Anterolateral approach was used with arm held in Crass position.  Needle placement was visualized and documented with ultrasound.  Ultrasound visualization was necessary to ensure placement in to the bursa and not the rotator cuff tendon which could potentially cause further tendon damage.  Injection performed long axis to the probe.  Injection solution visualized within the bursal space.  Images were permanently stored for the patient's record.  There were no complications. The patient tolerated the procedure well. There was negligible bleeding. Handout provided detailing post-injection instructions.  Lena will plan for tenex procedure in the next 12 weeks. I will work with our surgery coordinators to establish [place] time on [place] [facility].    Soy Barbour DO [facility]  Primary Care Sports Medicine  [facility]  Large Joint Injection/Arthocentesis: L subacromial  bursa    Date/Time: 1/25/2024 12:20 PM    Performed by: Soy Barbour DO  Authorized by: Soy Barbour DO    Needle Size:  22 G  Guidance: ultrasound    Approach:  Lateral  Location:  Shoulder      Site:  L subacromial bursa  Medications:  40 mg triamcinolone 40 MG/ML; 4 mL lidocaine 1 %  Outcome:  Tolerated well, no immediate complications  Procedure discussed: discussed risks, benefits, and alternatives    Consent Given by:  Patient  Timeout: timeout called immediately prior to procedure    Prep: patient was prepped and draped in usual sterile fashion     Ultrasound was used to ensure safe and accurate needle placement and injection. Ultrasound images of the procedure were permanently stored.

## 2024-01-25 NOTE — LETTER
1/25/2024         RE: Lena Morrow  [address]  [place] [address]        Dear Colleague,    Thank you for referring your patient, Lena Morrow, to the [facility] [place]. Please see a copy of my visit note below.    ESTABLISHED PATIENT FOLLOW-UP:  Follow Up of the Left Shoulder     HISTORY OF PRESENT ILLNESS  Ms. Morrow is a pleasant 48 year old year old female who presents to clinic today for follow-up of left shoulder pain.     Date of injury: November 2022  Date last seen: 11/02/2023  Following Therapeutic Plan: Yes  Pain: Pain has increased, sharp pain  Function: Limited range of motion and decreased strength  Interval History: left shoulder CSI on 11/02/23 that provided a month of relief    Additional medical/Social/Surgical histories reviewed in [facility] and updated as appropriate.    Left Subacromial Bursa - Ultrasound Guided  The patient was informed of the risks and the benefits of the procedure and a written consent was signed.  The patient s left shoulder was prepped with chlorhexidine in sterile fashion.   40 mg of triamcinolone suspension was drawn up into a 5 mL syringe with 4 mL of 1% lidocaine.  Injection was performed using sterile technique.  Under ultrasound guidance a 1.5-inch 22-gauge needle was used to enter the subacromial bursa.  Anterolateral approach was used with arm held in Crass position.  Needle placement was visualized and documented with ultrasound.  Ultrasound visualization was necessary to ensure placement in to the bursa and not the rotator cuff tendon which could potentially cause further tendon damage.  Injection performed long axis to the probe.  Injection solution visualized within the bursal space.  Images were permanently stored for the patient's record.  There were no complications. The patient tolerated the procedure well. There was negligible bleeding. Handout provided detailing post-injection instructions.  Lena snow  plan for tenex procedure in the next 12 weeks. I will work with our surgery coordinators to establish OR time on Friday at [facility].    Soy Barbour DO [facility]  Primary Care Sports Medicine  [facility]  Large Joint Injection/Arthocentesis: L subacromial bursa    Date/Time: 1/25/2024 12:20 PM    Performed by: Soy Barbour DO  Authorized by: Soy Barbour DO    Needle Size:  22 G  Guidance: ultrasound    Approach:  Lateral  Location:  Shoulder      Site:  L subacromial bursa  Medications:  40 mg triamcinolone 40 MG/ML; 4 mL lidocaine 1 %  Outcome:  Tolerated well, no immediate complications  Procedure discussed: discussed risks, benefits, and alternatives    Consent Given by:  Patient  Timeout: timeout called immediately prior to procedure    Prep: patient was prepped and draped in usual sterile fashion     Ultrasound was used to ensure safe and accurate needle placement and injection. Ultrasound images of the procedure were permanently stored.        Again, thank you for allowing me to participate in the care of your patient.        Sincerely,        Soy Barbour DO

## 2024-01-25 NOTE — PATIENT INSTRUCTIONS
Thank you for choosing [facility]    DR YOUSIF'S CLINIC LOCATIONS  [place] [address] [address], [address] [place]   [place], [address] [place] [address]   [phone]       APPOINTMENTS: [phone]    CARE QUESTIONS: [phone],    BILLING QUESTIONS: [phone]    FAX NUMBER: [phone]        Follow up: As needed      No diagnosis found.      Steroid Injection Information:    A corticosteroid injection was administered at your visit today.  The area of injection may be sore, slightly swollen for 1-2 days afterward.  Immediately after injection, you may have an area of numbness, which is caused by the local anesthetic, lidocaine (similar to novacaine) in the shot.  This medicine will wear off in about 4 hours.  In addition to the lidocaine, a steroid medication was injected, called triamcinolone acetate.  This medication is intended to provide long-acting antiinflammatory / pain relief.  It may take 2-5 days for this medication to provide noticeable relief.      After an injection, Dr. Barbour recommends:    Protecting the area wearing a bandage or gauze pad for at least 24 hours.    Using ice; ice bag or frozen vegetables over the injection site every one to two hours when able (for 15 minutes at a time).      Avoid any strenuous activity even if the knee is already feeling better immediately afterward. Light stretching is encouraged but refrain from home exercise program and increasing activity level for about 48 hours.    Avoid soaking in a hot tub, bath, or pool for 48 hours after injection. Showering is fine!    Watch for signs of infection, including increasing pain, redness and swelling that last more than 48 hours after injection.

## 2024-01-29 ENCOUNTER — TRANSFERRED RECORDS (OUTPATIENT)
Dept: HEALTH INFORMATION MANAGEMENT | Facility: CLINIC | Age: 49
End: 2024-01-29
Payer: COMMERCIAL

## 2024-02-02 ENCOUNTER — TELEPHONE (OUTPATIENT)
Dept: ORTHOPEDICS | Facility: CLINIC | Age: 49
End: 2024-02-02
Payer: COMMERCIAL

## 2024-02-02 NOTE — TELEPHONE ENCOUNTER
Phoned patient to schedule her TENEX procedure with Dr Barbour. I left her my direct number to call back when she is able. [phone]

## 2024-02-06 PROBLEM — M75.32 CALCIFIC TENDINITIS OF LEFT SHOULDER: Status: ACTIVE | Noted: 2023-11-02

## 2024-02-07 NOTE — TELEPHONE ENCOUNTER
Received call back from patient on 2/6/24. Patient scheduled for TENEX procedure with Dr Barbour on 3/1/24 at the [facility]. Patient will wait for call 1-3 days prior to surgery with further instructions.

## 2024-02-14 NOTE — MR AVS SNAPSHOT
After Visit Summary   7/26/2017    Lena Morrow    MRN: [id number]           Patient Information     Date Of Birth          1975        Visit Information        Provider Department      7/26/2017 3:00 PM Yesika Templeton PA-C [facility] [place]        Today's Diagnoses     Kidney stones          Care Instructions    Standard recommendations on kidney stone prevention:  -These include maintaining fluid intake of 3 liters per day or more with a goal of making 2 or more liters of urine per day.  If alcoholic or caffeinated beverages are consumed then you need to drink water along with these beverages to maintain hydration.    -A few ounces of lemon juice concentrate a day diluted in water can help prevent stones.  -Limit intake of red meat, salt, and salty processed foods.    -Maintain calcium intake in diet through continued consumption of dairy products etc.  -Limit foods that are high in oxalate such as spinach, sweet potatoes, dark chocolate, soy products, and some nuts such as peanuts.   -Additional/different recommendations pending any stone analysis.          Follow-ups after your visit        Your next 10 appointments already scheduled     Aug 11, 2017  2:00 PM CDT   XR KUB with SHXR3   [facility] ([facility])    [address] [address] [phone]           Please bring a list of your current medicines to your exam. (Include vitamins, minerals and over-thecounter medicines.) Leave your valuables at home.  Tell your doctor if there is a chance you may be pregnant.  You do not need to do anything special for this exam.            Aug 11, 2017  3:00 PM CDT   Return Visit with Yesika [place] PA-C   [facility] ([facility] [place])    [address]  [address]  [place] [id number] [phone]              Future tests that were ordered for you  "today     Open Future Orders        Priority Expected Expires Ordered    XR KUB [[id number]] Routine 7/26/2017 7/26/2018 7/26/2017            Who to contact     If you have questions or need follow up information about today's clinic visit or your schedule please contact [facility] directly at [phone].  Normal or non-critical lab and imaging results will be communicated to you by [email or web address], letter or phone within 4 business days after the clinic has received the results. If you do not hear from us within 7 days, please contact the clinic through [email or web address] or phone. If you have a critical or abnormal lab result, we will notify you by phone as soon as possible.  Submit refill requests through [employer] or call your pharmacy and they will forward the refill request to us. Please allow 3 business days for your refill to be completed.          Additional Information About Your Visit        [email or web address] Information     [employer] gives you secure access to your electronic health record. If you see a primary care provider, you can also send messages to your care team and make appointments. If you have questions, please call your primary care clinic.  If you do not have a primary care provider, please call [phone] and they will assist you.        Care EveryWhere ID     This is your Care EveryWhere ID. This could be used by other organizations to access your [facility] medical records  [id number]        Your Vitals Were     Pulse Height Last Period Breastfeeding? BMI (Body Mass Index)       100 1.638 m (5' 4.5\") 12/10/2015 No 30.93 kg/m2        Blood Pressure from Last 3 Encounters:   07/26/17 (!) 130/100   07/17/17 120/70   05/27/17 108/51    Weight from Last 3 Encounters:   07/26/17 83 kg (183 lb)   07/17/17 83.1 kg (183 lb 4.8 oz)   05/26/17 85 kg (187 lb 4.8 oz)              We Performed the Following     UA without Microscopic [[id number]]          Today's Medication Changes          These " changes are accurate as of: 7/26/17  3:34 PM.  If you have any questions, ask your nurse or doctor.               Start taking these medicines.        Dose/Directions    oxyCODONE-acetaminophen 5-325 MG per tablet   Commonly known as:  PERCOCET   Used for:  Kidney stones   Started by:  Yesika Templeton PA-C        Dose:  1-2 tablet   Take 1-2 tablets by mouth every 4 hours as needed for pain   Quantity:  20 tablet   Refills:  0       tamsulosin 0.4 MG capsule   Commonly known as:  FLOMAX   Used for:  Kidney stones   Started by:  Yesika [place] PA-C        Dose:  0.4 mg   Take 1 capsule (0.4 mg) by mouth daily   Quantity:  21 capsule   Refills:  1            Where to get your medicines      These medications were sent to [facility] - [place], [place] - [address]  [address], [place] [address]     Phone:  [phone]     tamsulosin 0.4 MG capsule         Some of these will need a paper prescription and others can be bought over the counter.  Ask your nurse if you have questions.     Bring a paper prescription for each of these medications     oxyCODONE-acetaminophen 5-325 MG per tablet                Primary Care Provider Office Phone # Fax #    Trevon Crisostomo [phone]       [facility] [address] [facility] [address]        Equal Access to Services     PATRICIA MAURICIO AH: Hadii russ ramirezo Soterell, waaxda luqadaha, qaybta kaalmada jermaine, linden champion. So [facility] [phone].    ATENCIÓN: Si habla español, tiene a manriquez disposición servicios gratuitos de asistencia lingüística. Abril al [phone].    We comply with applicable federal civil rights laws and [place] laws. We do not discriminate on the basis of race, color, national origin, age, disability sex, sexual orientation or gender identity.            Thank you!     Thank you for choosing [facility] [place]  for your  care. Our goal is always to provide you with excellent care. Hearing back from our patients is one way we can continue to improve our services. Please take a few minutes to complete the written survey that you may receive in the mail after your visit with us. Thank you!             Your Updated Medication List - Protect others around you: Learn how to safely use, store and throw away your medicines at www.disposemymeds.org.          This list is accurate as of: 7/26/17  3:34 PM.  Always use your most recent med list.                   Brand Name Dispense Instructions for use Diagnosis    albuterol 108 (90 BASE) MCG/ACT Inhaler    PROAIR HFA/PROVENTIL HFA/VENTOLIN HFA    1 Inhaler    Inhale 2 puffs into the lungs every 6 hours as needed for shortness of breath / dyspnea or wheezing    Mild intermittent asthma with acute exacerbation       * buPROPion 300 MG 24 hr tablet    WELLBUTRIN XL    30 tablet    Take one 300 mg tablet along with 150 mg tablet to make 450 mg per day.    Major depressive disorder, recurrent episode, moderate (H)       * buPROPion 150 MG 24 hr tablet    WELLBUTRIN XL    30 tablet    Take one 150 mg tablet along with one 300 mg tablet to make 450 mg per day.    Major depressive disorder, recurrent episode, moderate (H)       FISH OIL PO           ibuprofen 600 MG tablet    ADVIL/MOTRIN    90 tablet    Take 1 tablet (600 mg) by mouth every 6 hours as needed for pain (mild)    S/P laparoscopic hysterectomy, Endometriosis, Pelvic pain in female       lamoTRIgine 200 MG tablet    LAMICTAL    180 tablet    Take 1 tablet (200 mg) by mouth 2 times daily    Bipolar 2 disorder (H)       LORATADINE PO      Take 10 mg by mouth daily        magnesium citrate solution      Take 296 mLs by mouth once        MUCINEX SINUS-MAX PO      Take 1,200 mg by mouth daily        NEXIUM PO      Take by mouth daily        OXYCODONE HCL PO      Take 5-10 mg by mouth every 3 hours as needed        oxyCODONE-acetaminophen  5-325 MG per tablet    PERCOCET    20 tablet    Take 1-2 tablets by mouth every 4 hours as needed for pain    Kidney stones       prenatal multivitamin  plus iron 27-0.8 MG Tabs per tablet      Take 1 tablet by mouth daily        propranolol 20 MG tablet    INDERAL    90 tablet    Take 1-2 tablets as needed for anxiety (up to twice a day)    Generalized anxiety disorder       ROBAXIN PO      Take 750 mg by mouth 4 times daily as needed for muscle spasms        SUPER B COMPLEX PO           tamsulosin 0.4 MG capsule    FLOMAX    21 capsule    Take 1 capsule (0.4 mg) by mouth daily    Kidney stones       VALTREX PO      Take 500 mg by mouth 2 times daily as needed (herpes outbreak)        VITAMIN D (CHOLECALCIFEROL) PO      Take 5,000 Units by mouth daily        * Notice:  This list has 2 medication(s) that are the same as other medications prescribed for you. Read the directions carefully, and ask your doctor or other care provider to review them with you.       No

## 2024-02-23 ENCOUNTER — TRANSFERRED RECORDS (OUTPATIENT)
Dept: HEALTH INFORMATION MANAGEMENT | Facility: CLINIC | Age: 49
End: 2024-02-23
Payer: COMMERCIAL

## 2024-03-01 ENCOUNTER — ANCILLARY PROCEDURE (OUTPATIENT)
Dept: RADIOLOGY | Facility: AMBULATORY SURGERY CENTER | Age: 49
End: 2024-03-01
Attending: FAMILY MEDICINE
Payer: COMMERCIAL

## 2024-03-01 ENCOUNTER — HOSPITAL ENCOUNTER (OUTPATIENT)
Facility: AMBULATORY SURGERY CENTER | Age: 49
Discharge: HOME | End: 2024-03-01
Attending: FAMILY MEDICINE | Admitting: FAMILY MEDICINE
Payer: COMMERCIAL

## 2024-03-01 VITALS
SYSTOLIC BLOOD PRESSURE: 105 MMHG | TEMPERATURE: 97.1 F | WEIGHT: 215 LBS | RESPIRATION RATE: 14 BRPM | BODY MASS INDEX: 36.7 KG/M2 | DIASTOLIC BLOOD PRESSURE: 68 MMHG | HEIGHT: 64 IN | OXYGEN SATURATION: 95 % | HEART RATE: 85 BPM

## 2024-03-01 DIAGNOSIS — M75.32 CALCIFIC TENDINITIS OF LEFT SHOULDER: Primary | ICD-10-CM

## 2024-03-01 DIAGNOSIS — M75.32 CALCIFYING TENDINITIS OF LEFT SHOULDER: ICD-10-CM

## 2024-03-01 PROCEDURE — 23405 INCISION OF TENDON & MUSCLE: CPT | Mod: LT

## 2024-03-01 RX ORDER — LIDOCAINE HYDROCHLORIDE 10 MG/ML
INJECTION, SOLUTION INFILTRATION; PERINEURAL PRN
Status: DISCONTINUED | OUTPATIENT
Start: 2024-03-01 | End: 2024-03-01 | Stop reason: HOSPADM

## 2024-03-01 RX ORDER — QUETIAPINE FUMARATE 25 MG/1
25 TABLET, FILM COATED ORAL 2 TIMES DAILY
COMMUNITY
End: 2024-10-03

## 2024-03-01 RX ORDER — OXYCODONE HYDROCHLORIDE 5 MG/1
5 TABLET ORAL EVERY 6 HOURS PRN
Qty: 12 TABLET | Refills: 0 | Status: SHIPPED | OUTPATIENT
Start: 2024-03-01 | End: 2024-03-05

## 2024-03-01 NOTE — OP NOTE
Pre-operative diagnosis: Calcific tendinopathy Left Shoulder   Post-operative diagnosis Calcific Tendinopathy Left Shoulder   Procedure: Procedure(s):  EXCISION, SOFT TISSUE, USING ULTRASONIC ASPIRATOR SYSTEM  TENOTOMY, PERCUTANEOUS LEFT ROTATOR CUFF.   Surgeon(s): Surgeon(s) and Role:     * Soy Barbour, DO - Primary   Estimated blood loss: 2cc         Patient presented with chronic left shoulder pain due to calcific tendinopathy. She is here for a percutaneous tenotomy of the left shoulder using ultrasound guidance to cut and remove the pathologic tissue.    Written consent obtained by patient.  Time out performed in the room confirming patient name, procedure, laterality and allergies. Signed on iPad with patient.     The anatomy was identified and the diseased tissue was visualized and confirmed at the supraspinatus tendon.  The area was prepped with chlorhexidine scrub.  Local anesthesia was provided with a local injection of 10 mL of 1% lidocaine, using a 25 gauge needle.  A #11 blade was used to puncture the skin to the site of the pathologic tissue.  A sterile sleeve was placed over the ultrasound transducer.     To section the tendon the surgical instrument is introduced through the puncture site advancing to the diseased tendon.  Once the tip of the instrument is confirmed to be on the pathologic tissue, the foot pedal was depressed and the tendon is incised along its length, cutting and removing the diseased tendon tissue.  Due to inadequate anesthesia in distant regions of tendon, additional 3cc utilized for achieving adequate anesthesia.  Procedure was performed with a total of 3:07 seconds of cutting time. Lena tolerated this well.     Following the procedure, benzoin tincture and steri-strips were placed, followed by gauze 4x4 and Tegaderm.  Sling fitted for discharge. Post-operative instructions were given as follows:    Oxycodone prescribed to pharmacy at discharge.     Patient scheduled to  follow up in 2 weeks.     Soy Barbour DO CAQSM  Primary Care Sports Medicine  [facility]

## 2024-03-01 NOTE — DISCHARGE INSTRUCTIONS
Background  The ultrasound guided percutaneous tenotomy (Tenex) allows what was once major surgery to be performed  quickly through a small incision.  Although post-procedure care will be tailored to fit your individual needs, the following guidelines are designed  to help you and your physical therapist after the procedure. Your physician may also amend or adjust these  treatments as they deem necessary.    Over-thecounter pain medicine   Ibuprofen (Advil , Motrin ), naproxen (Aleve , Naprosyn ) can impair your  ability to heal and may increase risk of bleeding. Make every effort to avoid these  medications for two weeks before and one week after your procedure.     Acetaminophen (Tylenol ) is ok to take for pre and post procedural pain.   If you are taking aspirin (ASA) for cardiovascular benefit, please continue with the  same dosage.  Alcohol    Avoid 48 hours before your procedure. Do not consume alcohol while you are  taking prescription pain medication.  Tobacco & nicotine   Consider talking to your physician or health care provider about stopping. These  products impair your ability to heal and might reduce the beneficial effects of the  procedure.    Oxycodone Prescribed     Diet    You will need to fast overnight before the Tenex procedure.   You may resume your regular diet when you feel able after the procedure    Make sure your medical team provides you with the following before or at your  procedure:  1. A sling  2. Follow-up visit in 2 weeks    Sling    It is typically recommended to wear the sling for comfort while awake for the next 2- 3 days.   You do not need to sleep in the sling.   Do not drive while wearing the sling.    Discomfort    Some pain after your procedure is expected for the first few weeks. Local  anesthetic was used and this will begin to wear off about 8 hours after the procedure. Anticipate an increase in pain at this time and consider taking Acetaminophen (Tylenol) about 6 hours  after the procedure to stay ahead of your pain.   Use an ice pack on the painful area for 15 minutes as needed; in the first 2-3 days  consider icing 3 times daily.   If you are concerned about your pain, please contact your care team.    Bandage    If a bandage / dressing was applied, remove dressing after 24-48 hours. Replace  with simple bandage.   Sterile strip bandages can be removed when they begin peeling off or after 7 days.    Keep procedure area clean and dry for 1 week after the procedure until your doctor  has seen you for your wound check.    Bathing    Do not soak/submerge the treatment area in water for 1 week. Showering is OK,  but keep incision site covered for the first week.    Follow-Up Appointment   For Tenex: You will be scheduled for follow-up appointments approximately 2  weeks, 1 month and 3 months after your procedure.

## 2024-03-05 ENCOUNTER — TELEPHONE (OUTPATIENT)
Dept: ORTHOPEDICS | Facility: CLINIC | Age: 49
End: 2024-03-05
Payer: COMMERCIAL

## 2024-03-05 DIAGNOSIS — M75.32 CALCIFYING TENDINITIS OF LEFT SHOULDER: Primary | ICD-10-CM

## 2024-03-05 NOTE — TELEPHONE ENCOUNTER
PALMER LVM for patient regarding follow up from Tenex procedure on 3/1/2024. ATC provided call back number of [phone].    ATC informed the patient of the following:  - She may wean sling as comfort allows.    - Keep steri strips in place but can remove tegaderm if she has not.    - Remind her to schedule PT in 2 weeks (after next visit) to start gentle ROM and strengthening protocol.     Reminded patient of follow up appointment on 3/14/2024 with Dr. Barbour at his [facility]. Ideally physical therapy can begin on Friday 3/15 or Monday 3/18. Physical therapy referral placed today.     PALMER Delaney

## 2024-03-14 ENCOUNTER — OFFICE VISIT (OUTPATIENT)
Dept: ORTHOPEDICS | Facility: CLINIC | Age: 49
End: 2024-03-14
Payer: COMMERCIAL

## 2024-03-14 VITALS
DIASTOLIC BLOOD PRESSURE: 86 MMHG | SYSTOLIC BLOOD PRESSURE: 155 MMHG | HEART RATE: 84 BPM | OXYGEN SATURATION: 98 % | WEIGHT: 210 LBS | HEIGHT: 64 IN | BODY MASS INDEX: 35.85 KG/M2

## 2024-03-14 DIAGNOSIS — M75.32 CALCIFYING TENDINITIS OF LEFT SHOULDER: Primary | ICD-10-CM

## 2024-03-14 PROCEDURE — 99024 POSTOP FOLLOW-UP VISIT: CPT | Performed by: FAMILY MEDICINE

## 2024-03-14 RX ORDER — DESVENLAFAXINE 25 MG/1
1 TABLET, EXTENDED RELEASE ORAL
COMMUNITY
Start: 2024-02-23 | End: 2024-05-28

## 2024-03-14 ASSESSMENT — PATIENT HEALTH QUESTIONNAIRE - PHQ9: SUM OF ALL RESPONSES TO PHQ QUESTIONS 1-9: 18

## 2024-03-14 NOTE — PATIENT INSTRUCTIONS
Thank you for choosing [facility]    DR YOUSIF'S CLINIC LOCATIONS  [place] [address]   [place], [place], [address] [place] [address]   [phone]       APPOINTMENTS: [phone]    CARE QUESTIONS: [phone],    BILLING QUESTIONS: [phone]    FAX NUMBER: [phone]        Follow up: May 1st @ 9:40 am, Arrival time 9:25 am      No diagnosis found.    Physical Therapy orders have been placed with [facility] ([facility])  You can call [phone] to schedule at your convenience.

## 2024-03-14 NOTE — PROGRESS NOTES
"ESTABLISHED PATIENT FOLLOW-UP:  No chief complaint on file.       HISTORY OF PRESENT ILLNESS  Ms. Morrow is a pleasant 48 year old year old female who presents to clinic today for follow-up of left shoulder pain. She reports that on Tuesday she reached overhead in the shower too quickly and it hurt really bad but the pain has since resolved. Overall, feels like the procedure helped a lot and would rate about 90% improvement.     Date of injury: Symptom onset Nov 2022  Date last seen: 1/25/24  Following Therapeutic Plan: Yes  Pain: No pain at rest but certain activities increase symptoms.   Function: Lifting arm overhead is painful and she is unable to lay on that side because it \"feels funny\"  Interval History: left shoulder Tenex procedure done on 3/01/24 with Dr Barbour    Additional medical/Social/Surgical histories reviewed in [facility] and updated as appropriate.    REVIEW OF SYSTEMS (3/14/2024)  CONSTITUTIONAL: Denies fever and weight loss  GASTROINTESTINAL: Denies abdominal pain, nausea, vomiting  MUSCULOSKELETAL: See HPI  SKIN: Denies any recent rash or lesion  NEUROLOGICAL: Denies numbness or focal weakness     PHYSICAL EXAM  Ht 1.626 m (5' 4\")   Wt 95.3 kg (210 lb)   LMP 12/10/2015   BMI 36.05 kg/m      General  - normal appearance, in no obvious distress  Skin  -Small healing, well-approximated puncture site at left shoulder.  No surrounding erythema.  No drainage.  Scab formation.  Musculoskeletal - Left shoulder  - inspection: normal bone and joint alignment, no obvious deformity, no scapular winging, no AC step-off  - palpation: mildly tender RC insertion, normal clavicle, non-tender AC  - ROM: Painless internal and external rotation of left shoulder at side.  Pain greater than 90 degrees with forward elevation and weight deferred additional active and passive motion of shoulder.  Neuro  - no sensory or motor deficit, grossly normal coordination, normal muscle tone  Skin  - no ecchymosis, erythema, warmth, " or induration, no obvious rash  Psych  - interactive, appropriate, normal mood and affect      ASSESSMENT & PLAN  Ms. Morrow is a 48 year old year old female who presents to clinic today as a follow-up from percutaneous tenotomy, Tenex procedure performed 2 weeks ago on 3/1/2024.    Diagnosis: Calcific tendinopathy of left shoulder status post Tenex    Yahir is doing quite well and is not utilizing any additional oxycodone prescribed postprocedure.  She has minimal pain at rest, but has yet to move her arm significantly in the overhead plane.  We discussed that I feel she is doing well overall and is progressing as expected.    I would like her to start formal physical therapy.  She notes due to financial constraints that she is between jobs she would like to go to 1 visit of therapy to learn exercises.  I discussed that 1-3 visits would likely be feasible and that she should let her therapist know of her intentions upon arrival.  I will message one of our physical therapists with rehab protocol they can develop for her.    I would like to see Cayden in 6 to 8 weeks to evaluate progress.    It was a pleasure seeing Lena.    Soy Barbour DO, [facility]  Primary Care Sports Medicine

## 2024-03-14 NOTE — LETTER
"    3/14/2024         RE: Lena Morrow  [address]  [place] [address]        Dear Colleague,    Thank you for referring your patient, Lena Morrow, to the [facility] [place]. Please see a copy of my visit note below.    ESTABLISHED PATIENT FOLLOW-UP:  No chief complaint on file.       HISTORY OF PRESENT ILLNESS  Ms. Morrow is a pleasant 48 year old year old female who presents to clinic today for follow-up of left shoulder pain. She reports that on Tuesday she reached overhead in the shower too quickly and it hurt really bad but the pain has since resolved. Overall, feels like the procedure helped a lot and would rate about 90% improvement.     Date of injury: Symptom onset Nov 2022  Date last seen: 1/25/24  Following Therapeutic Plan: Yes  Pain: No pain at rest but certain activities increase symptoms.   Function: Lifting arm overhead is painful and she is unable to lay on that side because it \"feels funny\"  Interval History: left shoulder Tenex procedure done on 3/01/24 with Dr Barbour    Additional medical/Social/Surgical histories [facility] in [facility] and updated as appropriate.    REVIEW OF SYSTEMS (3/14/2024)  CONSTITUTIONAL: Denies fever and weight loss  GASTROINTESTINAL: Denies abdominal pain, nausea, vomiting  MUSCULOSKELETAL: See HPI  SKIN: Denies any recent rash or lesion  NEUROLOGICAL: Denies numbness or focal weakness     PHYSICAL EXAM  Ht 1.626 m (5' 4\")   Wt 95.3 kg (210 lb)   LMP 12/10/2015   BMI 36.05 kg/m      General  - normal appearance, in no obvious distress  Skin  -Small healing, well-approximated puncture site at left shoulder.  No surrounding erythema.  No drainage.  Scab formation.  Musculoskeletal - Left shoulder  - inspection: normal bone and joint alignment, no obvious deformity, no scapular winging, no AC step-off  - palpation: mildly tender RC insertion, normal clavicle, non-tender AC  - ROM: Painless internal and external rotation of " left shoulder at side.  Pain greater than 90 degrees with forward elevation and weight deferred additional active and passive motion of shoulder.  Neuro  - no sensory or motor deficit, grossly normal coordination, normal muscle tone  Skin  - no ecchymosis, erythema, warmth, or induration, no obvious rash  Psych  - interactive, appropriate, normal mood and affect      ASSESSMENT & PLAN  Ms. Morrow is a 48 year old year old female who presents to clinic today as a follow-up from percutaneous tenotomy, Tenex procedure performed 2 weeks ago on 3/1/2024.    Diagnosis: Calcific tendinopathy of left shoulder status post Tenex    Yahir is doing quite well and is not utilizing any additional oxycodone prescribed postprocedure.  She has minimal pain at rest, but has yet to move her arm significantly in the overhead plane.  We discussed that I feel she is doing well overall and is progressing as expected.    I would like her to start formal physical therapy.  She notes due to financial constraints that she is between jobs she would like to go to 1 visit of therapy to learn exercises.  I discussed that 1-3 visits would likely be feasible and that she should let her therapist know of her intentions upon arrival.  I will message one of our physical therapists with rehab protocol they can develop for her.    I would like to see Cayden in 6 to 8 weeks to evaluate progress.    It was a pleasure seeing Lena.    Soy Barbour DO, [facility]  Primary Care Sports Medicine         Again, thank you for allowing me to participate in the care of your patient.        Sincerely,        Soy Barbour DO

## 2024-03-21 ENCOUNTER — TELEPHONE (OUTPATIENT)
Dept: INTERNAL MEDICINE | Facility: CLINIC | Age: 49
End: 2024-03-21

## 2024-03-21 NOTE — TELEPHONE ENCOUNTER
"[facility]     Diagnosis:  Asthma on albuterol   Specified Heart Arrhythmias- note from 7/3 hospitalization \" Paroxysmal atrial flutter: By history. I see a note from Cardiology in 2007 when that service was consulted for tachycardia after pregnancy. TTE in that year showed preserved LVEF and I have reviewed multiple EKG's done over the years in Epic that show sinus rhythm. I do not know if I have been able to review all available EKG's.     Major Depressive, Bipolar, and Paranoid Disorders on pristiq and wellbutrin     Appointment:  Last office visit with PCP:Aug 2023  Due for: Annual Wellness Visit Aug 2024    Brooks Davis RN.  "

## 2024-03-21 NOTE — TELEPHONE ENCOUNTER
Call attempt 1/3.    LVM for patient to schedule annual wellness exam due  Aug 2024 . Please include RN Diagnosis notes from open encounter to the appointment notes of scheduled appointment.    Please close encounter when scheduled.

## 2024-03-29 ENCOUNTER — TRANSFERRED RECORDS (OUTPATIENT)
Dept: HEALTH INFORMATION MANAGEMENT | Facility: CLINIC | Age: 49
End: 2024-03-29
Payer: COMMERCIAL

## 2024-04-07 ENCOUNTER — MYC REFILL (OUTPATIENT)
Dept: INTERNAL MEDICINE | Facility: CLINIC | Age: 49
End: 2024-04-07
Payer: COMMERCIAL

## 2024-04-07 DIAGNOSIS — E03.4 HYPOTHYROIDISM DUE TO ACQUIRED ATROPHY OF THYROID: ICD-10-CM

## 2024-04-08 RX ORDER — LEVOTHYROXINE SODIUM 75 UG/1
75 TABLET ORAL DAILY
Qty: 90 TABLET | Refills: 0 | Status: SHIPPED | OUTPATIENT
Start: 2024-04-08 | End: 2024-10-04

## 2024-04-12 ENCOUNTER — TRANSFERRED RECORDS (OUTPATIENT)
Dept: HEALTH INFORMATION MANAGEMENT | Facility: CLINIC | Age: 49
End: 2024-04-12
Payer: COMMERCIAL

## 2024-04-24 ENCOUNTER — TRANSFERRED RECORDS (OUTPATIENT)
Dept: HEALTH INFORMATION MANAGEMENT | Facility: CLINIC | Age: 49
End: 2024-04-24

## 2024-05-21 ENCOUNTER — TRANSFERRED RECORDS (OUTPATIENT)
Dept: HEALTH INFORMATION MANAGEMENT | Facility: CLINIC | Age: 49
End: 2024-05-21
Payer: COMMERCIAL

## 2024-05-28 ENCOUNTER — OFFICE VISIT (OUTPATIENT)
Dept: PODIATRY | Facility: CLINIC | Age: 49
End: 2024-05-28
Attending: INTERNAL MEDICINE
Payer: COMMERCIAL

## 2024-05-28 VITALS — SYSTOLIC BLOOD PRESSURE: 124 MMHG | DIASTOLIC BLOOD PRESSURE: 80 MMHG

## 2024-05-28 DIAGNOSIS — L60.0 INGROWN TOENAIL OF RIGHT FOOT: Primary | ICD-10-CM

## 2024-05-28 DIAGNOSIS — M79.674 GREAT TOE PAIN, RIGHT: ICD-10-CM

## 2024-05-28 PROCEDURE — 11750 EXCISION NAIL&NAIL MATRIX: CPT | Mod: T5 | Performed by: PODIATRIST

## 2024-05-28 NOTE — PATIENT INSTRUCTIONS
Thank you for choosing [facility]!    DR. REIS'S CLINIC LOCATIONS:     [facility] TRIAGE LINE: [phone]   [address] [place] [address] APPOINTMENTS: [phone]   [place] [address] RADIOLOGY: [phone]   (Every other Tues - Wed - Fri PM) SET UP SURGERY: [phone]    PHYSICAL THERAPY: [phone]   [place] SPECIALTY BILLING QUESTIONS: [phone] [address] FAX: [phone]   [place] [address]    (Thurs & Fri AM)         DR. REIS'S RECOMMENDATIONS FOR HEALING AFTER A NAIL REMOVAL PROCEDURE    1. Try to keep the bandage on until bedtime or the morning after your procedure.     2. Some bleeding is normal. If bleeding seems excessive to you, place ice on top of your foot for 15-20 minutes, elevate your foot above heart level and reinforce the dressing (add additional covering)    3. Over the counter pain medication (tylenol / ibuprofen), elevating your foot and cold application is all you should need for pain control. Pain is usually easily managed.      4. For 1-2 weeks, soak your foot twice a day in mild, skin friendly soap water solution for 15 minutes (dish soap, hand soap, body wash, etc). After soaking, blot the area dry and apply a regular band aid. An antibiotic ointment is not needed.  If the guaze dressing sticks to your toe, soak your foot for a few minutes with the dressing on. This should help loosen it.     6. It is normal to experience some discomfort and redness around the nail for several days following the procedure. Drainage will likely appear a red and/or yellow. This is normal. If your toe is still draining fluid after 2 weeks, continue soaking.    7. Initial discomfort might last for 2-3 days. You may resume with regular activity as soon as you want,  as long as you keep the wound clean, dry and follow the soaking instruction. It is recommended that you do not enter public swimming pools/hot tubs while your toe is  draining.    8. If you are experiencing worsening pain and redness or notice pus after 2-3 days please contact the clinic. Ask to speak with a triage nurse and they will inform our team of your symptoms and we can advise if a follow up is needed.      IF YOU HAD A PERMANENT NAIL REMOVAL PROCEDURE    - Healing will take longer and you might need to soak for 2-3 weeks. You are healing from the nail being removed and the chemical burn.  - Expect some drainage, crust  and redness. This is from the acid (phenol) that was applied and the chemical burn.  - After soaking, use a Q-tip to clean under and around the skin where the nail was removed. This helps get rid of the brownish material and helps the wound drain  - Sometimes it is hard to know if the redness and drainage is normal versus a developing infection. If in doubt, reach out to Dr. Cruz's office via My Chart or phone.   - If redness extends back to the middle of the toe, you should have it looked at in clinic.     After 2-3 days, if you are experiencing worsening pain and redness, or notice pus, please contact the clinic. Ask to speak with a triage nurse and they will inform our team of your symptoms and we can advise if a follow up is needed.

## 2024-05-28 NOTE — PROGRESS NOTES
"ASSESSMENT:  Encounter Diagnoses   Name Primary?    Ingrown toenail of right great toe, medial edge Yes    Great toe pain, right      MEDICAL DECISION MAKING:  Given her duration of discomfort related to this toe, I think a partial chemical matrixectomy is a very reasonable request.    I reviewed the procedure, postprocedural recommendations, the likely pain, crust and erythema near the phenol application site.  We provided our contact information and she is encouraged to reach out with any questions or concerns after the procedure.    Chemical Matrixectomy/ Permanent nail removal:    The chemical matrixectomy procedure was reviewed, including risks, benefits, and post-procedure cares.  The risk of discomfort and infection was discussed.  The chance of nail regrowth was discussed.  Verbal and written consent was obtained.  The site was marked and the \"Time Out\" called.      The base of the right great toe was injected with 2 cc of  2% Lidocaine plain.  The toe was then prepped with betadine solution.  A tourniquet was applied around the base of the toe to for hemostasis.   Next the toe was checked for adequate anesthesia.  The medial nail was freed from the nail bed and marginal soft tissue attachments with a small spatula. A longitudinal cut in the nail was made 2-3mm from the medial skin fold via a nail splitter.  It was completed under the eponychium with a [place] blade.)  The nail edge was firmly grasped with a hemostat and removed in total.      Using small applicator sticks, three 30 second applications of phenol to the nail matrix were performed.  The area was diluted with a copious amount of isopropyl alcohol.  It was blotted dry.    Next the tourniquet was removed. Bacitracin ointment was applied to the nail bed, followed by a compressive dressing.  Lena AD Morrow tolerated the procedure well. Post-procedure instruction handout was provided.      Disclaimer: This note consists of symbols derived from " keyboarding, dictation and/or voice recognition software. As a result, there may be errors in the script that have gone undetected. Please consider this when interpreting information found in this chart.    Jay Cruz DPM, FACFAS, MS    [place] [facility] of Podiatry/Foot & Ankle Surgery      ____________________________________________________________________    HPI:       I was asked by Dr. Katherine Zamora to evaluate this patient for an ingrown toenail.  Lena presents today with an ingrown toenail involving her right great toe.  She is dealt with this for years.  Burning and throbbing pain  Pain comes and goes  Aggravated by bumping/contact  Previous pedicures have helped.    She was treated in podiatry on 2023 and had a partial nail chemical matrixectomy on the left.  She has not had pain since and requests that the same procedure be done on the right.  *  Past Medical History:   Diagnosis Date    Bipolar 1 disorder (H)     Exertional asthma     PHIL (generalized anxiety disorder)     Hypothyroidism     Obesity (BMI 30-39.9)    *  *  Past Surgical History:   Procedure Laterality Date    CARPAL TUNNEL RELEASE RT/LT Right     + excision right dorsal carpal ganglion cyst & terminal branch, right posterior interosseous nerve.     SECTION      CYSTOSCOPY N/A 2017    Procedure: CYSTOSCOPY;;  Surgeon: Toni Da Silva MD;  Location:  OR    DAVINCI HYSTERECTOMY TOTAL, SALPINGECTOMY BILATERAL N/A 2017    Procedure: DAVINCI HYSTERECTOMY TOTAL, SALPINGECTOMY BILATERAL;  ROBOTIC ASSISTED LAPAROSCOPIC TOTAL HYSTERECTOMY; BILATERAL SALPINGECTOMY; CYSTOSCOPY;  Surgeon: Toni Da Silva MD;  Location:  OR    DILATION AND CURETTAGE N/A 2016    Procedure: DILATION AND CURETTAGE;  Surgeon: Josue Jama MD;  Location:  [facility]    EXTRACORPOREAL SHOCK WAVE LITHOTRIPSY (ESWL) Left 2021    Procedure: LEFT EXTRACORPOREAL SHOCK WAVE LITHOTRIPSY (ESWL);  Surgeon: Devonte Lambert  MD Soy;  Location:  OR    HYSTERECTOMY, PAP NO LONGER INDICATED      LAPAROSCOPIC CHOLECYSTECTOMY  04/03/2012    Procedure:LAPAROSCOPIC CHOLECYSTECTOMY; LAPAROSCOPIC CHOLECYSTECTOMY ; Surgeon:KARYNA CAMARA; Location: [facility]    LAPAROSCOPY DIAGNOSTIC (GYN) N/A 01/05/2016    Procedure: LAPAROSCOPY DIAGNOSTIC (GYN);  Surgeon: Josue Jama MD;  Location:  [facility]    LASER HOLMIUM LITHOTRIPSY URETER(S), INSERT STENT, COMBINED Left 07/04/2020    Procedure: Cystoscopy, left ureteroscopy with holmium laser lithotripsy and left stent placement;  Surgeon: Devonte Lambert MD;  Location:  OR    PERCUTANEOUS TENOTOMY Left 3/1/2024    Procedure: TENOTOMY, PERCUTANEOUS LEFT ROTATOR CUFF.;  Surgeon: Soy Barbour DO;  Location: [facility] [place]    TONSILLECTOMY & ADENOIDECTOMY      ULTRASONIC REMOVAL SOFT TISSUE (LOCATION) Left 3/1/2024    Procedure: EXCISION, SOFT TISSUE, USING ULTRASONIC ASPIRATOR SYSTEM;  Surgeon: Soy Barbour DO;  Location: [facility] [place]   *  *  Current Outpatient Medications   Medication Sig Dispense Refill    albuterol (PROVENTIL HFA) 108 (90 Base) MCG/ACT inhaler Inhale 2 puffs into the lungs every 6 hours 8.5 g 0    albuterol (PROVENTIL) (2.5 MG/3ML) 0.083% neb solution Take 1 vial (2.5 mg) by nebulization every 4 hours as needed for shortness of breath / dyspnea or wheezing 300 mL 0    buPROPion (WELLBUTRIN XL) 300 MG 24 hr tablet Take 300 mg by mouth every morning      Calcium Carbonate-Vit D-Min (CALCIUM 1200 PO) Take 1 capsule by mouth daily      cholecalciferol (VITAMIN D3) 25 mcg (1000 units) capsule Take 1 capsule by mouth daily      esomeprazole (NEXIUM) 40 MG DR capsule TAKE 1 CAPSULE BY MOUTH EVERY MORNING 30 TO 60 MINUTES BEFORE BREAKFAST 90 capsule 1    ketoconazole (NIZORAL) 2 % external shampoo APPLY TOPICALLY DAILY AS NEEDED FOR ITCHING OR IRRITATION 120 mL 2    lamoTRIgine (LAMICTAL) 200 MG tablet Take 200 mg by mouth 2 times daily      levothyroxine (SYNTHROID/LEVOTHROID) 75  MCG tablet Take 1 tablet (75 mcg) by mouth daily 90 tablet 0    lithium ER (LITHOBID) 300 MG CR tablet Take 1,200 mg by mouth At Bedtime   0    loratadine-pseudoePHEDrine (CLARITIN-D 12-HOUR) 5-120 MG 12 hr tablet Take 1 tablet by mouth 2 times daily       Multiple Vitamins-Minerals (WOMENS MULTIVITAMIN PO) Take 2 capsules by mouth daily      ondansetron (ZOFRAN ODT) 4 MG ODT tab Take 1 tablet (4 mg) by mouth every 6 hours as needed for nausea 10 tablet 1    oxyCODONE (ROXICODONE) 5 MG tablet TAKE 1 TABLET BY MOUTH EVERY DAY AS NEEDED FOR MODERATE TO SEVERE PAIN      potassium 99 MG TABS Take 99 mg by mouth daily       propranolol ER (INDERAL LA) 80 MG 24 hr capsule TAKE 1 CAPSULE(80 MG) BY MOUTH DAILY 90 capsule 2    QUEtiapine (SEROQUEL) 25 MG tablet Take 25 mg by mouth 2 times daily           EXAM:    Vitals: /80   LMP 12/10/2015   BMI: There is no height or weight on file to calculate BMI.    Vascular:  Pedal pulses are palpable for both the DP and PT arteries.  CFT < 3 sec.  No edema.      Neuro: Light touch sensation is intact to the L4, L5, S1 distributions  No evidence of weakness, spasticity, or contracture in the lower extremities.     Derm: Localized edema, light erythema and tenderness at the distal aspect of the right hallux medial skin fold.  No drainage.

## 2024-05-28 NOTE — LETTER
"    5/28/2024         RE: Lena Morrow  [address]  [facility] [address]        Dear Colleague,    Thank you for referring your patient, Lena Morrow, to the [facility]. Please see a copy of my visit note below.    ASSESSMENT:  Encounter Diagnoses   Name Primary?     Ingrown toenail of right great toe, medial edge Yes     Great toe pain, right      MEDICAL DECISION MAKING:  Given her duration of discomfort related to this toe, I think a partial chemical matrixectomy is a very reasonable request.    I reviewed the procedure, postprocedural recommendations, the likely pain, crust and erythema near the phenol application site.  We provided our contact information and she is encouraged to reach out with any questions or concerns after the procedure.    Chemical Matrixectomy/ Permanent nail removal:    The chemical matrixectomy procedure was reviewed, including risks, benefits, and post-procedure cares.  The risk of discomfort and infection was discussed.  The chance of nail regrowth was discussed.  Verbal and written consent was obtained.  The site was marked and the \"Time Out\" called.      The base of the right great toe was injected with 2 cc of  2% Lidocaine plain.  The toe was then prepped with betadine solution.  A tourniquet was applied around the base of the toe to for hemostasis.   Next the toe was checked for adequate anesthesia.  The medial nail was freed from the nail bed and marginal soft tissue attachments with a small spatula. A longitudinal cut in the nail was made 2-3mm from the medial skin fold via a nail splitter.  It was completed under the eponychium with a [place] blade.)  The nail edge was firmly grasped with a hemostat and removed in total.      Using small applicator sticks, three 30 second applications of phenol to the nail matrix were performed.  The area was diluted with a copious amount of isopropyl alcohol.  It was blotted dry.    Next " the tourniquet was removed. Bacitracin ointment was applied to the nail bed, followed by a compressive dressing.  Lena Morrow tolerated the procedure well. Post-procedure instruction handout was provided.      Disclaimer: This note consists of symbols derived from keyboarding, dictation and/or voice recognition software. As a result, there may be errors in the script that have gone undetected. Please consider this when interpreting information found in this chart.    Jay Cruz, CAREY, FACFAS, MS    [place] Department of Podiatry/Foot & Ankle Surgery      ____________________________________________________________________    HPI:       I was asked by Dr. Katherine Zamora to evaluate this patient for an ingrown toenail.  Lena presents today with an ingrown toenail involving her right great toe.  She is dealt with this for years.  Burning and throbbing pain  Pain comes and goes  Aggravated by bumping/contact  Previous pedicures have helped.    She was treated in podiatry on 2023 and had a partial nail chemical matrixectomy on the left.  She has not had pain since and requests that the same procedure be done on the right.  *  Past Medical History:   Diagnosis Date     Bipolar 1 disorder (H)      Exertional asthma      PHIL (generalized anxiety disorder)      Hypothyroidism      Obesity (BMI 30-39.9)    *  *  Past Surgical History:   Procedure Laterality Date     CARPAL TUNNEL RELEASE RT/LT Right     + excision right dorsal carpal ganglion cyst & terminal branch, right posterior interosseous nerve.      SECTION       CYSTOSCOPY N/A 2017    Procedure: CYSTOSCOPY;;  Surgeon: Toni Da Silva MD;  Location: [facility] OR     DAVINCI HYSTERECTOMY TOTAL, SALPINGECTOMY BILATERAL N/A 2017    Procedure: DAVINCI HYSTERECTOMY TOTAL, SALPINGECTOMY BILATERAL;  ROBOTIC ASSISTED LAPAROSCOPIC TOTAL HYSTERECTOMY; BILATERAL SALPINGECTOMY; CYSTOSCOPY;  Surgeon: Toni Da Silva MD;  Location:  OR     DILATION AND  CURETTAGE N/A 01/05/2016    Procedure: DILATION AND CURETTAGE;  Surgeon: Josue Jama MD;  Location:  [facility]     EXTRACORPOREAL SHOCK WAVE LITHOTRIPSY (ESWL) Left 08/05/2021    Procedure: LEFT EXTRACORPOREAL SHOCK WAVE LITHOTRIPSY (ESWL);  Surgeon: Devonte Lambert MD;  Location:  OR     HYSTERECTOMY, PAP NO LONGER INDICATED       LAPAROSCOPIC CHOLECYSTECTOMY  04/03/2012    Procedure:LAPAROSCOPIC CHOLECYSTECTOMY; LAPAROSCOPIC CHOLECYSTECTOMY ; Surgeon:KARYNA CAMARA; Location: [facility]     LAPAROSCOPY DIAGNOSTIC (GYN) N/A 01/05/2016    Procedure: LAPAROSCOPY DIAGNOSTIC (GYN);  Surgeon: Josue Jama MD;  Location:  [facility]     LASER HOLMIUM LITHOTRIPSY URETER(S), INSERT STENT, COMBINED Left 07/04/2020    Procedure: Cystoscopy, left ureteroscopy with holmium laser lithotripsy and left stent placement;  Surgeon: Devonte Lambert MD;  Location:  OR     PERCUTANEOUS TENOTOMY Left 3/1/2024    Procedure: TENOTOMY, PERCUTANEOUS LEFT ROTATOR CUFF.;  Surgeon: Soy Barbour DO;  Location: [facility] OR     TONSILLECTOMY & ADENOIDECTOMY       ULTRASONIC REMOVAL SOFT TISSUE (LOCATION) Left 3/1/2024    Procedure: EXCISION, SOFT TISSUE, USING ULTRASONIC ASPIRATOR SYSTEM;  Surgeon: Soy Barbour DO;  Location: [facility] OR   *  *  Current Outpatient Medications   Medication Sig Dispense Refill     albuterol (PROVENTIL HFA) 108 (90 Base) MCG/ACT inhaler Inhale 2 puffs into the lungs every 6 hours 8.5 g 0     albuterol (PROVENTIL) (2.5 MG/3ML) 0.083% neb solution Take 1 vial (2.5 mg) by nebulization every 4 hours as needed for shortness of breath / dyspnea or wheezing 300 mL 0     buPROPion (WELLBUTRIN XL) 300 MG 24 hr tablet Take 300 mg by mouth every morning       Calcium Carbonate-Vit D-Min (CALCIUM 1200 PO) Take 1 capsule by mouth daily       cholecalciferol (VITAMIN D3) 25 mcg (1000 units) capsule Take 1 capsule by mouth daily       esomeprazole (NEXIUM) 40 MG DR capsule TAKE 1 CAPSULE BY MOUTH EVERY  MORNING 30 TO 60 MINUTES BEFORE BREAKFAST 90 capsule 1     ketoconazole (NIZORAL) 2 % external shampoo APPLY TOPICALLY DAILY AS NEEDED FOR ITCHING OR IRRITATION 120 mL 2     lamoTRIgine (LAMICTAL) 200 MG tablet Take 200 mg by mouth 2 times daily       levothyroxine (SYNTHROID/LEVOTHROID) 75 MCG tablet Take 1 tablet (75 mcg) by mouth daily 90 tablet 0     lithium ER (LITHOBID) 300 MG CR tablet Take 1,200 mg by mouth At Bedtime   0     loratadine-pseudoePHEDrine (CLARITIN-D 12-HOUR) 5-120 MG 12 hr tablet Take 1 tablet by mouth 2 times daily        Multiple Vitamins-Minerals (WOMENS MULTIVITAMIN PO) Take 2 capsules by mouth daily       ondansetron (ZOFRAN ODT) 4 MG ODT tab Take 1 tablet (4 mg) by mouth every 6 hours as needed for nausea 10 tablet 1     oxyCODONE (ROXICODONE) 5 MG tablet TAKE 1 TABLET BY MOUTH EVERY DAY AS NEEDED FOR MODERATE TO SEVERE PAIN       potassium 99 MG TABS Take 99 mg by mouth daily        propranolol ER (INDERAL LA) 80 MG 24 hr capsule TAKE 1 CAPSULE(80 MG) BY MOUTH DAILY 90 capsule 2     QUEtiapine (SEROQUEL) 25 MG tablet Take 25 mg by mouth 2 times daily           EXAM:    Vitals: /80   LMP 12/10/2015   BMI: There is no height or weight on file to calculate BMI.    Vascular:  Pedal pulses are palpable for both the DP and PT arteries.  CFT < 3 sec.  No edema.      Neuro: Light touch sensation is intact to the L4, L5, S1 distributions  No evidence of weakness, spasticity, or contracture in the lower extremities.     Derm: Localized edema, light erythema and tenderness at the distal aspect of the right hallux medial skin fold.  No drainage.        Again, thank you for allowing me to participate in the care of your patient.        Sincerely,        Jay Cruz DPM

## 2024-06-11 DIAGNOSIS — R10.13 EPIGASTRIC PAIN: ICD-10-CM

## 2024-06-11 RX ORDER — ESOMEPRAZOLE MAGNESIUM 40 MG/1
CAPSULE, DELAYED RELEASE ORAL
Qty: 90 CAPSULE | Refills: 1 | Status: SHIPPED | OUTPATIENT
Start: 2024-06-11 | End: 2024-09-30

## 2024-06-23 NOTE — TELEPHONE ENCOUNTER
Reason for Call:  Appointment Request    Patient requesting this type of appt: Chronic Diease Management/Medication/Follow-Up    Requested provider: Katherine Zamora    Reason patient unable to be scheduled: Not within requested timeframe    When does patient want to be seen/preferred time: 1-2 days    Comments: needs to be seen the soonest available appointment this month for workman's comp injury on left shoulder and neck.    Could we send this information to you in [employer] or would you prefer to receive a phone call?:   Patient would prefer a phone call   Okay to leave a detailed message?: Yes at Cell number on file:    Telephone Information:   Mobile [phone]       Call taken on 3/10/2023 at 3:13 PM by Lauren Dior    
Responded to patient via [employer] message.  
Course.  Radiology: ordered and independent interpretation performed. Decision-making details documented in ED Course.    Risk  OTC drugs.  Prescription drug management.  Decision regarding hospitalization.      Clinical Impression     1. Nausea and vomiting, unspecified vomiting type    2. Pyelonephritis        Disposition     PATIENT REFERRED TO:  No follow-up provider specified.    DISCHARGE MEDICATIONS:  New Prescriptions    ONDANSETRON (ZOFRAN) 4 MG TABLET    Take 1 tablet by mouth every 8 hours as needed for Nausea or Vomiting    POTASSIUM CHLORIDE (KLOR-CON M) 20 MEQ EXTENDED RELEASE TABLET    Take 1 tablet by mouth daily for 7 days       DISPOSITION Decision To Discharge 06/23/2024 08:18:05 PM        Diagnostic Results and Other Data       RADIOLOGY:  CT ABDOMEN PELVIS WO CONTRAST Additional Contrast? None   Final Result      No acute pathology.         Electronically signed by Santa Bennett          LABS:   Results for orders placed or performed during the hospital encounter of 06/23/24   CBC with Auto Differential   Result Value Ref Range    WBC 9.4 4.0 - 11.0 K/uL    RBC 3.88 (L) 4.00 - 5.20 M/uL    Hemoglobin 10.3 (L) 12.0 - 16.0 g/dL    Hematocrit 31.2 (L) 36.0 - 48.0 %    MCV 80.4 80.0 - 100.0 fL    MCH 26.4 26.0 - 34.0 pg    MCHC 32.9 31.0 - 36.0 g/dL    RDW 17.0 (H) 12.4 - 15.4 %    Platelets 223 135 - 450 K/uL    MPV 9.1 5.0 - 10.5 fL    Neutrophils % 80.1 %    Lymphocytes % 11.6 %    Monocytes % 8.1 %    Eosinophils % 0.1 %    Basophils % 0.1 %    Neutrophils Absolute 7.5 1.7 - 7.7 K/uL    Lymphocytes Absolute 1.1 1.0 - 5.1 K/uL    Monocytes Absolute 0.8 0.0 - 1.3 K/uL    Eosinophils Absolute 0.0 0.0 - 0.6 K/uL    Basophils Absolute 0.0 0.0 - 0.2 K/uL   Comprehensive Metabolic Panel w/ Reflex to MG   Result Value Ref Range    Sodium 137 136 - 145 mmol/L    Potassium reflex Magnesium 3.0 (L) 3.5 - 5.1 mmol/L    Chloride 102 99 - 110 mmol/L    CO2 23 21 - 32 mmol/L    Anion Gap 12 3 - 16

## 2024-07-17 ENCOUNTER — TRANSFERRED RECORDS (OUTPATIENT)
Dept: HEALTH INFORMATION MANAGEMENT | Facility: CLINIC | Age: 49
End: 2024-07-17
Payer: COMMERCIAL

## 2024-07-18 DIAGNOSIS — F41.1 GENERALIZED ANXIETY DISORDER: ICD-10-CM

## 2024-07-18 RX ORDER — PROPRANOLOL HYDROCHLORIDE 80 MG/1
80 CAPSULE, EXTENDED RELEASE ORAL DAILY
Qty: 90 CAPSULE | Refills: 0 | Status: SHIPPED | OUTPATIENT
Start: 2024-07-18 | End: 2024-10-04

## 2024-08-12 DIAGNOSIS — N20.0 NEPHROLITHIASIS: Primary | ICD-10-CM

## 2024-08-13 ENCOUNTER — ANCILLARY PROCEDURE (OUTPATIENT)
Dept: MAMMOGRAPHY | Facility: CLINIC | Age: 49
End: 2024-08-13
Attending: INTERNAL MEDICINE
Payer: COMMERCIAL

## 2024-08-13 DIAGNOSIS — Z12.31 VISIT FOR SCREENING MAMMOGRAM: ICD-10-CM

## 2024-08-13 PROCEDURE — 77063 BREAST TOMOSYNTHESIS BI: CPT | Mod: TC | Performed by: STUDENT IN AN ORGANIZED HEALTH CARE EDUCATION/TRAINING PROGRAM

## 2024-08-13 PROCEDURE — 77067 SCR MAMMO BI INCL CAD: CPT | Mod: TC | Performed by: STUDENT IN AN ORGANIZED HEALTH CARE EDUCATION/TRAINING PROGRAM

## 2024-08-14 ENCOUNTER — TRANSFERRED RECORDS (OUTPATIENT)
Dept: HEALTH INFORMATION MANAGEMENT | Facility: CLINIC | Age: 49
End: 2024-08-14
Payer: COMMERCIAL

## 2024-08-16 ENCOUNTER — TRANSFERRED RECORDS (OUTPATIENT)
Dept: HEALTH INFORMATION MANAGEMENT | Facility: CLINIC | Age: 49
End: 2024-08-16
Payer: COMMERCIAL

## 2024-09-19 ENCOUNTER — TRANSFERRED RECORDS (OUTPATIENT)
Dept: HEALTH INFORMATION MANAGEMENT | Facility: CLINIC | Age: 49
End: 2024-09-19
Payer: COMMERCIAL

## 2024-09-23 ENCOUNTER — MYC REFILL (OUTPATIENT)
Dept: INTERNAL MEDICINE | Facility: CLINIC | Age: 49
End: 2024-09-23
Payer: COMMERCIAL

## 2024-09-23 DIAGNOSIS — R10.13 EPIGASTRIC PAIN: ICD-10-CM

## 2024-09-24 RX ORDER — ESOMEPRAZOLE MAGNESIUM 40 MG/1
CAPSULE, DELAYED RELEASE ORAL
Qty: 90 CAPSULE | Refills: 1 | OUTPATIENT
Start: 2024-09-24

## 2024-09-27 ENCOUNTER — MYC MEDICAL ADVICE (OUTPATIENT)
Dept: INTERNAL MEDICINE | Facility: CLINIC | Age: 49
End: 2024-09-27
Payer: COMMERCIAL

## 2024-09-27 DIAGNOSIS — R10.13 EPIGASTRIC PAIN: ICD-10-CM

## 2024-09-27 NOTE — TELEPHONE ENCOUNTER
Dr. Zamora, pt is out of esomeprazole at this time, please provide short refill if appropriate.    Routing back to team to schedule pt.     Jessica Smith RN

## 2024-09-30 RX ORDER — ESOMEPRAZOLE MAGNESIUM 40 MG/1
40 CAPSULE, DELAYED RELEASE ORAL
Qty: 90 CAPSULE | Refills: 1 | Status: SHIPPED | OUTPATIENT
Start: 2024-09-30

## 2024-10-01 SDOH — HEALTH STABILITY: PHYSICAL HEALTH: ON AVERAGE, HOW MANY DAYS PER WEEK DO YOU ENGAGE IN MODERATE TO STRENUOUS EXERCISE (LIKE A BRISK WALK)?: 5 DAYS

## 2024-10-01 SDOH — HEALTH STABILITY: PHYSICAL HEALTH: ON AVERAGE, HOW MANY MINUTES DO YOU ENGAGE IN EXERCISE AT THIS LEVEL?: 30 MIN

## 2024-10-01 ASSESSMENT — ASTHMA QUESTIONNAIRES
ACT_TOTALSCORE: 19
QUESTION_1 LAST FOUR WEEKS HOW MUCH OF THE TIME DID YOUR ASTHMA KEEP YOU FROM GETTING AS MUCH DONE AT WORK, SCHOOL OR AT HOME: NONE OF THE TIME
QUESTION_4 LAST FOUR WEEKS HOW OFTEN HAVE YOU USED YOUR RESCUE INHALER OR NEBULIZER MEDICATION (SUCH AS ALBUTEROL): TWO OR THREE TIMES PER WEEK
QUESTION_3 LAST FOUR WEEKS HOW OFTEN DID YOUR ASTHMA SYMPTOMS (WHEEZING, COUGHING, SHORTNESS OF BREATH, CHEST TIGHTNESS OR PAIN) WAKE YOU UP AT NIGHT OR EARLIER THAN USUAL IN THE MORNING: NOT AT ALL
QUESTION_5 LAST FOUR WEEKS HOW WOULD YOU RATE YOUR ASTHMA CONTROL: COMPLETELY CONTROLLED
QUESTION_2 LAST FOUR WEEKS HOW OFTEN HAVE YOU HAD SHORTNESS OF BREATH: MORE THAN ONCE A DAY
ACT_TOTALSCORE: 19

## 2024-10-01 ASSESSMENT — SOCIAL DETERMINANTS OF HEALTH (SDOH): HOW OFTEN DO YOU GET TOGETHER WITH FRIENDS OR RELATIVES?: NEVER

## 2024-10-03 PROBLEM — M75.32 CALCIFIC TENDINITIS OF LEFT SHOULDER: Status: RESOLVED | Noted: 2023-11-02 | Resolved: 2024-10-03

## 2024-10-04 ENCOUNTER — OFFICE VISIT (OUTPATIENT)
Dept: INTERNAL MEDICINE | Facility: CLINIC | Age: 49
End: 2024-10-04
Payer: COMMERCIAL

## 2024-10-04 VITALS
SYSTOLIC BLOOD PRESSURE: 120 MMHG | WEIGHT: 213 LBS | HEART RATE: 63 BPM | BODY MASS INDEX: 36.37 KG/M2 | RESPIRATION RATE: 21 BRPM | HEIGHT: 64 IN | OXYGEN SATURATION: 97 % | DIASTOLIC BLOOD PRESSURE: 70 MMHG | TEMPERATURE: 96.3 F

## 2024-10-04 DIAGNOSIS — Z13.1 SCREENING FOR DIABETES MELLITUS: ICD-10-CM

## 2024-10-04 DIAGNOSIS — Z12.11 SPECIAL SCREENING FOR MALIGNANT NEOPLASMS, COLON: ICD-10-CM

## 2024-10-04 DIAGNOSIS — F31.9 BIPOLAR 1 DISORDER (H): ICD-10-CM

## 2024-10-04 DIAGNOSIS — J45.990 EXERTIONAL ASTHMA: ICD-10-CM

## 2024-10-04 DIAGNOSIS — Z00.00 ROUTINE HISTORY AND PHYSICAL EXAMINATION OF ADULT: Primary | ICD-10-CM

## 2024-10-04 DIAGNOSIS — E03.4 HYPOTHYROIDISM DUE TO ACQUIRED ATROPHY OF THYROID: ICD-10-CM

## 2024-10-04 DIAGNOSIS — Z13.220 SCREENING FOR CHOLESTEROL LEVEL: ICD-10-CM

## 2024-10-04 DIAGNOSIS — F41.1 GAD (GENERALIZED ANXIETY DISORDER): ICD-10-CM

## 2024-10-04 LAB
ALBUMIN SERPL BCG-MCNC: 4.3 G/DL (ref 3.5–5.2)
ALP SERPL-CCNC: 136 U/L (ref 40–150)
ALT SERPL W P-5'-P-CCNC: 183 U/L (ref 0–50)
ANION GAP SERPL CALCULATED.3IONS-SCNC: 9 MMOL/L (ref 7–15)
AST SERPL W P-5'-P-CCNC: 247 U/L (ref 0–45)
BILIRUB SERPL-MCNC: 0.6 MG/DL
BUN SERPL-MCNC: 11.7 MG/DL (ref 6–20)
CALCIUM SERPL-MCNC: 9.6 MG/DL (ref 8.8–10.4)
CHLORIDE SERPL-SCNC: 107 MMOL/L (ref 98–107)
CREAT SERPL-MCNC: 1.1 MG/DL (ref 0.51–0.95)
EGFRCR SERPLBLD CKD-EPI 2021: 61 ML/MIN/1.73M2
FASTING STATUS PATIENT QL REPORTED: YES
GLUCOSE SERPL-MCNC: 93 MG/DL (ref 70–99)
HCO3 SERPL-SCNC: 24 MMOL/L (ref 22–29)
POTASSIUM SERPL-SCNC: 4.9 MMOL/L (ref 3.4–5.3)
PROT SERPL-MCNC: 7 G/DL (ref 6.4–8.3)
SODIUM SERPL-SCNC: 140 MMOL/L (ref 135–145)
TSH SERPL DL<=0.005 MIU/L-ACNC: 2.96 UIU/ML (ref 0.3–4.2)

## 2024-10-04 PROCEDURE — 36415 COLL VENOUS BLD VENIPUNCTURE: CPT | Performed by: INTERNAL MEDICINE

## 2024-10-04 PROCEDURE — 84443 ASSAY THYROID STIM HORMONE: CPT | Performed by: INTERNAL MEDICINE

## 2024-10-04 PROCEDURE — 80053 COMPREHEN METABOLIC PANEL: CPT | Performed by: INTERNAL MEDICINE

## 2024-10-04 PROCEDURE — 99396 PREV VISIT EST AGE 40-64: CPT | Performed by: INTERNAL MEDICINE

## 2024-10-04 PROCEDURE — 80061 LIPID PANEL: CPT | Performed by: INTERNAL MEDICINE

## 2024-10-04 RX ORDER — PROPRANOLOL HYDROCHLORIDE 80 MG/1
80 CAPSULE, EXTENDED RELEASE ORAL DAILY
Qty: 90 CAPSULE | Refills: 0 | Status: SHIPPED | OUTPATIENT
Start: 2024-10-04

## 2024-10-04 RX ORDER — LEVOTHYROXINE SODIUM 75 UG/1
75 TABLET ORAL DAILY
Qty: 90 TABLET | Refills: 0 | Status: SHIPPED | OUTPATIENT
Start: 2024-10-04

## 2024-10-04 NOTE — PROGRESS NOTES
ASSESSMENT/PLAN                                                       (Z00.00) Routine history and physical examination of adult  (primary encounter diagnosis)  Comment: PMH, PSH, FH, SH, medications, allergies, immunizations, and preventative health measures reviewed and updated as appropriate.  Plan: see below for plans.      (F31.9) Bipolar 1 disorder (H)  (F41.1) PHIL (generalized anxiety disorder)  Comment: well-controlled on Wellbutrin XL, Lamictal, propranolol, and lithium; followed by outside psychiatrist.    (J45.990) Exertional asthma  Comment: well-controlled with albuterol as needed.    (E03.4) Hypothyroidism due to acquired atrophy of thyroid  (Z13.220) Screening for cholesterol level  (Z13.1) Screening for diabetes mellitus  Plan: fasting labs today; recommendations to follow.    (Z12.11) Special screening for malignant neoplasms, colon  Plan: FIT test ordered - patient to pick-up, complete, and mail in when able.     Katherine Zamora MD   [address] [address]  T: [phone], F: [phone]    SUBJECTIVE                                                      Lena Morrow is a very pleasant 49 year old female who presents for a physical.    ROS:  Constitutional: no unintentional weight loss or gain reported; no fevers, chills, or sweats reported  Cardiovascular: no chest pain, palpitations, or edema reported  Respiratory: no cough, wheezing, shortness of breath, or dyspnea on exertion reported  Gastrointestinal: no nausea, vomiting, constipation, diarrhea, or abdominal pain reported  Genitourinary: no urinary frequency, urgency, dysuria, or hematuria reported  Integumentary: no rash or pruritus reported  Musculoskeletal: no back pain, muscle pain, joint pain, or joint swelling reported  Neurologic: no focal weakness, numbness, or tingling reported  Hematologic: no easy bruising or bleeding reported  Endocrine: no heat or cold intolerance reported; no  polyuria or polydipsia reported  Psychiatric: see PMH below    Past Medical History:   Diagnosis Date    Bipolar 1 disorder (H)     Exertional asthma     PHIL (generalized anxiety disorder)     Hypothyroidism     Obesity (BMI 30-39.9)      Past Surgical History:   Procedure Laterality Date    CARPAL TUNNEL RELEASE RT/LT Right     + excision right dorsal carpal ganglion cyst & terminal branch, right posterior interosseous nerve.     SECTION      CYSTOSCOPY N/A 2017    Procedure: CYSTOSCOPY;;  Surgeon: Toni Da Silva MD;  Location:  OR    DAVINCI HYSTERECTOMY TOTAL, SALPINGECTOMY BILATERAL N/A 2017    Procedure: DAVINCI HYSTERECTOMY TOTAL, SALPINGECTOMY BILATERAL;  ROBOTIC ASSISTED LAPAROSCOPIC TOTAL HYSTERECTOMY; BILATERAL SALPINGECTOMY; CYSTOSCOPY;  Surgeon: Toni Da Silva MD;  Location:  OR    DILATION AND CURETTAGE N/A 2016    Procedure: DILATION AND CURETTAGE;  Surgeon: Josue Jama MD;  Location:  SD    EXTRACORPOREAL SHOCK WAVE LITHOTRIPSY (ESWL) Left 2021    Procedure: LEFT EXTRACORPOREAL SHOCK WAVE LITHOTRIPSY (ESWL);  Surgeon: Devonte Lambert MD;  Location:  OR    HYSTERECTOMY, PAP NO LONGER INDICATED      LAPAROSCOPIC CHOLECYSTECTOMY  2012    Procedure:LAPAROSCOPIC CHOLECYSTECTOMY; LAPAROSCOPIC CHOLECYSTECTOMY ; Surgeon:KARYNA CAMARA; Location:[facility]    LAPAROSCOPY DIAGNOSTIC (GYN) N/A 2016    Procedure: LAPAROSCOPY DIAGNOSTIC (GYN);  Surgeon: Josue Jama MD;  Location: [facility]    LASER HOLMIUM LITHOTRIPSY URETER(S), INSERT STENT, COMBINED Left 2020    Procedure: Cystoscopy, left ureteroscopy with holmium laser lithotripsy and left stent placement;  Surgeon: Devonte Lambert MD;  Location:  OR    PERCUTANEOUS TENOTOMY Left 3/1/2024    Procedure: TENOTOMY, PERCUTANEOUS LEFT ROTATOR CUFF.;  Surgeon: Soy Barbour DO;  Location: [facility] OR    TONSILLECTOMY & ADENOIDECTOMY      ULTRASONIC REMOVAL SOFT TISSUE  (LOCATION) Left 3/1/2024    Procedure: EXCISION, SOFT TISSUE, USING ULTRASONIC ASPIRATOR SYSTEM;  Surgeon: Soy Barbour DO;  Location: [facility] OR     Family History   Problem Relation Age of Onset    Alcoholism Mother     Diabetes Type 2  Mother     Hypertension Mother     Depression Mother     Lung Cancer Father         smoker    Brain Tumor Brother         GBM    Diabetes Type 2  Paternal Grandmother     Myocardial Infarction Paternal Grandmother     Heart Failure Paternal Grandmother     Cancer Paternal Grandfather         unknown    Brain Cancer Paternal Aunt     Cerebrovascular Disease No family hx of     Breast Cancer No family hx of     Colon Cancer No family hx of     Ovarian Cancer No family hx of      Social History     Occupational History    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    Son (17 as of 2024).    Walks when weather permits.      Allergies   Allergen Reactions    Amoxicillin-Pot Clavulanate Rash    Aripiprazole Other (See Comments)     increased sadness and moodiness    Azithromycin GI Disturbance    Escitalopram Oxalate Diarrhea    Prochlorperazine Other (See Comments)     agitation    Sertraline Other (See Comments)     sweats    Venlafaxine Other (See Comments)     sweats     Current Outpatient Medications   Medication Sig    albuterol (PROVENTIL HFA) 108 (90 Base) MCG/ACT inhaler Inhale 2 puffs into the lungs every 6 hours    albuterol (PROVENTIL) (2.5 MG/3ML) 0.083% neb solution Take 1 vial (2.5 mg) by nebulization every 4 hours as needed for shortness of breath / dyspnea or wheezing    buPROPion (WELLBUTRIN XL) 300 MG 24 hr tablet Take 300 mg by mouth every  morning    cholecalciferol (VITAMIN D3) 25 mcg (1000 units) capsule Take 1 capsule by mouth daily    esomeprazole (NEXIUM) 40 MG DR capsule Take 1 capsule (40 mg) by mouth every morning (before breakfast). Take 30-60 minutes before eating.    ketoconazole (NIZORAL) 2 % external shampoo APPLY TOPICALLY DAILY AS NEEDED FOR ITCHING OR IRRITATION    lamoTRIgine (LAMICTAL) 200 MG tablet Take 200 mg by mouth 2 times daily    levothyroxine (SYNTHROID/LEVOTHROID) 75 MCG tablet Take 1 tablet (75 mcg) by mouth daily.    lithium ER (LITHOBID) 300 MG CR tablet Take 1,200 mg by mouth At Bedtime     loratadine-pseudoePHEDrine (CLARITIN-D 12-HOUR) 5-120 MG 12 hr tablet Take 1 tablet by mouth 2 times daily     Multiple Vitamins-Minerals (WOMENS MULTIVITAMIN PO) Take 2 capsules by mouth daily    ondansetron (ZOFRAN ODT) 4 MG ODT tab Take 1 tablet (4 mg) by mouth every 6 hours as needed for nausea    oxyCODONE (ROXICODONE) 5 MG tablet TAKE 1 TABLET BY MOUTH EVERY DAY AS NEEDED FOR MODERATE TO SEVERE PAIN    potassium 99 MG TABS Take 99 mg by mouth daily     propranolol ER (INDERAL LA) 80 MG 24 hr capsule Take 1 capsule (80 mg) by mouth daily.     Immunization History   Administered Date(s) Administered    COVID-19 MONOVALENT 12+ (Pfizer) 04/25/2021, 05/16/2021, 11/19/2021    DT (PEDS <7y) 06/12/1995    Influenza (IIV3) PF 01/15/2013, 10/01/2013, 10/15/2014    Influenza Vaccine >6 months,quad, PF 12/31/2016, 11/20/2017, 12/08/2018, 10/21/2019, 09/18/2020, 11/19/2021, 12/10/2022    TD,PF 7+ (Tenivac) 06/12/1995, 09/13/2005, 08/01/2023    TDAP Vaccine (Adacel) 04/29/2013     PREVENTATIVE HEALTH                                                      BMI: obese  Blood pressure: within normal limits   Breast CA screening: up to date   Cervical CA screening: n/a   Colon CA screening: DUE  Lung CA screening: n/a   Dexa: not medically indicated at this time   Screening cholesterol: DUE  Screening diabetes: DUE  STD testing: no risk factors  "present  Alcohol misuse screening: alcohol use reviewed - no intervention indicated at this time  Immunizations: reviewed;  flu shot DUE and Prevnar 20 and COVID-19 booster DUE - previously declined (not discussed at today's visit)    OBJECTIVE                                                      /70 (BP Location: Left arm, Patient Position: Sitting, Cuff Size: Adult Regular)   Pulse 63   Temp (!) 96.3  F (35.7  C) (Temporal)   Resp 21   Ht 1.626 m (5' 4\")   Wt 96.6 kg (213 lb)   LMP 12/10/2015   SpO2 97%   BMI 36.56 kg/m    Constitutional: well-appearing  Head, Ears, and Eyes: normocephalic; normal external auditory canal and pinna; tympanic membranes visualized and normal; normal lids and conjunctivae  Neck: supple, symmetric, no thyromegaly or lymphadenopathy  Respiratory: normal respiratory effort; clear to auscultation bilaterally  Cardiovascular: regular rate and rhythm; no edema  Gastrointestinal: soft, non-tender, and non-distended; no organomegaly or masses  Musculoskeletal: normal gait and station  Psych: normal judgment and insight; normal mood and affect; recent and remote memory intact    ---  (Note was completed, in part, with [employer] voice-recognition software. Documentation was reviewed, but some grammatical, spelling, and word errors may remain.)    "

## 2024-10-05 LAB
CHOLEST SERPL-MCNC: 192 MG/DL
FASTING STATUS PATIENT QL REPORTED: YES
HDLC SERPL-MCNC: 45 MG/DL
LDLC SERPL CALC-MCNC: 118 MG/DL
NONHDLC SERPL-MCNC: 147 MG/DL
TRIGL SERPL-MCNC: 145 MG/DL

## 2024-10-06 DIAGNOSIS — R79.89 ELEVATED LIVER FUNCTION TESTS: Primary | ICD-10-CM

## 2024-10-11 ENCOUNTER — TRANSFERRED RECORDS (OUTPATIENT)
Dept: HEALTH INFORMATION MANAGEMENT | Facility: CLINIC | Age: 49
End: 2024-10-11
Payer: COMMERCIAL

## 2024-10-11 DIAGNOSIS — F41.1 GAD (GENERALIZED ANXIETY DISORDER): ICD-10-CM

## 2024-10-11 RX ORDER — PROPRANOLOL HYDROCHLORIDE 80 MG/1
80 CAPSULE, EXTENDED RELEASE ORAL DAILY
Qty: 90 CAPSULE | Refills: 0 | OUTPATIENT
Start: 2024-10-11

## 2024-10-15 DIAGNOSIS — F31.60 MIXED BIPOLAR I DISORDER (H): ICD-10-CM

## 2024-10-15 DIAGNOSIS — Z79.899 ENCOUNTER FOR LONG-TERM (CURRENT) USE OF MEDICATIONS: Primary | ICD-10-CM

## 2024-10-15 DIAGNOSIS — F41.9 ANXIETY DISORDER: ICD-10-CM

## 2024-10-17 ENCOUNTER — MYC MEDICAL ADVICE (OUTPATIENT)
Dept: ORTHOPEDICS | Facility: CLINIC | Age: 49
End: 2024-10-17
Payer: COMMERCIAL

## 2024-10-17 DIAGNOSIS — G89.29 CHRONIC LEFT SHOULDER PAIN: ICD-10-CM

## 2024-10-17 DIAGNOSIS — M25.512 CHRONIC LEFT SHOULDER PAIN: ICD-10-CM

## 2024-10-17 DIAGNOSIS — M75.32 CALCIFIC TENDINITIS OF LEFT SHOULDER: Primary | ICD-10-CM

## 2024-10-21 ENCOUNTER — PATIENT OUTREACH (OUTPATIENT)
Dept: CARE COORDINATION | Facility: CLINIC | Age: 49
End: 2024-10-21
Payer: COMMERCIAL

## 2024-10-23 ENCOUNTER — PATIENT OUTREACH (OUTPATIENT)
Dept: CARE COORDINATION | Facility: CLINIC | Age: 49
End: 2024-10-23
Payer: COMMERCIAL

## 2024-10-25 ENCOUNTER — LAB (OUTPATIENT)
Dept: LAB | Facility: CLINIC | Age: 49
End: 2024-10-25
Payer: COMMERCIAL

## 2024-10-25 DIAGNOSIS — R79.89 ELEVATED LIVER FUNCTION TESTS: ICD-10-CM

## 2024-10-25 DIAGNOSIS — F41.9 ANXIETY DISORDER: ICD-10-CM

## 2024-10-25 DIAGNOSIS — F31.60 MIXED BIPOLAR I DISORDER (H): ICD-10-CM

## 2024-10-25 DIAGNOSIS — Z79.899 ENCOUNTER FOR LONG-TERM (CURRENT) USE OF MEDICATIONS: ICD-10-CM

## 2024-10-25 LAB
ALBUMIN SERPL BCG-MCNC: 4.4 G/DL (ref 3.5–5.2)
ALP SERPL-CCNC: 94 U/L (ref 40–150)
ALT SERPL W P-5'-P-CCNC: 28 U/L (ref 0–50)
ANION GAP SERPL CALCULATED.3IONS-SCNC: 10 MMOL/L (ref 7–15)
AST SERPL W P-5'-P-CCNC: 23 U/L (ref 0–45)
BILIRUB DIRECT SERPL-MCNC: <0.2 MG/DL (ref 0–0.3)
BILIRUB SERPL-MCNC: 0.2 MG/DL
BUN SERPL-MCNC: 17.4 MG/DL (ref 6–20)
CALCIUM SERPL-MCNC: 10.4 MG/DL (ref 8.8–10.4)
CHLORIDE SERPL-SCNC: 105 MMOL/L (ref 98–107)
CHOLEST SERPL-MCNC: 193 MG/DL
CREAT SERPL-MCNC: 1.13 MG/DL (ref 0.51–0.95)
EGFRCR SERPLBLD CKD-EPI 2021: 59 ML/MIN/1.73M2
EST. AVERAGE GLUCOSE BLD GHB EST-MCNC: 100 MG/DL
FASTING STATUS PATIENT QL REPORTED: NO
FASTING STATUS PATIENT QL REPORTED: NO
GLUCOSE SERPL-MCNC: 94 MG/DL (ref 70–99)
HBA1C MFR BLD: 5.1 % (ref 0–5.6)
HCO3 SERPL-SCNC: 24 MMOL/L (ref 22–29)
HDLC SERPL-MCNC: 38 MG/DL
LDLC SERPL CALC-MCNC: 105 MG/DL
LITHIUM SERPL-SCNC: 0.84 MMOL/L (ref 0.6–1.2)
NONHDLC SERPL-MCNC: 155 MG/DL
POTASSIUM SERPL-SCNC: 4.4 MMOL/L (ref 3.4–5.3)
PROT SERPL-MCNC: 7.1 G/DL (ref 6.4–8.3)
SODIUM SERPL-SCNC: 139 MMOL/L (ref 135–145)
T4 FREE SERPL-MCNC: 1.3 NG/DL (ref 0.9–1.7)
TRIGL SERPL-MCNC: 248 MG/DL
TSH SERPL DL<=0.005 MIU/L-ACNC: 2.28 UIU/ML (ref 0.3–4.2)

## 2024-10-25 PROCEDURE — 80061 LIPID PANEL: CPT

## 2024-10-25 PROCEDURE — 83036 HEMOGLOBIN GLYCOSYLATED A1C: CPT

## 2024-10-25 PROCEDURE — 36415 COLL VENOUS BLD VENIPUNCTURE: CPT

## 2024-10-25 PROCEDURE — 84443 ASSAY THYROID STIM HORMONE: CPT

## 2024-10-25 PROCEDURE — 82248 BILIRUBIN DIRECT: CPT

## 2024-10-25 PROCEDURE — 80053 COMPREHEN METABOLIC PANEL: CPT

## 2024-10-25 PROCEDURE — 80178 ASSAY OF LITHIUM: CPT

## 2024-10-25 PROCEDURE — 84439 ASSAY OF FREE THYROXINE: CPT

## 2024-11-02 DIAGNOSIS — J45.901 MODERATE ASTHMA WITH EXACERBATION, UNSPECIFIED WHETHER PERSISTENT: ICD-10-CM

## 2024-11-02 DIAGNOSIS — R06.02 SOB (SHORTNESS OF BREATH): ICD-10-CM

## 2024-11-04 ENCOUNTER — OFFICE VISIT (OUTPATIENT)
Dept: ORTHOPEDICS | Facility: CLINIC | Age: 49
End: 2024-11-04
Attending: FAMILY MEDICINE
Payer: COMMERCIAL

## 2024-11-04 ENCOUNTER — MYC REFILL (OUTPATIENT)
Dept: INTERNAL MEDICINE | Facility: CLINIC | Age: 49
End: 2024-11-04

## 2024-11-04 VITALS — HEIGHT: 64 IN | WEIGHT: 213 LBS | BODY MASS INDEX: 36.37 KG/M2

## 2024-11-04 DIAGNOSIS — G89.29 CHRONIC LEFT SHOULDER PAIN: ICD-10-CM

## 2024-11-04 DIAGNOSIS — M25.512 CHRONIC LEFT SHOULDER PAIN: ICD-10-CM

## 2024-11-04 DIAGNOSIS — M75.32 CALCIFIC TENDINITIS OF LEFT SHOULDER: ICD-10-CM

## 2024-11-04 DIAGNOSIS — R06.02 SOB (SHORTNESS OF BREATH): ICD-10-CM

## 2024-11-04 DIAGNOSIS — J45.901 MODERATE ASTHMA WITH EXACERBATION, UNSPECIFIED WHETHER PERSISTENT: ICD-10-CM

## 2024-11-04 PROCEDURE — 99203 OFFICE O/P NEW LOW 30 MIN: CPT | Performed by: STUDENT IN AN ORGANIZED HEALTH CARE EDUCATION/TRAINING PROGRAM

## 2024-11-04 RX ORDER — ALBUTEROL SULFATE 90 UG/1
2 INHALANT RESPIRATORY (INHALATION) EVERY 6 HOURS
Qty: 8.5 G | Refills: 0 | Status: SHIPPED | OUTPATIENT
Start: 2024-11-04

## 2024-11-04 RX ORDER — ALBUTEROL SULFATE 90 UG/1
2 INHALANT RESPIRATORY (INHALATION) EVERY 6 HOURS
Qty: 8.5 G | Refills: 0 | Status: SHIPPED | OUTPATIENT
Start: 2024-11-04 | End: 2024-11-04

## 2024-11-04 NOTE — LETTER
11/4/2024      Lena Morrow  [address]  [place] [address]      Dear Colleague,    Thank you for referring your patient, Lena Morrow, to the [facility] [place]. Please see a copy of my visit note below.    CC: Left shoulder pain    HPI: Patient is a 49-year-old female seen here today for evaluation of left shoulder pain and calcific tendinitis.  She has noted pain in her left shoulder for over a year.  She states that the pain is constant.  She localized over the anterior, posterior, lateral, and axillary aspect of the arm.  Is worse at night.  Is worse with any attempted range of motion.  She takes Tylenol and ibuprofen as needed for the pain.  She has trialed physical therapy in the past.  She feels that physical therapy has not been overly helpful for her multiple orthopedic diagnoses in the past.  She has tried subacromial corticosteroid injections with my colleague, Dr. Mendieta.  She feels that these provided 90% pain relief for very short lasting.  She underwent a Tenex procedure in March 2024.  She feels that this also provided about 90% pain relief but was very short-lived.  She continues to struggle with left shoulder pain that affects her activities of daily living including clothing and being able to put on her bra.  Has not had a prior surgery of the shoulder.    She takes medication for hypothyroid.  Hemoglobin A1c is 5.1.  BMI is 36.  She works as a paraprofessional.  She does not smoke.  She enjoys walking her dog for hobbies and exercise       Patient Active Problem List   Diagnosis     Bipolar 1 disorder (H)     PHIL (generalized anxiety disorder)     Obesity (BMI 30-39.9)     Exertional asthma     Hypothyroidism          Past Medical History:   Diagnosis Date     Bipolar 1 disorder (H)      Exertional asthma      PHIL (generalized anxiety disorder)      Hypothyroidism      Obesity (BMI 30-39.9)           Past Surgical History:   Procedure  Laterality Date     CARPAL TUNNEL RELEASE RT/LT Right     + excision right dorsal carpal ganglion cyst & terminal branch, right posterior interosseous nerve.      SECTION       CYSTOSCOPY N/A 2017    Procedure: CYSTOSCOPY;;  Surgeon: Toni Da Silva MD;  Location:  OR     DAVINCI HYSTERECTOMY TOTAL, SALPINGECTOMY BILATERAL N/A 2017    Procedure: DAVINCI HYSTERECTOMY TOTAL, SALPINGECTOMY BILATERAL;  ROBOTIC ASSISTED LAPAROSCOPIC TOTAL HYSTERECTOMY; BILATERAL SALPINGECTOMY; CYSTOSCOPY;  Surgeon: Toni Da Silva MD;  Location:  OR     DILATION AND CURETTAGE N/A 2016    Procedure: DILATION AND CURETTAGE;  Surgeon: Josue Jama MD;  Location:  SD     EXTRACORPOREAL SHOCK WAVE LITHOTRIPSY (ESWL) Left 2021    Procedure: LEFT EXTRACORPOREAL SHOCK WAVE LITHOTRIPSY (ESWL);  Surgeon: Devonte Lambert MD;  Location:  OR     HYSTERECTOMY, PAP NO LONGER INDICATED       LAPAROSCOPIC CHOLECYSTECTOMY  2012    Procedure:LAPAROSCOPIC CHOLECYSTECTOMY; LAPAROSCOPIC CHOLECYSTECTOMY ; Surgeon:KARYNA CAMARA; Location: [facility]     LAPAROSCOPY DIAGNOSTIC (GYN) N/A 2016    Procedure: LAPAROSCOPY DIAGNOSTIC (GYN);  Surgeon: Josue Jama MD;  Location:  [facility]     LASER HOLMIUM LITHOTRIPSY URETER(S), INSERT STENT, COMBINED Left 2020    Procedure: Cystoscopy, left ureteroscopy with holmium laser lithotripsy and left stent placement;  Surgeon: Devonte Lambert MD;  Location:  OR     PERCUTANEOUS TENOTOMY Left 3/1/2024    Procedure: TENOTOMY, PERCUTANEOUS LEFT ROTATOR CUFF.;  Surgeon: Soy Barbour DO;  Location: [facility] OR     TONSILLECTOMY & ADENOIDECTOMY       ULTRASONIC REMOVAL SOFT TISSUE (LOCATION) Left 3/1/2024    Procedure: EXCISION, SOFT TISSUE, USING ULTRASONIC ASPIRATOR SYSTEM;  Surgeon: Soy Barbour DO;  Location: [facility] OR          Current Outpatient Medications   Medication Sig Dispense Refill     albuterol (PROAIR HFA/PROVENTIL  HFA/VENTOLIN HFA) 108 (90 Base) MCG/ACT inhaler INHALE 2 PUFFS INTO THE LUNGS EVERY 6 HOURS 8.5 g 0     albuterol (PROVENTIL) (2.5 MG/3ML) 0.083% neb solution Take 1 vial (2.5 mg) by nebulization every 4 hours as needed for shortness of breath / dyspnea or wheezing 300 mL 0     buPROPion (WELLBUTRIN XL) 300 MG 24 hr tablet Take 300 mg by mouth every morning       cholecalciferol (VITAMIN D3) 25 mcg (1000 units) capsule Take 1 capsule by mouth daily       esomeprazole (NEXIUM) 40 MG DR capsule Take 1 capsule (40 mg) by mouth every morning (before breakfast). Take 30-60 minutes before eating. 90 capsule 1     ketoconazole (NIZORAL) 2 % external shampoo APPLY TOPICALLY DAILY AS NEEDED FOR ITCHING OR IRRITATION 120 mL 2     lamoTRIgine (LAMICTAL) 200 MG tablet Take 200 mg by mouth 2 times daily       levothyroxine (SYNTHROID/LEVOTHROID) 75 MCG tablet Take 1 tablet (75 mcg) by mouth daily. 90 tablet 0     lithium ER (LITHOBID) 300 MG CR tablet Take 1,200 mg by mouth At Bedtime   0     loratadine-pseudoePHEDrine (CLARITIN-D 12-HOUR) 5-120 MG 12 hr tablet Take 1 tablet by mouth 2 times daily        Multiple Vitamins-Minerals (WOMENS MULTIVITAMIN PO) Take 2 capsules by mouth daily       ondansetron (ZOFRAN ODT) 4 MG ODT tab Take 1 tablet (4 mg) by mouth every 6 hours as needed for nausea 10 tablet 1     oxyCODONE (ROXICODONE) 5 MG tablet TAKE 1 TABLET BY MOUTH EVERY DAY AS NEEDED FOR MODERATE TO SEVERE PAIN       potassium 99 MG TABS Take 99 mg by mouth daily        propranolol ER (INDERAL LA) 80 MG 24 hr capsule Take 1 capsule (80 mg) by mouth daily. 90 capsule 0          Allergies   Allergen Reactions     Amoxicillin-Pot Clavulanate Rash     Aripiprazole Other (See Comments)     increased sadness and moodiness     Azithromycin GI Disturbance     Escitalopram Oxalate Diarrhea            Prochlorperazine Other (See Comments)     agitation     Sertraline Other (See Comments)     sweats     Venlafaxine Other (See Comments)      sweats          Family History   Problem Relation Age of Onset     Alcoholism Mother      Diabetes Type 2  Mother      Hypertension Mother      Depression Mother      Lung Cancer Father         smoker     Brain Tumor Brother         GBM     Diabetes Type 2  Paternal Grandmother      Myocardial Infarction Paternal Grandmother      Heart Failure Paternal Grandmother      Cancer Paternal Grandfather         unknown     Brain Cancer Paternal Aunt      Cerebrovascular Disease No family hx of      Breast Cancer No family hx of      Colon Cancer No family hx of      Ovarian Cancer No family hx of           Social History     Tobacco Use     Smoking status: Never     Smokeless tobacco: Never   Substance Use Topics     Alcohol use: Yes     Comment: occasionally            Objective:  Physical Exam:  LUE: No gross deformity of the shoulder.  No open wounds or lacerations.  No surgical incisions.  No erythema or ecchymosis.  Significant pain to light palpation over the clavicle, AC joint, acromion, and scapular spine.  Significant pain to light palpation over the posterior joint line, lateral aspect of the shoulder, or long head of the biceps in the bicipital groove.  Passive range of motion 180 degrees forward flexion, 170 degrees abduction, 90 degrees external rotation, L1 internal rotation with pain throughout range of motion.  Active range of motion is equivalent to passive range of motion with pain throughout range of motion. 4/5 strength in flexion, abduction, internal and external rotation limited by pain.  Sensation intact to axillary, radial, median, and ulnar nerve distribution.    Imagin view x-ray left shoulder from 2023 was reviewed.  This shows well-maintained glenohumeral joint space.  Small amount of calcific tendinosis off the greater tuberosity.    MRI without contrast of the left shoulder from May 2023 was reviewed.  This shows very small deposits of calcific tendinitis at the supraspinatus  insertion.  No other bony, chondral, or soft tissue pathology    Assessment and Plan: Patient is a 49-year-old female seen here today with her  for evaluation of left shoulder pain.  On review of her imaging from last year she has very small chondrocalcinosis deposits.  She has gone through extensive nonoperative management in the form of oral anti-inflammatory medication, physical therapy, subacromial corticosteroid injection and Tenex.  She continues to have global left shoulder pain.  She has done a good job of preserving her range of motion.  At this time discussed with her that calcific tendinitis of the supraspinatus tendon can cause pain over the anterior lateral aspect of the shoulder.  Certainly she has trialed all the nonoperative modalities.  Operative intervention is a reasonable option for this diagnosis.  It would include left shoulder arthroscopic rotator cuff debridement versus repair.  I discussed with her that occasionally we do have to take down portions of the tendon to remove all of the calcific deposits.  This would be a surgical intervention for treatment of pain over the anterior lateral aspect of the shoulder.  She also is showing significant signs of global shoulder pain and hypersensitivity to pain.  She is showing signs of CRPS.  Discussed with her that I cannot promise that shoulder arthroscopy will cure all of her global shoulder pain.  I also stressed the importance of weekly physical therapy for 3 months to regain full active and passive range of motion.  I discussed risks of operative intervention including but not limited to bleeding, infection, failure to care pain, demonstrating nerves and blood vessels, posttraumatic arthritis, stiffness, loss of limb and loss of life.  I discussed with her that stiffness is a significant risk and that if her shoulder becomes stiff he can become very painful.  She did disclose to me that she does not feel physical therapy works on her she  would find it challenging to complete a course of physical therapy.    In regards to her global shoulder pain.  She is showing signs of CRPS with extreme hypersensitivity to light touch.  [place] with her that treatment options could include a referral to physical medicine and rehabilitation to evaluate for desensitize meant treatment and regional nerve blocks.    At this time she is provided a printed informational packet.  They are going to think about her options.  She has an open offer for me for a referral to PM&R for pain management, or to be seen back to discuss surgical options further    Abdoulaye Harrington MD    [facility]   Department of Orthopedic Surgery    Disclaimer: This note consists of symbols derived from keyboarding, dictation and/or voice recognition software. As a result, there may be errors in the script that have gone undetected. Please consider this when interpreting information found in this chart.         Again, thank you for allowing me to participate in the care of your patient.        Sincerely,        Abdoulaye Harrington MD

## 2024-11-04 NOTE — PROGRESS NOTES
CC: Left shoulder pain    HPI: Patient is a 49-year-old female seen here today for evaluation of left shoulder pain and calcific tendinitis.  She has noted pain in her left shoulder for over a year.  She states that the pain is constant.  She localized over the anterior, posterior, lateral, and axillary aspect of the arm.  Is worse at night.  Is worse with any attempted range of motion.  She takes Tylenol and ibuprofen as needed for the pain.  She has trialed physical therapy in the past.  She feels that physical therapy has not been overly helpful for her multiple orthopedic diagnoses in the past.  She has tried subacromial corticosteroid injections with my colleague, Dr. Mendieta.  She feels that these provided 90% pain relief for very short lasting.  She underwent a Tenex procedure in 2024.  She feels that this also provided about 90% pain relief but was very short-lived.  She continues to struggle with left shoulder pain that affects her activities of daily living including clothing and being able to put on her bra.  Has not had a prior surgery of the shoulder.    She takes medication for hypothyroid.  Hemoglobin A1c is 5.1.  BMI is 36.  She works as a paraprofessional.  She does not smoke.  She enjoys walking her dog for hobbies and exercise       Patient Active Problem List   Diagnosis    Bipolar 1 disorder (H)    PHIL (generalized anxiety disorder)    Obesity (BMI 30-39.9)    Exertional asthma    Hypothyroidism          Past Medical History:   Diagnosis Date    Bipolar 1 disorder (H)     Exertional asthma     PHIL (generalized anxiety disorder)     Hypothyroidism     Obesity (BMI 30-39.9)           Past Surgical History:   Procedure Laterality Date    CARPAL TUNNEL RELEASE RT/LT Right     + excision right dorsal carpal ganglion cyst & terminal branch, right posterior interosseous nerve.     SECTION      CYSTOSCOPY N/A 2017    Procedure: CYSTOSCOPY;;  Surgeon: Toni Da Silva MD;  Location:   OR    DAVINCI HYSTERECTOMY TOTAL, SALPINGECTOMY BILATERAL N/A 05/26/2017    Procedure: DAVINCI HYSTERECTOMY TOTAL, SALPINGECTOMY BILATERAL;  ROBOTIC ASSISTED LAPAROSCOPIC TOTAL HYSTERECTOMY; BILATERAL SALPINGECTOMY; CYSTOSCOPY;  Surgeon: Toni Da Silva MD;  Location:  OR    DILATION AND CURETTAGE N/A 01/05/2016    Procedure: DILATION AND CURETTAGE;  Surgeon: Josue Jama MD;  Location:  [facility]    EXTRACORPOREAL SHOCK WAVE LITHOTRIPSY (ESWL) Left 08/05/2021    Procedure: LEFT EXTRACORPOREAL SHOCK WAVE LITHOTRIPSY (ESWL);  Surgeon: Devonte Lambert MD;  Location:  OR    HYSTERECTOMY, PAP NO LONGER INDICATED      LAPAROSCOPIC CHOLECYSTECTOMY  04/03/2012    Procedure:LAPAROSCOPIC CHOLECYSTECTOMY; LAPAROSCOPIC CHOLECYSTECTOMY ; Surgeon:KARYNA CAMARA; Location: [facility]    LAPAROSCOPY DIAGNOSTIC (GYN) N/A 01/05/2016    Procedure: LAPAROSCOPY DIAGNOSTIC (GYN);  Surgeon: Josue Jama MD;  Location:  [facility]    LASER HOLMIUM LITHOTRIPSY URETER(S), INSERT STENT, COMBINED Left 07/04/2020    Procedure: Cystoscopy, left ureteroscopy with holmium laser lithotripsy and left stent placement;  Surgeon: Devonte Lambert MD;  Location:  OR    PERCUTANEOUS TENOTOMY Left 3/1/2024    Procedure: TENOTOMY, PERCUTANEOUS LEFT ROTATOR CUFF.;  Surgeon: Soy Barbour DO;  Location: [facility] OR    TONSILLECTOMY & ADENOIDECTOMY      ULTRASONIC REMOVAL SOFT TISSUE (LOCATION) Left 3/1/2024    Procedure: EXCISION, SOFT TISSUE, USING ULTRASONIC ASPIRATOR SYSTEM;  Surgeon: Soy Barbour DO;  Location: [facility] OR          Current Outpatient Medications   Medication Sig Dispense Refill    albuterol (PROAIR HFA/PROVENTIL HFA/VENTOLIN HFA) 108 (90 Base) MCG/ACT inhaler INHALE 2 PUFFS INTO THE LUNGS EVERY 6 HOURS 8.5 g 0    albuterol (PROVENTIL) (2.5 MG/3ML) 0.083% neb solution Take 1 vial (2.5 mg) by nebulization every 4 hours as needed for shortness of breath / dyspnea or wheezing 300 mL 0    buPROPion (WELLBUTRIN  XL) 300 MG 24 hr tablet Take 300 mg by mouth every morning      cholecalciferol (VITAMIN D3) 25 mcg (1000 units) capsule Take 1 capsule by mouth daily      esomeprazole (NEXIUM) 40 MG DR capsule Take 1 capsule (40 mg) by mouth every morning (before breakfast). Take 30-60 minutes before eating. 90 capsule 1    ketoconazole (NIZORAL) 2 % external shampoo APPLY TOPICALLY DAILY AS NEEDED FOR ITCHING OR IRRITATION 120 mL 2    lamoTRIgine (LAMICTAL) 200 MG tablet Take 200 mg by mouth 2 times daily      levothyroxine (SYNTHROID/LEVOTHROID) 75 MCG tablet Take 1 tablet (75 mcg) by mouth daily. 90 tablet 0    lithium ER (LITHOBID) 300 MG CR tablet Take 1,200 mg by mouth At Bedtime   0    loratadine-pseudoePHEDrine (CLARITIN-D 12-HOUR) 5-120 MG 12 hr tablet Take 1 tablet by mouth 2 times daily       Multiple Vitamins-Minerals (WOMENS MULTIVITAMIN PO) Take 2 capsules by mouth daily      ondansetron (ZOFRAN ODT) 4 MG ODT tab Take 1 tablet (4 mg) by mouth every 6 hours as needed for nausea 10 tablet 1    oxyCODONE (ROXICODONE) 5 MG tablet TAKE 1 TABLET BY MOUTH EVERY DAY AS NEEDED FOR MODERATE TO SEVERE PAIN      potassium 99 MG TABS Take 99 mg by mouth daily       propranolol ER (INDERAL LA) 80 MG 24 hr capsule Take 1 capsule (80 mg) by mouth daily. 90 capsule 0          Allergies   Allergen Reactions    Amoxicillin-Pot Clavulanate Rash    Aripiprazole Other (See Comments)     increased sadness and moodiness    Azithromycin GI Disturbance    Escitalopram Oxalate Diarrhea           Prochlorperazine Other (See Comments)     agitation    Sertraline Other (See Comments)     sweats    Venlafaxine Other (See Comments)     sweats          Family History   Problem Relation Age of Onset    Alcoholism Mother     Diabetes Type 2  Mother     Hypertension Mother     Depression Mother     Lung Cancer Father         smoker    Brain Tumor Brother         GBM    Diabetes Type 2  Paternal Grandmother     Myocardial Infarction Paternal Grandmother      Heart Failure Paternal Grandmother     Cancer Paternal Grandfather         unknown    Brain Cancer Paternal Aunt     Cerebrovascular Disease No family hx of     Breast Cancer No family hx of     Colon Cancer No family hx of     Ovarian Cancer No family hx of           Social History     Tobacco Use    Smoking status: Never    Smokeless tobacco: Never   Substance Use Topics    Alcohol use: Yes     Comment: occasionally            Objective:  Physical Exam:  LUE: No gross deformity of the shoulder.  No open wounds or lacerations.  No surgical incisions.  No erythema or ecchymosis.  Significant pain to light palpation over the clavicle, AC joint, acromion, and scapular spine.  Significant pain to light palpation over the posterior joint line, lateral aspect of the shoulder, or long head of the biceps in the bicipital groove.  Passive range of motion 180 degrees forward flexion, 170 degrees abduction, 90 degrees external rotation, L1 internal rotation with pain throughout range of motion.  Active range of motion is equivalent to passive range of motion with pain throughout range of motion. 4/5 strength in flexion, abduction, internal and external rotation limited by pain.  Sensation intact to axillary, radial, median, and ulnar nerve distribution.    Imagin view x-ray left shoulder from 2023 was reviewed.  This shows well-maintained glenohumeral joint space.  Small amount of calcific tendinosis off the greater tuberosity.    MRI without contrast of the left shoulder from May 2023 was reviewed.  This shows very small deposits of calcific tendinitis at the supraspinatus insertion.  No other bony, chondral, or soft tissue pathology    Assessment and Plan: Patient is a 49-year-old female seen here today with her  for evaluation of left shoulder pain.  On review of her imaging from last year she has very small chondrocalcinosis deposits.  She has gone through extensive nonoperative management in the  form of oral anti-inflammatory medication, physical therapy, subacromial corticosteroid injection and Tenex.  She continues to have global left shoulder pain.  She has done a good job of preserving her range of motion.  At this time discussed with her that calcific tendinitis of the supraspinatus tendon can cause pain over the anterior lateral aspect of the shoulder.  Certainly she has trialed all the nonoperative modalities.  Operative intervention is a reasonable option for this diagnosis.  It would include left shoulder arthroscopic rotator cuff debridement versus repair.  I discussed with her that occasionally we do have to take down portions of the tendon to remove all of the calcific deposits.  This would be a surgical intervention for treatment of pain over the anterior lateral aspect of the shoulder.  She also is showing significant signs of global shoulder pain and hypersensitivity to pain.  She is showing signs of CRPS.  Discussed with her that I cannot promise that shoulder arthroscopy will cure all of her global shoulder pain.  I also stressed the importance of weekly physical therapy for 3 months to regain full active and passive range of motion.  I discussed risks of operative intervention including but not limited to bleeding, infection, failure to care pain, demonstrating nerves and blood vessels, posttraumatic arthritis, stiffness, loss of limb and loss of life.  I discussed with her that stiffness is a significant risk and that if her shoulder becomes stiff he can become very painful.  She did disclose to me that she does not feel physical therapy works on her she would find it challenging to complete a course of physical therapy.    In regards to her global shoulder pain.  She is showing signs of CRPS with extreme hypersensitivity to light touch.  [place] with her that treatment options could include a referral to physical medicine and rehabilitation to evaluate for desensitize meant treatment and  regional nerve blocks.    At this time she is provided a printed informational packet.  They are going to think about her options.  She has an open offer for me for a referral to PM&R for pain management, or to be seen back to discuss surgical options further    Abdoulaye Harrington MD    [facility]   Department of Orthopedic Surgery    Disclaimer: This note consists of symbols derived from keyboarding, dictation and/or voice recognition software. As a result, there may be errors in the script that have gone undetected. Please consider this when interpreting information found in this chart.

## 2024-11-05 ENCOUNTER — TRANSFERRED RECORDS (OUTPATIENT)
Dept: HEALTH INFORMATION MANAGEMENT | Facility: CLINIC | Age: 49
End: 2024-11-05
Payer: COMMERCIAL

## 2024-12-02 ENCOUNTER — TRANSFERRED RECORDS (OUTPATIENT)
Dept: HEALTH INFORMATION MANAGEMENT | Facility: CLINIC | Age: 49
End: 2024-12-02
Payer: COMMERCIAL

## 2024-12-04 LAB — HEMOCCULT STL QL IA: NEGATIVE

## 2024-12-05 ENCOUNTER — TRANSFERRED RECORDS (OUTPATIENT)
Dept: HEALTH INFORMATION MANAGEMENT | Facility: CLINIC | Age: 49
End: 2024-12-05

## 2024-12-15 ENCOUNTER — ANCILLARY PROCEDURE (OUTPATIENT)
Dept: GENERAL RADIOLOGY | Facility: CLINIC | Age: 49
End: 2024-12-15
Attending: FAMILY MEDICINE
Payer: COMMERCIAL

## 2024-12-15 ENCOUNTER — OFFICE VISIT (OUTPATIENT)
Dept: URGENT CARE | Facility: URGENT CARE | Age: 49
End: 2024-12-15
Payer: COMMERCIAL

## 2024-12-15 VITALS
SYSTOLIC BLOOD PRESSURE: 137 MMHG | BODY MASS INDEX: 35.19 KG/M2 | WEIGHT: 205 LBS | OXYGEN SATURATION: 98 % | TEMPERATURE: 97.9 F | HEART RATE: 73 BPM | DIASTOLIC BLOOD PRESSURE: 76 MMHG | RESPIRATION RATE: 16 BRPM

## 2024-12-15 DIAGNOSIS — R05.1 ACUTE COUGH: ICD-10-CM

## 2024-12-15 DIAGNOSIS — J06.9 UPPER RESPIRATORY TRACT INFECTION, UNSPECIFIED TYPE: Primary | ICD-10-CM

## 2024-12-15 LAB
FLUAV AG SPEC QL IA: NEGATIVE
FLUBV AG SPEC QL IA: NEGATIVE

## 2024-12-15 PROCEDURE — 99213 OFFICE O/P EST LOW 20 MIN: CPT | Performed by: FAMILY MEDICINE

## 2024-12-15 PROCEDURE — 87804 INFLUENZA ASSAY W/OPTIC: CPT | Performed by: FAMILY MEDICINE

## 2024-12-15 PROCEDURE — 87798 DETECT AGENT NOS DNA AMP: CPT | Performed by: FAMILY MEDICINE

## 2024-12-15 PROCEDURE — 71046 X-RAY EXAM CHEST 2 VIEWS: CPT | Mod: TC | Performed by: RADIOLOGY

## 2024-12-15 PROCEDURE — 87635 SARS-COV-2 COVID-19 AMP PRB: CPT | Performed by: FAMILY MEDICINE

## 2024-12-15 RX ORDER — CODEINE PHOSPHATE AND GUAIFENESIN 10; 100 MG/5ML; MG/5ML
1-2 SOLUTION ORAL EVERY 6 HOURS PRN
Qty: 118 ML | Refills: 0 | Status: SHIPPED | OUTPATIENT
Start: 2024-12-15

## 2024-12-15 RX ORDER — LURASIDONE HYDROCHLORIDE 60 MG/1
60 TABLET, FILM COATED ORAL
COMMUNITY
Start: 2024-12-05

## 2024-12-16 ENCOUNTER — TELEPHONE (OUTPATIENT)
Dept: FAMILY MEDICINE | Facility: CLINIC | Age: 49
End: 2024-12-16
Payer: COMMERCIAL

## 2024-12-16 DIAGNOSIS — R05.1 ACUTE COUGH: Primary | ICD-10-CM

## 2024-12-16 LAB — SARS-COV-2 RNA RESP QL NAA+PROBE: NEGATIVE

## 2024-12-16 NOTE — TELEPHONE ENCOUNTER
Medication Question or Refill        What medication are you calling about (include dose and sig)?: Cough syrup with codeine    Preferred Pharmacy:     [facility]    WRITTEN PRESCRIPTION REQUESTED  No address on file        Controlled Substance Agreement on file:   CSA -- Patient Level:    CSA: None found at the patient level.       Who prescribed the medication?: Dr Gonzalez    Do you need a refill? Yes, Mary is out of prescription    When did you use the medication last?      Patient offered an appointment? No    Do you have any questions or concerns?  No      Could we send this information to you in [facility] or would you prefer to receive a phone call?:   Patient would prefer a phone call   Okay to leave a detailed message?: Yes at Cell number on file:    Telephone Information:   Mobile [phone]

## 2024-12-16 NOTE — PROGRESS NOTES
Assessment & Plan     (J06.9) Upper respiratory tract infection, unspecified type  (primary encounter diagnosis)  Comment: Differentials discussed in detail including viral URI.  Chest x-ray and influenza test came back negative.  COVID-19 and pertussis test ordered.  Cheratussin AC prescribed for cough control.  Recommended well hydration, warm fluids, steam inhalation and humidifier use.  Return criteria explained.  All questions answered.      (R05.1) Acute cough  Comment:   Plan: XR Chest 2 Views, Influenza A & B Antigen -         Clinic Collect, B. pertussis/parapertussis         PCR-NP, COVID-19 Virus (Coronavirus) by PCR         Nasopharyngeal, guaiFENesin-codeine (ROBITUSSIN        AC) 100-10 MG/5ML solution             Niurka Paredes is a 49 year old, presenting for the following health issues:  Urgent Care    HPI     Concern -   Onset: 3 days   Description: productive cough, congestion, runny nose,  Intensity: moderate  Progression of Symptoms:  worsening  Accompanying Signs & Symptoms:   Previous history of similar problem:   Therapies tried and outcome: over-the-counter cold meds  Son also having mild cold symptoms      Review of Systems  Constitutional, neuro, ENT, endocrine, pulmonary, cardiac, gastrointestinal, genitourinary, musculoskeletal, integument and psychiatric systems are negative, except as otherwise noted.      Objective    /76   Pulse 73   Temp 97.9  F (36.6  C) (Tympanic)   Resp 16   Wt 93 kg (205 lb)   LMP 12/10/2015   SpO2 98%   BMI 35.19 kg/m    Body mass index is 35.19 kg/m .  Physical Exam   GENERAL: alert and no distress  EYES: Eyes grossly normal to inspection, PERRL and conjunctivae and sclerae normal  HENT: normal cephalic/atraumatic, ear canals and TM's normal, oral mucous membranes moist, and oropharxnx crowded  NECK: no adenopathy, no asymmetry, masses, or scars  RESP: lungs clear to auscultation - no rales, rhonchi or wheezes  CV: regular rates and rhythm,  normal S1 S2, no S3 or S4, and no murmur, click or rub  MS: no gross musculoskeletal defects noted, no edema  NEURO: Normal strength and tone, mentation intact and speech normal  PSYCH: mentation appears normal, affect normal/bright    Results for orders placed or performed in visit on 12/15/24   XR Chest 2 Views     Status: None    Narrative    EXAM: XR CHEST 2 VIEWS  LOCATION: [facility]  DATE: 12/15/2024    INDICATION:  Acute cough  COMPARISON: 2/1/2022.      Impression    IMPRESSION: Negative chest.   Results for orders placed or performed in visit on 12/15/24   Influenza A & B Antigen - Clinic Collect     Status: Normal    Specimen: Nose; Swab   Result Value Ref Range    Influenza A antigen Negative Negative    Influenza B antigen Negative Negative    Narrative    Test results must be correlated with clinical data. If necessary, results should be confirmed by a molecular assay or viral culture.       Signed Electronically by: Jorje Gonzalez MD

## 2024-12-17 ENCOUNTER — MYC MEDICAL ADVICE (OUTPATIENT)
Dept: INTERNAL MEDICINE | Facility: CLINIC | Age: 49
End: 2024-12-17
Payer: COMMERCIAL

## 2024-12-17 LAB
B PARAPERT DNA SPEC QL NAA+PROBE: NOT DETECTED
B PERT DNA SPEC QL NAA+PROBE: NOT DETECTED

## 2024-12-17 RX ORDER — CODEINE PHOSPHATE AND GUAIFENESIN 10; 100 MG/5ML; MG/5ML
1-2 SOLUTION ORAL EVERY 4 HOURS PRN
Qty: 118 ML | Refills: 0 | Status: SHIPPED | OUTPATIENT
Start: 2024-12-17 | End: 2024-12-22

## 2024-12-17 NOTE — TELEPHONE ENCOUNTER
Do not recommend Robitussin with Codeine. Robitussin with dextromethorphan is available over the counter. The codeine portion only serves to turn off the brain, including the cough reflex. Codeine does not address the underlying upper respiratory symptoms.

## 2024-12-17 NOTE — TELEPHONE ENCOUNTER
Spoke to patient and informed her about being reevaluated as Dr Gonzalez is not in UC today.  Patient upset and does sound very ill, weak voice, a little bit if laryngitis and coughing on the phone.  Patient wondering if Katherine Zamora will prescribe this for her as she does not want to have to pay for another visit.    Joi Urias, JAYDA 12/17/2024  11:44 AM

## 2024-12-18 NOTE — TELEPHONE ENCOUNTER
Freddy Andino DO wrote hard copy Rx for patient.  Placed at .  Patient informed.  Joi Urias CMA 12/17/2024  6:29 PM       Weakness

## 2024-12-22 ENCOUNTER — E-VISIT (OUTPATIENT)
Dept: URGENT CARE | Facility: CLINIC | Age: 49
End: 2024-12-22
Payer: COMMERCIAL

## 2024-12-22 DIAGNOSIS — H10.33 ACUTE BACTERIAL CONJUNCTIVITIS OF BOTH EYES: Primary | ICD-10-CM

## 2024-12-22 RX ORDER — OFLOXACIN 3 MG/ML
SOLUTION/ DROPS OPHTHALMIC
Qty: 5 ML | Refills: 0 | Status: SHIPPED | OUTPATIENT
Start: 2024-12-22 | End: 2024-12-29

## 2024-12-22 NOTE — PATIENT INSTRUCTIONS
Thank you for choosing us for your care. I have placed an order for a prescription so that you can start treatment. View your full visit summary for details by clicking on the link below. Your pharmacist will able to address any questions you may have about the medication.     If you re not feeling better within 2-3 days, please schedule an appointment.  You can schedule an appointment right here in [facility], or call [phone]  If the visit is for the same symptoms as your eVisit, we ll refund the cost of your eVisit if seen within seven days.    Pinkeye: Care Instructions  Overview     Pinkeye is redness and swelling of the eye surface and the conjunctiva (the lining of the eyelid and the covering of the white part of the eye). Pinkeye is also called conjunctivitis. Pinkeye is often caused by infection with bacteria or a virus. Dry air, allergies, smoke, and chemicals are other common causes.  Pinkeye often gets better on its own in 7 to 10 days. Antibiotics only help if the pinkeye is caused by bacteria. Pinkeye caused by infection spreads easily. If an allergy or chemical is causing pinkeye, it will not go away unless you can avoid whatever is causing it.  Follow-up care is a key part of your treatment and safety. Be sure to make and go to all appointments, and call your doctor if you are having problems. It's also a good idea to know your test results and keep a list of the medicines you take.  How can you care for yourself at home?  Wash your hands often. Always wash them before and after you treat pinkeye or touch your eyes or face.  Use moist cotton or a clean, wet cloth to remove crust. Wipe from the inside corner of the eye to the outside. Use a clean part of the cloth for each wipe.  Put cold or warm wet cloths on your eye a few times a day if the eye hurts.  Do not wear contact lenses or eye makeup until the pinkeye is gone. Throw away any eye makeup you were using when you got pinkeye. Clean your  "contacts and storage case. If you wear disposable contacts, use a new pair when your eye has cleared and it is safe to wear contacts again.  If the doctor gave you antibiotic ointment or eyedrops, use them as directed. Use the medicine for as long as instructed, even if your eye starts looking better soon. Keep the bottle tip clean, and do not let it touch the eye area.  To put in eyedrops or ointment:  Tilt your head back, and pull your lower eyelid down with one finger.  Drop or squirt the medicine inside the lower lid.  Close your eye for 30 to 60 seconds to let the drops or ointment move around.  Do not touch the ointment or dropper tip to your eyelashes or any other surface.  Do not share towels, pillows, or washcloths while you have pinkeye.  When should you call for help?   Call your doctor now or seek immediate medical care if:    You have pain in your eye, not just irritation on the surface.     You have a change in vision or loss of vision.     You have an increase in discharge from the eye.     Your eye has not started to improve or begins to get worse within 48 hours after you start using antibiotics.     Pinkeye lasts longer than 7 days.   Watch closely for changes in your health, and be sure to contact your doctor if you have any problems.  Where can you learn more?  Go to https://[email or web address].net/patiented  Enter Y392 in the search box to learn more about \"Pinkeye: Care Instructions.\"  Current as of: July 31, 2024  Content Version: 14.3    2024 [email or web address].   Care instructions adapted under license by your healthcare professional. If you have questions about a medical condition or this instruction, always ask your healthcare professional. [email or web address] disclaims any warranty or liability for your use of this information.    "

## 2024-12-29 DIAGNOSIS — R10.13 EPIGASTRIC PAIN: ICD-10-CM

## 2024-12-30 RX ORDER — ESOMEPRAZOLE MAGNESIUM 40 MG/1
CAPSULE, DELAYED RELEASE ORAL
Qty: 90 CAPSULE | Refills: 1 | OUTPATIENT
Start: 2024-12-30

## 2025-01-13 DIAGNOSIS — E03.4 HYPOTHYROIDISM DUE TO ACQUIRED ATROPHY OF THYROID: ICD-10-CM

## 2025-01-13 RX ORDER — LEVOTHYROXINE SODIUM 75 UG/1
75 TABLET ORAL DAILY
Qty: 90 TABLET | Refills: 1 | Status: SHIPPED | OUTPATIENT
Start: 2025-01-13

## 2025-01-15 ENCOUNTER — TRANSFERRED RECORDS (OUTPATIENT)
Dept: HEALTH INFORMATION MANAGEMENT | Facility: CLINIC | Age: 50
End: 2025-01-15

## 2025-01-15 ENCOUNTER — MYC REFILL (OUTPATIENT)
Dept: INTERNAL MEDICINE | Facility: CLINIC | Age: 50
End: 2025-01-15

## 2025-01-15 ENCOUNTER — E-VISIT (OUTPATIENT)
Dept: URGENT CARE | Facility: CLINIC | Age: 50
End: 2025-01-15
Payer: COMMERCIAL

## 2025-01-15 DIAGNOSIS — J01.90 ACUTE BACTERIAL SINUSITIS: Primary | ICD-10-CM

## 2025-01-15 DIAGNOSIS — E03.4 HYPOTHYROIDISM DUE TO ACQUIRED ATROPHY OF THYROID: ICD-10-CM

## 2025-01-15 DIAGNOSIS — B96.89 ACUTE BACTERIAL SINUSITIS: Primary | ICD-10-CM

## 2025-01-15 RX ORDER — LEVOTHYROXINE SODIUM 75 UG/1
75 TABLET ORAL DAILY
Qty: 90 TABLET | Refills: 1 | OUTPATIENT
Start: 2025-01-15

## 2025-01-15 RX ORDER — DOXYCYCLINE HYCLATE 100 MG
100 TABLET ORAL 2 TIMES DAILY
Qty: 14 TABLET | Refills: 0 | Status: SHIPPED | OUTPATIENT
Start: 2025-01-15 | End: 2025-01-22

## 2025-01-15 NOTE — PATIENT INSTRUCTIONS
Thank you for choosing us for your care. I have placed an order for a prescription so that you can start treatment. View your full visit summary for details by clicking on the link below. Your pharmacist will able to address any questions you may have about the medication.     If you're not feeling better within 5-7 days, please schedule an appointment.  You can schedule an appointment right here in [facility], or call [phone]  If the visit is for the same symptoms as your eVisit, we'll refund the cost of your eVisit if seen within seven days.

## 2025-01-27 ENCOUNTER — TRANSFERRED RECORDS (OUTPATIENT)
Dept: HEALTH INFORMATION MANAGEMENT | Facility: CLINIC | Age: 50
End: 2025-01-27
Payer: COMMERCIAL

## 2025-02-10 ENCOUNTER — MYC REFILL (OUTPATIENT)
Dept: INTERNAL MEDICINE | Facility: CLINIC | Age: 50
End: 2025-02-10
Payer: COMMERCIAL

## 2025-02-10 DIAGNOSIS — F41.1 GAD (GENERALIZED ANXIETY DISORDER): ICD-10-CM

## 2025-02-11 RX ORDER — PROPRANOLOL HYDROCHLORIDE 80 MG/1
80 CAPSULE, EXTENDED RELEASE ORAL DAILY
Qty: 90 CAPSULE | Refills: 0 | Status: SHIPPED | OUTPATIENT
Start: 2025-02-11

## 2025-02-25 ENCOUNTER — TRANSFERRED RECORDS (OUTPATIENT)
Dept: HEALTH INFORMATION MANAGEMENT | Facility: CLINIC | Age: 50
End: 2025-02-25

## 2025-04-04 ENCOUNTER — TRANSFERRED RECORDS (OUTPATIENT)
Dept: HEALTH INFORMATION MANAGEMENT | Facility: CLINIC | Age: 50
End: 2025-04-04
Payer: COMMERCIAL

## 2025-04-22 ENCOUNTER — TRANSFERRED RECORDS (OUTPATIENT)
Dept: HEALTH INFORMATION MANAGEMENT | Facility: CLINIC | Age: 50
End: 2025-04-22
Payer: COMMERCIAL

## 2025-04-23 DIAGNOSIS — E03.4 HYPOTHYROIDISM DUE TO ACQUIRED ATROPHY OF THYROID: ICD-10-CM

## 2025-04-23 RX ORDER — LEVOTHYROXINE SODIUM 75 UG/1
75 TABLET ORAL DAILY
Qty: 90 TABLET | Refills: 1 | Status: SHIPPED | OUTPATIENT
Start: 2025-04-23

## 2025-04-24 ENCOUNTER — TRANSFERRED RECORDS (OUTPATIENT)
Dept: HEALTH INFORMATION MANAGEMENT | Facility: CLINIC | Age: 50
End: 2025-04-24

## 2025-04-28 NOTE — TELEPHONE ENCOUNTER
"Failed protocol    Last Written Prescription Date:  1/16/2020  Last Fill Quantity: 30,  # refills: 0   Last office visit: 1/16/2020 with prescribing provider:     Future Office Visit:    Requested Prescriptions   Pending Prescriptions Disp Refills     piroxicam (FELDENE) 20 MG capsule [Pharmacy Med Name: PIROXICAM 20MG CAPSULES] 30 capsule 0     Sig: TAKE 1 CAPSULE(20 MG) BY MOUTH DAILY       NSAID Medications Failed - 2/5/2020  1:32 AM        Failed - Normal ALT on file in past 12 months     Recent Labs   Lab Test 07/17/17  1054   ALT 28             Failed - Normal AST on file in past 12 months     Recent Labs   Lab Test 07/17/17  1054   AST 13             Failed - Normal CBC on file in past 12 months     Recent Labs   Lab Test 07/17/17  1054   WBC 7.0   RBC 4.67   HGB 14.1   HCT 43.5                    Passed - Blood pressure under 140/90 in past 12 months     BP Readings from Last 3 Encounters:   01/16/20 116/78   12/30/19 114/56   02/28/19 118/68                 Passed - Recent (12 mo) or future (30 days) visit within the authorizing provider's specialty     Patient has had an office visit with the authorizing provider or a provider within the authorizing providers department within the previous 12 mos or has a future within next 30 days. See \"Patient Info\" tab in inbasket, or \"Choose Columns\" in Meds & Orders section of the refill encounter.              Passed - Patient is age 6-64 years        Passed - Medication is active on med list        Passed - No active pregnancy on record        Passed - Normal serum creatinine on file in past 12 months     Recent Labs   Lab Test 07/13/19  1009   CR 0.88             Passed - No positive pregnancy test in past 12 months          " Patient/Family

## 2025-05-06 ENCOUNTER — TRANSFERRED RECORDS (OUTPATIENT)
Dept: HEALTH INFORMATION MANAGEMENT | Facility: CLINIC | Age: 50
End: 2025-05-06

## 2025-05-24 DIAGNOSIS — F41.1 GAD (GENERALIZED ANXIETY DISORDER): ICD-10-CM

## 2025-05-27 RX ORDER — PROPRANOLOL HYDROCHLORIDE 80 MG/1
80 CAPSULE, EXTENDED RELEASE ORAL DAILY
Qty: 90 CAPSULE | Refills: 1 | Status: SHIPPED | OUTPATIENT
Start: 2025-05-27

## 2025-06-02 ENCOUNTER — TRANSFERRED RECORDS (OUTPATIENT)
Dept: HEALTH INFORMATION MANAGEMENT | Facility: CLINIC | Age: 50
End: 2025-06-02
Payer: COMMERCIAL

## 2025-06-02 ENCOUNTER — TELEPHONE (OUTPATIENT)
Dept: ORTHOPEDICS | Facility: CLINIC | Age: 50
End: 2025-06-02
Payer: COMMERCIAL

## 2025-06-02 NOTE — TELEPHONE ENCOUNTER
Other: pt calling as she would like to schedule her shoulder surgery.      Could we send this information to you in [employer] or would you prefer to receive a phone call?:   Patient would prefer a phone call   Okay to leave a detailed message?: Yes at Cell number on file:    Telephone Information:   Mobile [phone]

## 2025-06-02 NOTE — TELEPHONE ENCOUNTER
Upon review of LOV note, patient needs follow up for further discussion.     Spoke with patient and assisted in scheduling follow up.     Liana Nelson, ATC

## 2025-06-06 ENCOUNTER — TELEPHONE (OUTPATIENT)
Dept: INTERNAL MEDICINE | Facility: CLINIC | Age: 50
End: 2025-06-06

## 2025-06-09 ENCOUNTER — OFFICE VISIT (OUTPATIENT)
Dept: ORTHOPEDICS | Facility: CLINIC | Age: 50
End: 2025-06-09
Payer: COMMERCIAL

## 2025-06-09 DIAGNOSIS — Z98.890 STATUS POST LEFT ROTATOR CUFF REPAIR: Primary | ICD-10-CM

## 2025-06-09 DIAGNOSIS — M75.122 NONTRAUMATIC COMPLETE TEAR OF LEFT ROTATOR CUFF: ICD-10-CM

## 2025-06-09 PROCEDURE — 99214 OFFICE O/P EST MOD 30 MIN: CPT | Performed by: STUDENT IN AN ORGANIZED HEALTH CARE EDUCATION/TRAINING PROGRAM

## 2025-06-09 NOTE — PROGRESS NOTES
CC: Left shoulder pain    HPI: Patient is a 50-year-old female known to my practice with left shoulder pain and rotator cuff calcific deposit.  For full history and physical please see my prior note since last appointment, she has been following with neuro pain clinic.  She has had a suprascapular nerve block and a glenohumeral injection.  The most recent of which was in February 2025.  She is taking oxycodone, tizanidine, and THC for pain.  She has not done any recent physical therapy.  She is now having increasing pain over the anterior lateral and anterior aspect of her shoulder.  She notes pain when lifting objects away from her body, driving, and at night when she rolls over on her shoulder.  No prior surgery on her shoulder    Objective:   PE:  LUE: No open wounds or lacerations noted.  Mild pain to palpation of the clavicle, AC joint, and acromion.  Mild pain to palpation over the posterior aspect of the shoulder.  Focal pain to palpation over the anterior lateral aspect of the shoulder and along the biceps the bicipital groove.  Passive range of motion is 160 degrees of forward elevation, 150 degrees abduction, 60 degrees external rotation.  Active range of motion is 130 degrees of forward elevation limited by pain.  4+/5 strength in flexion and abduction limited by pain.  5/5 internal/external rotation.  Positive Jobes for pain over the anterior lateral aspect of the shoulder.  Positive speeds and [place] for pain over the bicipital groove    Imaging:   MRI without contrast of left shoulder from outside facility on May 6 was reviewed.  This shows full-thickness tearing of the leading edge of the supraspinatus with approximately 1 cm retraction.  No muscle belly fatty atrophy.  Well-preserved muscle bulk.  Mild posterior superior labral fraying.  No chondral wear.  Mild biceps tendinosis    A/P:  Patient is a 50-year-old female seen here today in follow-up for her left shoulder pain.  X-ray and MRI show  full-thickness tear to the leading edge of the supraspinatus. I had the opportunity review the imaging with the patient today.  We discussed treatment options.  Discussed nonoperative management the form of oral anti-inflammatory medication, activity modification, the role of physical therapy, and corticosteroid injection.  We discussed operative intervention would be in the form of left/ shoulder arthroscopic rotator cuff repair and biceps tenotomy.  I described the operative technique of a rotator cuff repair.  I outlined the postoperative protocol.  We discussed the risk and benefits of operative intervention including but not limited to bleeding, infection, failure to cure pain, stiffness requiring manipulation, posttraumatic arthritis, reinjury to the rotator cuff tendons, loss of limb and loss of life.  I discussed that the risk of tendon not healing or reinjury to the tendons in the first 5 years is approximately 5%.  I had the opportunity to answer any questions.      I specifically discussed the requirement and importance of physical therapy after surgery.  Discussed with her that she will have to perform physical therapy at least once a week for 3 months to regain her full range of motion.  If she does not regain full range of motion, she is at risk for needing a manipulation under anesthesia, and continued pain.  She is in understanding of this    At this time point she feels she has trialed and exhausted nonoperative management.  She would like to move forward left shoulder arthroscopic rotator cuff repair and biceps tenotomy    Abdoulaye Harrington MD    [facility]   Department of Orthopedic Surgery      Disclaimer: This note consists of symbols derived from keyboarding, dictation and/or voice recognition software. As a result, there may be errors in the script that have gone undetected. Please consider this when interpreting information found in this chart.

## 2025-06-09 NOTE — LETTER
June 9, 2025      Lena Morrow  [address]   [facility] [address]        To Whom It May Concern:    Lena Morrow  was seen on 6/9/25.  Please excuse Kee Morrow from work due to appointment.        Sincerely,          Abdoulaye Harrington MD    Electronically signed

## 2025-06-09 NOTE — NURSING NOTE
Teaching Flowsheet     Visit Type: In Clinic    Time Start: 857  Time End: 930  Total Time Spent: 33 min.    Surgeon: Dr. Harrington  Location of Surgery (known or anticipated): at [facility] [place].   Type of Anesthesia: Choice with Block  Worker's Compensation Procedure: No    Pertinent Medical History: Depression or hypothyroidism  Were medical conditions reviewed and appropriate for location? Yes  BMI: Obesity Grade II BMI 35-39.9    Relevant Diagnosis: Nontraumatic complete tear of left rotator cuff    Teaching Topic: 7/18 ARTHROSCOPY, SHOULDER, WITH ROTATOR CUFF REPAIR , left.    PT and orthotic order placed on 6/9.  Faxed PT order to [facility] in [place] per patient request.    [facility]  [address] [place] [address]  P: [phone]  F: [phone]    Spouse needed doctor's excuse to attend patient's appointment.  May need FMLA to attend to her at/after surgery.  I told him to check into it.       Person(s) involved in teaching:   Patient and   : No.   Verified Patient's Phone Number: YES: [phone]    Caregiver//  Name: Kee  Phone Number: [phone]   Relationship:   Consent to Communicate on file: Yes  Authorization to Share Protected Health Information- Person to person communication signed by patient and authorized the following person or people:  Kee, spouse.     Motivation Level:  Asks Questions: Yes  Eager to Learn: Yes  Cooperative: Yes  Receptive (willing/able to accept information): Yes  Any cultural factors/Episcopalian beliefs that may influence understanding or compliance? No     Patient and Family demonstrates understanding of the following:  Reason for the appointment, diagnosis and treatment plan: Yes  Knowledge of proper use of medications and conditions for which they are ordered (with special attention to potential side effects or drug interactions): Yes  Which situations necessitate calling provider and whom to contact: Yes     Teaching  Concerns Addressed:   Proper use and care of medical equip, care aids, etc.: Yes  Nutritional needs and diet plan: Yes  Pain management techniques: Yes  Wound Care: Yes  How and/when to access community resources: Yes  Need for pre-op with in 30 days: YES, will be done with PCP. I asked them to ensure they go over their daily medications during this visit and discuss what medications need to be stopped before surgery and when. If you are doing a pre-op with your PCP and they are not within the [facility], I ask them to fax it to our pre-op office. Patient verbalized understanding.       Does patient have difficulty getting a ride to appointments (post-ops, PT/OT): Yes, explain: Will need son Jered to drive her.  Kee, spouse, is a .  He may need [place] to help her at home.  Patient's plan after discharge: home with family or spouse     Instructional Materials Used/Given:  two bottles of chlorhexidine soap and a surgery packet given to patient in clinic.   - Important Contact Info/Phone Numbers: [facility] number [phone]   - Map/location of surgery and follow-up appointments  - Showering instructions  - [employer] [employer]     -Next step: surgery scheduled for 7/18 and pre-op scheduled with PCP for 7/1.    Divine Brandon RN

## 2025-06-09 NOTE — LETTER
6/9/2025      Lena Morrow  [address]  [facility] [place] [facility] [address]      Dear Colleague,    Thank you for referring your patient, Lena Morrow, to the [facility] [place]. Please see a copy of my visit note below.    CC: Left shoulder pain    HPI: Patient is a 50-year-old female known to my practice with left shoulder pain and rotator cuff calcific deposit.  For full history and physical please see my prior note since last appointment, she has been following with neuro pain clinic.  She has had a suprascapular nerve block and a glenohumeral injection.  The most recent of which was in February 2025.  She is taking oxycodone, tizanidine, and THC for pain.  She has not done any recent physical therapy.  She is now having increasing pain over the anterior lateral and anterior aspect of her shoulder.  She notes pain when lifting objects away from her body, driving, and at night when she rolls over on her shoulder.  No prior surgery on her shoulder    Objective:   PE:  LUE: No open wounds or lacerations noted.  Mild pain to palpation of the clavicle, AC joint, and acromion.  Mild pain to palpation over the posterior aspect of the shoulder.  Focal pain to palpation over the anterior lateral aspect of the shoulder and along the biceps the bicipital groove.  Passive range of motion is 160 degrees of forward elevation, 150 degrees abduction, 60 degrees external rotation.  Active range of motion is 130 degrees of forward elevation limited by pain.  4+/5 strength in flexion and abduction limited by pain.  5/5 internal/external rotation.  Positive Jobes for pain over the anterior lateral aspect of the shoulder.  Positive speeds and [place] for pain over the bicipital groove    Imaging:   MRI without contrast of left shoulder from outside facility on May 6 was reviewed.  This shows full-thickness tearing of the leading edge of the supraspinatus with approximately 1 cm retraction.  No  muscle belly fatty atrophy.  Well-preserved muscle bulk.  Mild posterior superior labral fraying.  No chondral wear.  Mild biceps tendinosis    A/P:  Patient is a 50-year-old female seen here today in follow-up for her left shoulder pain.  X-ray and MRI show full-thickness tear to the leading edge of the supraspinatus. I had the opportunity review the imaging with the patient today.  We discussed treatment options.  Discussed nonoperative management the form of oral anti-inflammatory medication, activity modification, the role of physical therapy, and corticosteroid injection.  We discussed operative intervention would be in the form of left/ shoulder arthroscopic rotator cuff repair and biceps tenotomy.  I described the operative technique of a rotator cuff repair.  I outlined the postoperative protocol.  We discussed the risk and benefits of operative intervention including but not limited to bleeding, infection, failure to cure pain, stiffness requiring manipulation, posttraumatic arthritis, reinjury to the rotator cuff tendons, loss of limb and loss of life.  I discussed that the risk of tendon not healing or reinjury to the tendons in the first 5 years is approximately 5%.  I had the opportunity to answer any questions.      I specifically discussed the requirement and importance of physical therapy after surgery.  Discussed with her that she will have to perform physical therapy at least once a week for 3 months to regain her full range of motion.  If she does not regain full range of motion, she is at risk for needing a manipulation under anesthesia, and continued pain.  She is in understanding of this    At this time point she feels she has trialed and exhausted nonoperative management.  She would like to move forward left shoulder arthroscopic rotator cuff repair and biceps tenotomy    Abdoulaye Harrington MD    [facility]   Department of Orthopedic Surgery      Disclaimer: This  note consists of symbols derived from keyboarding, dictation and/or voice recognition software. As a result, there may be errors in the script that have gone undetected. Please consider this when interpreting information found in this chart.       Again, thank you for allowing me to participate in the care of your patient.        Sincerely,        Abdoulaye Harrington MD    Electronically signed

## 2025-06-26 ASSESSMENT — ASTHMA QUESTIONNAIRES
ACT_TOTALSCORE: 24
QUESTION_3 LAST FOUR WEEKS HOW OFTEN DID YOUR ASTHMA SYMPTOMS (WHEEZING, COUGHING, SHORTNESS OF BREATH, CHEST TIGHTNESS OR PAIN) WAKE YOU UP AT NIGHT OR EARLIER THAN USUAL IN THE MORNING: NOT AT ALL
QUESTION_2 LAST FOUR WEEKS HOW OFTEN HAVE YOU HAD SHORTNESS OF BREATH: NOT AT ALL
QUESTION_5 LAST FOUR WEEKS HOW WOULD YOU RATE YOUR ASTHMA CONTROL: COMPLETELY CONTROLLED
QUESTION_1 LAST FOUR WEEKS HOW MUCH OF THE TIME DID YOUR ASTHMA KEEP YOU FROM GETTING AS MUCH DONE AT WORK, SCHOOL OR AT HOME: NONE OF THE TIME
QUESTION_4 LAST FOUR WEEKS HOW OFTEN HAVE YOU USED YOUR RESCUE INHALER OR NEBULIZER MEDICATION (SUCH AS ALBUTEROL): ONCE A WEEK OR LESS

## 2025-06-30 ENCOUNTER — TRANSFERRED RECORDS (OUTPATIENT)
Dept: HEALTH INFORMATION MANAGEMENT | Facility: CLINIC | Age: 50
End: 2025-06-30
Payer: COMMERCIAL

## 2025-07-01 ENCOUNTER — OFFICE VISIT (OUTPATIENT)
Dept: INTERNAL MEDICINE | Facility: CLINIC | Age: 50
End: 2025-07-01
Payer: COMMERCIAL

## 2025-07-01 ENCOUNTER — RESULTS FOLLOW-UP (OUTPATIENT)
Dept: INTERNAL MEDICINE | Facility: CLINIC | Age: 50
End: 2025-07-01

## 2025-07-01 VITALS
RESPIRATION RATE: 18 BRPM | HEART RATE: 74 BPM | HEIGHT: 64 IN | SYSTOLIC BLOOD PRESSURE: 132 MMHG | OXYGEN SATURATION: 98 % | WEIGHT: 218.8 LBS | TEMPERATURE: 97.8 F | BODY MASS INDEX: 37.36 KG/M2 | DIASTOLIC BLOOD PRESSURE: 82 MMHG

## 2025-07-01 DIAGNOSIS — Z23 NEED FOR VACCINATION: ICD-10-CM

## 2025-07-01 DIAGNOSIS — Z01.818 PREOPERATIVE EXAMINATION: Primary | ICD-10-CM

## 2025-07-01 DIAGNOSIS — M75.122 NONTRAUMATIC COMPLETE TEAR OF LEFT ROTATOR CUFF: ICD-10-CM

## 2025-07-01 LAB
ALBUMIN SERPL BCG-MCNC: 4.3 G/DL (ref 3.5–5.2)
ALP SERPL-CCNC: 120 U/L (ref 40–150)
ALT SERPL W P-5'-P-CCNC: 30 U/L (ref 0–50)
ANION GAP SERPL CALCULATED.3IONS-SCNC: 11 MMOL/L (ref 7–15)
AST SERPL W P-5'-P-CCNC: 28 U/L (ref 0–45)
BILIRUB SERPL-MCNC: 0.2 MG/DL
BUN SERPL-MCNC: 10.5 MG/DL (ref 6–20)
CALCIUM SERPL-MCNC: 10.5 MG/DL (ref 8.8–10.4)
CHLORIDE SERPL-SCNC: 102 MMOL/L (ref 98–107)
CREAT SERPL-MCNC: 0.9 MG/DL (ref 0.51–0.95)
EGFRCR SERPLBLD CKD-EPI 2021: 78 ML/MIN/1.73M2
ERYTHROCYTE [DISTWIDTH] IN BLOOD BY AUTOMATED COUNT: 12.3 % (ref 10–15)
GLUCOSE SERPL-MCNC: 87 MG/DL (ref 70–99)
HCO3 SERPL-SCNC: 24 MMOL/L (ref 22–29)
HCT VFR BLD AUTO: 42.4 % (ref 35–47)
HGB BLD-MCNC: 14 G/DL (ref 11.7–15.7)
MCH RBC QN AUTO: 29.7 PG (ref 26.5–33)
MCHC RBC AUTO-ENTMCNC: 33 G/DL (ref 31.5–36.5)
MCV RBC AUTO: 90 FL (ref 78–100)
PLATELET # BLD AUTO: 340 10E3/UL (ref 150–450)
POTASSIUM SERPL-SCNC: 4.1 MMOL/L (ref 3.4–5.3)
PROT SERPL-MCNC: 7.4 G/DL (ref 6.4–8.3)
RBC # BLD AUTO: 4.72 10E6/UL (ref 3.8–5.2)
SODIUM SERPL-SCNC: 137 MMOL/L (ref 135–145)
WBC # BLD AUTO: 11.3 10E3/UL (ref 4–11)

## 2025-07-01 PROCEDURE — 36415 COLL VENOUS BLD VENIPUNCTURE: CPT | Performed by: INTERNAL MEDICINE

## 2025-07-01 PROCEDURE — 80053 COMPREHEN METABOLIC PANEL: CPT | Performed by: INTERNAL MEDICINE

## 2025-07-01 PROCEDURE — 3075F SYST BP GE 130 - 139MM HG: CPT | Performed by: INTERNAL MEDICINE

## 2025-07-01 PROCEDURE — 90472 IMMUNIZATION ADMIN EACH ADD: CPT | Performed by: INTERNAL MEDICINE

## 2025-07-01 PROCEDURE — 90471 IMMUNIZATION ADMIN: CPT | Performed by: INTERNAL MEDICINE

## 2025-07-01 PROCEDURE — 90750 HZV VACC RECOMBINANT IM: CPT | Performed by: INTERNAL MEDICINE

## 2025-07-01 PROCEDURE — 99213 OFFICE O/P EST LOW 20 MIN: CPT | Mod: 25 | Performed by: INTERNAL MEDICINE

## 2025-07-01 PROCEDURE — 90677 PCV20 VACCINE IM: CPT | Performed by: INTERNAL MEDICINE

## 2025-07-01 PROCEDURE — 3079F DIAST BP 80-89 MM HG: CPT | Performed by: INTERNAL MEDICINE

## 2025-07-01 PROCEDURE — 85027 COMPLETE CBC AUTOMATED: CPT | Performed by: INTERNAL MEDICINE

## 2025-07-01 NOTE — PATIENT INSTRUCTIONS
Prevnar 20 today.    Shingrix #1 today. #2 in 2-6 months (any pharmacy).    HOLD ibuprofen for at least 1 day prior to surgery. Tylenol is okay.    HOLD Claritin-D on the day of surgery.    May continue all other medications up to and on the morning of surgery (with a sip of water).

## 2025-07-01 NOTE — PROGRESS NOTES
ASSESSMENT/PLAN:                                                      Lena Morrow is a pleasant 50 year old female who presents for a preoperative evaluation. She is undergoing an intermediate risk procedure. Her risk of major cardiac event is 0 points: 0.4%.    (Z01.818) Preoperative examination  (primary encounter diagnosis)  (M75.122) Nontraumatic complete tear of left rotator cuff  Plan: CBC and CMP today; no additional workup, cardiac or otherwise, indicated prior to surgery; HOLD ibuprofen for at least 1 day prior to surgery; HOLD Claritin D on the day of surgery; no additional medication adjustments/changes indicated prior to surgery.     (Z23) Need for vaccination  Comment: Prevnar 20 given today; Shingrix #1 today; #2 in 2-6 months.     RECOMMEND PROCEEDING WITH SURGERY: YES    Katherine Zamora MD   [address] [address]  T: [phone], F: [phone]    SUBJECTIVE:                                                      Lena Morrow is a very pleasant 50 year old female who presents for preoperative evaluation.    Surgeon: Juany  Date: 7/18/2025  Location: [facility]  Surgery: LEFT shoulder arthroscopy with rotator cuff repair (intermediate risk)  Indication: LEFT rotator cuff tear    Past Medical History:   Diagnosis Date    Bipolar 1 disorder (H)     Exertional asthma     PHIL (generalized anxiety disorder)     Hypothyroidism     Obesity (BMI 30-39.9)      Personal or family history of excessive or prolonged bleeding? No  Personal or family history of blood clots? No  History of snoring/Sleep Apnea? YES - snores lightly; has never been tested for JESSIE  History of blood transfusions? No    Cardiovascular risk: None (0 points)    Risk of major cardiac event: 0 points: 0.4%    Past Surgical History:   Procedure Laterality Date    CARPAL TUNNEL RELEASE RT/LT Right     + excision right dorsal carpal ganglion cyst & terminal branch, right posterior  interosseous nerve.     SECTION      CYSTOSCOPY N/A 2017    Procedure: CYSTOSCOPY;;  Surgeon: Toni Da Silva MD;  Location:  OR    DAVINCI HYSTERECTOMY TOTAL, SALPINGECTOMY BILATERAL N/A 2017    Procedure: DAVINCI HYSTERECTOMY TOTAL, SALPINGECTOMY BILATERAL;  ROBOTIC ASSISTED LAPAROSCOPIC TOTAL HYSTERECTOMY; BILATERAL SALPINGECTOMY; CYSTOSCOPY;  Surgeon: Toni Da Silva MD;  Location:  OR    DILATION AND CURETTAGE N/A 2016    Procedure: DILATION AND CURETTAGE;  Surgeon: Josue Jama MD;  Location:  SD    EXTRACORPOREAL SHOCK WAVE LITHOTRIPSY (ESWL) Left 2021    Procedure: LEFT EXTRACORPOREAL SHOCK WAVE LITHOTRIPSY (ESWL);  Surgeon: Devonte Lambert MD;  Location:  OR    HYSTERECTOMY, PAP NO LONGER INDICATED      LAPAROSCOPIC CHOLECYSTECTOMY  2012    Procedure:LAPAROSCOPIC CHOLECYSTECTOMY; LAPAROSCOPIC CHOLECYSTECTOMY ; Surgeon:KARYNA CAMARA; Location:[facility]    LAPAROSCOPY DIAGNOSTIC (GYN) N/A 2016    Procedure: LAPAROSCOPY DIAGNOSTIC (GYN);  Surgeon: Josue Jama MD;  Location:  SD    LASER HOLMIUM LITHOTRIPSY URETER(S), INSERT STENT, COMBINED Left 2020    Procedure: Cystoscopy, left ureteroscopy with holmium laser lithotripsy and left stent placement;  Surgeon: Devonte Lambert MD;  Location:  OR    PERCUTANEOUS TENOTOMY Left 3/1/2024    Procedure: TENOTOMY, PERCUTANEOUS LEFT ROTATOR CUFF.;  Surgeon: Soy Barbour DO;  [facility]: [facility] OR    TONSILLECTOMY & ADENOIDECTOMY      ULTRASONIC REMOVAL SOFT TISSUE (LOCATION) Left 3/1/2024    Procedure: EXCISION, SOFT TISSUE, USING ULTRASONIC ASPIRATOR SYSTEM;  Surgeon: Soy Barbour DO;  Location: [facility] OR     Artificial assistive devices, such as pacemakers, artificial cardiac valves, or artificial joints? No    Allergies   Allergen Reactions    Amoxicillin-Pot Clavulanate Rash    Aripiprazole Other (See Comments)     increased sadness and moodiness    Azithromycin GI  Disturbance    Escitalopram Oxalate Diarrhea    Prochlorperazine Other (See Comments)     agitation    Sertraline Other (See Comments)     sweats    Venlafaxine Other (See Comments)     sweats     Personal or family history of allergy to anesthesia? No  Allergy to local or topical analgesics? No  Allergy to latex? No  Allergy to adhesives/skin preparations (chlorhexadine)? No    Current Outpatient Medications   Medication Sig    albuterol (PROAIR HFA/PROVENTIL HFA/VENTOLIN HFA) 108 (90 Base) MCG/ACT inhaler Inhale 2 puffs into the lungs every 6 hours. (Patient taking differently: Inhale 2 puffs into the lungs every 6 hours as needed for wheezing or cough.)    albuterol (PROVENTIL) (2.5 MG/3ML) 0.083% neb solution Take 1 vial (2.5 mg) by nebulization every 4 hours as needed for shortness of breath / dyspnea or wheezing    buPROPion (WELLBUTRIN XL) 300 MG 24 hr tablet Take 300 mg by mouth every morning    cholecalciferol (VITAMIN D3) 25 mcg (1000 units) capsule Take 1 capsule by mouth daily    esomeprazole (NEXIUM) 40 MG DR capsule Take 1 capsule (40 mg) by mouth every morning (before breakfast). Take 30-60 minutes before eating.    ketoconazole (NIZORAL) 2 % external shampoo APPLY TOPICALLY DAILY AS NEEDED FOR ITCHING OR IRRITATION    LamoTRIgine (LAMICTAL XR) 300 MG 24 hr tablet Take 1 tablet (300 mg) by mouth every morning.    levothyroxine (SYNTHROID/LEVOTHROID) 75 MCG tablet TAKE 1 TABLET(75 MCG) BY MOUTH DAILY    lithium ER (LITHOBID) 300 MG CR tablet Take 1,200 mg by mouth At Bedtime     loratadine-pseudoePHEDrine (CLARITIN-D 12-HOUR) 5-120 MG 12 hr tablet Take 1 tablet by mouth 2 times daily  (Patient taking differently: Take 1 tablet by mouth 2 times daily as needed for allergies.)    methocarbamol (ROBAXIN) 500 MG tablet Take 1 tablet (500 mg) by mouth 3 times daily as needed for muscle spasms.    Multiple Vitamins-Minerals (WOMENS MULTIVITAMIN PO) Take 2 capsules by mouth daily    ondansetron (ZOFRAN ODT) 4  "MG ODT tab Take 1 tablet (4 mg) by mouth every 6 hours as needed for nausea    oxyCODONE (ROXICODONE) 5 MG tablet TAKE 1 TABLET BY MOUTH EVERY DAY AS NEEDED FOR MODERATE TO SEVERE PAIN    potassium 99 MG TABS Take 99 mg by mouth daily     propranolol ER (INDERAL LA) 80 MG 24 hr capsule TAKE 1 CAPSULE(80 MG) BY MOUTH DAILY     Over the counter medications? YES - ibuprofen as needed  Vitamin Supplements? Other than above, no  Herbal Supplements? No    Family History   Problem Relation Age of Onset    Alcoholism Mother     Diabetes Type 2  Mother     Hypertension Mother     Depression Mother     Lung Cancer Father         smoker    Brain Tumor Brother         GBM    Diabetes Type 2  Paternal Grandmother     Myocardial Infarction Paternal Grandmother     Heart Failure Paternal Grandmother     Cancer Paternal Grandfather         unknown    Brain Cancer Paternal Aunt     Seizure Disorder Son         juvenile myoclonus seizures    Cerebrovascular Disease No family hx of     Breast Cancer No family hx of     Colon Cancer No family hx of     Ovarian Cancer No family hx of      Social History     Occupational History    Occupation: Not workung currently   Tobacco Use    Smoking status: Never    Smokeless tobacco: Never   Vaping Use    Vaping status: Never Used   Substance and Sexual Activity    Alcohol use: Yes     Comment: occasionally    Drug use: Yes     Types: Marijuana     Comment: CBD gummies    Sexual activity: Yes     Partners: Male   Social History Narrative    .    Son is 17 (as of 2025).     Walks when weather permits.      Functional capacity: Can walk on level ground at 4 mph (5 METs)    OBJECTIVE:                                                      /82   Pulse 74   Temp 97.8  F (36.6  C) (Temporal)   Resp 18   Ht 1.626 m (5' 4\")   Wt 99.2 kg (218 lb 12.8 oz)   LMP 12/10/2015   SpO2 98%   BMI 37.56 kg/m    Constitutional: well-appearing  Respiratory: normal respiratory effort; clear to " auscultation bilaterally  Cardiovascular: regular rate and rhythm; no edema  Gastrointestinal: soft, non-tender, non-distended, and bowel sounds present; no organomegaly or masses  Musculoskeletal: normal gait and station  Psych: normal judgment and insight; normal mood and affect    ---    (Chart documentation was completed, in part, with [employer] voice-recognition software. Even though reviewed, some grammatical, spelling, and word errors may remain.)

## 2025-07-03 DIAGNOSIS — R10.13 EPIGASTRIC PAIN: ICD-10-CM

## 2025-07-03 RX ORDER — ESOMEPRAZOLE MAGNESIUM 40 MG/1
CAPSULE, DELAYED RELEASE ORAL
Qty: 90 CAPSULE | Refills: 2 | Status: SHIPPED | OUTPATIENT
Start: 2025-07-03

## 2025-07-09 ENCOUNTER — MYC MEDICAL ADVICE (OUTPATIENT)
Dept: INTERNAL MEDICINE | Facility: CLINIC | Age: 50
End: 2025-07-09
Payer: COMMERCIAL

## 2025-07-09 DIAGNOSIS — B00.9 RECURRENT HERPES SIMPLEX: ICD-10-CM

## 2025-07-10 ENCOUNTER — E-VISIT (OUTPATIENT)
Dept: URGENT CARE | Facility: CLINIC | Age: 50
End: 2025-07-10
Payer: COMMERCIAL

## 2025-07-10 DIAGNOSIS — B00.9 HERPES SIMPLEX VIRUS INFECTION: Primary | ICD-10-CM

## 2025-07-10 RX ORDER — VALACYCLOVIR HYDROCHLORIDE 500 MG/1
500 TABLET, FILM COATED ORAL 2 TIMES DAILY
Qty: 6 TABLET | Refills: 3 | Status: SHIPPED | OUTPATIENT
Start: 2025-07-10

## 2025-07-10 RX ORDER — VALACYCLOVIR HYDROCHLORIDE 1 G/1
2000 TABLET, FILM COATED ORAL 2 TIMES DAILY
Qty: 12 TABLET | Refills: 1 | Status: SHIPPED | OUTPATIENT
Start: 2025-07-10 | End: 2025-07-11

## 2025-07-31 ENCOUNTER — OFFICE VISIT (OUTPATIENT)
Dept: ORTHOPEDICS | Facility: CLINIC | Age: 50
End: 2025-07-31
Payer: COMMERCIAL

## 2025-07-31 DIAGNOSIS — Z98.890 S/P ARTHROSCOPY OF LEFT SHOULDER: ICD-10-CM

## 2025-07-31 DIAGNOSIS — Z47.89 ORTHOPEDIC AFTERCARE: Primary | ICD-10-CM

## 2025-07-31 NOTE — PROGRESS NOTES
HISTORY OF PRESENT ILLNESS:  Lena Morrow is a 50 year old female who is seen in follow up now 2 weeks S/P Left Arthroscopic Rotator Cuff Repair, Arthroscopic Biceps Tenotomy , and Subacromial decompression with acromioplasty - (DOS:7/18/25) with Dr. Harrington.  Today: Patient reports that she has continued to improve since her operation.  She does note that she has been experiencing intermittent pain and difficulty sleeping.  She also notes a history of anxiety and feels claustrophobic within the shoulder immobilizer.  Wearing shoulder immobilizer full time, removing 3x a day for elbow/wrist ROM exercises.  Using ibuprofen and Tylenol as needed for pain management.  She has been participating in formal physical therapy at [facility] in [place]. Denies Chest pain, Calve pain, Fever, Chills.      PHYSICAL EXAM:  There were no vitals taken for this visit.  There is no weight on file to calculate BMI.   GENERAL APPEARANCE: healthy, alert and no distress   PSYCH: mentation appears normal and affect normal/bright    MSK:  Left Shoulder  Incision clean and dry, Sutures present, healing.  Normal postop incisional erythema.   Ecchymosis: Small, yellow-tinged patch localized to anterior left shoulder.  Appears to be resolving.  Edema: None  CMS: robles incisional numbness, otherwise grossly intact to digits.  AROM: Full elbow/wrist ROM. Shoulder ROM deferred at this time    Radiology:   Unable to view intraoperative arthroscopic images with patient      ASSESSMENT:  Lena Morrow is a 50 year old female who is seen in follow up now 2 weeks S/P Left Arthroscopic Rotator Cuff Repair, Arthroscopic Biceps Tenotomy , and Subacromial decompression with acromioplasty - (DOS:7/18/25) with Dr. Harrington.  - Healing appropriately    PLAN:  - Surgery discussed and all questions were answered at this time.  - Reviewed sleep hygiene, including avoiding going into bed and less for sleep.  Avoiding blue lights i.e. phone or TV use 1 hour prior to  "sleep.  - Discussed in great detail that she has been progressing well following her procedure.  I believe she is through the hardest part of recovery and will continue to improve.  - Sutures removed with sterile technique, steri-strips applied in usual fashion, care instructions given and verbally acknowledged.  - May allow soap/water to run over incision. Avoid submerging in pool/tub x2 weeks.  - Medications: Taper RX pain meds, OTC PRN.  - Physical Therapy: As instructed / RICE and PROM. Reviewed shoulder pendulum exercises today in clinic. Continue to follow \" [facility] standard rotator cuff repair\" PT protocol.  She was provided with a copy of the protocol to bring to [facility] physical therapy.      Return to clinic in 4 weeks for recheck    Jay Steward PA-C  Physician Assistant   Oncology and Adult Reconstructive Surgery  Dept Orthopaedic Surgery, [facility]      7/31/2025  This note was created using dictation software and may contain errors.  Please contact the creator for any clarifications that are needed.     "

## 2025-07-31 NOTE — LETTER
7/31/2025      Lena Morrow  [address]  [facility] [address]      Dear Colleague,    Thank you for referring your patient, Lena Morrow, to the [facility] [place]. Please see a copy of my visit note below.    HISTORY OF PRESENT ILLNESS:  Lena Morrow is a 50 year old female who is seen in follow up now 2 weeks S/P Left Arthroscopic Rotator Cuff Repair, Arthroscopic Biceps Tenotomy , and Subacromial decompression with acromioplasty - (DOS:7/18/25) with Dr. Harrington.  Today: Patient reports that she has continued to improve since her operation.  She does note that she has been experiencing intermittent pain and difficulty sleeping.  She also notes a history of anxiety and feels claustrophobic within the shoulder immobilizer.  Wearing shoulder immobilizer full time, removing 3x a day for elbow/wrist ROM exercises.  Using ibuprofen and Tylenol as needed for pain management.  She has been participating in formal physical therapy at [facility] in [place]. Denies Chest pain, Calve pain, Fever, Chills.      PHYSICAL EXAM:  There were no vitals taken for this visit.  There is no weight on file to calculate BMI.   GENERAL APPEARANCE: healthy, alert and no distress   PSYCH: mentation appears normal and affect normal/bright    MSK:  Left Shoulder  Incision clean and dry, Sutures present, healing.  Normal postop incisional erythema.   Ecchymosis: Small, yellow-tinged patch localized to anterior left shoulder.  Appears to be resolving.  Edema: None  CMS: robles incisional numbness, otherwise grossly intact to digits.  AROM: Full elbow/wrist ROM. Shoulder ROM deferred at this time    Radiology:   Unable to view intraoperative arthroscopic images with patient      ASSESSMENT:  Lena Morrow is a 50 year old female who is seen in follow up now 2 weeks S/P Left Arthroscopic Rotator Cuff Repair, Arthroscopic Biceps Tenotomy , and Subacromial decompression with acromioplasty - (DOS:7/18/25) with  "Dr. Harrington.  - Healing appropriately    PLAN:  - Surgery discussed and all questions were answered at this time.  - Reviewed sleep hygiene, including avoiding going into bed and less for sleep.  Avoiding blue lights i.e. phone or TV use 1 hour prior to sleep.  - Discussed in great detail that she has been progressing well following her procedure.  I believe she is through the hardest part of recovery and will continue to improve.  - Sutures removed with sterile technique, steri-strips applied in usual fashion, care instructions given and verbally acknowledged.  - May allow soap/water to run over incision. Avoid submerging in pool/tub x2 weeks.  - Medications: Taper RX pain meds, OTC PRN.  - Physical Therapy: As instructed / RICE and PROM. Reviewed shoulder pendulum exercises today in clinic. Continue to follow \" [facility] standard rotator cuff repair\" PT protocol.  She was provided with a copy of the protocol to bring to [facility] physical therapy.      Return to clinic in 4 weeks for recheck    Jay Steward PA-C  Physician Assistant   Oncology and Adult Reconstructive Surgery  Dept Orthopaedic Surgery, [facility]      7/31/2025  This note was created using dictation software and may contain errors.  Please contact the creator for any clarifications that are needed.       Again, thank you for allowing me to participate in the care of your patient.        Sincerely,        Jay Steward PA-C    Electronically signed"

## 2025-08-12 DIAGNOSIS — F41.1 GAD (GENERALIZED ANXIETY DISORDER): ICD-10-CM

## 2025-08-12 RX ORDER — PROPRANOLOL HYDROCHLORIDE 80 MG/1
80 CAPSULE, EXTENDED RELEASE ORAL DAILY
Qty: 90 CAPSULE | Refills: 1 | OUTPATIENT
Start: 2025-08-12

## 2025-09-04 ENCOUNTER — PATIENT OUTREACH (OUTPATIENT)
Dept: CARE COORDINATION | Facility: CLINIC | Age: 50
End: 2025-09-04

## 2025-09-04 ENCOUNTER — OFFICE VISIT (OUTPATIENT)
Dept: ORTHOPEDICS | Facility: CLINIC | Age: 50
End: 2025-09-04
Payer: COMMERCIAL

## 2025-09-04 DIAGNOSIS — S89.91XA INJURY OF RIGHT KNEE, INITIAL ENCOUNTER: Primary | ICD-10-CM

## (undated) DEVICE — GUIDEWIRE URO STR STIFF .035"X150CM NITINOL 150NSS35

## (undated) DEVICE — TRAY PAIN INJECTION 97A 640

## (undated) DEVICE — BLADE CLIPPER 4406

## (undated) DEVICE — SU VICRYL 4-0 PS-2 18" UND J496H

## (undated) DEVICE — LINEN TOWEL PACK X5 5464

## (undated) DEVICE — SOL NACL 0.9% IRRIG 1000ML BOTTLE 07138-09

## (undated) DEVICE — SOL ADH LIQUID BENZOIN SWAB 0.6ML C1544

## (undated) DEVICE — GLOVE BIOGEL PI MICRO INDICATOR UNDERGLOVE SZ 7.5 48975

## (undated) DEVICE — PACK DAVINCI GYN SMA15GDFS1

## (undated) DEVICE — GLOVE PROTEXIS W/NEU-THERA 7.5  2D73TE75

## (undated) DEVICE — DRSG GAUZE 4X4" TRAY 6939

## (undated) DEVICE — RAD RX ISOVUE 300 (50ML) 61% IOPAMIDOL CHARGE PER ML

## (undated) DEVICE — SOL NACL 0.9% INJ 500ML BAG 2B1323Q

## (undated) DEVICE — PACK CYSTOSCOPY SBA15CYFSI

## (undated) DEVICE — SOL WATER IRRIG 1000ML BOTTLE 2F7114

## (undated) DEVICE — RETR ELEV / UTERINE MANIPULATOR V-CARE LG CUP 60-6085-202

## (undated) DEVICE — DRSG STERI STRIP 1/2X4" R1547

## (undated) DEVICE — PREP CHLORAPREP 26ML TINTED ORANGE  260815

## (undated) DEVICE — DAVINCI HOT SHEARS TIP COVER  400180

## (undated) DEVICE — BAG CYSTO TABLE DRAIN

## (undated) DEVICE — LASER FIBER HOLMIUM FLEXIVA 365UM M0068403920

## (undated) DEVICE — PEN MARKING SKIN W/PAPER RULER 31145785

## (undated) DEVICE — TUBING CONMED AIRSEAL SMOKE EVAC INSUFFLATION ASM-EVAC

## (undated) DEVICE — DAVINCI S FCP BIPOLAR FENESTRATED 420205

## (undated) DEVICE — WIPES FOLEY CARE SURESTEP PROVON DFC100

## (undated) DEVICE — SHEATH URETERAL ACCESS NAVIGATOR HD 13/15FRX46CM M0062502290

## (undated) DEVICE — GLOVE PROTEXIS BLUE W/NEU-THERA 7.0  2D73EB70

## (undated) DEVICE — SUCTION IRR TRUMPET VALVE LAP ASU1201

## (undated) DEVICE — CATH URETERAL FLEX TIP TIGERTAIL 06FRX70CM 139006

## (undated) DEVICE — DRAPE SHEET REV FOLD 3/4 9349

## (undated) DEVICE — DAVINCI SI ENDOWIST ONE VESSEL SEALER 410322

## (undated) DEVICE — GLOVE PROTEXIS MICRO 7.0  2D73PM70

## (undated) DEVICE — ENDO TROCAR CONMED AIRSEAL BLADELESS 08X120MM IAS8-120LP

## (undated) DEVICE — DAVINCI S NDL DRIVER MEGA 420194

## (undated) DEVICE — BASKET STONE RETRIEVAL NTNL ZERO TIP 1.9FRX90CM M0063901050

## (undated) DEVICE — DRSG BANDAID 1X3" FABRIC CURITY LATEX FREE KC44101

## (undated) DEVICE — PAD CHUX UNDERPAD 23X24" 7136

## (undated) DEVICE — KIT PATIENT POSITIONING PIGAZZI LATEX FREE 40580

## (undated) DEVICE — GLOVE PROTEXIS W/NEU-THERA 6.5  2D73TE65

## (undated) DEVICE — DAVINCI OBTURATOR 8MM BLADELESS 420023

## (undated) DEVICE — GLOVE PROTEXIS BLUE W/NEU-THERA 8.0  2D73EB80

## (undated) DEVICE — DAVINCI S CANNULA SEAL 8.5-13MM 420206

## (undated) DEVICE — DAVINCI DRAPE KIT 4-ARM 420291

## (undated) DEVICE — ESU CORD MONOPOLAR 10'  E0510

## (undated) DEVICE — PAD CHUX UNDERPAD 30X36" P3036C

## (undated) DEVICE — SUCTION CANISTER MEDIVAC LINER 3000ML W/LID 65651-530

## (undated) DEVICE — SU VICRYL 0 UR-6 27" J603H

## (undated) DEVICE — CAST STOCKINETTE 4" COTTON 30-7004

## (undated) DEVICE — CATH TRAY FOLEY SURESTEP 16FR WDRAIN BAG STLK LATEX A300316A

## (undated) DEVICE — SOL NACL 0.9% INJ 1000ML BAG 2B1324X

## (undated) DEVICE — ESU GROUND PAD UNIVERSAL W/O CORD

## (undated) DEVICE — GEL ULTRASOUND AQUASONIC 20GM 01-01

## (undated) DEVICE — COVER SHOE HI-TOP FLUID RESISTANT

## (undated) RX ORDER — KETOROLAC TROMETHAMINE 30 MG/ML
INJECTION, SOLUTION INTRAMUSCULAR; INTRAVENOUS
Status: DISPENSED
Start: 2017-05-26

## (undated) RX ORDER — ONDANSETRON 2 MG/ML
INJECTION INTRAMUSCULAR; INTRAVENOUS
Status: DISPENSED
Start: 2021-08-05

## (undated) RX ORDER — CLINDAMYCIN PHOSPHATE 900 MG/50ML
INJECTION, SOLUTION INTRAVENOUS
Status: DISPENSED
Start: 2017-05-26

## (undated) RX ORDER — FENTANYL CITRATE 50 UG/ML
INJECTION, SOLUTION INTRAMUSCULAR; INTRAVENOUS
Status: DISPENSED
Start: 2017-05-26

## (undated) RX ORDER — ONDANSETRON 2 MG/ML
INJECTION INTRAMUSCULAR; INTRAVENOUS
Status: DISPENSED
Start: 2017-05-26

## (undated) RX ORDER — BUPIVACAINE HYDROCHLORIDE AND EPINEPHRINE 5; 5 MG/ML; UG/ML
INJECTION, SOLUTION EPIDURAL; INTRACAUDAL; PERINEURAL
Status: DISPENSED
Start: 2017-05-26

## (undated) RX ORDER — KETOROLAC TROMETHAMINE 30 MG/ML
INJECTION, SOLUTION INTRAMUSCULAR; INTRAVENOUS
Status: DISPENSED
Start: 2020-07-04

## (undated) RX ORDER — ATROPA BELLADONNA AND OPIUM 16.2; 3 MG/1; MG/1
SUPPOSITORY RECTAL
Status: DISPENSED
Start: 2020-07-04

## (undated) RX ORDER — FENTANYL CITRATE 50 UG/ML
INJECTION, SOLUTION INTRAMUSCULAR; INTRAVENOUS
Status: DISPENSED
Start: 2020-07-04

## (undated) RX ORDER — FENTANYL CITRATE 50 UG/ML
INJECTION, SOLUTION INTRAMUSCULAR; INTRAVENOUS
Status: DISPENSED
Start: 2021-08-05

## (undated) RX ORDER — DEXAMETHASONE SODIUM PHOSPHATE 4 MG/ML
INJECTION, SOLUTION INTRA-ARTICULAR; INTRALESIONAL; INTRAMUSCULAR; INTRAVENOUS; SOFT TISSUE
Status: DISPENSED
Start: 2020-07-04

## (undated) RX ORDER — HYDROMORPHONE HYDROCHLORIDE 1 MG/ML
INJECTION, SOLUTION INTRAMUSCULAR; INTRAVENOUS; SUBCUTANEOUS
Status: DISPENSED
Start: 2017-05-26

## (undated) RX ORDER — PROPOFOL 10 MG/ML
INJECTION, EMULSION INTRAVENOUS
Status: DISPENSED
Start: 2017-05-26

## (undated) RX ORDER — LIDOCAINE HYDROCHLORIDE 20 MG/ML
INJECTION, SOLUTION EPIDURAL; INFILTRATION; INTRACAUDAL; PERINEURAL
Status: DISPENSED
Start: 2017-05-26

## (undated) RX ORDER — KETOROLAC TROMETHAMINE 30 MG/ML
INJECTION, SOLUTION INTRAMUSCULAR; INTRAVENOUS
Status: DISPENSED
Start: 2021-08-05

## (undated) RX ORDER — DEXAMETHASONE SODIUM PHOSPHATE 4 MG/ML
INJECTION, SOLUTION INTRA-ARTICULAR; INTRALESIONAL; INTRAMUSCULAR; INTRAVENOUS; SOFT TISSUE
Status: DISPENSED
Start: 2017-05-26

## (undated) RX ORDER — DEXAMETHASONE SODIUM PHOSPHATE 4 MG/ML
INJECTION, SOLUTION INTRA-ARTICULAR; INTRALESIONAL; INTRAMUSCULAR; INTRAVENOUS; SOFT TISSUE
Status: DISPENSED
Start: 2021-08-05

## (undated) RX ORDER — LIDOCAINE HYDROCHLORIDE 10 MG/ML
INJECTION, SOLUTION EPIDURAL; INFILTRATION; INTRACAUDAL; PERINEURAL
Status: DISPENSED
Start: 2024-03-01

## (undated) RX ORDER — NEOSTIGMINE METHYLSULFATE 1 MG/ML
VIAL (ML) INJECTION
Status: DISPENSED
Start: 2017-05-26

## (undated) RX ORDER — ONDANSETRON 2 MG/ML
INJECTION INTRAMUSCULAR; INTRAVENOUS
Status: DISPENSED
Start: 2020-07-04

## (undated) RX ORDER — HYDROXYZINE HYDROCHLORIDE 50 MG/ML
INJECTION, SOLUTION INTRAMUSCULAR
Status: DISPENSED
Start: 2017-05-26

## (undated) RX ORDER — LIDOCAINE HYDROCHLORIDE 20 MG/ML
INJECTION, SOLUTION EPIDURAL; INFILTRATION; INTRACAUDAL; PERINEURAL
Status: DISPENSED
Start: 2020-07-04

## (undated) RX ORDER — PROPOFOL 10 MG/ML
INJECTION, EMULSION INTRAVENOUS
Status: DISPENSED
Start: 2020-07-04

## (undated) RX ORDER — VECURONIUM BROMIDE 1 MG/ML
INJECTION, POWDER, LYOPHILIZED, FOR SOLUTION INTRAVENOUS
Status: DISPENSED
Start: 2017-05-26

## (undated) RX ORDER — GLYCOPYRROLATE 0.2 MG/ML
INJECTION, SOLUTION INTRAMUSCULAR; INTRAVENOUS
Status: DISPENSED
Start: 2017-05-26

## (undated) RX ORDER — CEFAZOLIN SODIUM 2 G/100ML
INJECTION, SOLUTION INTRAVENOUS
Status: DISPENSED
Start: 2021-08-05